# Patient Record
Sex: FEMALE | Race: WHITE | NOT HISPANIC OR LATINO | Employment: OTHER | URBAN - METROPOLITAN AREA
[De-identification: names, ages, dates, MRNs, and addresses within clinical notes are randomized per-mention and may not be internally consistent; named-entity substitution may affect disease eponyms.]

---

## 2023-03-02 PROBLEM — G62.9 NEUROPATHY: Status: ACTIVE | Noted: 2023-03-02

## 2023-03-02 PROBLEM — Z78.0 ASYMPTOMATIC POSTMENOPAUSAL STATUS: Status: ACTIVE | Noted: 2023-03-02

## 2023-03-02 PROBLEM — I70.229 REST PAIN OF LOWER EXTREMITY DUE TO ATHEROSCLEROSIS (MULTI): Status: ACTIVE | Noted: 2023-03-02

## 2023-03-02 PROBLEM — I10 ESSENTIAL HYPERTENSION: Status: ACTIVE | Noted: 2023-03-02

## 2023-03-02 PROBLEM — L02.419 CUTANEOUS ABSCESS OF LIMB, UNSPECIFIED: Status: ACTIVE | Noted: 2023-03-02

## 2023-03-02 PROBLEM — R20.0 LEG NUMBNESS: Status: ACTIVE | Noted: 2023-03-02

## 2023-03-02 PROBLEM — R94.31 ABNORMAL EKG: Status: ACTIVE | Noted: 2023-03-02

## 2023-03-02 PROBLEM — I73.9 PERIPHERAL ARTERY DISEASE (CMS-HCC): Status: ACTIVE | Noted: 2023-03-02

## 2023-03-02 PROBLEM — R20.2 LEG PARESTHESIA: Status: ACTIVE | Noted: 2023-03-02

## 2023-03-02 PROBLEM — R60.0 LEG EDEMA, LEFT: Status: ACTIVE | Noted: 2023-03-02

## 2023-03-02 PROBLEM — L03.119 CELLULITIS, LEG: Status: ACTIVE | Noted: 2023-03-02

## 2023-03-02 PROBLEM — M79.89 SWELLING OF LOWER EXTREMITY: Status: ACTIVE | Noted: 2023-03-02

## 2023-03-02 PROBLEM — E78.5 HYPERLIPEMIA: Status: ACTIVE | Noted: 2023-03-02

## 2023-03-02 RX ORDER — ASPIRIN 81 MG/1
81 TABLET ORAL DAILY
Status: ON HOLD | COMMUNITY
End: 2024-03-20 | Stop reason: SINTOL

## 2023-03-02 RX ORDER — GABAPENTIN 100 MG/1
100 CAPSULE ORAL 3 TIMES DAILY
COMMUNITY
Start: 2022-11-16 | End: 2023-09-27 | Stop reason: ALTCHOICE

## 2023-03-02 RX ORDER — ROSUVASTATIN CALCIUM 5 MG/1
5 TABLET, COATED ORAL DAILY
COMMUNITY
End: 2023-04-26 | Stop reason: SDUPTHER

## 2023-03-02 RX ORDER — CLOPIDOGREL BISULFATE 75 MG/1
75 TABLET ORAL DAILY
COMMUNITY
Start: 2022-05-12 | End: 2023-04-26 | Stop reason: SDUPTHER

## 2023-03-29 ENCOUNTER — OFFICE VISIT (OUTPATIENT)
Dept: PRIMARY CARE | Facility: CLINIC | Age: 78
End: 2023-03-29
Payer: MEDICARE

## 2023-03-29 VITALS
HEIGHT: 65 IN | TEMPERATURE: 98 F | BODY MASS INDEX: 21.49 KG/M2 | WEIGHT: 129 LBS | SYSTOLIC BLOOD PRESSURE: 138 MMHG | OXYGEN SATURATION: 96 % | RESPIRATION RATE: 14 BRPM | DIASTOLIC BLOOD PRESSURE: 60 MMHG | HEART RATE: 70 BPM

## 2023-03-29 DIAGNOSIS — I10 ESSENTIAL HYPERTENSION: ICD-10-CM

## 2023-03-29 DIAGNOSIS — Z72.0 TOBACCO USE: ICD-10-CM

## 2023-03-29 DIAGNOSIS — G62.9 NEUROPATHY: ICD-10-CM

## 2023-03-29 DIAGNOSIS — I83.028: ICD-10-CM

## 2023-03-29 DIAGNOSIS — I73.9 PAD (PERIPHERAL ARTERY DISEASE) (CMS-HCC): ICD-10-CM

## 2023-03-29 DIAGNOSIS — E46 PROTEIN-CALORIE MALNUTRITION, UNSPECIFIED SEVERITY (MULTI): Primary | ICD-10-CM

## 2023-03-29 DIAGNOSIS — Z00.00 PERIODIC HEALTH ASSESSMENT, GENERAL SCREENING, ADULT: ICD-10-CM

## 2023-03-29 DIAGNOSIS — I70.229 REST PAIN OF LOWER EXTREMITY DUE TO ATHEROSCLEROSIS (MULTI): ICD-10-CM

## 2023-03-29 PROBLEM — L03.119 CELLULITIS, LEG: Status: RESOLVED | Noted: 2023-03-02 | Resolved: 2023-03-29

## 2023-03-29 PROBLEM — L02.419 CUTANEOUS ABSCESS OF LIMB, UNSPECIFIED: Status: RESOLVED | Noted: 2023-03-02 | Resolved: 2023-03-29

## 2023-03-29 PROCEDURE — 1160F RVW MEDS BY RX/DR IN RCRD: CPT | Performed by: INTERNAL MEDICINE

## 2023-03-29 PROCEDURE — 99213 OFFICE O/P EST LOW 20 MIN: CPT | Performed by: INTERNAL MEDICINE

## 2023-03-29 PROCEDURE — 1170F FXNL STATUS ASSESSED: CPT | Performed by: INTERNAL MEDICINE

## 2023-03-29 PROCEDURE — 3075F SYST BP GE 130 - 139MM HG: CPT | Performed by: INTERNAL MEDICINE

## 2023-03-29 PROCEDURE — 1157F ADVNC CARE PLAN IN RCRD: CPT | Performed by: INTERNAL MEDICINE

## 2023-03-29 PROCEDURE — 1159F MED LIST DOCD IN RCRD: CPT | Performed by: INTERNAL MEDICINE

## 2023-03-29 PROCEDURE — G0439 PPPS, SUBSEQ VISIT: HCPCS | Performed by: INTERNAL MEDICINE

## 2023-03-29 PROCEDURE — 3078F DIAST BP <80 MM HG: CPT | Performed by: INTERNAL MEDICINE

## 2023-03-29 PROCEDURE — 4004F PT TOBACCO SCREEN RCVD TLK: CPT | Performed by: INTERNAL MEDICINE

## 2023-03-29 ASSESSMENT — ACTIVITIES OF DAILY LIVING (ADL)
DRESSING: INDEPENDENT
BATHING: INDEPENDENT

## 2023-03-29 ASSESSMENT — ENCOUNTER SYMPTOMS
RHINORRHEA: 1
LOSS OF SENSATION IN FEET: 0
DEPRESSION: 0
COUGH: 0
PALPITATIONS: 0
SHORTNESS OF BREATH: 0
CONSTIPATION: 0
OCCASIONAL FEELINGS OF UNSTEADINESS: 0

## 2023-03-29 ASSESSMENT — PATIENT HEALTH QUESTIONNAIRE - PHQ9
SUM OF ALL RESPONSES TO PHQ9 QUESTIONS 1 AND 2: 0
1. LITTLE INTEREST OR PLEASURE IN DOING THINGS: NOT AT ALL
2. FEELING DOWN, DEPRESSED OR HOPELESS: NOT AT ALL

## 2023-03-29 NOTE — PROGRESS NOTES
"Subjective   Patient ID: Mechelle Alcantara is a 77 y.o. female who presents for Medicare Annual Wellness Visit Subsequent (Medicare wellness .) and follow up for PAD.    Overall she has been feeling well.  She has chronic neuropathic like pain in her left LE - primarily her foot.  No claudication symptoms and her pain is getting better.  She also denies any issues with CP, SOB or dizzy spells.      Review of Systems   HENT:  Positive for rhinorrhea.    Respiratory:  Negative for cough and shortness of breath.    Cardiovascular:  Negative for chest pain, palpitations and leg swelling.   Gastrointestinal:  Negative for constipation.     Objective   /60 (BP Location: Left arm, Patient Position: Sitting, BP Cuff Size: Adult)   Pulse 70   Temp 36.7 °C (98 °F) (Tympanic)   Resp 14   Ht 1.651 m (5' 5\")   Wt 58.5 kg (129 lb)   SpO2 96%   BMI 21.47 kg/m²     Physical Exam  Constitutional:       General: She is not in acute distress.     Appearance: Normal appearance. She is not ill-appearing.   HENT:      Head: Normocephalic and atraumatic.      Nose: Nose normal.   Eyes:      Extraocular Movements: Extraocular movements intact.      Conjunctiva/sclera: Conjunctivae normal.      Pupils: Pupils are equal, round, and reactive to light.   Cardiovascular:      Rate and Rhythm: Normal rate.   Pulmonary:      Effort: Pulmonary effort is normal.   Abdominal:      General: There is no distension.   Musculoskeletal:         General: Normal range of motion.      Cervical back: Neck supple.   Skin:     General: Skin is warm and dry.   Neurological:      General: No focal deficit present.      Mental Status: She is alert.      Gait: Gait normal.   Psychiatric:         Mood and Affect: Mood normal.         Behavior: Behavior normal.         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  She denies any current claudication symptoms.  Pain is improving even without gabapentin.  Will continue risk factor treatment.  She is still smoking and we discussed the need to continue to wokr on decreasing amount and hopefully quitting.    Annual Wellness Visit questions and answers were reviewed and discussed including the importance of discussing end of life wishes as well as having a living will and health care power of .     Follow up in 6 months - sooner if any issues.

## 2023-04-26 ENCOUNTER — TELEPHONE (OUTPATIENT)
Dept: PRIMARY CARE | Facility: CLINIC | Age: 78
End: 2023-04-26
Payer: MEDICARE

## 2023-04-26 DIAGNOSIS — I73.9 PERIPHERAL ARTERY DISEASE (CMS-HCC): ICD-10-CM

## 2023-04-26 DIAGNOSIS — E78.5 HYPERLIPIDEMIA, UNSPECIFIED HYPERLIPIDEMIA TYPE: Primary | ICD-10-CM

## 2023-04-26 RX ORDER — CLOPIDOGREL BISULFATE 75 MG/1
75 TABLET ORAL DAILY
Qty: 90 TABLET | Refills: 3 | Status: SHIPPED | OUTPATIENT
Start: 2023-04-26 | End: 2024-04-25

## 2023-04-26 RX ORDER — ROSUVASTATIN CALCIUM 5 MG/1
5 TABLET, COATED ORAL DAILY
Qty: 90 TABLET | Refills: 3 | Status: SHIPPED | OUTPATIENT
Start: 2023-04-26 | End: 2023-04-30 | Stop reason: SDUPTHER

## 2023-04-30 DIAGNOSIS — E78.5 HYPERLIPIDEMIA, UNSPECIFIED HYPERLIPIDEMIA TYPE: ICD-10-CM

## 2023-04-30 RX ORDER — ROSUVASTATIN CALCIUM 5 MG/1
5 TABLET, COATED ORAL DAILY
Qty: 90 TABLET | Refills: 3 | Status: SHIPPED | OUTPATIENT
Start: 2023-04-30 | End: 2024-05-16

## 2023-05-18 ENCOUNTER — LAB (OUTPATIENT)
Dept: LAB | Facility: LAB | Age: 78
End: 2023-05-18
Payer: MEDICARE

## 2023-05-18 DIAGNOSIS — I73.9 PAD (PERIPHERAL ARTERY DISEASE) (CMS-HCC): ICD-10-CM

## 2023-05-18 DIAGNOSIS — I10 ESSENTIAL HYPERTENSION: ICD-10-CM

## 2023-05-18 LAB
ALANINE AMINOTRANSFERASE (SGPT) (U/L) IN SER/PLAS: 10 U/L (ref 7–45)
ALBUMIN (G/DL) IN SER/PLAS: 3.9 G/DL (ref 3.4–5)
ALKALINE PHOSPHATASE (U/L) IN SER/PLAS: 129 U/L (ref 33–136)
ANION GAP IN SER/PLAS: 10 MMOL/L (ref 10–20)
ASPARTATE AMINOTRANSFERASE (SGOT) (U/L) IN SER/PLAS: 15 U/L (ref 9–39)
BILIRUBIN TOTAL (MG/DL) IN SER/PLAS: 0.4 MG/DL (ref 0–1.2)
CALCIUM (MG/DL) IN SER/PLAS: 9.1 MG/DL (ref 8.6–10.3)
CARBON DIOXIDE, TOTAL (MMOL/L) IN SER/PLAS: 27 MMOL/L (ref 21–32)
CHLORIDE (MMOL/L) IN SER/PLAS: 103 MMOL/L (ref 98–107)
CHOLESTEROL (MG/DL) IN SER/PLAS: 137 MG/DL (ref 0–199)
CHOLESTEROL IN HDL (MG/DL) IN SER/PLAS: 80.9 MG/DL
CHOLESTEROL/HDL RATIO: 1.7
CREATININE (MG/DL) IN SER/PLAS: 0.76 MG/DL (ref 0.5–1.05)
ERYTHROCYTE DISTRIBUTION WIDTH (RATIO) BY AUTOMATED COUNT: 13.2 % (ref 11.5–14.5)
ERYTHROCYTE MEAN CORPUSCULAR HEMOGLOBIN CONCENTRATION (G/DL) BY AUTOMATED: 32.2 G/DL (ref 32–36)
ERYTHROCYTE MEAN CORPUSCULAR VOLUME (FL) BY AUTOMATED COUNT: 95 FL (ref 80–100)
ERYTHROCYTES (10*6/UL) IN BLOOD BY AUTOMATED COUNT: 4.16 X10E12/L (ref 4–5.2)
GFR FEMALE: 80 ML/MIN/1.73M2
GLUCOSE (MG/DL) IN SER/PLAS: 92 MG/DL (ref 74–99)
HEMATOCRIT (%) IN BLOOD BY AUTOMATED COUNT: 39.5 % (ref 36–46)
HEMOGLOBIN (G/DL) IN BLOOD: 12.7 G/DL (ref 12–16)
LDL: 42 MG/DL (ref 0–99)
LEUKOCYTES (10*3/UL) IN BLOOD BY AUTOMATED COUNT: 6.2 X10E9/L (ref 4.4–11.3)
NRBC (PER 100 WBCS) BY AUTOMATED COUNT: 0 /100 WBC (ref 0–0)
PLATELETS (10*3/UL) IN BLOOD AUTOMATED COUNT: 381 X10E9/L (ref 150–450)
POTASSIUM (MMOL/L) IN SER/PLAS: 4.4 MMOL/L (ref 3.5–5.3)
PROTEIN TOTAL: 6.5 G/DL (ref 6.4–8.2)
SODIUM (MMOL/L) IN SER/PLAS: 136 MMOL/L (ref 136–145)
TRIGLYCERIDE (MG/DL) IN SER/PLAS: 69 MG/DL (ref 0–149)
UREA NITROGEN (MG/DL) IN SER/PLAS: 17 MG/DL (ref 6–23)
VLDL: 14 MG/DL (ref 0–40)

## 2023-05-18 PROCEDURE — 80061 LIPID PANEL: CPT

## 2023-05-18 PROCEDURE — 85027 COMPLETE CBC AUTOMATED: CPT

## 2023-05-18 PROCEDURE — 36415 COLL VENOUS BLD VENIPUNCTURE: CPT

## 2023-05-18 PROCEDURE — 80053 COMPREHEN METABOLIC PANEL: CPT

## 2023-05-19 ENCOUNTER — TELEPHONE (OUTPATIENT)
Dept: PRIMARY CARE | Facility: CLINIC | Age: 78
End: 2023-05-19
Payer: MEDICARE

## 2023-09-27 ENCOUNTER — OFFICE VISIT (OUTPATIENT)
Dept: PRIMARY CARE | Facility: CLINIC | Age: 78
End: 2023-09-27
Payer: MEDICARE

## 2023-09-27 VITALS
SYSTOLIC BLOOD PRESSURE: 134 MMHG | HEART RATE: 66 BPM | WEIGHT: 126 LBS | DIASTOLIC BLOOD PRESSURE: 80 MMHG | BODY MASS INDEX: 19.78 KG/M2 | RESPIRATION RATE: 14 BRPM | TEMPERATURE: 98 F | OXYGEN SATURATION: 97 % | HEIGHT: 67 IN

## 2023-09-27 DIAGNOSIS — G62.9 NEUROPATHY: ICD-10-CM

## 2023-09-27 DIAGNOSIS — I73.9 PERIPHERAL ARTERY DISEASE (CMS-HCC): Primary | ICD-10-CM

## 2023-09-27 DIAGNOSIS — R73.9 ELEVATED BLOOD SUGAR: ICD-10-CM

## 2023-09-27 DIAGNOSIS — I10 ESSENTIAL HYPERTENSION: ICD-10-CM

## 2023-09-27 DIAGNOSIS — Z00.00 PERIODIC HEALTH ASSESSMENT, GENERAL SCREENING, ADULT: ICD-10-CM

## 2023-09-27 PROCEDURE — 3075F SYST BP GE 130 - 139MM HG: CPT | Performed by: INTERNAL MEDICINE

## 2023-09-27 PROCEDURE — 99213 OFFICE O/P EST LOW 20 MIN: CPT | Performed by: INTERNAL MEDICINE

## 2023-09-27 PROCEDURE — 4004F PT TOBACCO SCREEN RCVD TLK: CPT | Performed by: INTERNAL MEDICINE

## 2023-09-27 PROCEDURE — 1160F RVW MEDS BY RX/DR IN RCRD: CPT | Performed by: INTERNAL MEDICINE

## 2023-09-27 PROCEDURE — 1159F MED LIST DOCD IN RCRD: CPT | Performed by: INTERNAL MEDICINE

## 2023-09-27 PROCEDURE — 3079F DIAST BP 80-89 MM HG: CPT | Performed by: INTERNAL MEDICINE

## 2023-09-27 ASSESSMENT — ENCOUNTER SYMPTOMS
SHORTNESS OF BREATH: 0
EYE PAIN: 1
ABDOMINAL PAIN: 0
PALPITATIONS: 0
COUGH: 0
WHEEZING: 0
EYE REDNESS: 1
CONSTIPATION: 0
VOMITING: 0
DIARRHEA: 0

## 2023-09-27 NOTE — PROGRESS NOTES
"Subjective   Patient ID: Mechelle Alcantara is a 77 y.o. female who presents for Hyperlipidemia.    Hyperlipidemia  Pertinent negatives include no chest pain or shortness of breath.   Has had to cut down to taking medication every other day due to leg pains.  Decreasing dose has in fact helped.    She denies any issues with CP, SOB or dizzy spells.    Unfortunately she continues to smoke and has no desire to quit,.      Review of Systems   Eyes:  Positive for pain and redness.   Respiratory:  Negative for cough, shortness of breath and wheezing.    Cardiovascular:  Negative for chest pain and palpitations.   Gastrointestinal:  Negative for abdominal pain, constipation, diarrhea and vomiting.       Objective   /80 (BP Location: Left arm, Patient Position: Sitting, BP Cuff Size: Adult)   Pulse 66   Temp 36.7 °C (98 °F) (Tympanic)   Resp 14   Ht 1.689 m (5' 6.5\")   Wt 57.2 kg (126 lb)   SpO2 97%   BMI 20.03 kg/m²     Physical Exam  Vitals reviewed.   Constitutional:       Appearance: Normal appearance.   HENT:      Head: Normocephalic.   Cardiovascular:      Rate and Rhythm: Normal rate.   Pulmonary:      Effort: Pulmonary effort is normal.   Musculoskeletal:         General: Normal range of motion.   Neurological:      General: No focal deficit present.      Mental Status: She is alert.   Psychiatric:         Mood and Affect: Mood normal.         Assessment/Plan   Problem List Items Addressed This Visit             ICD-10-CM    Essential hypertension I10    Relevant Orders    Hemoglobin A1C    CBC    Comprehensive Metabolic Panel    Lipid Panel    Thyroid Stimulating Hormone    Neuropathy G62.9    Peripheral artery disease (CMS/HCC) - Primary I73.9    Relevant Orders    Hemoglobin A1C    CBC    Comprehensive Metabolic Panel    Lipid Panel    Thyroid Stimulating Hormone     Other Visit Diagnoses         Codes    Periodic health assessment, general screening, adult     Z00.00    Relevant Orders    Hemoglobin " A1C    CBC    Comprehensive Metabolic Panel    Lipid Panel    Thyroid Stimulating Hormone    Elevated blood sugar     R73.9    Relevant Orders    Hemoglobin A1C        Discussed all of the above.    No claudication or symptoms of PAD.  She does follow with Dr Chamberlain now.    We discussed need to control risks - blood sugars, HTN, HLD and smoking.    We will follow up in 6 months - with labs for reassessment.

## 2023-10-03 ENCOUNTER — TRANSCRIBE ORDERS (OUTPATIENT)
Dept: VASCULAR SURGERY | Facility: CLINIC | Age: 78
End: 2023-10-03
Payer: MEDICARE

## 2023-10-03 DIAGNOSIS — I73.9 PAD (PERIPHERAL ARTERY DISEASE) (CMS-HCC): ICD-10-CM

## 2023-10-03 DIAGNOSIS — R60.9 EDEMA, UNSPECIFIED TYPE: ICD-10-CM

## 2023-10-03 DIAGNOSIS — M79.89 SWELLING OF LEFT LOWER EXTREMITY: ICD-10-CM

## 2023-11-10 ENCOUNTER — OFFICE VISIT (OUTPATIENT)
Dept: PRIMARY CARE | Facility: CLINIC | Age: 78
End: 2023-11-10
Payer: MEDICARE

## 2023-11-10 ENCOUNTER — HOSPITAL ENCOUNTER (EMERGENCY)
Facility: HOSPITAL | Age: 78
Discharge: HOME | End: 2023-11-11
Attending: STUDENT IN AN ORGANIZED HEALTH CARE EDUCATION/TRAINING PROGRAM
Payer: MEDICARE

## 2023-11-10 ENCOUNTER — APPOINTMENT (OUTPATIENT)
Dept: RADIOLOGY | Facility: HOSPITAL | Age: 78
End: 2023-11-10
Payer: MEDICARE

## 2023-11-10 VITALS
OXYGEN SATURATION: 97 % | RESPIRATION RATE: 20 BRPM | BODY MASS INDEX: 20.03 KG/M2 | DIASTOLIC BLOOD PRESSURE: 82 MMHG | SYSTOLIC BLOOD PRESSURE: 128 MMHG | WEIGHT: 126 LBS | TEMPERATURE: 97.8 F | HEART RATE: 80 BPM

## 2023-11-10 DIAGNOSIS — H60.502 ACUTE OTITIS EXTERNA OF LEFT EAR, UNSPECIFIED TYPE: Primary | ICD-10-CM

## 2023-11-10 DIAGNOSIS — H60.22 ACUTE MALIGNANT OTITIS EXTERNA OF LEFT EAR: Primary | ICD-10-CM

## 2023-11-10 LAB
ALBUMIN SERPL BCP-MCNC: 4.1 G/DL (ref 3.4–5)
ALP SERPL-CCNC: 123 U/L (ref 33–136)
ALT SERPL W P-5'-P-CCNC: 8 U/L (ref 7–45)
ANION GAP SERPL CALC-SCNC: 12 MMOL/L (ref 10–20)
AST SERPL W P-5'-P-CCNC: 14 U/L (ref 9–39)
BILIRUB SERPL-MCNC: 0.3 MG/DL (ref 0–1.2)
BUN SERPL-MCNC: 16 MG/DL (ref 6–23)
CALCIUM SERPL-MCNC: 9.1 MG/DL (ref 8.6–10.3)
CHLORIDE SERPL-SCNC: 101 MMOL/L (ref 98–107)
CO2 SERPL-SCNC: 26 MMOL/L (ref 21–32)
CREAT SERPL-MCNC: 0.72 MG/DL (ref 0.5–1.05)
ERYTHROCYTE [DISTWIDTH] IN BLOOD BY AUTOMATED COUNT: 13.6 % (ref 11.5–14.5)
GFR SERPL CREATININE-BSD FRML MDRD: 86 ML/MIN/1.73M*2
GLUCOSE SERPL-MCNC: 118 MG/DL (ref 74–99)
HCT VFR BLD AUTO: 42.3 % (ref 36–46)
HGB BLD-MCNC: 13.8 G/DL (ref 12–16)
MCH RBC QN AUTO: 31 PG (ref 26–34)
MCHC RBC AUTO-ENTMCNC: 32.6 G/DL (ref 32–36)
MCV RBC AUTO: 95 FL (ref 80–100)
NRBC BLD-RTO: 0 /100 WBCS (ref 0–0)
PLATELET # BLD AUTO: 364 X10*3/UL (ref 150–450)
POTASSIUM SERPL-SCNC: 3.9 MMOL/L (ref 3.5–5.3)
PROT SERPL-MCNC: 7.5 G/DL (ref 6.4–8.2)
RBC # BLD AUTO: 4.45 X10*6/UL (ref 4–5.2)
SODIUM SERPL-SCNC: 135 MMOL/L (ref 136–145)
WBC # BLD AUTO: 7.8 X10*3/UL (ref 4.4–11.3)

## 2023-11-10 PROCEDURE — 99214 OFFICE O/P EST MOD 30 MIN: CPT | Performed by: NURSE PRACTITIONER

## 2023-11-10 PROCEDURE — 84520 ASSAY OF UREA NITROGEN: CPT | Performed by: STUDENT IN AN ORGANIZED HEALTH CARE EDUCATION/TRAINING PROGRAM

## 2023-11-10 PROCEDURE — 4004F PT TOBACCO SCREEN RCVD TLK: CPT | Performed by: NURSE PRACTITIONER

## 2023-11-10 PROCEDURE — 3079F DIAST BP 80-89 MM HG: CPT | Performed by: NURSE PRACTITIONER

## 2023-11-10 PROCEDURE — 36415 COLL VENOUS BLD VENIPUNCTURE: CPT | Performed by: STUDENT IN AN ORGANIZED HEALTH CARE EDUCATION/TRAINING PROGRAM

## 2023-11-10 PROCEDURE — 99285 EMERGENCY DEPT VISIT HI MDM: CPT | Performed by: STUDENT IN AN ORGANIZED HEALTH CARE EDUCATION/TRAINING PROGRAM

## 2023-11-10 PROCEDURE — 84075 ASSAY ALKALINE PHOSPHATASE: CPT | Performed by: STUDENT IN AN ORGANIZED HEALTH CARE EDUCATION/TRAINING PROGRAM

## 2023-11-10 PROCEDURE — 99284 EMERGENCY DEPT VISIT MOD MDM: CPT | Mod: 25 | Performed by: STUDENT IN AN ORGANIZED HEALTH CARE EDUCATION/TRAINING PROGRAM

## 2023-11-10 PROCEDURE — 85027 COMPLETE CBC AUTOMATED: CPT | Performed by: STUDENT IN AN ORGANIZED HEALTH CARE EDUCATION/TRAINING PROGRAM

## 2023-11-10 PROCEDURE — 2500000004 HC RX 250 GENERAL PHARMACY W/ HCPCS (ALT 636 FOR OP/ED): Performed by: STUDENT IN AN ORGANIZED HEALTH CARE EDUCATION/TRAINING PROGRAM

## 2023-11-10 PROCEDURE — 70481 CT ORBIT/EAR/FOSSA W/DYE: CPT | Performed by: STUDENT IN AN ORGANIZED HEALTH CARE EDUCATION/TRAINING PROGRAM

## 2023-11-10 PROCEDURE — 99285 EMERGENCY DEPT VISIT HI MDM: CPT | Mod: 25 | Performed by: STUDENT IN AN ORGANIZED HEALTH CARE EDUCATION/TRAINING PROGRAM

## 2023-11-10 PROCEDURE — 3074F SYST BP LT 130 MM HG: CPT | Performed by: NURSE PRACTITIONER

## 2023-11-10 PROCEDURE — 96365 THER/PROPH/DIAG IV INF INIT: CPT | Mod: 59 | Performed by: STUDENT IN AN ORGANIZED HEALTH CARE EDUCATION/TRAINING PROGRAM

## 2023-11-10 PROCEDURE — 70481 CT ORBIT/EAR/FOSSA W/DYE: CPT

## 2023-11-10 PROCEDURE — 1159F MED LIST DOCD IN RCRD: CPT | Performed by: NURSE PRACTITIONER

## 2023-11-10 PROCEDURE — 2550000001 HC RX 255 CONTRASTS: Performed by: STUDENT IN AN ORGANIZED HEALTH CARE EDUCATION/TRAINING PROGRAM

## 2023-11-10 PROCEDURE — 1160F RVW MEDS BY RX/DR IN RCRD: CPT | Performed by: NURSE PRACTITIONER

## 2023-11-10 RX ORDER — CIPROFLOXACIN AND DEXAMETHASONE 3; 1 MG/ML; MG/ML
4 SUSPENSION/ DROPS AURICULAR (OTIC) 2 TIMES DAILY
Qty: 7.5 ML | Refills: 0 | Status: SHIPPED | OUTPATIENT
Start: 2023-11-10 | End: 2024-03-06 | Stop reason: ALTCHOICE

## 2023-11-10 RX ORDER — AMOXICILLIN AND CLAVULANATE POTASSIUM 875; 125 MG/1; MG/1
1 TABLET, FILM COATED ORAL EVERY 12 HOURS
Qty: 14 TABLET | Refills: 0 | Status: SHIPPED | OUTPATIENT
Start: 2023-11-10 | End: 2023-11-17

## 2023-11-10 RX ADMIN — PIPERACILLIN SODIUM AND TAZOBACTAM SODIUM 3.38 G: 3; .375 INJECTION, SOLUTION INTRAVENOUS at 20:54

## 2023-11-10 RX ADMIN — IOHEXOL 60 ML: 350 INJECTION, SOLUTION INTRAVENOUS at 22:13

## 2023-11-10 ASSESSMENT — LIFESTYLE VARIABLES
EVER FELT BAD OR GUILTY ABOUT YOUR DRINKING: NO
EVER HAD A DRINK FIRST THING IN THE MORNING TO STEADY YOUR NERVES TO GET RID OF A HANGOVER: NO
HAVE PEOPLE ANNOYED YOU BY CRITICIZING YOUR DRINKING: NO
HAVE YOU EVER FELT YOU SHOULD CUT DOWN ON YOUR DRINKING: NO
REASON UNABLE TO ASSESS: NO

## 2023-11-10 ASSESSMENT — COLUMBIA-SUICIDE SEVERITY RATING SCALE - C-SSRS
6. HAVE YOU EVER DONE ANYTHING, STARTED TO DO ANYTHING, OR PREPARED TO DO ANYTHING TO END YOUR LIFE?: NO
2. HAVE YOU ACTUALLY HAD ANY THOUGHTS OF KILLING YOURSELF?: NO
1. IN THE PAST MONTH, HAVE YOU WISHED YOU WERE DEAD OR WISHED YOU COULD GO TO SLEEP AND NOT WAKE UP?: NO

## 2023-11-10 ASSESSMENT — PAIN - FUNCTIONAL ASSESSMENT: PAIN_FUNCTIONAL_ASSESSMENT: 0-10

## 2023-11-10 ASSESSMENT — PAIN DESCRIPTION - ORIENTATION: ORIENTATION: LEFT

## 2023-11-10 ASSESSMENT — PAIN SCALES - GENERAL
PAINLEVEL_OUTOF10: 6
PAINLEVEL_OUTOF10: 5 - MODERATE PAIN

## 2023-11-10 ASSESSMENT — PAIN DESCRIPTION - LOCATION: LOCATION: EAR

## 2023-11-10 NOTE — PROGRESS NOTES
Subjective   Patient ID: Mechelle Alcantara is a 78 y.o. female who presents for Earache.  Earache   There is pain in the left ear. This is a new problem. The current episode started in the past 7 days. The problem occurs constantly. The problem has been unchanged. There has been no fever. The pain is severe. Associated symptoms include ear discharge. Treatments tried: peroxide, alcohol and vinegar. The treatment provided no relief.    Review of Systems   HENT:  Positive for ear discharge and ear pain.    Objective   /82   Pulse 80   Temp 36.6 °C (97.8 °F) (Temporal)   Resp 20   Wt 57.2 kg (126 lb)   SpO2 97%   BMI 20.03 kg/m²   Physical Exam  Constitutional:       Appearance: Normal appearance.   HENT:      Head: Normocephalic.      Comments: Tenderness on palpation of left temporal area, but no erythema, warmth in that area.     Left Ear: Drainage (purulent), swelling and tenderness present. No mastoid tenderness.      Ears:        Comments: Edema, erythema, warmth of tragus, lower auricle. Also pre-auricular cellulitis, tenderness of skull above auricle.     Nose: Nose normal. No congestion or rhinorrhea.      Mouth/Throat:      Mouth: Mucous membranes are moist.   Cardiovascular:      Rate and Rhythm: Normal rate and regular rhythm.      Pulses: Normal pulses.      Heart sounds: No murmur heard.  Pulmonary:      Effort: Pulmonary effort is normal.      Breath sounds: Normal breath sounds.   Skin:     General: Skin is warm and dry.      Findings: Erythema (left pre-auricular erythema, warmth, inflammation) present.   Neurological:      General: No focal deficit present.      Mental Status: She is alert and oriented to person, place, and time.   Psychiatric:         Mood and Affect: Mood normal.         Behavior: Behavior normal.     Assessment/Plan   1. 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 will need left temporal CT to eval for possible malignant otitis externa. Likely needs IV  antibiotics.      Pt. Is hemodynamically stable and is not diabetic.  Discussed w/ Dr. Shubham Morris (ENT). The patient was sent to [facility] ER for evaluation of severe otitis externa with concern for malignant OE.

## 2023-11-11 VITALS
RESPIRATION RATE: 18 BRPM | HEART RATE: 78 BPM | DIASTOLIC BLOOD PRESSURE: 77 MMHG | BODY MASS INDEX: 20.99 KG/M2 | HEIGHT: 65 IN | OXYGEN SATURATION: 98 % | SYSTOLIC BLOOD PRESSURE: 178 MMHG | WEIGHT: 126 LBS | TEMPERATURE: 97 F

## 2023-11-11 NOTE — ED PROVIDER NOTES
HPI   Chief Complaint   Patient presents with    Earache     L ear pain since last weekend but worsened yesterday; sent by urgent care NP for possible CT d/t ear infection.       HPI    78-year-old female presents emergency room with chief complaint of earache.  Patient indicates that since Sunday she developed some ear pain on her left ear she attempted home remedies which include vinegar and alcohol and Tuesday she switched to hydroperoxide and water.  She indicates that last night she had terrible pain and subjective fever with purulent discharge she also claims that she has difficulty with chewing.  PMHx: Elevated cholesterol  PSHx: Appendectomy tonsillectomy  FHx: No relevant family history  SocHX:     EtOH:  none      Tobacco: 10 cigarettes a day      Other illicits:  none  Allergies: Fish containing products shellfish containing products.      ROS  Constitutional:      Denies: Fever, fatigue     Reports: None  Neuro:      Denies: Headache, numbness, tingling     Reports: None  Psych:      Denies: anorexia, SI, HI     Reports: None  HEENT:      Denies: vision change, congestion, sore throat     Reports: None  Cardiovascular:      Denies: Chest pain, palpitations, orthopnea     Reports: None  Pulmonary:      Denies: shortness of breath, cough, hemoptysis     Reports: None  Gastrointestinal:      Denies: Abdominal pain, nausea, vomiting, constipation, diarrhea, bright red stools, black stools     Reports: None  Genitourinary:      Denies: Flank pain, painful urination, frequent urination, discharge     Reports: None  Musculoskeletal:     Denies: loss of ROM, extremity pain, back pain     Reports: None  Integumentary:     Denies: Rash, itching     Reports: None    PHYSICAL EXAM  Constitutional: Well appearing, no acute distress, oriented to person/place/time  Psych: Age appropriate insight, judgement, no psychomotor agitation  Neuro: GCS 15, spontaneously moves all extremities  Head: Atraumatic, no salas sign,  no raccoon eyes  Eyes: Spontaneously open, PERRL, EOMI, no scleral icterus or conjunctival injection  ENT: Purulent discharge from the left ear.  As well as an erythematous streak moving proximal from the ear towards the face.  Patient has severe tenderness to palpitation of the mastoid process and has extreme pain with any manipulation of the ear.  Tympanic membrane was not able to be visualized due to swelling and purulent material.    Neck: Supple, nontender, no ROM restriction, no JVD  Respiratory:  respirations nonlabored, equal chest rise, no wheezes rales or rhonchi  Cardiovascular: Regular rate and rhythm,   distal pulses 2+ symmetric  Gastrointestinal: No gross deformation, normal bowel sounds, soft, nontender, nondistended  Musculoskeletal: Normal Appearance, Full ROM of extremities, no tenderness, no edema  Integumentary: Warm, dry, no rashes    MDM/ED Course  78-year-old female presents emergency room chief complaint of an earache.  Medical management and treatment in the emergency room will consist of CT scan of the head with contrast in the ear canal.  Most likely be placed on antibiotics here in the ED. patient was given a dose of antibiotics here, Zosyn.  Patient will be discharged with instructions to follow-up with the ENT specialist as well as antibiotics including Augmentin and Ciprodex.  An ear wick was placed for proper drainage and antibiotic placement.      I reviewed the case with the attending ED physician. The attending ED physician agrees with the plan. Patient and/or patient´s representative was counseled regarding labs, imaging, likely diagnosis, and plan. All questions were answered.  Eliot Friend MD  PGY-1  Emergency Medicine      Please excuse any misspellings or unintended errors related to the Dragon speech recognition software used to dictate this note.                   Kuna Coma Scale Score: 15                  Patient History   No past medical history on file.  No past  surgical history on file.  Family History   Problem Relation Name Age of Onset    Heart failure Father       Social History     Tobacco Use    Smoking status: Every Day     Packs/day: .25     Types: Cigarettes    Smokeless tobacco: Never   Vaping Use    Vaping Use: Never used   Substance Use Topics    Alcohol use: Never    Drug use: Never       Physical Exam   ED Triage Vitals [11/10/23 1807]   Temp Heart Rate Resp BP   35.6 °C (96.1 °F) 88 18 (!) 186/81      SpO2 Temp Source Heart Rate Source Patient Position   97 % Temporal -- --      BP Location FiO2 (%)     -- --       Physical Exam    ED Course & MDM   Diagnoses as of 11/10/23 2349   Acute otitis externa of left ear, unspecified type       Medical Decision Making      Procedure  Procedures     Eliot Friend MD  Resident  11/10/23 3219

## 2023-11-11 NOTE — DISCHARGE INSTRUCTIONS
You are seen in the emergency room today for otitis externa.  We have sent prescription antibiotics to [facility] in [place] include an oral tablet Augmentin as well as drops for your ear that are antibiotics Ciprodex.  Please take these to completion.  Please follow-up with your primary care and Dr. Morris.  If your ear does not progressively get better or you develop any new fevers pain anterior jaw, nausea, vomiting or any other concerning symptoms please come back to emergency room soon as possible.

## 2023-11-28 ENCOUNTER — OFFICE VISIT (OUTPATIENT)
Dept: OTOLARYNGOLOGY | Facility: CLINIC | Age: 78
End: 2023-11-28
Payer: MEDICARE

## 2023-11-28 VITALS
BODY MASS INDEX: 21.76 KG/M2 | HEIGHT: 65 IN | WEIGHT: 130.6 LBS | DIASTOLIC BLOOD PRESSURE: 79 MMHG | TEMPERATURE: 97.3 F | SYSTOLIC BLOOD PRESSURE: 184 MMHG

## 2023-11-28 DIAGNOSIS — H92.02 LEFT EAR PAIN: Primary | ICD-10-CM

## 2023-11-28 DIAGNOSIS — H60.392 INFECTIVE OTITIS EXTERNA OF LEFT EAR: ICD-10-CM

## 2023-11-28 PROCEDURE — 1160F RVW MEDS BY RX/DR IN RCRD: CPT | Performed by: OTOLARYNGOLOGY

## 2023-11-28 PROCEDURE — 3078F DIAST BP <80 MM HG: CPT | Performed by: OTOLARYNGOLOGY

## 2023-11-28 PROCEDURE — 1159F MED LIST DOCD IN RCRD: CPT | Performed by: OTOLARYNGOLOGY

## 2023-11-28 PROCEDURE — 4004F PT TOBACCO SCREEN RCVD TLK: CPT | Performed by: OTOLARYNGOLOGY

## 2023-11-28 PROCEDURE — 99203 OFFICE O/P NEW LOW 30 MIN: CPT | Performed by: OTOLARYNGOLOGY

## 2023-11-28 PROCEDURE — 3077F SYST BP >= 140 MM HG: CPT | Performed by: OTOLARYNGOLOGY

## 2023-11-28 PROCEDURE — 1125F AMNT PAIN NOTED PAIN PRSNT: CPT | Performed by: OTOLARYNGOLOGY

## 2023-11-28 NOTE — PROGRESS NOTES
Impression:  1. Left ear pain        2. Infective otitis externa of left ear              RECOMMENDATIONS/PLAN :  I reassured the patient that her ears appear normal today.  She can stop her antibiotic eardrops for now.  I want her to avoid sticking her finger in her ears or using Q-tips.  She must keep all water out of her ears as well.  If she starts developing pressure within the ears, I want her to use Flonase nasal spray-2 puffs each nostril daily.      **This electronic medical record note was created with the use of voice recognition software.  Despite proofreading, typographical or grammatical errors may be present that could affect meaning of content **    Subjective   Patient ID:     Mechelle Alcantara is a 78 y.o. female who presents to the office today after she recently had an outer ear infection on that left side.  She was seen through the emergency department and placed on antibiotic eardrops.  She is doing much better now.  She denies any fever chills or any upper respiratory complaints nasal congestion or significant postnasal drip.  Sometimes her left ear feels slightly muffled.  No vertigo or neurologic complaints.    ROS:  A detailed 12 system review of systems is noted on the intake form has been reviewed with the patient with details noted in the HPI and scanned into the patient's medical record.    Objective     No past medical history on file.     No past surgical history on file.     Allergies   Allergen Reactions    Fish Containing Products Anaphylaxis    Other Hives     IV Contrast Dye    Shellfish Containing Products Anaphylaxis          Current Outpatient Medications:     ciprofloxacin-dexamethasone (Ciprodex) otic suspension, Administer 4 drops into the left ear 2 times a day., Disp: 7.5 mL, Rfl: 0    rosuvastatin (Crestor) 5 mg tablet, Take 1 tablet (5 mg) by mouth once daily., Disp: 90 tablet, Rfl: 3    aspirin 81 mg EC tablet, Take 1 tablet (81 mg) by mouth once daily., Disp: , Rfl:  "    clopidogrel (Plavix) 75 mg tablet, Take 1 tablet (75 mg) by mouth once daily. (Patient not taking: Reported on 11/28/2023), Disp: 90 tablet, Rfl: 3     Tobacco Use: High Risk (11/28/2023)    Patient History     Smoking Tobacco Use: Every Day     Smokeless Tobacco Use: Never     Passive Exposure: Not on file        Alcohol Use: Not on file        Social History     Substance and Sexual Activity   Drug Use Never        Physical Exam:  Visit Vitals  BP (!) 184/79   Temp 36.3 °C (97.3 °F) (Temporal)   Ht 1.651 m (5' 5\")   Wt 59.2 kg (130 lb 9.6 oz)   BMI 21.73 kg/m²   OB Status Postmenopausal   Smoking Status Every Day   BSA 1.65 m²      General: Patient is alert, oriented, cooperative in no apparent distress.  Head: Normocephalic, atraumatic.  Eyes: PERRL, EOMI, Conjunctiva is clear. No nystagmus.  Ears: Right Ear-- Pinna is normal.  External auditory canal is patent. Tympanic membrane is [intact, translucent and has good mobility with my pneumatic otoscope. No effusion].  Mastoid is nontender.  Left ear-- Pinna is normal.  External auditory canal is patent no evidence of any residual inflammation or drainage.. Tympanic membrane is [intact, translucent and has good mobility with my pneumatic otoscope.  No effusion].  Mastoid is nontender.  Nose: Septum is deviated to the right.  No septal perforation or lesions. No septal hematoma/ seroma.  No signs of bleeding.  Inferior turbinates are mildly swollen.   No evidence of intranasal polyps.  No infectious drainage.  Throat:  Floor of mouth is clear, no masses.  Tongue appears normal, no lesions or masses. Gums, gingiva, buccal mucosa appear pink and moist, no lesions. Teeth are in fair repair.  No obvious dental infections.  Peritonsillar regions appear symmetric without swelling.  Hard and soft palate appear normal, no obvious cleft. Uvula is midline.  Oropharynx: No lesions. Retropharyngeal wall is flat.  No active postnasal drip.  Neck: Supple,  no lymphadenopathy. "  No masses.  Salivary Glands: Symmetric bilaterally.  No palpable masses.  No evidence of acute infection or salivary stones  Neurologic: Cranial Nerves 2-12 are grossly intact without focal deficits. Cerebellar function testing is normal.     Results:   []    Procedure:   []    Phani Russell, DO

## 2024-03-06 ENCOUNTER — OFFICE VISIT (OUTPATIENT)
Dept: PRIMARY CARE | Facility: CLINIC | Age: 79
End: 2024-03-06
Payer: MEDICARE

## 2024-03-06 ENCOUNTER — TELEPHONE (OUTPATIENT)
Dept: PRIMARY CARE | Facility: CLINIC | Age: 79
End: 2024-03-06

## 2024-03-06 VITALS
BODY MASS INDEX: 22.33 KG/M2 | HEIGHT: 65 IN | TEMPERATURE: 97.5 F | HEART RATE: 80 BPM | WEIGHT: 134 LBS | DIASTOLIC BLOOD PRESSURE: 80 MMHG | OXYGEN SATURATION: 98 % | RESPIRATION RATE: 14 BRPM | SYSTOLIC BLOOD PRESSURE: 140 MMHG

## 2024-03-06 DIAGNOSIS — E46 PROTEIN-CALORIE MALNUTRITION, UNSPECIFIED SEVERITY (MULTI): ICD-10-CM

## 2024-03-06 DIAGNOSIS — L03.116 CELLULITIS OF LEFT LOWER EXTREMITY: ICD-10-CM

## 2024-03-06 DIAGNOSIS — I83.028: ICD-10-CM

## 2024-03-06 DIAGNOSIS — L98.492 SKIN ULCER WITH FAT LAYER EXPOSED (MULTI): Primary | ICD-10-CM

## 2024-03-06 DIAGNOSIS — I70.229 REST PAIN OF LOWER EXTREMITY DUE TO ATHEROSCLEROSIS (MULTI): ICD-10-CM

## 2024-03-06 DIAGNOSIS — I73.9 PAD (PERIPHERAL ARTERY DISEASE) (CMS-HCC): ICD-10-CM

## 2024-03-06 PROCEDURE — 1158F ADVNC CARE PLAN TLK DOCD: CPT | Performed by: INTERNAL MEDICINE

## 2024-03-06 PROCEDURE — G0439 PPPS, SUBSEQ VISIT: HCPCS | Performed by: INTERNAL MEDICINE

## 2024-03-06 PROCEDURE — 1159F MED LIST DOCD IN RCRD: CPT | Performed by: INTERNAL MEDICINE

## 2024-03-06 PROCEDURE — 99214 OFFICE O/P EST MOD 30 MIN: CPT | Performed by: INTERNAL MEDICINE

## 2024-03-06 PROCEDURE — 1123F ACP DISCUSS/DSCN MKR DOCD: CPT | Performed by: INTERNAL MEDICINE

## 2024-03-06 PROCEDURE — 3079F DIAST BP 80-89 MM HG: CPT | Performed by: INTERNAL MEDICINE

## 2024-03-06 PROCEDURE — 1157F ADVNC CARE PLAN IN RCRD: CPT | Performed by: INTERNAL MEDICINE

## 2024-03-06 PROCEDURE — 1160F RVW MEDS BY RX/DR IN RCRD: CPT | Performed by: INTERNAL MEDICINE

## 2024-03-06 PROCEDURE — 1170F FXNL STATUS ASSESSED: CPT | Performed by: INTERNAL MEDICINE

## 2024-03-06 PROCEDURE — 3077F SYST BP >= 140 MM HG: CPT | Performed by: INTERNAL MEDICINE

## 2024-03-06 PROCEDURE — 1125F AMNT PAIN NOTED PAIN PRSNT: CPT | Performed by: INTERNAL MEDICINE

## 2024-03-06 RX ORDER — DOXYCYCLINE 100 MG/1
100 CAPSULE ORAL 2 TIMES DAILY
Qty: 28 CAPSULE | Refills: 0 | Status: SHIPPED
Start: 2024-03-06 | End: 2024-03-23 | Stop reason: HOSPADM

## 2024-03-06 ASSESSMENT — ENCOUNTER SYMPTOMS
WEAKNESS: 1
WOUND: 1
SHORTNESS OF BREATH: 0
DIARRHEA: 0
CONSTIPATION: 0
NAUSEA: 0
WHEEZING: 0
PALPITATIONS: 0
COUGH: 0
NUMBNESS: 1
MYALGIAS: 1

## 2024-03-06 ASSESSMENT — ACTIVITIES OF DAILY LIVING (ADL)
DRESSING: INDEPENDENT
GROCERY_SHOPPING: TOTAL CARE
DOING_HOUSEWORK: TOTAL CARE
TAKING_MEDICATION: TOTAL CARE
BATHING: INDEPENDENT
MANAGING_FINANCES: TOTAL CARE

## 2024-03-06 NOTE — PROGRESS NOTES
"Subjective   Patient ID: Mechelle Alcantara is a 78 y.o. female who presents for Medicare Annual Wellness Visit Subsequent ( Possible  infection.).    Present with red leg and ulcer lateral aspect of her left lower leg.  No F/C/S.  Some pain in the leg (most of which is chronic neuropathy).  Feels well otherwise.    History of PAD.  She did stop her aspirin some time ago.      Review of Systems   Respiratory:  Negative for cough, shortness of breath and wheezing.    Cardiovascular:  Negative for chest pain and palpitations.   Gastrointestinal:  Negative for constipation, diarrhea and nausea.   Musculoskeletal:  Positive for myalgias.   Skin:  Positive for wound.        Bi-lat lower   Neurological:  Positive for weakness and numbness.     Objective   /80 (BP Location: Left arm, Patient Position: Sitting, BP Cuff Size: Adult)   Pulse 80   Temp 36.4 °C (97.5 °F) (Tympanic)   Resp 14   Ht 1.651 m (5' 5\")   Wt 60.8 kg (134 lb)   SpO2 98%   BMI 22.30 kg/m²     Physical Exam  Vitals reviewed.   Constitutional:       Appearance: Normal appearance.   HENT:      Head: Normocephalic.   Cardiovascular:      Rate and Rhythm: Normal rate.   Pulmonary:      Effort: Pulmonary effort is normal.   Musculoskeletal:         General: Normal range of motion.   Skin:     General: Skin is warm and dry.      Comments: Dry flaking skin.  Good capillary refill.  No pain to touch.  Ulcer on lateral leg.  Same place where her previous arterial insufficiency ulcer was.     Neurological:      General: No focal deficit present.      Mental Status: She is alert.   Psychiatric:         Mood and Affect: Mood normal.         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    Ulcer with cellulitis.  No systemic symptoms.  We will start her on oral antibiotic.  We will arrange her to see the wound center to assist with the ulcer care.  We will have her restart her baby aspirin and crestor.  We will have her see Dr Cordero again as well.    If symptoms worsen she should go to the ER.   Annual Wellness Visit questions and answers were reviewed and discussed including the importance of discussing end of life wishes as well as having a living will and health care power of .     We will see her back in 1 months - sooner if any issue.

## 2024-03-12 ENCOUNTER — OFFICE VISIT (OUTPATIENT)
Dept: WOUND CARE | Facility: CLINIC | Age: 79
End: 2024-03-12
Payer: MEDICARE

## 2024-03-12 DIAGNOSIS — L98.492 SKIN ULCER WITH FAT LAYER EXPOSED (MULTI): ICD-10-CM

## 2024-03-12 PROCEDURE — 11042 DBRDMT SUBQ TIS 1ST 20SQCM/<: CPT

## 2024-03-12 PROCEDURE — 99213 OFFICE O/P EST LOW 20 MIN: CPT

## 2024-03-14 ENCOUNTER — TELEPHONE (OUTPATIENT)
Dept: VASCULAR SURGERY | Facility: HOSPITAL | Age: 79
End: 2024-03-14
Payer: MEDICARE

## 2024-03-14 NOTE — TELEPHONE ENCOUNTER
I have attempted to contact   Mrs. Alcantara     . There is no answer at the following phone number   [phone] or [phone]       . I have left a voice mail message for the patient to contact Dr. Chamberlain * office nurse at [phone]   Mechelle Childers RN BSN

## 2024-03-15 ENCOUNTER — TELEPHONE (OUTPATIENT)
Dept: VASCULAR SURGERY | Facility: CLINIC | Age: 79
End: 2024-03-15

## 2024-03-15 ENCOUNTER — PREP FOR PROCEDURE (OUTPATIENT)
Dept: VASCULAR SURGERY | Facility: CLINIC | Age: 79
End: 2024-03-15

## 2024-03-15 ENCOUNTER — HOSPITAL ENCOUNTER (OUTPATIENT)
Facility: HOSPITAL | Age: 79
Setting detail: OUTPATIENT SURGERY
End: 2024-03-15
Attending: SURGERY | Admitting: SURGERY
Payer: MEDICARE

## 2024-03-15 ENCOUNTER — TELEMEDICINE (OUTPATIENT)
Dept: VASCULAR SURGERY | Facility: CLINIC | Age: 79
End: 2024-03-15
Payer: MEDICARE

## 2024-03-15 DIAGNOSIS — I73.9 PAD (PERIPHERAL ARTERY DISEASE) (CMS-HCC): Primary | ICD-10-CM

## 2024-03-15 DIAGNOSIS — I73.9 PAD (PERIPHERAL ARTERY DISEASE) (CMS-HCC): ICD-10-CM

## 2024-03-15 DIAGNOSIS — I70.229: ICD-10-CM

## 2024-03-15 DIAGNOSIS — L98.492 SKIN ULCER WITH FAT LAYER EXPOSED (MULTI): ICD-10-CM

## 2024-03-15 PROCEDURE — 1159F MED LIST DOCD IN RCRD: CPT | Performed by: SURGERY

## 2024-03-15 PROCEDURE — 99214 OFFICE O/P EST MOD 30 MIN: CPT | Performed by: SURGERY

## 2024-03-15 PROCEDURE — 1123F ACP DISCUSS/DSCN MKR DOCD: CPT | Performed by: SURGERY

## 2024-03-15 PROCEDURE — 1160F RVW MEDS BY RX/DR IN RCRD: CPT | Performed by: SURGERY

## 2024-03-15 PROCEDURE — 1157F ADVNC CARE PLAN IN RCRD: CPT | Performed by: SURGERY

## 2024-03-15 RX ORDER — SODIUM CHLORIDE 9 MG/ML
100 INJECTION, SOLUTION INTRAVENOUS CONTINUOUS
Status: CANCELLED | OUTPATIENT
Start: 2024-03-15

## 2024-03-15 RX ORDER — PREDNISONE 50 MG/1
TABLET ORAL
Qty: 3 TABLET | Refills: 0 | Status: SHIPPED
Start: 2024-03-15 | End: 2024-03-23 | Stop reason: HOSPADM

## 2024-03-15 RX ORDER — DIPHENHYDRAMINE HCL 50 MG
CAPSULE ORAL
Qty: 1 CAPSULE | Refills: 0 | Status: SHIPPED
Start: 2024-03-15 | End: 2024-03-23 | Stop reason: HOSPADM

## 2024-03-15 NOTE — PROGRESS NOTES
Vascular Surgery Clinic Note    CC: wound left leg    HPI:  Mechelle Alcantara is 78 y.o. female with history of PAD and left leg wounds. KATHLEEN at wound center were 0.3 and wound on the left shin has not healed. Her  says she has had discoloration of left toes and pain in her foot lately.      Medical History:   has no past medical history on file.    Meds:   Current Outpatient Medications on File Prior to Visit   Medication Sig Dispense Refill    aspirin 81 mg EC tablet Take 1 tablet (81 mg) by mouth once daily.      clopidogrel (Plavix) 75 mg tablet Take 1 tablet (75 mg) by mouth once daily. 90 tablet 3    doxycycline (Vibramycin) 100 mg capsule Take 1 capsule (100 mg) by mouth 2 times a day for 14 days. Take with at least 8 ounces (large glass) of water, do not lie down for 30 minutes after 28 capsule 0    rosuvastatin (Crestor) 5 mg tablet Take 1 tablet (5 mg) by mouth once daily. 90 tablet 3     No current facility-administered medications on file prior to visit.        Allergies:   Allergies   Allergen Reactions    Fish Containing Products Anaphylaxis    Other Hives     IV Contrast Dye    Shellfish Containing Products Anaphylaxis       SH:    Social Determinants of Health     Tobacco Use: High Risk (3/6/2024)    Patient History     Smoking Tobacco Use: Every Day     Smokeless Tobacco Use: Never     Passive Exposure: Not on file   Alcohol Use: Not on file   Financial Resource Strain: Not on file   Food Insecurity: Not on file   Transportation Needs: Not on file   Physical Activity: Not on file   Stress: Not on file   Social Connections: Not on file   Intimate Partner Violence: Not on file   Depression: Not at risk (3/6/2024)    PHQ-2     PHQ-2 Score: 0   Housing Stability: Not on file   Utilities: Not on file   [employer]: Not on file        FH:  Family History   Problem Relation Name Age of Onset    Heart failure Father          ROS:  All systems were reviewed and are negative except as per  HPI.    Objective:  Vitals:  There were no vitals filed for this visit.     Assessment & Plan:  Mechelle Alcantara is 78 y.o. female with severe left leg PAD. Proceed with left leg angiogram next week.      I spent a total of 30 minutes on the day of the visit.         Negrito Chamberlain M.D.

## 2024-03-15 NOTE — TELEPHONE ENCOUNTER
Dear Mr. Mechelle Alcantara       Following review with Dr. Negrito Chamberlain, you have been scheduled for a vascular procedure on 3/19/2024 at  [facility]      Prior to your surgery please obtain the following blood work (orders entered into the electronic medical record) at least  one week before the planned operation..Blood work must be completed in the hospital lab, located on the first floor of [facility] near registration.       I have reviewed with the patient's   Mrs. Alcantara's  allergy to IVP dye    The following medications have been ordered and reviewed with the patient'e    Prednisone 50 mg one tablet PO orally 13 hours prior to procedure    Prednisone 50 mg one tablet PO orally 7 hours prior to procedure   Prednisone 50 mg one tablet PO orally 1 hour prior to procedure      Benadryl 50 mg one tablet PO one hour prior to procedure      Please remember nothing to eat nor drink after 12 midnight.   Please wear comfortable clothes and remember to bring with you your insurance cards, a form of identification and your COVID vaccination card with you.   Do not bring valuables such as credit cards, checkbook money or jewelry with you.     PLEASE BRING ALL MEDICATIONS OR AN UPDATED LIST OF CURRENT MEDICATIONS WITH YOU    You must have a  on the day of the procedure.   Directions to the medical center have been provided     The patient's    has verbalized an understanding of the instructions.   If you have any questions or concerns, please feel free to contact our office at [phone].    Mechelle Childers RN BSN  Vascular and Endovascular Surgery

## 2024-03-15 NOTE — TELEPHONE ENCOUNTER
The patient has a known IVP allergy     I have reviewed with the patient his/her allergy to IVP dye    The following medications have been ordered and reviewed with the patient  Prednisone 50 mg one tablet PO orally 13 hours prior to procedure    Prednisone 50 mg one tablet PO orally 7 hours prior to procedure   Prednisone 50 mg one tablet PO orally 1 hour prior to procedure      Benadryl 50 mg one tablet PO one hour prior to procedure      The medications have been called into the [facility] pharmacy in [place] .     MARY Childers RN BSN

## 2024-03-18 ENCOUNTER — TELEPHONE (OUTPATIENT)
Dept: VASCULAR SURGERY | Facility: HOSPITAL | Age: 79
End: 2024-03-18
Payer: MEDICARE

## 2024-03-18 NOTE — TELEPHONE ENCOUNTER
"I have had the pleasure of speaking with Mrs. Alcantara  Per the patient \"  I do not want to have a procedure done tomorrow, I thought  that this was just a test to check my circulation. I will not  come in tomorrow  for  the angiogram, Yes   I know that Dr. Chamberlain  reviewed the procedure  but I am not coming in .\"  I have notified the cath lab and Dr. Chamberlain that the patient has cancelled her procedure.   MARY Sterling  " No

## 2024-03-18 NOTE — TELEPHONE ENCOUNTER
I have attempted to contact     Mrs. Alcantara     . There is no answer at the following phone number  [phone]   . I have left a voice mail message for the patient to contact Dr. Albarran  office nurse* office nurse at [phone] .  Mechelle Childers RN BSN

## 2024-03-19 ENCOUNTER — APPOINTMENT (OUTPATIENT)
Dept: CARDIOLOGY | Facility: HOSPITAL | Age: 79
DRG: 603 | End: 2024-03-19
Payer: MEDICARE

## 2024-03-19 ENCOUNTER — OFFICE VISIT (OUTPATIENT)
Dept: WOUND CARE | Facility: CLINIC | Age: 79
DRG: 603 | End: 2024-03-19
Payer: MEDICARE

## 2024-03-19 ENCOUNTER — HOSPITAL ENCOUNTER (INPATIENT)
Facility: HOSPITAL | Age: 79
LOS: 3 days | Discharge: HOME | DRG: 603 | End: 2024-03-23
Attending: STUDENT IN AN ORGANIZED HEALTH CARE EDUCATION/TRAINING PROGRAM | Admitting: INTERNAL MEDICINE
Payer: MEDICARE

## 2024-03-19 DIAGNOSIS — I73.9 PAD (PERIPHERAL ARTERY DISEASE) (CMS-HCC): ICD-10-CM

## 2024-03-19 DIAGNOSIS — I10 HYPERTENSION, UNSPECIFIED TYPE: ICD-10-CM

## 2024-03-19 DIAGNOSIS — G62.9 NEUROPATHY: ICD-10-CM

## 2024-03-19 DIAGNOSIS — L03.116 LEFT LEG CELLULITIS: Primary | ICD-10-CM

## 2024-03-19 DIAGNOSIS — M79.605 PAIN IN LEFT LEG: ICD-10-CM

## 2024-03-19 DIAGNOSIS — L97.922: ICD-10-CM

## 2024-03-19 LAB
ALBUMIN SERPL BCP-MCNC: 3.5 G/DL (ref 3.4–5)
ALP SERPL-CCNC: 122 U/L (ref 33–136)
ALT SERPL W P-5'-P-CCNC: 11 U/L (ref 7–45)
ANION GAP SERPL CALC-SCNC: 11 MMOL/L (ref 10–20)
AST SERPL W P-5'-P-CCNC: 16 U/L (ref 9–39)
BASOPHILS # BLD AUTO: 0.05 X10*3/UL (ref 0–0.1)
BASOPHILS NFR BLD AUTO: 0.7 %
BILIRUB SERPL-MCNC: 0.3 MG/DL (ref 0–1.2)
BUN SERPL-MCNC: 23 MG/DL (ref 6–23)
CALCIUM SERPL-MCNC: 8.5 MG/DL (ref 8.6–10.3)
CHLORIDE SERPL-SCNC: 105 MMOL/L (ref 98–107)
CO2 SERPL-SCNC: 24 MMOL/L (ref 21–32)
CREAT SERPL-MCNC: 0.59 MG/DL (ref 0.5–1.05)
CRP SERPL-MCNC: 0.99 MG/DL
EGFRCR SERPLBLD CKD-EPI 2021: >90 ML/MIN/1.73M*2
EOSINOPHIL # BLD AUTO: 0.33 X10*3/UL (ref 0–0.4)
EOSINOPHIL NFR BLD AUTO: 4.4 %
ERYTHROCYTE [DISTWIDTH] IN BLOOD BY AUTOMATED COUNT: 13.8 % (ref 11.5–14.5)
ERYTHROCYTE [SEDIMENTATION RATE] IN BLOOD BY WESTERGREN METHOD: 43 MM/H (ref 0–30)
GLUCOSE SERPL-MCNC: 124 MG/DL (ref 74–99)
HCT VFR BLD AUTO: 37.1 % (ref 36–46)
HGB BLD-MCNC: 12.1 G/DL (ref 12–16)
IMM GRANULOCYTES # BLD AUTO: 0.01 X10*3/UL (ref 0–0.5)
IMM GRANULOCYTES NFR BLD AUTO: 0.1 % (ref 0–0.9)
LYMPHOCYTES # BLD AUTO: 1.32 X10*3/UL (ref 0.8–3)
LYMPHOCYTES NFR BLD AUTO: 17.7 %
MCH RBC QN AUTO: 30.3 PG (ref 26–34)
MCHC RBC AUTO-ENTMCNC: 32.6 G/DL (ref 32–36)
MCV RBC AUTO: 93 FL (ref 80–100)
MONOCYTES # BLD AUTO: 0.83 X10*3/UL (ref 0.05–0.8)
MONOCYTES NFR BLD AUTO: 11.1 %
NEUTROPHILS # BLD AUTO: 4.91 X10*3/UL (ref 1.6–5.5)
NEUTROPHILS NFR BLD AUTO: 66 %
NRBC BLD-RTO: 0 /100 WBCS (ref 0–0)
PLATELET # BLD AUTO: 400 X10*3/UL (ref 150–450)
POTASSIUM SERPL-SCNC: 4.3 MMOL/L (ref 3.5–5.3)
PROT SERPL-MCNC: 6.6 G/DL (ref 6.4–8.2)
RBC # BLD AUTO: 3.99 X10*6/UL (ref 4–5.2)
SODIUM SERPL-SCNC: 136 MMOL/L (ref 136–145)
WBC # BLD AUTO: 7.5 X10*3/UL (ref 4.4–11.3)

## 2024-03-19 PROCEDURE — 80053 COMPREHEN METABOLIC PANEL: CPT

## 2024-03-19 PROCEDURE — 93971 EXTREMITY STUDY: CPT

## 2024-03-19 PROCEDURE — 36415 COLL VENOUS BLD VENIPUNCTURE: CPT

## 2024-03-19 PROCEDURE — 96372 THER/PROPH/DIAG INJ SC/IM: CPT

## 2024-03-19 PROCEDURE — 2500000004 HC RX 250 GENERAL PHARMACY W/ HCPCS (ALT 636 FOR OP/ED)

## 2024-03-19 PROCEDURE — 96372 THER/PROPH/DIAG INJ SC/IM: CPT | Mod: GC

## 2024-03-19 PROCEDURE — 86140 C-REACTIVE PROTEIN: CPT

## 2024-03-19 PROCEDURE — 2500000001 HC RX 250 WO HCPCS SELF ADMINISTERED DRUGS (ALT 637 FOR MEDICARE OP)

## 2024-03-19 PROCEDURE — 96375 TX/PRO/DX INJ NEW DRUG ADDON: CPT

## 2024-03-19 PROCEDURE — 99285 EMERGENCY DEPT VISIT HI MDM: CPT | Mod: 25

## 2024-03-19 PROCEDURE — 85025 COMPLETE CBC W/AUTO DIFF WBC: CPT

## 2024-03-19 PROCEDURE — 11042 DBRDMT SUBQ TIS 1ST 20SQCM/<: CPT

## 2024-03-19 PROCEDURE — 96365 THER/PROPH/DIAG IV INF INIT: CPT | Mod: 59

## 2024-03-19 PROCEDURE — 99285 EMERGENCY DEPT VISIT HI MDM: CPT | Performed by: STUDENT IN AN ORGANIZED HEALTH CARE EDUCATION/TRAINING PROGRAM

## 2024-03-19 PROCEDURE — 85652 RBC SED RATE AUTOMATED: CPT

## 2024-03-19 PROCEDURE — 2500000004 HC RX 250 GENERAL PHARMACY W/ HCPCS (ALT 636 FOR OP/ED): Mod: JZ

## 2024-03-19 RX ORDER — CLOPIDOGREL BISULFATE 75 MG/1
75 TABLET ORAL ONCE
Status: COMPLETED | OUTPATIENT
Start: 2024-03-19 | End: 2024-03-19

## 2024-03-19 RX ORDER — CLOPIDOGREL BISULFATE 75 MG/1
75 TABLET ORAL DAILY
Status: DISCONTINUED | OUTPATIENT
Start: 2024-03-20 | End: 2024-03-23 | Stop reason: HOSPADM

## 2024-03-19 RX ORDER — CLOPIDOGREL BISULFATE 75 MG/1
75 TABLET ORAL DAILY
Status: DISCONTINUED | OUTPATIENT
Start: 2024-03-20 | End: 2024-03-19

## 2024-03-19 RX ORDER — ENOXAPARIN SODIUM 100 MG/ML
40 INJECTION SUBCUTANEOUS EVERY 24 HOURS
Status: DISCONTINUED | OUTPATIENT
Start: 2024-03-19 | End: 2024-03-23 | Stop reason: HOSPADM

## 2024-03-19 RX ORDER — MORPHINE SULFATE 4 MG/ML
4 INJECTION, SOLUTION INTRAMUSCULAR; INTRAVENOUS ONCE
Status: COMPLETED | OUTPATIENT
Start: 2024-03-19 | End: 2024-03-19

## 2024-03-19 RX ORDER — NAPROXEN SODIUM 220 MG/1
81 TABLET, FILM COATED ORAL DAILY
Status: DISCONTINUED | OUTPATIENT
Start: 2024-03-20 | End: 2024-03-23 | Stop reason: HOSPADM

## 2024-03-19 RX ORDER — POLYETHYLENE GLYCOL 3350 17 G/17G
17 POWDER, FOR SOLUTION ORAL DAILY
Status: DISCONTINUED | OUTPATIENT
Start: 2024-03-20 | End: 2024-03-23 | Stop reason: HOSPADM

## 2024-03-19 RX ADMIN — PIPERACILLIN SODIUM AND TAZOBACTAM SODIUM 3.38 G: 3; .375 INJECTION, SOLUTION INTRAVENOUS at 21:32

## 2024-03-19 RX ADMIN — ENOXAPARIN SODIUM 40 MG: 40 INJECTION SUBCUTANEOUS at 23:22

## 2024-03-19 RX ADMIN — VANCOMYCIN HYDROCHLORIDE 1250 MG: 1.25 INJECTION, POWDER, LYOPHILIZED, FOR SOLUTION INTRAVENOUS at 22:08

## 2024-03-19 RX ADMIN — MORPHINE SULFATE 4 MG: 4 INJECTION, SOLUTION INTRAMUSCULAR; INTRAVENOUS at 18:06

## 2024-03-19 RX ADMIN — CLOPIDOGREL BISULFATE 75 MG: 75 TABLET ORAL at 21:31

## 2024-03-19 ASSESSMENT — PAIN - FUNCTIONAL ASSESSMENT
PAIN_FUNCTIONAL_ASSESSMENT: 0-10
PAIN_FUNCTIONAL_ASSESSMENT: 0-10

## 2024-03-19 ASSESSMENT — PAIN SCALES - GENERAL
PAINLEVEL_OUTOF10: 0 - NO PAIN
PAINLEVEL_OUTOF10: 0 - NO PAIN
PAINLEVEL_OUTOF10: 4
PAINLEVEL_OUTOF10: 7
PAINLEVEL_OUTOF10: 0 - NO PAIN
PAINLEVEL_OUTOF10: 0 - NO PAIN
PAINLEVEL_OUTOF10: 9

## 2024-03-19 ASSESSMENT — COLUMBIA-SUICIDE SEVERITY RATING SCALE - C-SSRS
1. IN THE PAST MONTH, HAVE YOU WISHED YOU WERE DEAD OR WISHED YOU COULD GO TO SLEEP AND NOT WAKE UP?: NO
6. HAVE YOU EVER DONE ANYTHING, STARTED TO DO ANYTHING, OR PREPARED TO DO ANYTHING TO END YOUR LIFE?: NO
2. HAVE YOU ACTUALLY HAD ANY THOUGHTS OF KILLING YOURSELF?: NO

## 2024-03-19 ASSESSMENT — PAIN DESCRIPTION - PAIN TYPE: TYPE: ACUTE PAIN

## 2024-03-19 ASSESSMENT — LIFESTYLE VARIABLES
EVER FELT BAD OR GUILTY ABOUT YOUR DRINKING: NO
HAVE YOU EVER FELT YOU SHOULD CUT DOWN ON YOUR DRINKING: NO
EVER HAD A DRINK FIRST THING IN THE MORNING TO STEADY YOUR NERVES TO GET RID OF A HANGOVER: NO
HAVE PEOPLE ANNOYED YOU BY CRITICIZING YOUR DRINKING: NO

## 2024-03-19 ASSESSMENT — PAIN DESCRIPTION - LOCATION: LOCATION: ANKLE

## 2024-03-19 ASSESSMENT — PAIN DESCRIPTION - ORIENTATION: ORIENTATION: LEFT

## 2024-03-19 NOTE — Clinical Note
Vessel(s): left PTA and left dorsalis pedis artery. Injected with hand injections. Single view taken.

## 2024-03-19 NOTE — Clinical Note
Patient states that she does not want family to know about her procedure start time change. Patient is alert and oriented.

## 2024-03-19 NOTE — ED PROVIDER NOTES
EMERGENCY DEPARTMENT ENCOUNTER      Pt Name: Mechelle Alcantara  MRN: [id number]  Birthdate 1945  Date of evaluation: 3/19/2024  Provider: Pato Sharpe MD    CHIEF COMPLAINT       Chief Complaint   Patient presents with    Wound Check     Left leg. Sent from wound care needs IV ATB         HISTORY OF PRESENT ILLNESS    HPI  Patient is 78-year-old male with a history of peripheral arterial diseas and chronic left lower extremity wound, noncompliant with medications presenting with concerns for worsening right leg infection.  She notes that the area of erythema of her left leg has increased up to her knee and spreading somewhat of the medial thigh over the past week.  It is not warm but is highly tender to touch.  She denies claudication, fever, chills, chest pain, shortness of breath, nausea, vomiting.  She had the wound debrided in clinic today who sent her to the ER for further evaluation.  She reportedly stopped taking her doxycycline several days ago after it gave her diarrhea.  Notably, she stopped taking her dual antiplatelet therapy 2 months ago.  She was scheduled for an angiogram with her vascular surgeon at [facility] earlier this week, but there were communication issues and she did not take her prep for contrast allergy and had to reschedule for April.    Nursing Notes were reviewed.    PAST MEDICAL HISTORY   History reviewed. No pertinent past medical history.      SURGICAL HISTORY     History reviewed. No pertinent surgical history.      CURRENT MEDICATIONS       Current Discharge Medication List        CONTINUE these medications which have NOT CHANGED    Details   aspirin 81 mg EC tablet Take 1 tablet (81 mg) by mouth once daily.      clopidogrel (Plavix) 75 mg tablet Take 1 tablet (75 mg) by mouth once daily.  Qty: 90 tablet, Refills: 3    Associated Diagnoses: Peripheral artery disease (CMS/[facility])      diphenhydrAMINE (BENADryl) 50 mg capsule Take one capsule (50 mg) by mouth one hour prior to  procedure.  Qty: 1 capsule, Refills: 0    Associated Diagnoses: PAD (peripheral artery disease) (CMS/HCC); Extremity atherosclerosis with resting pain (CMS/HCC)      predniSONE (Deltasone) 50 mg tablet Take one tablet (50 mg) by mouth 13 hours, 7 hours and 1 hour prior to procedure.  Qty: 3 tablet, Refills: 0    Associated Diagnoses: PAD (peripheral artery disease) (CMS/HCC); Extremity atherosclerosis with resting pain (CMS/HCC)      rosuvastatin (Crestor) 5 mg tablet Take 1 tablet (5 mg) by mouth once daily.  Qty: 90 tablet, Refills: 3    Associated Diagnoses: Hyperlipidemia, unspecified hyperlipidemia type             ALLERGIES     Fish containing products, Shellfish containing products, Gadolinium-containing contrast media, and Iodinated contrast media    FAMILY HISTORY       Family History   Problem Relation Name Age of Onset    Heart failure Father            SOCIAL HISTORY       Social History     Socioeconomic History    Marital status:      Spouse name: None    Number of children: None    Years of education: None    Highest education level: None   Occupational History    None   Tobacco Use    Smoking status: Every Day     Packs/day: .25     Types: Cigarettes    Smokeless tobacco: Never   Vaping Use    Vaping Use: Never used   Substance and Sexual Activity    Alcohol use: Never    Drug use: Never    Sexual activity: Defer   Other Topics Concern    None   Social History Narrative    None     Social Determinants of Health     Financial Resource Strain: Low Risk  (3/20/2024)    Overall Financial Resource Strain (CARDIA)     Difficulty of Paying Living Expenses: Not very hard   Food Insecurity: Not on file   Transportation Needs: No Transportation Needs (3/20/2024)    PRAPARE - Transportation     Lack of Transportation (Medical): No     Lack of Transportation (Non-Medical): No   Physical Activity: Not on file   Stress: Not on file   Social Connections: Not on file   Intimate Partner Violence: Not on file    Housing Stability: Low Risk  (3/20/2024)    Housing Stability Vital Sign     Unable to Pay for Housing in the Last Year: No     Number of Places Lived in the Last Year: 1     Unstable Housing in the Last Year: No       SCREENINGS                        PHYSICAL EXAM    (up to 7 for level 4, 8 or more for level 5)     ED Triage Vitals [03/19/24 1421]   Temperature Heart Rate Respirations BP   36 °C (96.8 °F) 77 18 (!) 190/92      Pulse Ox Temp Source Heart Rate Source Patient Position   96 % Temporal Monitor Sitting      BP Location FiO2 (%)     Right arm --       Physical Exam  Vitals and nursing note reviewed.   Constitutional:       General: She is not in acute distress.     Appearance: She is not toxic-appearing.   HENT:      Head: Normocephalic and atraumatic.      Nose: Nose normal.      Mouth/Throat:      Mouth: Mucous membranes are moist.      Pharynx: Oropharynx is clear.   Eyes:      Extraocular Movements: Extraocular movements intact.      Conjunctiva/sclera: Conjunctivae normal.   Cardiovascular:      Rate and Rhythm: Normal rate and regular rhythm.      Comments: Non-palpable DP/PT pulses on the left leg  Pulmonary:      Effort: Pulmonary effort is normal. No respiratory distress.      Breath sounds: Normal breath sounds.   Abdominal:      General: There is no distension.      Palpations: Abdomen is soft.   Musculoskeletal:         General: No deformity or signs of injury.      Cervical back: Normal range of motion and neck supple.      Left lower leg: No edema.   Skin:     Comments: Circumferential zone of erythema from the foot up to the knee on the left with streaks going up the left medial thigh.  Slightly warm to palpation, no induration, crepitus, fluctuance.  Central ulceration of the lateral left leg   Neurological:      General: No focal deficit present.      Mental Status: Mental status is at baseline.          DIAGNOSTIC RESULTS     LABS:  Labs Reviewed   CBC WITH AUTO DIFFERENTIAL -  Abnormal       Result Value    WBC 7.5      nRBC 0.0      RBC 3.99 (*)     Hemoglobin 12.1      Hematocrit 37.1      MCV 93      MCH 30.3      MCHC 32.6      RDW 13.8      Platelets 400      Neutrophils % 66.0      Immature Granulocytes %, Automated 0.1      Lymphocytes % 17.7      Monocytes % 11.1      Eosinophils % 4.4      Basophils % 0.7      Neutrophils Absolute 4.91      Immature Granulocytes Absolute, Automated 0.01      Lymphocytes Absolute 1.32      Monocytes Absolute 0.83 (*)     Eosinophils Absolute 0.33      Basophils Absolute 0.05     COMPREHENSIVE METABOLIC PANEL - Abnormal    Glucose 124 (*)     Sodium 136      Potassium 4.3      Chloride 105      Bicarbonate 24      Anion Gap 11      Urea Nitrogen 23      Creatinine 0.59      eGFR >90      Calcium 8.5 (*)     Albumin 3.5      Alkaline Phosphatase 122      Total Protein 6.6      AST 16      Bilirubin, Total 0.3      ALT 11     SEDIMENTATION RATE, AUTOMATED - Abnormal    Sedimentation Rate 43 (*)    CBC - Abnormal    WBC 5.9      nRBC 0.0      RBC 4.15      Hemoglobin 12.7      Hematocrit 40.6      MCV 98      MCH 30.6      MCHC 31.3 (*)     RDW 13.9      Platelets 416     BASIC METABOLIC PANEL - Abnormal    Glucose 84      Sodium 136      Potassium 3.8      Chloride 105      Bicarbonate 23      Anion Gap 12      Urea Nitrogen 19      Creatinine 0.72      eGFR 86      Calcium 8.3 (*)    CBC WITH AUTO DIFFERENTIAL - Abnormal    WBC 7.7      nRBC 0.0      RBC 3.74 (*)     Hemoglobin 11.4 (*)     Hematocrit 35.4 (*)     MCV 95      MCH 30.5      MCHC 32.2      RDW 13.9      Platelets 401      Neutrophils % 69.9      Immature Granulocytes %, Automated 0.3      Lymphocytes % 15.6      Monocytes % 10.1      Eosinophils % 3.5      Basophils % 0.6      Neutrophils Absolute 5.39      Immature Granulocytes Absolute, Automated 0.02      Lymphocytes Absolute 1.20      Monocytes Absolute 0.78      Eosinophils Absolute 0.27      Basophils Absolute 0.05     RENAL  FUNCTION PANEL - Abnormal    Glucose 94      Sodium 138      Potassium 3.8      Chloride 108 (*)     Bicarbonate 22      Anion Gap 12      Urea Nitrogen 23      Creatinine 0.72      eGFR 86      Calcium 7.7 (*)     Phosphorus 2.8      Albumin 3.1 (*)    C-REACTIVE PROTEIN - Normal    C-Reactive Protein 0.99     PROTIME-INR - Normal    Protime 11.6      INR 1.0     MAGNESIUM - Normal    Magnesium 2.16         All other labs were within normal range or not returned as of this dictation.    Imaging  Vascular US lower extremity venous duplex left   Final Result           Procedures  Procedures     EMERGENCY DEPARTMENT COURSE/MDM:     Diagnoses as of 03/21/24 1955   Left leg cellulitis        Medical Decision Making  History obtained from the patient and her .  Records including labs, imaging, notes reviewed.  Primary diagnostic considerations for possible cellulitis, DVT, acute arterial occlusion.  Patient was not septic at presentation.  Given the concern for possible acute arterial occlusion, lactate was sent which was within normal limits.  Basic labs were also sent.  Chemistry notable for hyperglycemia of 124.  Hematology unremarkable.  With consideration for possible underlying osteomyelitis, inflammatory markers were sent.  ESR elevated at 43.  CRP normal at 0.99.  Venous duplex was ordered which demonstrated no acute DVTs.  We are informed by vascular ultrasound that they do not perform arterial duplex for acute arterial occlusion.  They do perform KATHLEEN, but that it just been performed in clinic prior to presentation in the ER.  This is depressed at 0.3 without evidence of acute arterial occlusion.  On the venous duplex, there were multilevel chronic arterial occlusions seen incidentally but no acute findings.  Patient's pain was acutely treated with success with morphine.  Patient was discussed with Dr. Singh of vascular surgery who recommended admission with broad-spectrum antibiotics for cellulitis as  well as resumption of her dual antiplatelet therapy that she had voluntarily discontinued 2 months prior.  Patient was subsequently admitted to the medicine service for further evaluation and treatment.  Patient and her  were amenable to this plan.    Patient and or family in agreement and understanding of treatment plan.  All questions answered.      I reviewed the case with the attending ED physician. The attending ED physician agrees with the plan. Patient and/or patient´s representative was counseled regarding labs, imaging, likely diagnosis, and plan. All questions were answered.    ED Medications administered this visit:    Medications   aspirin chewable tablet 81 mg (81 mg oral Given 3/21/24 0802)   enoxaparin (Lovenox) syringe 40 mg (40 mg subcutaneous Given 3/20/24 2237)   polyethylene glycol (Glycolax, Miralax) packet 17 g (17 g oral Not Given 3/21/24 0900)   clopidogrel (Plavix) tablet 75 mg (75 mg oral Given 3/21/24 0802)   rosuvastatin (Crestor) tablet 5 mg (5 mg oral Given 3/20/24 2237)   ceFAZolin in dextrose (iso-os) (Ancef) IVPB 1 g (0 g intravenous Stopped 3/21/24 1953)   predniSONE (Deltasone) tablet 50 mg (50 mg oral Given 3/21/24 1844)   diphenhydrAMINE (BENADryl) injection 50 mg (has no administration in time range)   amLODIPine (Norvasc) tablet 5 mg (5 mg oral Given 3/21/24 1457)   morphine injection 4 mg (4 mg intravenous Given 3/19/24 1806)   vancomycin (Vancocin) in  mL IV 1,250 mg (1,250 mg intravenous Given 3/19/24 2208)   piperacillin-tazobactam-dextrose (Zosyn) IV 3.375 g (0 g intravenous Stopped 3/19/24 2202)   clopidogrel (Plavix) tablet 75 mg (75 mg oral Given 3/19/24 2131)       New Prescriptions from this visit:    Current Discharge Medication List          Follow-up:  No follow-up provider specified.      Final Impression:   1. Left leg cellulitis    2. Pain in left leg    3. PAD (peripheral artery disease) (CMS/HCC)    4. Leg ulcer, left, with fat layer exposed  (CMS/[facility])          (Please note that portions of this note were completed with a voice recognition program.  Efforts were made to edit the dictations but occasionally words are mis-transcribed.)     Pato Sharpe MD  Resident  03/21/24 1958

## 2024-03-19 NOTE — Clinical Note
Vessel(s): left CFA, left SFA and left popliteal artery. Injected with hand injections. Single view taken.

## 2024-03-19 NOTE — Clinical Note
Patient Clipped and Prepped: left pedal. Prepped with ChloraPrep, a minimum of 3 minute dry time, longer if needed, no pooling noted, patient draped in sterile fashion.

## 2024-03-19 NOTE — Clinical Note
Sheath was exchanged in the right femoral artery with SHEATH, [place], W/.038 GUIDEWIRE, 10 CM,  6FR INTRODUCER, 6FR NAYELI, 2.5 CM DIALATOR.

## 2024-03-19 NOTE — Clinical Note
Vessel(s): left PTA, left peroneal artery and left DARREN. Injected with hand injections. Single view taken.

## 2024-03-19 NOTE — Clinical Note
Vessel(s): left iliac artery and right iliac artery. Injected with hand injections. Single view taken.

## 2024-03-19 NOTE — Clinical Note
Vessel(s): left SFA. Guidewire inserted and used as the primary guidewire crosses lesion. Attempting to cross lesion

## 2024-03-19 NOTE — ED TRIAGE NOTES
Pt states she was sent over from wound care. Not taking any medications as prescribed. She was placed on doxy, and pt stated not taking

## 2024-03-20 LAB
ANION GAP SERPL CALC-SCNC: 12 MMOL/L (ref 10–20)
BUN SERPL-MCNC: 19 MG/DL (ref 6–23)
CALCIUM SERPL-MCNC: 8.3 MG/DL (ref 8.6–10.3)
CHLORIDE SERPL-SCNC: 105 MMOL/L (ref 98–107)
CO2 SERPL-SCNC: 23 MMOL/L (ref 21–32)
CREAT SERPL-MCNC: 0.72 MG/DL (ref 0.5–1.05)
EGFRCR SERPLBLD CKD-EPI 2021: 86 ML/MIN/1.73M*2
ERYTHROCYTE [DISTWIDTH] IN BLOOD BY AUTOMATED COUNT: 13.9 % (ref 11.5–14.5)
GLUCOSE SERPL-MCNC: 84 MG/DL (ref 74–99)
HCT VFR BLD AUTO: 40.6 % (ref 36–46)
HGB BLD-MCNC: 12.7 G/DL (ref 12–16)
INR PPP: 1 (ref 0.9–1.1)
MCH RBC QN AUTO: 30.6 PG (ref 26–34)
MCHC RBC AUTO-ENTMCNC: 31.3 G/DL (ref 32–36)
MCV RBC AUTO: 98 FL (ref 80–100)
NRBC BLD-RTO: 0 /100 WBCS (ref 0–0)
PLATELET # BLD AUTO: 416 X10*3/UL (ref 150–450)
POTASSIUM SERPL-SCNC: 3.8 MMOL/L (ref 3.5–5.3)
PROTHROMBIN TIME: 11.6 SECONDS (ref 9.8–12.8)
RBC # BLD AUTO: 4.15 X10*6/UL (ref 4–5.2)
SODIUM SERPL-SCNC: 136 MMOL/L (ref 136–145)
WBC # BLD AUTO: 5.9 X10*3/UL (ref 4.4–11.3)

## 2024-03-20 PROCEDURE — 36415 COLL VENOUS BLD VENIPUNCTURE: CPT

## 2024-03-20 PROCEDURE — 2500000001 HC RX 250 WO HCPCS SELF ADMINISTERED DRUGS (ALT 637 FOR MEDICARE OP)

## 2024-03-20 PROCEDURE — 99222 1ST HOSP IP/OBS MODERATE 55: CPT | Performed by: SURGERY

## 2024-03-20 PROCEDURE — 2500000004 HC RX 250 GENERAL PHARMACY W/ HCPCS (ALT 636 FOR OP/ED)

## 2024-03-20 PROCEDURE — 99222 1ST HOSP IP/OBS MODERATE 55: CPT

## 2024-03-20 PROCEDURE — 80048 BASIC METABOLIC PNL TOTAL CA: CPT

## 2024-03-20 PROCEDURE — 2500000002 HC RX 250 W HCPCS SELF ADMINISTERED DRUGS (ALT 637 FOR MEDICARE OP, ALT 636 FOR OP/ED): Mod: MUE

## 2024-03-20 PROCEDURE — 85610 PROTHROMBIN TIME: CPT

## 2024-03-20 PROCEDURE — 85027 COMPLETE CBC AUTOMATED: CPT

## 2024-03-20 PROCEDURE — 1100000001 HC PRIVATE ROOM DAILY

## 2024-03-20 RX ORDER — ASPIRIN 81 MG/1
81 TABLET ORAL DAILY
COMMUNITY

## 2024-03-20 RX ORDER — CEFAZOLIN SODIUM 1 G/50ML
1 SOLUTION INTRAVENOUS EVERY 8 HOURS
Status: DISCONTINUED | OUTPATIENT
Start: 2024-03-20 | End: 2024-03-23 | Stop reason: HOSPADM

## 2024-03-20 RX ORDER — ROSUVASTATIN CALCIUM 5 MG/1
5 TABLET, COATED ORAL DAILY
Status: DISCONTINUED | OUTPATIENT
Start: 2024-03-20 | End: 2024-03-23 | Stop reason: HOSPADM

## 2024-03-20 RX ADMIN — CEFAZOLIN SODIUM 1 G: 1 INJECTION, SOLUTION INTRAVENOUS at 12:06

## 2024-03-20 RX ADMIN — ENOXAPARIN SODIUM 40 MG: 40 INJECTION SUBCUTANEOUS at 22:37

## 2024-03-20 RX ADMIN — CLOPIDOGREL BISULFATE 75 MG: 75 TABLET ORAL at 08:47

## 2024-03-20 RX ADMIN — ROSUVASTATIN CALCIUM 5 MG: 5 TABLET, FILM COATED ORAL at 22:37

## 2024-03-20 RX ADMIN — CEFAZOLIN SODIUM 1 G: 1 INJECTION, SOLUTION INTRAVENOUS at 18:54

## 2024-03-20 RX ADMIN — ASPIRIN 81 MG 81 MG: 81 TABLET ORAL at 08:47

## 2024-03-20 RX ADMIN — PIPERACILLIN SODIUM AND TAZOBACTAM SODIUM 3.38 G: 3; .375 INJECTION, SOLUTION INTRAVENOUS at 04:49

## 2024-03-20 SDOH — SOCIAL STABILITY: SOCIAL INSECURITY: DO YOU FEEL ANYONE HAS EXPLOITED OR TAKEN ADVANTAGE OF YOU FINANCIALLY OR OF YOUR PERSONAL PROPERTY?: NO

## 2024-03-20 SDOH — SOCIAL STABILITY: SOCIAL INSECURITY: ARE YOU OR HAVE YOU BEEN THREATENED OR ABUSED PHYSICALLY, EMOTIONALLY, OR SEXUALLY BY ANYONE?: NO

## 2024-03-20 SDOH — SOCIAL STABILITY: SOCIAL INSECURITY: DOES ANYONE TRY TO KEEP YOU FROM HAVING/CONTACTING OTHER FRIENDS OR DOING THINGS OUTSIDE YOUR HOME?: NO

## 2024-03-20 SDOH — SOCIAL STABILITY: SOCIAL INSECURITY: WERE YOU ABLE TO COMPLETE ALL THE BEHAVIORAL HEALTH SCREENINGS?: YES

## 2024-03-20 SDOH — SOCIAL STABILITY: SOCIAL INSECURITY: DO YOU FEEL UNSAFE GOING BACK TO THE PLACE WHERE YOU ARE LIVING?: NO

## 2024-03-20 SDOH — SOCIAL STABILITY: SOCIAL INSECURITY: HAVE YOU HAD THOUGHTS OF HARMING ANYONE ELSE?: NO

## 2024-03-20 SDOH — SOCIAL STABILITY: SOCIAL INSECURITY: ARE THERE ANY APPARENT SIGNS OF INJURIES/BEHAVIORS THAT COULD BE RELATED TO ABUSE/NEGLECT?: NO

## 2024-03-20 SDOH — SOCIAL STABILITY: SOCIAL INSECURITY: HAS ANYONE EVER THREATENED TO HURT YOUR FAMILY OR YOUR PETS?: NO

## 2024-03-20 SDOH — SOCIAL STABILITY: SOCIAL INSECURITY: ABUSE: ADULT

## 2024-03-20 ASSESSMENT — COGNITIVE AND FUNCTIONAL STATUS - GENERAL
CLIMB 3 TO 5 STEPS WITH RAILING: A LOT
MOBILITY SCORE: 21
MOBILITY SCORE: 22
PATIENT BASELINE BEDBOUND: NO
CLIMB 3 TO 5 STEPS WITH RAILING: TOTAL
DAILY ACTIVITIY SCORE: 24
DAILY ACTIVITIY SCORE: 24

## 2024-03-20 ASSESSMENT — ACTIVITIES OF DAILY LIVING (ADL)
PATIENT'S MEMORY ADEQUATE TO SAFELY COMPLETE DAILY ACTIVITIES?: YES
ADEQUATE_TO_COMPLETE_ADL: YES
DRESSING YOURSELF: INDEPENDENT
LACK_OF_TRANSPORTATION: NO
FEEDING YOURSELF: INDEPENDENT
HEARING - LEFT EAR: FUNCTIONAL
JUDGMENT_ADEQUATE_SAFELY_COMPLETE_DAILY_ACTIVITIES: YES
TOILETING: INDEPENDENT
BATHING: INDEPENDENT
GROOMING: INDEPENDENT
HEARING - RIGHT EAR: FUNCTIONAL
WALKS IN HOME: INDEPENDENT

## 2024-03-20 ASSESSMENT — LIFESTYLE VARIABLES
AUDIT-C TOTAL SCORE: 0
SKIP TO QUESTIONS 9-10: 1
HOW OFTEN DO YOU HAVE A DRINK CONTAINING ALCOHOL: NEVER
HOW MANY STANDARD DRINKS CONTAINING ALCOHOL DO YOU HAVE ON A TYPICAL DAY: PATIENT DOES NOT DRINK
HOW OFTEN DO YOU HAVE 6 OR MORE DRINKS ON ONE OCCASION: NEVER
AUDIT-C TOTAL SCORE: 0
PRESCIPTION_ABUSE_PAST_12_MONTHS: NO
SUBSTANCE_ABUSE_PAST_12_MONTHS: NO

## 2024-03-20 ASSESSMENT — PAIN SCALES - GENERAL
PAINLEVEL_OUTOF10: 2
PAINLEVEL_OUTOF10: 1

## 2024-03-20 ASSESSMENT — PATIENT HEALTH QUESTIONNAIRE - PHQ9
1. LITTLE INTEREST OR PLEASURE IN DOING THINGS: NOT AT ALL
SUM OF ALL RESPONSES TO PHQ9 QUESTIONS 1 & 2: 0
2. FEELING DOWN, DEPRESSED OR HOPELESS: NOT AT ALL

## 2024-03-20 ASSESSMENT — PAIN - FUNCTIONAL ASSESSMENT
PAIN_FUNCTIONAL_ASSESSMENT: 0-10
PAIN_FUNCTIONAL_ASSESSMENT: 0-10

## 2024-03-20 NOTE — PROGRESS NOTES
03/20/24 1417   Discharge Planning   Living Arrangements Spouse/significant other   Support Systems Spouse/significant other   Type of Residence Private residence   Home or Post Acute Services In home services   Type of Home Care Services Home nursing visits   Patient expects to be discharged to: Home possible Infusion     Met with son at bedside. Pt is sleeping. Pt admitted for cellulitis left leg. Pt lives with spouse and was independent with no HHC or DME. Family provides transport. Able to obtain medications. PCP is Dr Latif. Awaiting ID recs. Pt may need home IV antibiotic infusion. FOC is [facility]. TCC team will follow for potential discharge needs.

## 2024-03-20 NOTE — H&P (VIEW-ONLY)
Inpatient consult to Vascular Surgery  Consult performed by: Africa Hoover MD  Consult ordered by: Juan Dahl DO  Reason for consult: left leg PAD, infection          Reason For Consult  left leg PAD, infection     History Of Present Illness  Mechelle Alcantara is a 78 y.o. female presenting with complaint of worsening left lower extremity wound.  He has known history of PAD and venous insufficiency.  She had a similar wound 2 years ago that was treated in the wound center.  At that time she did have a left femoral endarterectomy with angioplasty of her left SFA performed by me.  The wound subsequently healed.  Patient has not followed up with vascular surgery since her procedure.  However she did call the office last week regarding the left leg ulcer, as she was also found to have significantly decreased ABIs.  She was scheduled for an angiogram last week with Dr. Chamberlain, but patient refused to proceed on the day of the procedure.  She states that this current wound of the left lower leg initially started about a month ago.  She noticed increased swelling in her leg at the time.  She admits that she waited a while before going to the doctor.  She was recently seen in the wound center and recommended to come to the emergency room.  Venous duplex in the ED reveals no evidence of DVT.  However it did reveal occlusion of the left SFA.     Past Medical History  She has no past medical history on file.    Surgical History  She has no past surgical history on file.     Social History  She reports that she has been smoking cigarettes. She has been smoking an average of .25 packs per day. She has never used smokeless tobacco. She reports that she does not drink alcohol and does not use drugs.    Family History  Family History   Problem Relation Name Age of Onset    Heart failure Father          Allergies  Fish containing products, Shellfish containing products, Gadolinium-containing contrast media, and Iodinated  contrast media    Review of Systems   Constitutional: Negative.    HENT: Negative.     Eyes: Negative.    Respiratory:  Negative for cough and shortness of breath.    Cardiovascular:  Positive for leg swelling. Negative for chest pain.   Gastrointestinal: Negative.    Endocrine: Negative.    Genitourinary: Negative.    Musculoskeletal:  Negative for back pain and gait problem.   Skin:  Positive for color change and wound. Negative for pallor.   Neurological:  Negative for dizziness, syncope, speech difficulty, weakness, numbness and headaches.   Hematological:  Does not bruise/bleed easily.   Psychiatric/Behavioral: Negative.          Physical Exam  Vitals reviewed.   Constitutional:       General: She is not in acute distress.     Appearance: Normal appearance. She is normal weight.   HENT:      Head: Normocephalic and atraumatic.   Eyes:      Extraocular Movements: Extraocular movements intact.      Conjunctiva/sclera: Conjunctivae normal.      Pupils: Pupils are equal, round, and reactive to light.   Neck:      Vascular: No carotid bruit.   Cardiovascular:      Rate and Rhythm: Normal rate and regular rhythm.      Pulses:           Femoral pulses are 2+ on the right side and 2+ on the left side.       Dorsalis pedis pulses are detected w/ Doppler on the right side and detected w/ Doppler on the left side.        Posterior tibial pulses are detected w/ Doppler on the right side and detected w/ Doppler on the left side.      Heart sounds: Normal heart sounds.      Comments: Trace edema of LLE  Pulmonary:      Effort: Pulmonary effort is normal.      Breath sounds: Normal breath sounds.   Abdominal:      General: Abdomen is flat.      Palpations: Abdomen is soft.   Musculoskeletal:         General: No swelling. Normal range of motion.      Cervical back: Normal range of motion. No tenderness.   Skin:     General: Skin is warm and dry.      Comments: Dry stable ulcer of left lateral mid calf and left medial ankle; no  "active drainage or odor; slight erythema   Neurological:      General: No focal deficit present.      Mental Status: She is alert and oriented to person, place, and time.      Cranial Nerves: No cranial nerve deficit.      Sensory: No sensory deficit.      Motor: No weakness.   Psychiatric:         Mood and Affect: Mood normal.         Behavior: Behavior normal.          Last Recorded Vitals  Blood pressure 150/67, pulse 66, temperature 36.2 °C (97.2 °F), resp. rate 18, height 1.676 m (5' 6\"), weight 61.2 kg (135 lb), SpO2 94 %.    Relevant Results    Current Facility-Administered Medications:     aspirin chewable tablet 81 mg, 81 mg, oral, Daily, Pato Sharpe MD, 81 mg at 03/20/24 0847    ceFAZolin in dextrose (iso-os) (Ancef) IVPB 1 g, 1 g, intravenous, q8h, Fior Hawkins DO, Stopped at 03/20/24 1236    clopidogrel (Plavix) tablet 75 mg, 75 mg, oral, Daily, Robe Marti DO, 75 mg at 03/20/24 0847    enoxaparin (Lovenox) syringe 40 mg, 40 mg, subcutaneous, q24h, Robe Marti DO, 40 mg at 03/19/24 2322    polyethylene glycol (Glycolax, Miralax) packet 17 g, 17 g, oral, Daily, Robe Marti DO    rosuvastatin (Crestor) tablet 5 mg, 5 mg, oral, Daily, Fior Hawkins DO     Results for orders placed or performed during the hospital encounter of 03/19/24 (from the past 24 hour(s))   CBC and Auto Differential   Result Value Ref Range    WBC 7.5 4.4 - 11.3 x10*3/uL    nRBC 0.0 0.0 - 0.0 /100 WBCs    RBC 3.99 (L) 4.00 - 5.20 x10*6/uL    Hemoglobin 12.1 12.0 - 16.0 g/dL    Hematocrit 37.1 36.0 - 46.0 %    MCV 93 80 - 100 fL    MCH 30.3 26.0 - 34.0 pg    MCHC 32.6 32.0 - 36.0 g/dL    RDW 13.8 11.5 - 14.5 %    Platelets 400 150 - 450 x10*3/uL    Neutrophils % 66.0 40.0 - 80.0 %    Immature Granulocytes %, Automated 0.1 0.0 - 0.9 %    Lymphocytes % 17.7 13.0 - 44.0 %    Monocytes % 11.1 2.0 - 10.0 %    Eosinophils % 4.4 0.0 - 6.0 %    Basophils % 0.7 0.0 - 2.0 %    Neutrophils Absolute 4.91 1.60 - " 5.50 x10*3/uL    Immature Granulocytes Absolute, Automated 0.01 0.00 - 0.50 x10*3/uL    Lymphocytes Absolute 1.32 0.80 - 3.00 x10*3/uL    Monocytes Absolute 0.83 (H) 0.05 - 0.80 x10*3/uL    Eosinophils Absolute 0.33 0.00 - 0.40 x10*3/uL    Basophils Absolute 0.05 0.00 - 0.10 x10*3/uL   Comprehensive metabolic panel   Result Value Ref Range    Glucose 124 (H) 74 - 99 mg/dL    Sodium 136 136 - 145 mmol/L    Potassium 4.3 3.5 - 5.3 mmol/L    Chloride 105 98 - 107 mmol/L    Bicarbonate 24 21 - 32 mmol/L    Anion Gap 11 10 - 20 mmol/L    Urea Nitrogen 23 6 - 23 mg/dL    Creatinine 0.59 0.50 - 1.05 mg/dL    eGFR >90 >60 mL/min/1.73m*2    Calcium 8.5 (L) 8.6 - 10.3 mg/dL    Albumin 3.5 3.4 - 5.0 g/dL    Alkaline Phosphatase 122 33 - 136 U/L    Total Protein 6.6 6.4 - 8.2 g/dL    AST 16 9 - 39 U/L    Bilirubin, Total 0.3 0.0 - 1.2 mg/dL    ALT 11 7 - 45 U/L   C-Reactive Protein   Result Value Ref Range    C-Reactive Protein 0.99 <1.00 mg/dL   Sedimentation Rate   Result Value Ref Range    Sedimentation Rate 43 (H) 0 - 30 mm/h   Protime-INR   Result Value Ref Range    Protime 11.6 9.8 - 12.8 seconds    INR 1.0 0.9 - 1.1   CBC   Result Value Ref Range    WBC 5.9 4.4 - 11.3 x10*3/uL    nRBC 0.0 0.0 - 0.0 /100 WBCs    RBC 4.15 4.00 - 5.20 x10*6/uL    Hemoglobin 12.7 12.0 - 16.0 g/dL    Hematocrit 40.6 36.0 - 46.0 %    MCV 98 80 - 100 fL    MCH 30.6 26.0 - 34.0 pg    MCHC 31.3 (L) 32.0 - 36.0 g/dL    RDW 13.9 11.5 - 14.5 %    Platelets 416 150 - 450 x10*3/uL   Basic metabolic panel   Result Value Ref Range    Glucose 84 74 - 99 mg/dL    Sodium 136 136 - 145 mmol/L    Potassium 3.8 3.5 - 5.3 mmol/L    Chloride 105 98 - 107 mmol/L    Bicarbonate 23 21 - 32 mmol/L    Anion Gap 12 10 - 20 mmol/L    Urea Nitrogen 19 6 - 23 mg/dL    Creatinine 0.72 0.50 - 1.05 mg/dL    eGFR 86 >60 mL/min/1.73m*2    Calcium 8.3 (L) 8.6 - 10.3 mg/dL        Vascular US lower extremity venous duplex left    Result Date: 3/20/2024            Darshana  [facility] [address] [place] [address]     Tel [phone] Fax [phone]  Vascular Lab Report  [facility] US LOWER EXTREMITY VENOUS DUPLEX LEFT Patient Name:     SUSIE RIDDLE    Reading           [id number] ER[address]                                        Physician:        MD Study Date:       3/19/2024            Ordering          [id number] DORIE STEIN                                        Provider: MRN/PID:          [id number]             Fellow: Accession#:       [id number]         Technologist:     Carla Manrique RVT Date of           1945 / 78      Technologist 2: Birth/Age:        years Gender:           F                    Encounter#:       [id number] Admission Status: Emergency            Location          [facility]                                        Performed:  Diagnosis/ICD: Pain in left leg-M79.605 Indication:    Limb pain. CPT Codes:     15074 Peripheral venous duplex scan for DVT Limited  Pertinent History: Known severe PVD (KATHLEEN at [facility] 0.3 and patient                    canceled angiogram scheduled this week).  **CRITICAL RESULT** Critical Result: Occluded left SFA and popliteal artery Notification called to Dr. Sharpe on 3/19/2024 at 5:18:38 PM by Carla Manrique, RVT.  CONCLUSIONS: Left Lower Arterial: Incidental finding of left SFA and popliteal artery occlusion. PTA, peroneal artery, and DARREN appear patent with monophasic flow distally. Right Lower Venous: Right common femoral vein is negative for deep vein thrombus. Left Lower Venous: No evidence of acute deep vein thrombus visualized in the left lower extremity. Additional Findings; Lymph nodes left groin. Additional Findings: Patient unable to keep leg still/ flat throughout exam due to pain. Technically difficult exam.  Imaging & Doppler Findings:  Right        Flow CFV   Spontaneous/Phasic  Left                  Compress Thrombus        Flow Distal External Iliac   Yes      None    Spontaneous/Phasic CFV                     Yes      None   Spontaneous/Phasic PFV                     Yes      None FV Proximal             Yes      None   Spontaneous/Phasic FV Mid                  Yes      None FV Distal               Yes      None Popliteal               Yes      None   Spontaneous/Phasic Peroneal                Yes      None PTV                     Yes      None  [id number] ERIN Church MD Electronically signed by [id number] ERIN Church MD on 3/20/2024 at 8:37:32 AM  ** Final **        Assessment/Plan     79yo female with venous stasis ulcer of the left lateral calf.  She also has known peripheral arterial disease.  She has had previous vascular intervention.  However recent KATHLEEN performed in the wound center was noted to be severely decreased at 0.3.  Venous duplex done on this admission reveals incidental finding of occluded left SFA.  I do believe this warrants proceeding with an angiogram.  I have discussed this with both the patient and her son and they are both agreeable to proceed.  Her wound is stable at this time, and therefore angiogram is not necessarily urgent.  Will try to schedule procedure for this coming Friday.  However if this is not possible we will schedule for next week.  If this is the case, patient does not have to remain in the hospital, but may be discharged and present for the procedure on an outpatient basis.  Will update with further information once angiogram procedure has been scheduled.    I spent 65 minutes in the professional and overall care of this patient.

## 2024-03-20 NOTE — H&P (VIEW-ONLY)
Inpatient consult to Vascular Surgery  Consult performed by: Africa Hoover MD  Consult ordered by: Juan Dahl DO  Reason for consult: left leg PAD, infection          Reason For Consult  left leg PAD, infection     History Of Present Illness  Mechelle Alcantara is a 78 y.o. female presenting with complaint of worsening left lower extremity wound.  He has known history of PAD and venous insufficiency.  She had a similar wound 2 years ago that was treated in the wound center.  At that time she did have a left femoral endarterectomy with angioplasty of her left SFA performed by me.  The wound subsequently healed.  Patient has not followed up with vascular surgery since her procedure.  However she did call the office last week regarding the left leg ulcer, as she was also found to have significantly decreased ABIs.  She was scheduled for an angiogram last week with Dr. Chamberlain, but patient refused to proceed on the day of the procedure.  She states that this current wound of the left lower leg initially started about a month ago.  She noticed increased swelling in her leg at the time.  She admits that she waited a while before going to the doctor.  She was recently seen in the wound center and recommended to come to the emergency room.  Venous duplex in the ED reveals no evidence of DVT.  However it did reveal occlusion of the left SFA.     Past Medical History  She has no past medical history on file.    Surgical History  She has no past surgical history on file.     Social History  She reports that she has been smoking cigarettes. She has been smoking an average of .25 packs per day. She has never used smokeless tobacco. She reports that she does not drink alcohol and does not use drugs.    Family History  Family History   Problem Relation Name Age of Onset    Heart failure Father          Allergies  Fish containing products, Shellfish containing products, Gadolinium-containing contrast media, and Iodinated  contrast media    Review of Systems   Constitutional: Negative.    HENT: Negative.     Eyes: Negative.    Respiratory:  Negative for cough and shortness of breath.    Cardiovascular:  Positive for leg swelling. Negative for chest pain.   Gastrointestinal: Negative.    Endocrine: Negative.    Genitourinary: Negative.    Musculoskeletal:  Negative for back pain and gait problem.   Skin:  Positive for color change and wound. Negative for pallor.   Neurological:  Negative for dizziness, syncope, speech difficulty, weakness, numbness and headaches.   Hematological:  Does not bruise/bleed easily.   Psychiatric/Behavioral: Negative.          Physical Exam  Vitals reviewed.   Constitutional:       General: She is not in acute distress.     Appearance: Normal appearance. She is normal weight.   HENT:      Head: Normocephalic and atraumatic.   Eyes:      Extraocular Movements: Extraocular movements intact.      Conjunctiva/sclera: Conjunctivae normal.      Pupils: Pupils are equal, round, and reactive to light.   Neck:      Vascular: No carotid bruit.   Cardiovascular:      Rate and Rhythm: Normal rate and regular rhythm.      Pulses:           Femoral pulses are 2+ on the right side and 2+ on the left side.       Dorsalis pedis pulses are detected w/ Doppler on the right side and detected w/ Doppler on the left side.        Posterior tibial pulses are detected w/ Doppler on the right side and detected w/ Doppler on the left side.      Heart sounds: Normal heart sounds.      Comments: Trace edema of LLE  Pulmonary:      Effort: Pulmonary effort is normal.      Breath sounds: Normal breath sounds.   Abdominal:      General: Abdomen is flat.      Palpations: Abdomen is soft.   Musculoskeletal:         General: No swelling. Normal range of motion.      Cervical back: Normal range of motion. No tenderness.   Skin:     General: Skin is warm and dry.      Comments: Dry stable ulcer of left lateral mid calf and left medial ankle; no  "active drainage or odor; slight erythema   Neurological:      General: No focal deficit present.      Mental Status: She is alert and oriented to person, place, and time.      Cranial Nerves: No cranial nerve deficit.      Sensory: No sensory deficit.      Motor: No weakness.   Psychiatric:         Mood and Affect: Mood normal.         Behavior: Behavior normal.          Last Recorded Vitals  Blood pressure 150/67, pulse 66, temperature 36.2 °C (97.2 °F), resp. rate 18, height 1.676 m (5' 6\"), weight 61.2 kg (135 lb), SpO2 94 %.    Relevant Results    Current Facility-Administered Medications:     aspirin chewable tablet 81 mg, 81 mg, oral, Daily, Pato Sharpe MD, 81 mg at 03/20/24 0847    ceFAZolin in dextrose (iso-os) (Ancef) IVPB 1 g, 1 g, intravenous, q8h, Fior Hawkins DO, Stopped at 03/20/24 1236    clopidogrel (Plavix) tablet 75 mg, 75 mg, oral, Daily, Robe Marti DO, 75 mg at 03/20/24 0847    enoxaparin (Lovenox) syringe 40 mg, 40 mg, subcutaneous, q24h, Robe Marti DO, 40 mg at 03/19/24 2322    polyethylene glycol (Glycolax, Miralax) packet 17 g, 17 g, oral, Daily, Robe Marti DO    rosuvastatin (Crestor) tablet 5 mg, 5 mg, oral, Daily, Fior Hawkins DO     Results for orders placed or performed during the hospital encounter of 03/19/24 (from the past 24 hour(s))   CBC and Auto Differential   Result Value Ref Range    WBC 7.5 4.4 - 11.3 x10*3/uL    nRBC 0.0 0.0 - 0.0 /100 WBCs    RBC 3.99 (L) 4.00 - 5.20 x10*6/uL    Hemoglobin 12.1 12.0 - 16.0 g/dL    Hematocrit 37.1 36.0 - 46.0 %    MCV 93 80 - 100 fL    MCH 30.3 26.0 - 34.0 pg    MCHC 32.6 32.0 - 36.0 g/dL    RDW 13.8 11.5 - 14.5 %    Platelets 400 150 - 450 x10*3/uL    Neutrophils % 66.0 40.0 - 80.0 %    Immature Granulocytes %, Automated 0.1 0.0 - 0.9 %    Lymphocytes % 17.7 13.0 - 44.0 %    Monocytes % 11.1 2.0 - 10.0 %    Eosinophils % 4.4 0.0 - 6.0 %    Basophils % 0.7 0.0 - 2.0 %    Neutrophils Absolute 4.91 1.60 - " 5.50 x10*3/uL    Immature Granulocytes Absolute, Automated 0.01 0.00 - 0.50 x10*3/uL    Lymphocytes Absolute 1.32 0.80 - 3.00 x10*3/uL    Monocytes Absolute 0.83 (H) 0.05 - 0.80 x10*3/uL    Eosinophils Absolute 0.33 0.00 - 0.40 x10*3/uL    Basophils Absolute 0.05 0.00 - 0.10 x10*3/uL   Comprehensive metabolic panel   Result Value Ref Range    Glucose 124 (H) 74 - 99 mg/dL    Sodium 136 136 - 145 mmol/L    Potassium 4.3 3.5 - 5.3 mmol/L    Chloride 105 98 - 107 mmol/L    Bicarbonate 24 21 - 32 mmol/L    Anion Gap 11 10 - 20 mmol/L    Urea Nitrogen 23 6 - 23 mg/dL    Creatinine 0.59 0.50 - 1.05 mg/dL    eGFR >90 >60 mL/min/1.73m*2    Calcium 8.5 (L) 8.6 - 10.3 mg/dL    Albumin 3.5 3.4 - 5.0 g/dL    Alkaline Phosphatase 122 33 - 136 U/L    Total Protein 6.6 6.4 - 8.2 g/dL    AST 16 9 - 39 U/L    Bilirubin, Total 0.3 0.0 - 1.2 mg/dL    ALT 11 7 - 45 U/L   C-Reactive Protein   Result Value Ref Range    C-Reactive Protein 0.99 <1.00 mg/dL   Sedimentation Rate   Result Value Ref Range    Sedimentation Rate 43 (H) 0 - 30 mm/h   Protime-INR   Result Value Ref Range    Protime 11.6 9.8 - 12.8 seconds    INR 1.0 0.9 - 1.1   CBC   Result Value Ref Range    WBC 5.9 4.4 - 11.3 x10*3/uL    nRBC 0.0 0.0 - 0.0 /100 WBCs    RBC 4.15 4.00 - 5.20 x10*6/uL    Hemoglobin 12.7 12.0 - 16.0 g/dL    Hematocrit 40.6 36.0 - 46.0 %    MCV 98 80 - 100 fL    MCH 30.6 26.0 - 34.0 pg    MCHC 31.3 (L) 32.0 - 36.0 g/dL    RDW 13.9 11.5 - 14.5 %    Platelets 416 150 - 450 x10*3/uL   Basic metabolic panel   Result Value Ref Range    Glucose 84 74 - 99 mg/dL    Sodium 136 136 - 145 mmol/L    Potassium 3.8 3.5 - 5.3 mmol/L    Chloride 105 98 - 107 mmol/L    Bicarbonate 23 21 - 32 mmol/L    Anion Gap 12 10 - 20 mmol/L    Urea Nitrogen 19 6 - 23 mg/dL    Creatinine 0.72 0.50 - 1.05 mg/dL    eGFR 86 >60 mL/min/1.73m*2    Calcium 8.3 (L) 8.6 - 10.3 mg/dL        Vascular US lower extremity venous duplex left    Result Date: 3/20/2024            Darshana  [facility] [address] [place] [address]     Tel [phone] Fax [phone]  Vascular Lab Report  [facility] US LOWER EXTREMITY VENOUS DUPLEX LEFT Patient Name:     SUSIE RIDDLE    Reading           [id number] ER[address]                                        Physician:        MD Study Date:       3/19/2024            Ordering          [id number] DORIE STEIN                                        Provider: MRN/PID:          [id number]             Fellow: Accession#:       [id number]         Technologist:     Carla Manrique RVT Date of           1945 / 78      Technologist 2: Birth/Age:        years Gender:           F                    Encounter#:       [id number] Admission Status: Emergency            Location          [facility]                                        Performed:  Diagnosis/ICD: Pain in left leg-M79.605 Indication:    Limb pain. CPT Codes:     61144 Peripheral venous duplex scan for DVT Limited  Pertinent History: Known severe PVD (KATHLEEN at [facility] 0.3 and patient                    canceled angiogram scheduled this week).  **CRITICAL RESULT** Critical Result: Occluded left SFA and popliteal artery Notification called to Dr. Sharpe on 3/19/2024 at 5:18:38 PM by Carla Manrique, RVT.  CONCLUSIONS: Left Lower Arterial: Incidental finding of left SFA and popliteal artery occlusion. PTA, peroneal artery, and DARREN appear patent with monophasic flow distally. Right Lower Venous: Right common femoral vein is negative for deep vein thrombus. Left Lower Venous: No evidence of acute deep vein thrombus visualized in the left lower extremity. Additional Findings; Lymph nodes left groin. Additional Findings: Patient unable to keep leg still/ flat throughout exam due to pain. Technically difficult exam.  Imaging & Doppler Findings:  Right        Flow CFV   Spontaneous/Phasic  Left                  Compress Thrombus        Flow Distal External Iliac   Yes      None    Spontaneous/Phasic CFV                     Yes      None   Spontaneous/Phasic PFV                     Yes      None FV Proximal             Yes      None   Spontaneous/Phasic FV Mid                  Yes      None FV Distal               Yes      None Popliteal               Yes      None   Spontaneous/Phasic Peroneal                Yes      None PTV                     Yes      None  [id number] ERIN Church MD Electronically signed by [id number] ERIN Church MD on 3/20/2024 at 8:37:32 AM  ** Final **        Assessment/Plan     77yo female with venous stasis ulcer of the left lateral calf.  She also has known peripheral arterial disease.  She has had previous vascular intervention.  However recent KATHLEEN performed in the wound center was noted to be severely decreased at 0.3.  Venous duplex done on this admission reveals incidental finding of occluded left SFA.  I do believe this warrants proceeding with an angiogram.  I have discussed this with both the patient and her son and they are both agreeable to proceed.  Her wound is stable at this time, and therefore angiogram is not necessarily urgent.  Will try to schedule procedure for this coming Friday.  However if this is not possible we will schedule for next week.  If this is the case, patient does not have to remain in the hospital, but may be discharged and present for the procedure on an outpatient basis.  Will update with further information once angiogram procedure has been scheduled.    I spent 65 minutes in the professional and overall care of this patient.

## 2024-03-20 NOTE — CONSULTS
Inpatient consult to Vascular Surgery  Consult performed by: Africa Hoover MD  Consult ordered by: Juan Dahl DO  Reason for consult: left leg PAD, infection          Reason For Consult  left leg PAD, infection     History Of Present Illness  Mechelle Alcantara is a 78 y.o. female presenting with complaint of worsening left lower extremity wound.  He has known history of PAD and venous insufficiency.  She had a similar wound 2 years ago that was treated in the wound center.  At that time she did have a left femoral endarterectomy with angioplasty of her left SFA performed by me.  The wound subsequently healed.  Patient has not followed up with vascular surgery since her procedure.  However she did call the office last week regarding the left leg ulcer, as she was also found to have significantly decreased ABIs.  She was scheduled for an angiogram last week with Dr. Chamberlain, but patient refused to proceed on the day of the procedure.  She states that this current wound of the left lower leg initially started about a month ago.  She noticed increased swelling in her leg at the time.  She admits that she waited a while before going to the doctor.  She was recently seen in the wound center and recommended to come to the emergency room.  Venous duplex in the ED reveals no evidence of DVT.  However it did reveal occlusion of the left SFA.     Past Medical History  She has no past medical history on file.    Surgical History  She has no past surgical history on file.     Social History  She reports that she has been smoking cigarettes. She has been smoking an average of .25 packs per day. She has never used smokeless tobacco. She reports that she does not drink alcohol and does not use drugs.    Family History  Family History   Problem Relation Name Age of Onset    Heart failure Father          Allergies  Fish containing products, Shellfish containing products, Gadolinium-containing contrast media, and Iodinated  contrast media    Review of Systems   Constitutional: Negative.    HENT: Negative.     Eyes: Negative.    Respiratory:  Negative for cough and shortness of breath.    Cardiovascular:  Positive for leg swelling. Negative for chest pain.   Gastrointestinal: Negative.    Endocrine: Negative.    Genitourinary: Negative.    Musculoskeletal:  Negative for back pain and gait problem.   Skin:  Positive for color change and wound. Negative for pallor.   Neurological:  Negative for dizziness, syncope, speech difficulty, weakness, numbness and headaches.   Hematological:  Does not bruise/bleed easily.   Psychiatric/Behavioral: Negative.          Physical Exam  Vitals reviewed.   Constitutional:       General: She is not in acute distress.     Appearance: Normal appearance. She is normal weight.   HENT:      Head: Normocephalic and atraumatic.   Eyes:      Extraocular Movements: Extraocular movements intact.      Conjunctiva/sclera: Conjunctivae normal.      Pupils: Pupils are equal, round, and reactive to light.   Neck:      Vascular: No carotid bruit.   Cardiovascular:      Rate and Rhythm: Normal rate and regular rhythm.      Pulses:           Femoral pulses are 2+ on the right side and 2+ on the left side.       Dorsalis pedis pulses are detected w/ Doppler on the right side and detected w/ Doppler on the left side.        Posterior tibial pulses are detected w/ Doppler on the right side and detected w/ Doppler on the left side.      Heart sounds: Normal heart sounds.      Comments: Trace edema of LLE  Pulmonary:      Effort: Pulmonary effort is normal.      Breath sounds: Normal breath sounds.   Abdominal:      General: Abdomen is flat.      Palpations: Abdomen is soft.   Musculoskeletal:         General: No swelling. Normal range of motion.      Cervical back: Normal range of motion. No tenderness.   Skin:     General: Skin is warm and dry.      Comments: Dry stable ulcer of left lateral mid calf and left medial ankle; no  "active drainage or odor; slight erythema   Neurological:      General: No focal deficit present.      Mental Status: She is alert and oriented to person, place, and time.      Cranial Nerves: No cranial nerve deficit.      Sensory: No sensory deficit.      Motor: No weakness.   Psychiatric:         Mood and Affect: Mood normal.         Behavior: Behavior normal.          Last Recorded Vitals  Blood pressure 150/67, pulse 66, temperature 36.2 °C (97.2 °F), resp. rate 18, height 1.676 m (5' 6\"), weight 61.2 kg (135 lb), SpO2 94 %.    Relevant Results    Current Facility-Administered Medications:     aspirin chewable tablet 81 mg, 81 mg, oral, Daily, Pato Sharpe MD, 81 mg at 03/20/24 0847    ceFAZolin in dextrose (iso-os) (Ancef) IVPB 1 g, 1 g, intravenous, q8h, Fior Hawkins DO, Stopped at 03/20/24 1236    clopidogrel (Plavix) tablet 75 mg, 75 mg, oral, Daily, Robe Marti DO, 75 mg at 03/20/24 0847    enoxaparin (Lovenox) syringe 40 mg, 40 mg, subcutaneous, q24h, Robe Marti DO, 40 mg at 03/19/24 2322    polyethylene glycol (Glycolax, Miralax) packet 17 g, 17 g, oral, Daily, Robe Marti DO    rosuvastatin (Crestor) tablet 5 mg, 5 mg, oral, Daily, Fior Hawkins DO     Results for orders placed or performed during the hospital encounter of 03/19/24 (from the past 24 hour(s))   CBC and Auto Differential   Result Value Ref Range    WBC 7.5 4.4 - 11.3 x10*3/uL    nRBC 0.0 0.0 - 0.0 /100 WBCs    RBC 3.99 (L) 4.00 - 5.20 x10*6/uL    Hemoglobin 12.1 12.0 - 16.0 g/dL    Hematocrit 37.1 36.0 - 46.0 %    MCV 93 80 - 100 fL    MCH 30.3 26.0 - 34.0 pg    MCHC 32.6 32.0 - 36.0 g/dL    RDW 13.8 11.5 - 14.5 %    Platelets 400 150 - 450 x10*3/uL    Neutrophils % 66.0 40.0 - 80.0 %    Immature Granulocytes %, Automated 0.1 0.0 - 0.9 %    Lymphocytes % 17.7 13.0 - 44.0 %    Monocytes % 11.1 2.0 - 10.0 %    Eosinophils % 4.4 0.0 - 6.0 %    Basophils % 0.7 0.0 - 2.0 %    Neutrophils Absolute 4.91 1.60 - " 5.50 x10*3/uL    Immature Granulocytes Absolute, Automated 0.01 0.00 - 0.50 x10*3/uL    Lymphocytes Absolute 1.32 0.80 - 3.00 x10*3/uL    Monocytes Absolute 0.83 (H) 0.05 - 0.80 x10*3/uL    Eosinophils Absolute 0.33 0.00 - 0.40 x10*3/uL    Basophils Absolute 0.05 0.00 - 0.10 x10*3/uL   Comprehensive metabolic panel   Result Value Ref Range    Glucose 124 (H) 74 - 99 mg/dL    Sodium 136 136 - 145 mmol/L    Potassium 4.3 3.5 - 5.3 mmol/L    Chloride 105 98 - 107 mmol/L    Bicarbonate 24 21 - 32 mmol/L    Anion Gap 11 10 - 20 mmol/L    Urea Nitrogen 23 6 - 23 mg/dL    Creatinine 0.59 0.50 - 1.05 mg/dL    eGFR >90 >60 mL/min/1.73m*2    Calcium 8.5 (L) 8.6 - 10.3 mg/dL    Albumin 3.5 3.4 - 5.0 g/dL    Alkaline Phosphatase 122 33 - 136 U/L    Total Protein 6.6 6.4 - 8.2 g/dL    AST 16 9 - 39 U/L    Bilirubin, Total 0.3 0.0 - 1.2 mg/dL    ALT 11 7 - 45 U/L   C-Reactive Protein   Result Value Ref Range    C-Reactive Protein 0.99 <1.00 mg/dL   Sedimentation Rate   Result Value Ref Range    Sedimentation Rate 43 (H) 0 - 30 mm/h   Protime-INR   Result Value Ref Range    Protime 11.6 9.8 - 12.8 seconds    INR 1.0 0.9 - 1.1   CBC   Result Value Ref Range    WBC 5.9 4.4 - 11.3 x10*3/uL    nRBC 0.0 0.0 - 0.0 /100 WBCs    RBC 4.15 4.00 - 5.20 x10*6/uL    Hemoglobin 12.7 12.0 - 16.0 g/dL    Hematocrit 40.6 36.0 - 46.0 %    MCV 98 80 - 100 fL    MCH 30.6 26.0 - 34.0 pg    MCHC 31.3 (L) 32.0 - 36.0 g/dL    RDW 13.9 11.5 - 14.5 %    Platelets 416 150 - 450 x10*3/uL   Basic metabolic panel   Result Value Ref Range    Glucose 84 74 - 99 mg/dL    Sodium 136 136 - 145 mmol/L    Potassium 3.8 3.5 - 5.3 mmol/L    Chloride 105 98 - 107 mmol/L    Bicarbonate 23 21 - 32 mmol/L    Anion Gap 12 10 - 20 mmol/L    Urea Nitrogen 19 6 - 23 mg/dL    Creatinine 0.72 0.50 - 1.05 mg/dL    eGFR 86 >60 mL/min/1.73m*2    Calcium 8.3 (L) 8.6 - 10.3 mg/dL        Vascular US lower extremity venous duplex left    Result Date: 3/20/2024            Darshana  [facility] [address] [place] [address]     Tel [phone] Fax [phone]  Vascular Lab Report  [facility] US LOWER EXTREMITY VENOUS DUPLEX LEFT Patient Name:     SUSIE RIDDLE    Reading           [id number] [address]                                        Physician:        MD Study Date:       3/19/2024            Ordering          [id number] DORIE STEIN                                        Provider: MRN/PID:          [id number]             Fellow: Accession#:       [id number]         Technologist:     Carla Manrique RVT Date of           1945 / 78      Technologist 2: Birth/Age:        years Gender:           F                    Encounter#:       [id number] Admission Status: Emergency            Location          [facility]                                        Performed:  Diagnosis/ICD: Pain in left leg-M79.605 Indication:    Limb pain. CPT Codes:     86818 Peripheral venous duplex scan for DVT Limited  Pertinent History: Known severe PVD (KATHLEEN at [facility] 0.3 and patient                    canceled angiogram scheduled this week).  **CRITICAL RESULT** Critical Result: Occluded left SFA and popliteal artery Notification called to Dr. Sharpe on 3/19/2024 at 5:18:38 PM by Carla Manrique, RVT.  CONCLUSIONS: Left Lower Arterial: Incidental finding of left SFA and popliteal artery occlusion. PTA, peroneal artery, and DARREN appear patent with monophasic flow distally. Right Lower Venous: Right common femoral vein is negative for deep vein thrombus. Left Lower Venous: No evidence of acute deep vein thrombus visualized in the left lower extremity. Additional Findings; Lymph nodes left groin. Additional Findings: Patient unable to keep leg still/ flat throughout exam due to pain. Technically difficult exam.  Imaging & Doppler Findings:  Right        Flow CFV   Spontaneous/Phasic  Left                  Compress Thrombus        Flow Distal External Iliac   Yes      None    Spontaneous/Phasic CFV                     Yes      None   Spontaneous/Phasic PFV                     Yes      None FV Proximal             Yes      None   Spontaneous/Phasic FV Mid                  Yes      None FV Distal               Yes      None Popliteal               Yes      None   Spontaneous/Phasic Peroneal                Yes      None PTV                     Yes      None  [id number] ERIN Church MD Electronically signed by [id number] ERIN Church MD on 3/20/2024 at 8:37:32 AM  ** Final **        Assessment/Plan     79yo female with venous stasis ulcer of the left lateral calf.  She also has known peripheral arterial disease.  She has had previous vascular intervention.  However recent KATHLEEN performed in the wound center was noted to be severely decreased at 0.3.  Venous duplex done on this admission reveals incidental finding of occluded left SFA.  I do believe this warrants proceeding with an angiogram.  I have discussed this with both the patient and her son and they are both agreeable to proceed.  Her wound is stable at this time, and therefore angiogram is not necessarily urgent.  Will try to schedule procedure for this coming Friday.  However if this is not possible we will schedule for next week.  If this is the case, patient does not have to remain in the hospital, but may be discharged and present for the procedure on an outpatient basis.  Will update with further information once angiogram procedure has been scheduled.    I spent 65 minutes in the professional and overall care of this patient.

## 2024-03-20 NOTE — H&P
History Of Present Illness  Mechelle Alcantara is a 78 y.o. female w/ pmhx of PAD (ASA/Plavix) s/p L femoral endarterectomy w/ left SFA atherectomy and angioplasty, HTN, and chronic venous insufficiency presents from wound clinic for worsening cellulitis of left leg. Patient has had chronic left lateral leg venous insufficiency ulcer for which she completed healing through the wound clinic, but however the wound opened up about 3 weeks ago. Patient had worsening redness around the ulcer about 2 weeks ago for which her PCP prescribed doxycycline for. She did not take medication as prescribed due to complications with diarrhea and only took medication once daily but not consistently. She was seen at wound clinic this morning and sent over to emergency department for redness extending up to knee. She did endorse pain at the ulcer, but states that ulcer was debrided at wound clinic causing pain. She denies any fevers, nausea, vomiting, headache, chest pain, or SOB.     Patient had procedure for PAD in 5/2022 with Dr. Hoover where she had L femoral endarterectomy w/ left SFA atherectomy and angioplasty. She was started on asa/plavix for 1 year and instructed to stop plavix by Dr. Schwab in 5/2023. PCP requested patient to continue plavix once he noted worsening of wound. She now follows with Dr. Chamberlain for vascualr surgery.    In the ED: Patient was hemodynamically stable on room air.  WBC 7.5, ESR 43, CRP 0.99.  Vascular ultrasound of lower extremities showed an incidental finding of chronic left occlusion of SFA/popliteal arteries.  But no DVT was seen.  Vascular surgery was called who was not concerned for any immediate procedure, but stated she would see patient in a.m..  Patient was started on Zosyn and vancomycin, and given aspirin and Plavix.     Past Medical History  As above    Surgical History  She has no past surgical history on file.     Social History  She reports that she has been smoking cigarettes. She  "has been smoking an average of .25 packs per day. She has never used smokeless tobacco. She reports that she does not drink alcohol and does not use drugs.    Family History  Family History   Problem Relation Name Age of Onset    Heart failure Father          Allergies  Fish containing products, Other, and Shellfish containing products    Review of Systems   ROS: 12 systems reviewed and negative except per HPI above     Physical Exam by System:    Constitutional: A&Ox4, NAD, resting comfortable   Head and Face: Atraumatic, normocephalic   Eyes: Normal external exam, EOMI  ENT: Normal external inspection of ears and nose. Oropharynx normal.  Cardiovascular: RRR, S1/S2, no murmurs, rubs, or gallops, radial pulses +2  Pulmonary: CTAB, no respiratory distress, no wheezing, rales or rhonchi, on RA  Abdomen: +BS, soft, non-tender, nondistended, no guarding rigidity or rebound tenderness, no masses noted  MSK: Negative for edema, No joint swelling, normal movements of all extremities.   Neuro: No focal deficits, normal motor function, normal sensation, follows all commands  Skin-2 to 3 cm open ulcer on left lateral leg, 1 cm open ulcer on left medial ankle.  Erythema extending circumferentially around leg from dorsal foot to knee with streaking under the patella.  Leg is not more warm than right.  There is no pus or drainage from ulcer.  Psychiatric: Judgment intact. Appropriate mood, affect and behavior      Last Recorded Vitals  Blood pressure 131/54, pulse 73, temperature 37 °C (98.6 °F), resp. rate 16, height 1.676 m (5' 6\"), weight 61.2 kg (135 lb), SpO2 94 %.    Relevant Results  Results for orders placed or performed during the hospital encounter of 03/19/24 (from the past 24 hour(s))   CBC and Auto Differential   Result Value Ref Range    WBC 7.5 4.4 - 11.3 x10*3/uL    nRBC 0.0 0.0 - 0.0 /100 WBCs    RBC 3.99 (L) 4.00 - 5.20 x10*6/uL    Hemoglobin 12.1 12.0 - 16.0 g/dL    Hematocrit 37.1 36.0 - 46.0 %    MCV 93 80 " - 100 fL    MCH 30.3 26.0 - 34.0 pg    MCHC 32.6 32.0 - 36.0 g/dL    RDW 13.8 11.5 - 14.5 %    Platelets 400 150 - 450 x10*3/uL    Neutrophils % 66.0 40.0 - 80.0 %    Immature Granulocytes %, Automated 0.1 0.0 - 0.9 %    Lymphocytes % 17.7 13.0 - 44.0 %    Monocytes % 11.1 2.0 - 10.0 %    Eosinophils % 4.4 0.0 - 6.0 %    Basophils % 0.7 0.0 - 2.0 %    Neutrophils Absolute 4.91 1.60 - 5.50 x10*3/uL    Immature Granulocytes Absolute, Automated 0.01 0.00 - 0.50 x10*3/uL    Lymphocytes Absolute 1.32 0.80 - 3.00 x10*3/uL    Monocytes Absolute 0.83 (H) 0.05 - 0.80 x10*3/uL    Eosinophils Absolute 0.33 0.00 - 0.40 x10*3/uL    Basophils Absolute 0.05 0.00 - 0.10 x10*3/uL   Comprehensive metabolic panel   Result Value Ref Range    Glucose 124 (H) 74 - 99 mg/dL    Sodium 136 136 - 145 mmol/L    Potassium 4.3 3.5 - 5.3 mmol/L    Chloride 105 98 - 107 mmol/L    Bicarbonate 24 21 - 32 mmol/L    Anion Gap 11 10 - 20 mmol/L    Urea Nitrogen 23 6 - 23 mg/dL    Creatinine 0.59 0.50 - 1.05 mg/dL    eGFR >90 >60 mL/min/1.73m*2    Calcium 8.5 (L) 8.6 - 10.3 mg/dL    Albumin 3.5 3.4 - 5.0 g/dL    Alkaline Phosphatase 122 33 - 136 U/L    Total Protein 6.6 6.4 - 8.2 g/dL    AST 16 9 - 39 U/L    Bilirubin, Total 0.3 0.0 - 1.2 mg/dL    ALT 11 7 - 45 U/L   C-Reactive Protein   Result Value Ref Range    C-Reactive Protein 0.99 <1.00 mg/dL   Sedimentation Rate   Result Value Ref Range    Sedimentation Rate 43 (H) 0 - 30 mm/h      Scheduled medications  [START ON 3/20/2024] aspirin, 81 mg, oral, Daily  vancomycin, 1,250 mg, intravenous, Once      Continuous medications     PRN medications       Vascular US lower extremity venous duplex left    Result Date: 3/19/2024  Preliminary Cardiology Report            [facility] [place] [address] [place] [address]     Tel [phone] Fax [phone]       [facility] Lab Report  [facility]  Patient Name:     SUSIE Clemens Physician:   [id number] ERIN Church MD Study Date:       3/19/2024         Ordering Provider:  [id number] DORIE STEIN MRN/PID:          [id number]          Fellow: Accession#:       [id number]      Technologist:       Carla Manrique RVT YOB: 1945        Technologist 2: Gender:           F                 Encounter#:         [id number] Admission Status: Emergency         Location Performed: [facility]  Diagnosis/ICD: Pain in left leg-M79.605 Indication:    Limb pain. CPT Codes:     22627 Peripheral venous duplex scan for DVT Limited  Pertinent History: Known severe PVD (KATHLEEN at [facility] center 0.3 and patient                    canceled angiogram scheduled this week).  **CRITICAL RESULT** Critical Result: Occluded left SFA and popliteal artery Notification called to Dr. Sharpe on 3/19/2024 at 5:18:38 PM by Carla Manrique RVT.  PRELIMINARY CONCLUSIONS:  Left Lower Arterial: Incidental finding of left SFA and popliteal artery occlusion. PTA, peroneal artery, and DARREN appear patent with monophasic flow distally. Right Lower Venous: Right common femoral vein is negative for deep vein thrombus. Left Lower Venous: No evidence of acute deep vein thrombus visualized in the left lower extremity. Additional Findings; Lymph nodes left groin. Additional Findings: Patient unable to keep leg still/ flat throughout exam due to pain. Technically difficult exam.  Imaging & Doppler Findings:  Right        Flow CFV   Spontaneous/Phasic  Left                  Compress Thrombus        Flow Distal External Iliac   Yes      None   Spontaneous/Phasic CFV                     Yes      None   Spontaneous/Phasic PFV                     Yes      None FV Proximal             Yes      None   Spontaneous/Phasic FV Mid                  Yes      None FV Distal               Yes      None Popliteal               Yes      None   Spontaneous/Phasic Peroneal                Yes      None PTV                     Yes      None VASCULAR  PRELIMINARY REPORT completed by Carla Manrqiue RVT on 3/19/2024 at 5:19:33 PM  ** Final **         Assessment/Plan   Principal Problem:    Left leg cellulitis    Patient is a 78 y.o. female w/ pmhx of PAD (ASA/Plavix) s/p L femoral endarterectomy w/ left SFA atherectomy and angioplasty, HTN, and chronic venous insufficiency presents from wound clinic for worsening cellulitis of left leg. Technically patient did not fail outpatient antibiotics as she did not taken medication as prescribed due to side effect of diarrhea. Nonpurulent cellulitis and non-diabetic; last culture of this wound in 2022 showed pan-sensitive Ec faecalis. Vascular surgery to see patient in AM to evaluate for potential procedure, but unlikely given chronic nature of disease.     1.) Non-purulent cellulitis of left leg  2.) Left lower extremity wounds 2/2 chronic venous insufficiency/PAD  -Non-compliant with doxycyline due to side effects  -Discontinue Vanc, likely discontinue zosyn  -Start Rocephin in AM  -Wound cultures  -Wound care consulted    3.) PAD  -Continue Plavix/ASA  -Vascular Consulted  -NPO @ midnight    #HTN  #HLD  -continue Crestor 5mg  -NEEDS MED REC    Diet: Cardiac, NPO @ midnight  DVT: lovenox  Consults: Vascular, wound  CODE: FULL    Disposition: Patient admitted for non-purulent cellulitis and PAD. To be seen by vascular. Anticipate 1-2 night stay    To be staffed with attending physician,   Robe Marti D.O.  PGY-1  -----------  Senior resident addendum:    Patient is a 78-year-old female who presents with worsening left lower extremity wounds to the left lateral lower extremity and left medial ankle.  States that over the past week, erythema of the left lower extremity has progressed from just circumferential erythema around the left lateral lower extremity wound to both distally to the foot and toes as well as approximately coursing towards the knee end to the medial thigh.  She has no lower extremity wounds on  the right.  Endorsing significant tenderness to palpation as well as mildly increased edema. She denies claudication, constitutional symptoms, shortness of breath, nausea or vomiting, angina, change in bowel or bladder habits or new or worsening edema.  She was reportedly at the wound clinic today where her wound was debrided, for which wound clinic instructed her to go to the ER for further evaluation of left lower extremity wound.  She was reportedly taking outpatient doxycycline for several days, however did not complete the course secondary to GI upset with diarrhea. Additionally, 2 months ago, she discontinued taking her dual antiplatelet therapy.    She has a past medical history significant for PAD and chronic left lower extremity wounds with concern for noncompliant with medication.  Recently had outpatient telehealth visit with vascular, Dr. Chamberlain, 3/15/2024 noting an KATHLEEN of the left lower extremity of 0.3 with wounds not healed to the left shin.   at that time was also complaining of discoloration of the left toes and pain in her foot.  At the conclusion of this visit, it was agreed upon that she would entertain left leg angiogram.  Per documentation, patient refused to have her procedure done stating that she thought it was a test just to test her circulation and that she stated she would not come in for the angiogram.  It should be noted that she continues to be an everyday tobacco smoker.    Arrived to the ED afebrile temperature 96.8, pulse 77, RR 18, hypertensive urgency 190/92 saturating 96% on room air.  CBC without leukocytosis, neutrophil predominance and left shift.  No anemia with hemoglobin 12.1 and platelets stable at 400.  CMP grossly unremarkable with no electrolyte, hepatic, renal derangement.  CRP normal at 0.99 with slightly elevated sed rate of 43.  Left lower arterial ultrasound: Incidental finding of left SFA and popliteal artery occlusion.  PTA, peroneal artery and DARREN appear  patent with monophasic flow distally.  Left lower venous ultrasound with no evidence of acute DVT.  Right lower venous ultrasound with no DVT.  Vascular surgery contacted in the ED who recommends restarting dual antiplatelet therapy with Plavix 75 mg and aspirin 81 mg.  She was also given broad-spectrum antibiotics with vancomycin and Zosyn due to concern for rapidly expanding cellulitis of the left lower extremity.    # Non-purulent left lower extremity cellulitis  # PAD  # Left SFA and popliteal artery occlusion of unknown chronicity  # Transient hypertensive urgency not requiring intervention  -Afebrile and no leukocytosis. CBC stable. Slightly elevated ESR 43 and CRP WNL at 0.99  -Did not complete outpatient doxycycline given GI upset with diarrhea  -Discontinued DAPT 2 months ago - continue ASA and plavix  -Left lower arterial ultrasound and bilateral duplex ultrasound results as above  -Vascular surgery contacted and consulted in the ED with recommendations to resume DAPT and broad-spectrum antibiotics for cellulitis. Will be made NPO at [facility].  -Hold SCDs. Wound care consulted  -Given vancomycin and Zosyn in the ED.  Continue Zosyn overnight with anticipation to down titrate antibiotics to Rocephin and likely Augmentin on discharge  -Prior culture data from left lower extremity wound 1/14/2022: E faecalis, pan-sensitive    Other comorbid conditions manage as stated above    Julian Pepper, DO  PGY-2 Internal Medicine      ___________________________________________________________  Day team addendum:   No acute events overnight.  The patient was seen and examined this morning at bedside.  Resting comfortably in bed in no apparent distress.  Patient reports feeling anxious improvement in her left lower extremity cellulitis with improved erythema and pain.  For the left lower extremity cellulitis low suspicion for MRSA and pseudomonal infection.  We will de-escalate IV antibiotics to Ancef at this time with  possible transition to p.o. antibiotics in the next 24 hours.  (Left SFA and popliteal artery occlusion vascular surgery is on consultation, appreciate recommendations.  Continue dual antiplatelet therapy at this time with Plavix and aspirin.      03/20/24 at 11:42 AM   - Fior Hawkins, DO  Internal Medicine, PGY 2

## 2024-03-20 NOTE — CARE PLAN
The patient's goals for the shift include      The clinical goals for the shift include remain clinically stable and safe

## 2024-03-20 NOTE — PROGRESS NOTES
"Vancomycin Dosing by Pharmacy - Emergency Department    Mechelle Alcantara is a 78 y.o. female who pharmacy has been consulted for vancomycin one-time dosing for cellulitis, skin and soft tissue by an Emergency Department (ED) provider.    Estimated Creatinine Clearance: 73.6 mL/min (by C-G formula based on SCr of 0.59 mg/dL).    HD/PD/DIANA: No    Blood pressure 157/69, pulse 76, temperature 37 °C (98.6 °F), resp. rate 16, height 1.676 m (5' 6\"), weight 61.2 kg (135 lb), SpO2 96 %.    Concern for Severe Sepsis and/or Septic Shock: No    Assessment/Plan:  Patient will not be given a loading dose.   Will initiate vancomycin ED dose, 1250 mg (20 mg/kg) x 1 dose.  Pharmacy will now discontinue the one time dose consult. Maintenance dose and continuation of vancomycin to be determined by the admitting team. If vancomycin appropriate for continuation, another pharmacy to dose vancomycin consult will be placed at that time.      Thank you for allowing me to take part in the care of this patient. Please contact pharmacy with any questions.    Mali Juan, PharmD   3/19/2024 8:50 PM       "

## 2024-03-20 NOTE — CONSULTS
Wound Care Consult     Visit Date: 3/20/2024      Patient Name: Mechelle Alcantara         MRN: [id number]           YOB: 1945     Reason for Consult: LLE Wounds        Wound History: Vascular      Pertinent Labs:   Albumin   Date Value Ref Range Status   03/19/2024 3.5 3.4 - 5.0 g/dL Final   01/25/2023 3.8 3.4 - 5.0 g/dL Final     Albumin/Protein Total, Ur   Date Value Ref Range Status   01/25/2023 50.9 Not Established % Final       Wound Assessment:  Wound 03/19/24 Venous Ulcer Pretibial Left (Active)       Wound 03/19/24 Venous Ulcer Pretibial Distal;Left (Active)       Wound Team Summary Assessment: Attempted to see patient. Patient and son state that Dr. Hoover was just in and dressed LLE. Requesting I do not removed dressing.      Wound Team Plan: Defer to Vascular MD for wound care.      Maliha Henson RN  3/20/2024  3:05 PM

## 2024-03-21 ENCOUNTER — PREP FOR PROCEDURE (OUTPATIENT)
Dept: VASCULAR SURGERY | Facility: CLINIC | Age: 79
End: 2024-03-21
Payer: MEDICARE

## 2024-03-21 ENCOUNTER — APPOINTMENT (OUTPATIENT)
Dept: CARDIOLOGY | Facility: HOSPITAL | Age: 79
End: 2024-03-21
Payer: MEDICARE

## 2024-03-21 ENCOUNTER — TELEPHONE (OUTPATIENT)
Dept: CARDIOLOGY | Facility: HOSPITAL | Age: 79
End: 2024-03-21

## 2024-03-21 DIAGNOSIS — S81.802A NON-HEALING WOUND OF LEFT LOWER EXTREMITY: ICD-10-CM

## 2024-03-21 DIAGNOSIS — I73.9 PAD (PERIPHERAL ARTERY DISEASE) (CMS-HCC): ICD-10-CM

## 2024-03-21 LAB
ALBUMIN SERPL BCP-MCNC: 3.1 G/DL (ref 3.4–5)
ANION GAP SERPL CALC-SCNC: 12 MMOL/L (ref 10–20)
BASOPHILS # BLD AUTO: 0.05 X10*3/UL (ref 0–0.1)
BASOPHILS NFR BLD AUTO: 0.6 %
BUN SERPL-MCNC: 23 MG/DL (ref 6–23)
CALCIUM SERPL-MCNC: 7.7 MG/DL (ref 8.6–10.3)
CHLORIDE SERPL-SCNC: 108 MMOL/L (ref 98–107)
CO2 SERPL-SCNC: 22 MMOL/L (ref 21–32)
CREAT SERPL-MCNC: 0.72 MG/DL (ref 0.5–1.05)
EGFRCR SERPLBLD CKD-EPI 2021: 86 ML/MIN/1.73M*2
EOSINOPHIL # BLD AUTO: 0.27 X10*3/UL (ref 0–0.4)
EOSINOPHIL NFR BLD AUTO: 3.5 %
ERYTHROCYTE [DISTWIDTH] IN BLOOD BY AUTOMATED COUNT: 13.9 % (ref 11.5–14.5)
GLUCOSE SERPL-MCNC: 94 MG/DL (ref 74–99)
HCT VFR BLD AUTO: 35.4 % (ref 36–46)
HGB BLD-MCNC: 11.4 G/DL (ref 12–16)
IMM GRANULOCYTES # BLD AUTO: 0.02 X10*3/UL (ref 0–0.5)
IMM GRANULOCYTES NFR BLD AUTO: 0.3 % (ref 0–0.9)
LYMPHOCYTES # BLD AUTO: 1.2 X10*3/UL (ref 0.8–3)
LYMPHOCYTES NFR BLD AUTO: 15.6 %
MAGNESIUM SERPL-MCNC: 2.16 MG/DL (ref 1.6–2.4)
MCH RBC QN AUTO: 30.5 PG (ref 26–34)
MCHC RBC AUTO-ENTMCNC: 32.2 G/DL (ref 32–36)
MCV RBC AUTO: 95 FL (ref 80–100)
MONOCYTES # BLD AUTO: 0.78 X10*3/UL (ref 0.05–0.8)
MONOCYTES NFR BLD AUTO: 10.1 %
NEUTROPHILS # BLD AUTO: 5.39 X10*3/UL (ref 1.6–5.5)
NEUTROPHILS NFR BLD AUTO: 69.9 %
NRBC BLD-RTO: 0 /100 WBCS (ref 0–0)
PHOSPHATE SERPL-MCNC: 2.8 MG/DL (ref 2.5–4.9)
PLATELET # BLD AUTO: 401 X10*3/UL (ref 150–450)
POTASSIUM SERPL-SCNC: 3.8 MMOL/L (ref 3.5–5.3)
RBC # BLD AUTO: 3.74 X10*6/UL (ref 4–5.2)
SODIUM SERPL-SCNC: 138 MMOL/L (ref 136–145)
WBC # BLD AUTO: 7.7 X10*3/UL (ref 4.4–11.3)

## 2024-03-21 PROCEDURE — 83735 ASSAY OF MAGNESIUM: CPT

## 2024-03-21 PROCEDURE — 2500000002 HC RX 250 W HCPCS SELF ADMINISTERED DRUGS (ALT 637 FOR MEDICARE OP, ALT 636 FOR OP/ED): Mod: MUE

## 2024-03-21 PROCEDURE — 1100000001 HC PRIVATE ROOM DAILY

## 2024-03-21 PROCEDURE — 80069 RENAL FUNCTION PANEL: CPT

## 2024-03-21 PROCEDURE — 2500000001 HC RX 250 WO HCPCS SELF ADMINISTERED DRUGS (ALT 637 FOR MEDICARE OP)

## 2024-03-21 PROCEDURE — 2500000004 HC RX 250 GENERAL PHARMACY W/ HCPCS (ALT 636 FOR OP/ED): Performed by: SURGERY

## 2024-03-21 PROCEDURE — 2500000004 HC RX 250 GENERAL PHARMACY W/ HCPCS (ALT 636 FOR OP/ED)

## 2024-03-21 PROCEDURE — 36415 COLL VENOUS BLD VENIPUNCTURE: CPT

## 2024-03-21 PROCEDURE — 85025 COMPLETE CBC W/AUTO DIFF WBC: CPT

## 2024-03-21 PROCEDURE — 99232 SBSQ HOSP IP/OBS MODERATE 35: CPT

## 2024-03-21 RX ORDER — AMLODIPINE BESYLATE 5 MG/1
5 TABLET ORAL DAILY
Status: DISCONTINUED | OUTPATIENT
Start: 2024-03-21 | End: 2024-03-23 | Stop reason: HOSPADM

## 2024-03-21 RX ORDER — DIPHENHYDRAMINE HYDROCHLORIDE 50 MG/ML
50 INJECTION INTRAMUSCULAR; INTRAVENOUS ONCE
Status: COMPLETED | OUTPATIENT
Start: 2024-03-22 | End: 2024-03-22

## 2024-03-21 RX ADMIN — CEFAZOLIN SODIUM 1 G: 1 INJECTION, SOLUTION INTRAVENOUS at 18:45

## 2024-03-21 RX ADMIN — AMLODIPINE BESYLATE 5 MG: 5 TABLET ORAL at 14:57

## 2024-03-21 RX ADMIN — ENOXAPARIN SODIUM 40 MG: 40 INJECTION SUBCUTANEOUS at 20:36

## 2024-03-21 RX ADMIN — CLOPIDOGREL BISULFATE 75 MG: 75 TABLET ORAL at 08:02

## 2024-03-21 RX ADMIN — PREDNISONE 50 MG: 20 TABLET ORAL at 13:45

## 2024-03-21 RX ADMIN — CEFAZOLIN SODIUM 1 G: 1 INJECTION, SOLUTION INTRAVENOUS at 11:08

## 2024-03-21 RX ADMIN — ASPIRIN 81 MG 81 MG: 81 TABLET ORAL at 08:02

## 2024-03-21 RX ADMIN — ROSUVASTATIN CALCIUM 5 MG: 5 TABLET, FILM COATED ORAL at 20:36

## 2024-03-21 RX ADMIN — CEFAZOLIN SODIUM 1 G: 1 INJECTION, SOLUTION INTRAVENOUS at 03:39

## 2024-03-21 RX ADMIN — PREDNISONE 50 MG: 20 TABLET ORAL at 18:44

## 2024-03-21 ASSESSMENT — ENCOUNTER SYMPTOMS
PSYCHIATRIC NEGATIVE: 1
EYES NEGATIVE: 1
ENDOCRINE NEGATIVE: 1
HEADACHES: 0
DIZZINESS: 0
SPEECH DIFFICULTY: 0
CONSTITUTIONAL NEGATIVE: 1
BACK PAIN: 0
WEAKNESS: 0
BRUISES/BLEEDS EASILY: 0
SHORTNESS OF BREATH: 0
COUGH: 0
NUMBNESS: 0
COLOR CHANGE: 1
GASTROINTESTINAL NEGATIVE: 1
WOUND: 1

## 2024-03-21 ASSESSMENT — PAIN SCALES - GENERAL
PAINLEVEL_OUTOF10: 0 - NO PAIN
PAINLEVEL_OUTOF10: 0 - NO PAIN
PAINLEVEL_OUTOF10: 1

## 2024-03-21 ASSESSMENT — PAIN - FUNCTIONAL ASSESSMENT
PAIN_FUNCTIONAL_ASSESSMENT: 0-10

## 2024-03-21 ASSESSMENT — COGNITIVE AND FUNCTIONAL STATUS - GENERAL
HELP NEEDED FOR BATHING: A LITTLE
TOILETING: A LITTLE
CLIMB 3 TO 5 STEPS WITH RAILING: A LITTLE
CLIMB 3 TO 5 STEPS WITH RAILING: A LOT
MOBILITY SCORE: 23
MOBILITY SCORE: 22
DAILY ACTIVITIY SCORE: 22
DAILY ACTIVITIY SCORE: 24

## 2024-03-21 NOTE — CARE PLAN
The patient's goals for the shift include comfort    The clinical goals for the shift include pain anagement    Over the shift, the patient slept most of the shift. Denies any pain.

## 2024-03-21 NOTE — PROGRESS NOTES
Mechelle Alcantara is a 78 y.o. female on day 1 of admission presenting with Left leg cellulitis.      Subjective   No acute events overnight.  The patient was seen and examined this morning at bedside.  Resting comfortably in bed in no apparent distress.  Patient reports much improvement in the pain and erythema to her left lower extremity.  She denies any complaints at this time.       Objective     Last Recorded Vitals  /72 (BP Location: Right arm, Patient Position: Lying)   Pulse 75   Temp 37.4 °C (99.3 °F) (Temporal)   Resp 18   Wt 61.2 kg (135 lb)   SpO2 94%   Intake/Output last 3 Shifts:    Intake/Output Summary (Last 24 hours) at 3/21/2024 1349  Last data filed at 3/21/2024 0409  Gross per 24 hour   Intake 100 ml   Output --   Net 100 ml       Admission Weight  Weight: 61.2 kg (135 lb) (03/19/24 1421)    Daily Weight  03/19/24 : 61.2 kg (135 lb)    Image Results  Vascular US lower extremity venous duplex left              [facility] [place]  [address] [id number]      Tel [phone] Fax [phone]       Vascular Lab Report     VASC US LOWER EXTREMITY VENOUS DUPLEX LEFT    Patient Name:     MECHELLE ALCANTARA    Reading           [id number] ERIN Church                                         Physician:        MD  Study Date:       3/19/2024            Ordering          [id number] DORIE STEIN                                         Provider:  MRN/PID:          [id number]             Fellow:  Accession#:       [id number]         Technologist:     Carla Manrique RVT  Date of           1945 / 78      Technologist 2:  Birth/Age:        years  Gender:           F                    Encounter#:       [id number]  Admission Status: Emergency            Location          [facility]                                         Performed:       Diagnosis/ICD: Pain in left leg-M79.605  Indication:    Limb pain.  CPT Codes:     49254 Peripheral venous duplex scan for DVT  Limited       Pertinent History: Known severe PVD (KATHLEEN at [facility] center 0.3 and patient                     canceled angiogram scheduled this week).       **CRITICAL RESULT**  Critical Result: Occluded left SFA and popliteal artery  Notification called to Dr. Sharpe on 3/19/2024 at 5:18:38 PM by Carla Manrique RVT.     CONCLUSIONS:  Left Lower Arterial: Incidental finding of left SFA and popliteal artery occlusion. PTA, peroneal artery, and DARREN appear patent with monophasic flow distally.  Right Lower Venous: Right common femoral vein is negative for deep vein thrombus.  Left Lower Venous: No evidence of acute deep vein thrombus visualized in the left lower extremity. Additional Findings; Lymph nodes left groin.  Additional Findings:  Patient unable to keep leg still/ flat throughout exam due to pain. Technically  difficult exam.       Imaging & Doppler Findings:     Right        Flow  CFV   Spontaneous/Phasic       Left                  Compress Thrombus        Flow  Distal External Iliac   Yes      None   Spontaneous/Phasic  CFV                     Yes      None   Spontaneous/Phasic  PFV                     Yes      None  FV Proximal             Yes      None   Spontaneous/Phasic  FV Mid                  Yes      None  FV Distal               Yes      None  Popliteal               Yes      None   Spontaneous/Phasic  Peroneal                Yes      None  PTV                     Yes      None       [id number] ERIN Church MD  Electronically signed by [id number] ERIN Church MD on 3/20/2024 at 8:37:32 AM       ** Final **      Physical Exam  General: Awake, alert/oriented x4, well developed, no acute distress  Head: Atraumatic/Normocephalic  Eyes: Normal external exam, EOMI, PERRLA  ENT: Oropharynx normal. Uvula midline, no tonsillar edema, moist mucous membranes  Cardiovascular: RRR, S1/S2, no murmurs, rubs, or gallops, radial pulses +2, no edema of extremities  Pulmonary: CTAB, no respiratory distress. No wheezes,  rales, or ronchi  Abdomen: +BS, soft, non-tender, nondistended, no guarding or rebound, no masses noted  MSK: No joint swelling, normal movements of all extremities. Range of motion- normal.  Extremities: FROM, no edema, wounds, or contusions  Skin-there is a 2 to 3 cm venous ulcer to the lateral aspect of the left lower extremity.  Erythema improved from previous evaluation, now extends from the dorsal foot up to the mid calf.  Neuro: Alert/oriented x4, no focal motor or sensory deficits  Psychiatric: Judgment intact. Appropriate mood and behavior    Relevant Results               Assessment/Plan                  Principal Problem:    Left leg cellulitis    Patient is a 78 y.o. female w/ pmhx of PAD (ASA/Plavix) s/p L femoral endarterectomy w/ left SFA atherectomy and angioplasty, HTN, and chronic venous insufficiency presents from wound clinic for worsening cellulitis of left leg.  Patient was recently treated outpatient with doxycycline however was nonadherent due to side effects of diarrhea. Nonpurulent cellulitis and non-diabetic; last culture of this wound in 2022 showed pan-sensitive Ec faecalis.      1.) Non-purulent cellulitis of left lower extremity  2.) Left lower extremity wounds 2/2 chronic venous insufficiency/PAD  -Non-compliant with outpatient doxycyline due to side effects  -Received vancomycin and Zosyn in the emergency department  -Continue Ancef (3/20-) for a total course of 5 days of treatment.  Anticipate discharge with Keflex  -Wound care on board     3.) PAD  4.)  Left SFA and popliteal artery occlusion of unknown chronicity  -Left lower extremity arterial ultrasound remarkable for incidental finding of left SFA and popliteal artery occlusion  -Bilateral lower extremity venous duplex negative for DVT  -Continue Plavix/ASA  -Vascular on consult, plan for angiogram in the morning  -NPO @ midnight      #HTN  #HLD  -continue Crestor 5mg  -Patient has been persistently hypertensive for the course  of her admission with blood pressures ranging between 140 and 170 systolic.    -Start amlodipine 5 mg    Diet: Cardiac, NPO @ midnight  DVT: lovenox  Consults: Vascular, wound  CODE: FULL          Fior Hawkins, DO  Internal Medicine, PGY 2

## 2024-03-22 PROBLEM — L97.922: Status: ACTIVE | Noted: 2024-03-19

## 2024-03-22 PROCEDURE — 2500000002 HC RX 250 W HCPCS SELF ADMINISTERED DRUGS (ALT 637 FOR MEDICARE OP, ALT 636 FOR OP/ED): Performed by: SURGERY

## 2024-03-22 PROCEDURE — 2550000001 HC RX 255 CONTRASTS: Performed by: SURGERY

## 2024-03-22 PROCEDURE — B4101ZZ FLUOROSCOPY OF ABDOMINAL AORTA USING LOW OSMOLAR CONTRAST: ICD-10-PCS | Performed by: SURGERY

## 2024-03-22 PROCEDURE — 2500000001 HC RX 250 WO HCPCS SELF ADMINISTERED DRUGS (ALT 637 FOR MEDICARE OP): Performed by: SURGERY

## 2024-03-22 PROCEDURE — G0269 OCCLUSIVE DEVICE IN VEIN ART: HCPCS | Mod: TC | Performed by: SURGERY

## 2024-03-22 PROCEDURE — 1100000001 HC PRIVATE ROOM DAILY

## 2024-03-22 PROCEDURE — 99153 MOD SED SAME PHYS/QHP EA: CPT | Performed by: SURGERY

## 2024-03-22 PROCEDURE — C1894 INTRO/SHEATH, NON-LASER: HCPCS | Performed by: SURGERY

## 2024-03-22 PROCEDURE — C1757 CATH, THROMBECTOMY/EMBOLECT: HCPCS | Performed by: SURGERY

## 2024-03-22 PROCEDURE — 2500000004 HC RX 250 GENERAL PHARMACY W/ HCPCS (ALT 636 FOR OP/ED): Performed by: SURGERY

## 2024-03-22 PROCEDURE — 36200 PLACE CATHETER IN AORTA: CPT | Performed by: SURGERY

## 2024-03-22 PROCEDURE — 2500000005 HC RX 250 GENERAL PHARMACY W/O HCPCS: Performed by: SURGERY

## 2024-03-22 PROCEDURE — C1769 GUIDE WIRE: HCPCS | Performed by: SURGERY

## 2024-03-22 PROCEDURE — 37224 PR REVSC OPN/PRG FEM/POP W/ANGIOPLASTY UNI: CPT | Performed by: SURGERY

## 2024-03-22 PROCEDURE — 2780000003 HC OR 278 NO HCPCS: Performed by: SURGERY

## 2024-03-22 PROCEDURE — 75625 CONTRAST EXAM ABDOMINL AORTA: CPT | Performed by: SURGERY

## 2024-03-22 PROCEDURE — 7100000010 HC PHASE TWO TIME - EACH INCREMENTAL 1 MINUTE: Performed by: SURGERY

## 2024-03-22 PROCEDURE — B41G1ZZ FLUOROSCOPY OF LEFT LOWER EXTREMITY ARTERIES USING LOW OSMOLAR CONTRAST: ICD-10-PCS | Performed by: SURGERY

## 2024-03-22 PROCEDURE — 75630 X-RAY AORTA LEG ARTERIES: CPT | Performed by: SURGERY

## 2024-03-22 PROCEDURE — C1887 CATHETER, GUIDING: HCPCS | Performed by: SURGERY

## 2024-03-22 PROCEDURE — 99232 SBSQ HOSP IP/OBS MODERATE 35: CPT

## 2024-03-22 PROCEDURE — C1760 CLOSURE DEV, VASC: HCPCS | Performed by: SURGERY

## 2024-03-22 PROCEDURE — 7100000009 HC PHASE TWO TIME - INITIAL BASE CHARGE: Performed by: SURGERY

## 2024-03-22 PROCEDURE — 36140 INTRO NDL ICATH UPR/LXTR ART: CPT | Performed by: SURGERY

## 2024-03-22 PROCEDURE — 37224 HC REVASCULARIZE FEM/POP ARTERY,ANGIOPLASTY: CPT | Mod: 53 | Performed by: SURGERY

## 2024-03-22 PROCEDURE — 99152 MOD SED SAME PHYS/QHP 5/>YRS: CPT | Performed by: SURGERY

## 2024-03-22 PROCEDURE — 75710 ARTERY X-RAYS ARM/LEG: CPT | Performed by: SURGERY

## 2024-03-22 PROCEDURE — 2500000004 HC RX 250 GENERAL PHARMACY W/ HCPCS (ALT 636 FOR OP/ED)

## 2024-03-22 PROCEDURE — 2720000007 HC OR 272 NO HCPCS: Performed by: SURGERY

## 2024-03-22 PROCEDURE — 36247 INS CATH ABD/L-EXT ART 3RD: CPT | Performed by: SURGERY

## 2024-03-22 PROCEDURE — 75774 ARTERY X-RAY EACH VESSEL: CPT | Performed by: SURGERY

## 2024-03-22 RX ORDER — ACETAMINOPHEN 325 MG/1
650 TABLET ORAL EVERY 6 HOURS PRN
Status: DISCONTINUED | OUTPATIENT
Start: 2024-03-22 | End: 2024-03-23 | Stop reason: HOSPADM

## 2024-03-22 RX ORDER — LIDOCAINE HYDROCHLORIDE 20 MG/ML
INJECTION, SOLUTION INFILTRATION; PERINEURAL AS NEEDED
Status: DISCONTINUED | OUTPATIENT
Start: 2024-03-22 | End: 2024-03-22 | Stop reason: HOSPADM

## 2024-03-22 RX ORDER — IODIXANOL 320 MG/ML
INJECTION, SOLUTION INTRAVASCULAR AS NEEDED
Status: DISCONTINUED | OUTPATIENT
Start: 2024-03-22 | End: 2024-03-22 | Stop reason: HOSPADM

## 2024-03-22 RX ORDER — FENTANYL CITRATE 50 UG/ML
INJECTION, SOLUTION INTRAMUSCULAR; INTRAVENOUS AS NEEDED
Status: DISCONTINUED | OUTPATIENT
Start: 2024-03-22 | End: 2024-03-22 | Stop reason: HOSPADM

## 2024-03-22 RX ORDER — MIDAZOLAM HYDROCHLORIDE 1 MG/ML
INJECTION, SOLUTION INTRAMUSCULAR; INTRAVENOUS AS NEEDED
Status: DISCONTINUED | OUTPATIENT
Start: 2024-03-22 | End: 2024-03-22 | Stop reason: HOSPADM

## 2024-03-22 RX ORDER — HEPARIN SODIUM 1000 [USP'U]/ML
INJECTION, SOLUTION INTRAVENOUS; SUBCUTANEOUS AS NEEDED
Status: DISCONTINUED | OUTPATIENT
Start: 2024-03-22 | End: 2024-03-22 | Stop reason: HOSPADM

## 2024-03-22 RX ADMIN — ASPIRIN 81 MG 81 MG: 81 TABLET ORAL at 12:59

## 2024-03-22 RX ADMIN — CEFAZOLIN SODIUM 1 G: 1 INJECTION, SOLUTION INTRAVENOUS at 03:26

## 2024-03-22 RX ADMIN — AMLODIPINE BESYLATE 5 MG: 5 TABLET ORAL at 12:59

## 2024-03-22 RX ADMIN — DIPHENHYDRAMINE HYDROCHLORIDE 50 MG: 50 INJECTION, SOLUTION INTRAMUSCULAR; INTRAVENOUS at 00:54

## 2024-03-22 RX ADMIN — PREDNISONE 50 MG: 20 TABLET ORAL at 00:53

## 2024-03-22 RX ADMIN — CEFAZOLIN SODIUM 1 G: 1 INJECTION, SOLUTION INTRAVENOUS at 18:42

## 2024-03-22 RX ADMIN — CEFAZOLIN SODIUM 1 G: 1 INJECTION, SOLUTION INTRAVENOUS at 12:59

## 2024-03-22 RX ADMIN — CLOPIDOGREL BISULFATE 75 MG: 75 TABLET ORAL at 12:59

## 2024-03-22 RX ADMIN — ROSUVASTATIN CALCIUM 5 MG: 5 TABLET, FILM COATED ORAL at 22:06

## 2024-03-22 RX ADMIN — ENOXAPARIN SODIUM 40 MG: 40 INJECTION SUBCUTANEOUS at 22:06

## 2024-03-22 ASSESSMENT — COGNITIVE AND FUNCTIONAL STATUS - GENERAL
MOBILITY SCORE: 22
TOILETING: A LITTLE
DRESSING REGULAR LOWER BODY CLOTHING: A LITTLE
WALKING IN HOSPITAL ROOM: A LITTLE
CLIMB 3 TO 5 STEPS WITH RAILING: A LITTLE
CLIMB 3 TO 5 STEPS WITH RAILING: A LITTLE
TOILETING: A LITTLE
HELP NEEDED FOR BATHING: A LITTLE
DAILY ACTIVITIY SCORE: 22
MOBILITY SCORE: 23
DAILY ACTIVITIY SCORE: 22

## 2024-03-22 ASSESSMENT — PAIN SCALES - GENERAL
PAINLEVEL_OUTOF10: 0 - NO PAIN

## 2024-03-22 ASSESSMENT — PAIN - FUNCTIONAL ASSESSMENT
PAIN_FUNCTIONAL_ASSESSMENT: 0-10
PAIN_FUNCTIONAL_ASSESSMENT: 0-10

## 2024-03-22 NOTE — NURSING NOTE
Taking po liquids well, no complaints. Site soft and no hematoma noted. Report called to Vel MCNEIL, transported via bed with ccl staff

## 2024-03-22 NOTE — DISCHARGE INSTRUCTIONS
Patient to follow-up at [facility] after discharge. Please call [phone] to make appointment. Please bring with you Insurance card, Photo ID, and list of medications.     Please call your primary care provider Dr. Latif and schedule a follow up appointment in 2-3 days.     Please follow-up with Dr. Hoover, your vascular surgeon, for repeat angiogram within the next week.    Please take all of your medications as directed.     New medications:    Keflex 500mg: Please take one tablet by mouth every 6 hours for the next 7 days  Amlodipine 5mg: Please take one tablet once a day for the management of your hypertension  Plavix 75mg: Please restart this medication and take once a day    If you have any concerning symptoms, or worsening symptoms please call or return, such as chest pain, shortness of breath, new onset confusion, loss of sensation, or fever >103F.    Thank you for allowing us to participate in your health care.    -[facility] [place] [facility].

## 2024-03-22 NOTE — CARE PLAN
The patient's goals for the shift include comfort    The clinical goals for the shift include pain management, stable vitals    Over the shift, the patient slept all night comfortably. Pt denied any pain. Able to walk to bathroom with one person assist.

## 2024-03-22 NOTE — PROGRESS NOTES
Mechelle Alcantara is a 78 y.o. female on day 2 of admission presenting with Left leg cellulitis.      Subjective   No acute events overnight.  The patient was seen and examined this morning at bedside.  Resting comfortably in bed in no apparent distress.  States erythema swelling and pain have significantly improved since arrival from the emergency department.       Objective     Last Recorded Vitals  BP (!) 183/91 (BP Location: Right arm, Patient Position: Lying)   Pulse 56   Temp 36.1 °C (97 °F) (Temporal)   Resp 16   Wt 61.2 kg (135 lb)   SpO2 93%   Intake/Output last 3 Shifts:    Intake/Output Summary (Last 24 hours) at 3/22/2024 1300  Last data filed at 3/22/2024 1028  Gross per 24 hour   Intake 150 ml   Output 5 ml   Net 145 ml       Admission Weight  Weight: 61.2 kg (135 lb) (03/19/24 1421)    Daily Weight  03/19/24 : 61.2 kg (135 lb)    Image Results  Invasive vascular procedure  This study is a surgery completed in an invasive cardiovascular space.   Please see OpNote on Notes tab for findings.      Physical Exam    General: Awake, alert/oriented x4, well developed, no acute distress  Head: Atraumatic/Normocephalic  Eyes: Normal external exam, EOMI, PERRLA  ENT: Oropharynx normal. Uvula midline, no tonsillar edema, moist mucous membranes  Cardiovascular: RRR, S1/S2, no murmurs, rubs, or gallops, radial pulses +2, no edema of extremities  Pulmonary: CTAB, no respiratory distress. No wheezes, rales, or ronchi  Abdomen: +BS, soft, non-tender, nondistended, no guarding or rebound, no masses noted  MSK: No joint swelling, normal movements of all extremities. Range of motion- normal.  Extremities: FROM, no edema, wounds, or contusions  Skin-there is a 2 to 3 cm venous ulcer to the lateral aspect of the left lower extremity.  Erythema improved from previous evaluation, now extends from the dorsal foot up to the distal calf.  Neuro: Alert/oriented x4, no focal motor or sensory deficits  Psychiatric: Judgment  intact. Appropriate mood and behavior       Relevant Results               Assessment/Plan                  Principal Problem:    Left leg cellulitis  Active Problems:    PAD (peripheral artery disease) (CMS/HCC)    Leg ulcer, left, with fat layer exposed (CMS/HCC)    Patient is a 78 y.o. female w/ pmhx of PAD (ASA/Plavix) s/p L femoral endarterectomy w/ left SFA atherectomy and angioplasty, HTN, and chronic venous insufficiency presents from wound clinic for worsening cellulitis of left leg.  Patient was recently treated outpatient with doxycycline however was nonadherent due to side effects of diarrhea. Nonpurulent cellulitis and non-diabetic; last culture of this wound in 2022 showed pan-sensitive Ec faecalis.      1.) Non-purulent cellulitis of left lower extremity  2.) Left lower extremity wounds 2/2 chronic venous insufficiency/PAD  -Non-compliant with outpatient doxycyline due to side effects  -Received vancomycin and Zosyn in the emergency department  -Continue Ancef (3/20-3/24) for a total course of 5 days of treatment.  Anticipate discharge with Keflex  -Wound care on board     3.) PAD  4.)  Left SFA and popliteal artery occlusion of unknown chronicity  -Left lower extremity arterial ultrasound remarkable for incidental finding of left SFA and popliteal artery occlusion  -Bilateral lower extremity venous duplex negative for DVT  -Continue Plavix/ASA  -Vascular on consult, patient underwent angiogram of the left lower extremity however unable to recanalize       #HTN  #HLD  -continue Crestor 5mg  -Continue amlodipine 5 mg     Diet: Cardiac  DVT: lovenox  Consults: Vascular, wound  CODE: FULL     Dispo: Anticipate dispo in the next 24 hours after monitoring status post angiogram this a.m.     Assessment and plan discussed with attending  Fior Hawkins, DO  Internal Medicine, PGY 2

## 2024-03-22 NOTE — OP NOTE
Lower Extremity Angiogram (L), Lower Extremity Intervention Operative Note     Date: 3/22/2024  OR Location: [facility] Cardiac Cath Lab    Name: Mechelle Alcantara, : 1945, Age: 78 y.o., MRN: [id number], Sex: female    Diagnosis  Pre-op Diagnosis     * PAD (peripheral artery disease) (CMS/HCC) [I73.9]     * Leg ulcer, left, with fat layer exposed (CMS/HCC) [L97.922] Post-op Diagnosis     * PAD (peripheral artery disease) (CMS/HCC) [I73.9]     * Leg ulcer, left, with fat layer exposed (CMS/HCC) [L97.922]     Procedures  Aortogram via US guided right femoral access  Selective left lower extremity angiogram  Attempted recanalization of left SFA occlusion  Attempted left pedal access  Mynx closure of right femoral access site    Surgeons      * Africa Hoover - Primary    Resident/Fellow/Other Assistant:  Surgeon(s) and Role:    Procedure Summary  Anesthesia: Moderate Sedation  ASA: ASA status not filed in the log.  Anesthesia Staff: No anesthesia staff entered.  Estimated Blood Loss: 5mL  Intra-op Medications:   Administrations occurring from 0839 to 1029 on 24:   Medication Name Total Dose   methylPREDNISolone sod succinate (SOLU-Medrol) injection 125 mg   fentaNYL PF (Sublimaze) injection 150 mcg   midazolam (Versed) injection 3.5 mg   lidocaine (Xylocaine) 20 mg/mL (2 %) injection 10 mL   heparin 1,000 unit/mL injection 5,000 Units   iodixanol (VISIPaque) 320 mg iodine/mL injection 50 mL         Intraprocedure I/O Totals       None           Specimen: No specimens collected     Staff:   Circulator: Carol Burton RN  Scrub Person: RT Theresa         Drains and/or Catheters: * None in log *    Tourniquet Times:         Implants:     Findings: occlusion of left SFA, popliteal artery, and TP trunk     Indications: Mechelle Alcantara is an 78 y.o. female who is having surgery for PAD (peripheral artery disease) (CMS/HCC) [I73.9]  Leg ulcer, left, with fat layer exposed (CMS/HCC) [L97.922].     The patient  was seen in the preoperative area. The risks, benefits, complications, treatment options, non-operative alternatives, expected recovery and outcomes were discussed with the patient. The possibilities of reaction to medication, pulmonary aspiration, injury to surrounding structures, bleeding, recurrent infection, the need for additional procedures, failure to diagnose a condition, and creating a complication requiring transfusion or operation were discussed with the patient. The patient concurred with the proposed plan, giving informed consent.  The site of surgery was properly noted/marked if necessary per policy. The patient has been actively warmed in preoperative area. Preoperative antibiotics are not indicated. Venous thrombosis prophylaxis are not indicated.    Procedure Details:   After the proper pre-operative work up and informed consent was obtained the patient was taken to the procedure room and laid in the supine position.  She was placed on hemodynamic monitoring and given moderate sedation in the form of fentanyl and Versed.  Bilateral groins were prepped and draped in sterile fashion.  The right femoral artery was visualized under duplex ultrasound and found to be widely patent with no evidence of any significant stenosis.  It was therefore accessed under direct ultrasound visualization using a micropuncture needle.  Micropuncture sheath was placed via Seldinger technique.  This was immediately upsized to a 5 Maltese sheath.  Then an Omni Flush catheter was passed through the sheath and guided up into the abdominal aorta.  The tip of the catheter was positioned at the level of L1.  Then a flush aortogram was performed in the AP projection.  This did reveal that the aorta and bilateral renal arteries were patent.  The tip of the catheter was then moved to the distal aorta and a pelvic oblique angiogram was performed in the 25 degree RAMOS projection.  This did reveal that bilateral iliac systems were  patent with no significant stenosis.  Therefore the catheter was then passed up and over the aortic bifurcation and advanced down to the level of the left femoral head.  Then a selective left lower extremity angiogram was performed in the sequential fashion from the groin to the foot.  This did reveal that the left common femoral and profunda femoris arteries are patent.  However there was an occlusion of the left superficial femoral artery starting just distal to its origin.  The popliteal artery was also found to be completely occluded.  There was reconstitution in the calf of the anterior tibial and posterior tibial arteries, which supplied flow into the foot.    Decision was made to attempt recanalization.  Therefore the 5 Greek sheath was upsized to a 6 Greek 45 cm Terumo destination sheath, the tip of which was positioned in the left common femoral artery.  Attempts were made to cross the occluded SFA using an angled glide catheter and a glide advantage wire.  This was unsuccessful.  We therefore attempted to cross the lesion with the Dizkon Crosser atherectomy device.  The catheter and atherectomy device were loaded through the sheath.  Attempts to cross the proximal cap with the device were unsuccessful.  Therefore an 018 command wire was selected and used to attempt to cross the lesion.  This was also unsuccessful.  Therefore decision was made to attempt pedal access.  The foot was prepped out and the posterior tibial artery was visualized under duplex ultrasound.  Attempts were made to access the artery with ultrasound guidance, but this was unsuccessful as well.  Attempts to access the left dorsalis pedis artery with ultrasound guidance was also unsuccessful.  At this point decision was made to abandon any further attempts at intervention.  The 6 Greek Terumo destination sheath was exchanged out for a short 6 Greek sheath and then the right femoral access site was closed with a Mynx closure device.   Good hemostasis was achieved.  The procedure was then completed.  The patient tolerated the procedure well.  She was transferred to the recovery area in stable condition.    Complications:  None; patient tolerated the procedure well.    Disposition: PACU - hemodynamically stable.  Condition: stable         Additional Details: none    Attending Attestation: I performed the procedure.    Africa Hoover  Phone Number: [phone]

## 2024-03-23 VITALS
OXYGEN SATURATION: 92 % | HEIGHT: 66 IN | DIASTOLIC BLOOD PRESSURE: 79 MMHG | BODY MASS INDEX: 21.69 KG/M2 | SYSTOLIC BLOOD PRESSURE: 162 MMHG | RESPIRATION RATE: 18 BRPM | WEIGHT: 135 LBS | HEART RATE: 67 BPM | TEMPERATURE: 97.3 F

## 2024-03-23 DIAGNOSIS — I10 HYPERTENSION, UNSPECIFIED TYPE: ICD-10-CM

## 2024-03-23 LAB
ALBUMIN SERPL BCP-MCNC: 3.1 G/DL (ref 3.4–5)
ANION GAP SERPL CALC-SCNC: 12 MMOL/L (ref 10–20)
BASOPHILS # BLD AUTO: 0.01 X10*3/UL (ref 0–0.1)
BASOPHILS NFR BLD AUTO: 0.1 %
BUN SERPL-MCNC: 42 MG/DL (ref 6–23)
CALCIUM SERPL-MCNC: 8.2 MG/DL (ref 8.6–10.3)
CHLORIDE SERPL-SCNC: 109 MMOL/L (ref 98–107)
CO2 SERPL-SCNC: 23 MMOL/L (ref 21–32)
CREAT SERPL-MCNC: 0.67 MG/DL (ref 0.5–1.05)
EGFRCR SERPLBLD CKD-EPI 2021: 90 ML/MIN/1.73M*2
EOSINOPHIL # BLD AUTO: 0 X10*3/UL (ref 0–0.4)
EOSINOPHIL NFR BLD AUTO: 0 %
ERYTHROCYTE [DISTWIDTH] IN BLOOD BY AUTOMATED COUNT: 13.9 % (ref 11.5–14.5)
GLUCOSE SERPL-MCNC: 114 MG/DL (ref 74–99)
HCT VFR BLD AUTO: 34.8 % (ref 36–46)
HGB BLD-MCNC: 11 G/DL (ref 12–16)
IMM GRANULOCYTES # BLD AUTO: 0.06 X10*3/UL (ref 0–0.5)
IMM GRANULOCYTES NFR BLD AUTO: 0.5 % (ref 0–0.9)
LYMPHOCYTES # BLD AUTO: 1.44 X10*3/UL (ref 0.8–3)
LYMPHOCYTES NFR BLD AUTO: 11.6 %
MAGNESIUM SERPL-MCNC: 2.32 MG/DL (ref 1.6–2.4)
MCH RBC QN AUTO: 30.2 PG (ref 26–34)
MCHC RBC AUTO-ENTMCNC: 31.6 G/DL (ref 32–36)
MCV RBC AUTO: 96 FL (ref 80–100)
MONOCYTES # BLD AUTO: 0.96 X10*3/UL (ref 0.05–0.8)
MONOCYTES NFR BLD AUTO: 7.7 %
NEUTROPHILS # BLD AUTO: 9.98 X10*3/UL (ref 1.6–5.5)
NEUTROPHILS NFR BLD AUTO: 80.1 %
NRBC BLD-RTO: 0 /100 WBCS (ref 0–0)
PHOSPHATE SERPL-MCNC: 2.8 MG/DL (ref 2.5–4.9)
PLATELET # BLD AUTO: 385 X10*3/UL (ref 150–450)
POTASSIUM SERPL-SCNC: 3.8 MMOL/L (ref 3.5–5.3)
RBC # BLD AUTO: 3.64 X10*6/UL (ref 4–5.2)
SODIUM SERPL-SCNC: 140 MMOL/L (ref 136–145)
WBC # BLD AUTO: 12.5 X10*3/UL (ref 4.4–11.3)

## 2024-03-23 PROCEDURE — 85025 COMPLETE CBC W/AUTO DIFF WBC: CPT

## 2024-03-23 PROCEDURE — 36415 COLL VENOUS BLD VENIPUNCTURE: CPT

## 2024-03-23 PROCEDURE — 2500000001 HC RX 250 WO HCPCS SELF ADMINISTERED DRUGS (ALT 637 FOR MEDICARE OP): Performed by: SURGERY

## 2024-03-23 PROCEDURE — 99239 HOSP IP/OBS DSCHRG MGMT >30: CPT

## 2024-03-23 PROCEDURE — 2500000001 HC RX 250 WO HCPCS SELF ADMINISTERED DRUGS (ALT 637 FOR MEDICARE OP): Performed by: INTERNAL MEDICINE

## 2024-03-23 PROCEDURE — 83735 ASSAY OF MAGNESIUM: CPT

## 2024-03-23 PROCEDURE — 80069 RENAL FUNCTION PANEL: CPT

## 2024-03-23 PROCEDURE — 2500000004 HC RX 250 GENERAL PHARMACY W/ HCPCS (ALT 636 FOR OP/ED): Performed by: SURGERY

## 2024-03-23 RX ORDER — AMLODIPINE BESYLATE 5 MG/1
5 TABLET ORAL DAILY
Qty: 30 TABLET | Refills: 0 | Status: SHIPPED | OUTPATIENT
Start: 2024-03-23 | End: 2024-03-23

## 2024-03-23 RX ORDER — CAPSAICIN 0.03 G/100G
CREAM TOPICAL 2 TIMES DAILY PRN
Qty: 50 G | Refills: 0 | Status: SHIPPED | OUTPATIENT
Start: 2024-03-23

## 2024-03-23 RX ORDER — AMLODIPINE BESYLATE 5 MG/1
5 TABLET ORAL DAILY
Qty: 90 TABLET | Refills: 0 | Status: SHIPPED | OUTPATIENT
Start: 2024-03-23

## 2024-03-23 RX ORDER — CAPSAICIN 0.75 MG/G
CREAM TOPICAL 2 TIMES DAILY
Status: DISCONTINUED | OUTPATIENT
Start: 2024-03-23 | End: 2024-03-23 | Stop reason: HOSPADM

## 2024-03-23 RX ORDER — CEPHALEXIN 500 MG/1
500 CAPSULE ORAL 4 TIMES DAILY
Qty: 28 CAPSULE | Refills: 0 | Status: SHIPPED | OUTPATIENT
Start: 2024-03-23 | End: 2024-04-05 | Stop reason: ALTCHOICE

## 2024-03-23 RX ADMIN — AMLODIPINE BESYLATE 5 MG: 5 TABLET ORAL at 09:30

## 2024-03-23 RX ADMIN — CEFAZOLIN SODIUM 1 G: 1 INJECTION, SOLUTION INTRAVENOUS at 03:32

## 2024-03-23 RX ADMIN — ASPIRIN 81 MG 81 MG: 81 TABLET ORAL at 09:30

## 2024-03-23 RX ADMIN — CLOPIDOGREL BISULFATE 75 MG: 75 TABLET ORAL at 09:30

## 2024-03-23 RX ADMIN — CAPSAICIN: 0.75 CREAM TOPICAL at 11:56

## 2024-03-23 RX ADMIN — CEFAZOLIN SODIUM 1 G: 1 INJECTION, SOLUTION INTRAVENOUS at 11:56

## 2024-03-23 ASSESSMENT — PAIN SCALES - GENERAL: PAINLEVEL_OUTOF10: 0 - NO PAIN

## 2024-03-23 NOTE — DISCHARGE SUMMARY
Discharge Diagnosis  Left leg cellulitis    Issues Requiring Follow-Up  Cellulitis, peripheral arterial disease, hypertension    Discharge Meds     Your medication list        START taking these medications        Instructions Last Dose Given Next Dose Due   amLODIPine 5 mg tablet  Commonly known as: Norvasc      Take 1 tablet (5 mg) by mouth once daily.       cephalexin 500 mg capsule  Commonly known as: Keflex      Take 1 capsule (500 mg) by mouth 4 times a day for 7 days.       clopidogrel 75 mg tablet  Commonly known as: Plavix      Take 1 tablet (75 mg) by mouth once daily.       rosuvastatin 5 mg tablet  Commonly known as: Crestor      Take 1 tablet (5 mg) by mouth once daily.              CONTINUE taking these medications        Instructions Last Dose Given Next Dose Due   aspirin 81 mg EC tablet                     Where to Get Your Medications        These medications were sent to [id number] - [place], [place] - [address] AT [facility] [address]  [address], [facility] [address]      Phone: [phone]   amLODIPine 5 mg tablet  cephalexin 500 mg capsule         Test Results Pending At Discharge  Pending Labs       No current pending labs.            Hospital Course   Mechelle Alcantara is a 78-year-old female with a past medical history of peripheral arterial disease status post angioplasty in 2022 presenting to the [facility] ED with a complaint of left lower extremity erythema pain and swelling.  Patient was started on IV antibiotics and admitted to the teaching service for management of cellulitis.  In the emergency department left lower extremity ultrasound was obtained which revealed occlusion at the left SFA and popliteal region, vascular was consulted.  Patient was started on aspirin and Plavix.  Patient continued on Ancef over the course of her admission.  She underwent angiogram on 3/22 however vascular was unable to recanalize therefore procedure was aborted.  Per  vascular surgery, they will plan for repeat angio attempt via pedal approach within the next week as an outpatient. patient's cellulitis has significantly improved over the course of her admission.  She will be discharged with a course of Keflex to complete an additional 7 days of treatment.  Encouraged continuing Plavix and aspirin on disposition with close primary care follow-up.  She was also started on amlodipine 5mg during her admission due to persistent hypertension.      Pertinent Physical Exam At Time of Discharge  Physical Exam    General: Awake, alert/oriented x4, well developed, no acute distress  Head: Atraumatic/Normocephalic  Eyes: Normal external exam, EOMI, PERRLA  ENT: Oropharynx normal. Uvula midline, no tonsillar edema, moist mucous membranes  Cardiovascular: RRR, S1/S2, no murmurs, rubs, or gallops, radial pulses +2, no edema of extremities  Pulmonary: CTAB, no respiratory distress. No wheezes, rales, or ronchi  Abdomen: +BS, soft, non-tender, nondistended, no guarding or rebound, no masses noted  MSK: No joint swelling, normal movements of all extremities. Range of motion- normal.  Extremities: FROM, no edema, wounds, or contusions  Skin-there is a 2 to 3 cm venous ulcer to the lateral aspect of the left lower extremity.  Erythema improved from previous evaluation, now extends from the dorsal foot up to the mid/distal calf just above the ankle.  Neuro: Alert/oriented x4, no focal motor or sensory deficits  Psychiatric: Judgment intact. Appropriate mood and behavior    Outpatient Follow-Up  Future Appointments   Date Time Provider Department Center   3/26/2024  1:30 PM Woundcare Provider [id number] [place]   4/5/2024  1:20 PM Mj Latif DO [id number] [place]   4/8/2024  1:00 PM ST VASC LAB 3 [id number] [facility] [place]   4/8/2024  2:00 PM STJ VASC LAB 3 [id number] [facility] [place]   4/18/2024  1:15 PM Negrito Chamberlain MD [id number] [place]         Fior Hawkins DO  Internal Medicine,  PGY 2

## 2024-03-23 NOTE — CARE PLAN
Problem: Skin  Goal: Decreased wound size/increased tissue granulation at next dressing change  Outcome: Progressing  Goal: Promote/optimize nutrition  Outcome: Progressing  Goal: Promote skin healing  Outcome: Progressing     Problem: Pain - Adult  Goal: Verbalizes/displays adequate comfort level or baseline comfort level  Outcome: Progressing     Problem: Safety - Adult  Goal: Free from fall injury  Outcome: Progressing     Problem: Discharge Planning  Goal: Discharge to home or other facility with appropriate resources  Outcome: Progressing     Problem: Chronic Conditions and Co-morbidities  Goal: Patient's chronic conditions and co-morbidity symptoms are monitored and maintained or improved  Outcome: Progressing     Problem: Skin  Goal: Decreased wound size/increased tissue granulation at next dressing change  Outcome: Progressing  Goal: Promote/optimize nutrition  Outcome: Progressing  Goal: Promote skin healing  Outcome: Progressing     Problem: Pain - Adult  Goal: Verbalizes/displays adequate comfort level or baseline comfort level  Outcome: Progressing     Problem: Safety - Adult  Goal: Free from fall injury  Outcome: Progressing     Problem: Discharge Planning  Goal: Discharge to home or other facility with appropriate resources  Outcome: Progressing     Problem: Chronic Conditions and Co-morbidities  Goal: Patient's chronic conditions and co-morbidity symptoms are monitored and maintained or improved  Outcome: Progressing   The patient's goals for the shift include comfort    The clinical goals for the shift include Pt will receive adequate rest.    Over the shift, the patient did

## 2024-03-23 NOTE — CARE PLAN
The patient's goals for the shift include comfort    The clinical goals for the shift include pt will have signs of increased healing to leg    Over the shift, the patient did make progress.     No

## 2024-03-23 NOTE — NURSING NOTE
Pt discharged at this time. H/l removed. Reviewed with pt and  medication to be taken today.  To follow up at wound clinic on Tuesday for wound culture.

## 2024-03-23 NOTE — PROGRESS NOTES
03/23/24 1033   Discharge Planning   Home or Post Acute Services None   Patient expects to be discharged to: Home     Pt to d/c home with oral antibiotics per AVS- no need for home infusion.

## 2024-03-23 NOTE — PROGRESS NOTES
Left leg angiogram performed yesterday. Patient has extensive occlusion of left SFA, popliteal artery, and tibioperoneal trunk. Attempts to recanalize were unsuccessful. Case discussed with Dr. Singh and will plan for repeat angio attempt via pedal approach likely sometime next week, as patient's only other option would be fem-tibial bypass. Angiogram can be done on an outpatient basis. Therefore ok to discharge patient home from vascular perspective if patient is otherwise stable from a medical standpoint.

## 2024-03-23 NOTE — NURSING NOTE
Pt ok to discharge home. Per dr. Moreland can follow up as outpt for wound culture. Area to left leg is dry and albert. Pt denies pain to area. C/o neuropathy pain to feet. Up ambulating in room with walker.

## 2024-03-25 ENCOUNTER — TELEPHONE (OUTPATIENT)
Dept: VASCULAR SURGERY | Facility: HOSPITAL | Age: 79
End: 2024-03-25
Payer: MEDICARE

## 2024-03-25 ENCOUNTER — PATIENT OUTREACH (OUTPATIENT)
Dept: PRIMARY CARE | Facility: CLINIC | Age: 79
End: 2024-03-25
Payer: MEDICARE

## 2024-03-25 ENCOUNTER — PREP FOR PROCEDURE (OUTPATIENT)
Dept: CARDIOLOGY | Facility: CLINIC | Age: 79
End: 2024-03-25
Payer: MEDICARE

## 2024-03-25 DIAGNOSIS — S81.802A NON-HEALING WOUND OF LEFT LOWER EXTREMITY: Primary | ICD-10-CM

## 2024-03-25 DIAGNOSIS — I73.9 PAD (PERIPHERAL ARTERY DISEASE) (CMS-HCC): ICD-10-CM

## 2024-03-25 NOTE — PROGRESS NOTES
Discharge Facility: [facility]  Discharge Diagnosis: Left leg cellulitis   Admission Date: 3/20/24  Discharge Date:  3/23/24    PCP Appointment Date: 4/5/24  Specialist Appointment Date:   - Wound Center: 3/26/24  - Africa Hoover MD (Vascular Surgery) :   Hospital Encounter and Summary: Linked  See discharge assessment below for further details    Medications  Medications reviewed with patient/caregiver?: Yes (see med list) (3/25/2024 10:53 AM)  Is the patient having any side effects they believe may be caused by any medication additions or changes?: No (3/25/2024 10:53 AM)  Does the patient have all medications ordered at discharge?: Yes (3/25/2024 10:53 AM)  Care Management Interventions: No intervention needed (3/25/2024 10:53 AM)  Prescription Comments: START: amlodipine 5mg daily, cephalexin 500mg QID x7 days. Clopidogrel 75mg daily, rosuvastatin 5mg daily (3/25/2024 10:53 AM)  Is the patient taking all medications as directed (includes completed medication regime)?: Yes (3/25/2024 10:53 AM)  Care Management Interventions: Provided patient education (3/25/2024 10:53 AM)    Appointments  Does the patient have a primary care provider?: Yes (3/25/2024 10:53 AM)  Care Management Interventions: Verified appointment date/time/provider (3/25/2024 10:53 AM)  Has the patient kept scheduled appointments due by today?: Yes (3/25/2024 10:53 AM)  Care Management Interventions: Advised patient to keep appointment (3/25/2024 10:53 AM)    Self Management  Has home health visited the patient within 72 hours of discharge?: Not applicable (3/25/2024 10:53 AM)    Patient Teaching  Does the patient have access to their discharge instructions?: Yes (3/25/2024 10:53 AM)  What is the patient's perception of their health status since discharge?: Same (3/25/2024 10:53 AM)  Is the patient/caregiver able to teach back the hierarchy of who to call/visit for symptoms/problems? PCP, Specialist, Home Health nurse, Urgent Care, ED, 911: Yes  (3/25/2024 10:53 AM)  Patient/Caregiver Education Comments: Spoke with patient  briefly. He reports patient is sleeping. Reports she is scared and not doing too well. States she is following up in the wound center tomorrow. Denies questions/concerns at this time. (3/25/2024 10:53 AM)

## 2024-03-25 NOTE — TELEPHONE ENCOUNTER
I have spoken to   And Quinton Cook .  The patient is  scheduled for his vascular procedure  with Dr. Singh on 3/28/2024 @ 11AM  .     The patient has been informed to arrive 1.5  hours prior to the  procedure and to remain NPO    8 hours         hours before the  procedure.        I have reviewed with the patient h er allergy to IVP dye    The following medications have been ordered and reviewed with the patient  Prednisone 50 mg one tablet PO orally 13 hours prior to procedure    Prednisone 50 mg one tablet PO orally 7 hours prior to procedure   Prednisone 50 mg one tablet PO orally 1 hour prior to procedure      Benadryl 50 mg one tablet PO one hour prior to procedure    Called into the [facility] pharmacy [place]    -The day before your procedure or surgery    - Nothing to eat nor drink after midnight               - The day of your procedure or surgery:  -Please park in parking lot E (nearest to the Emergency Department) enter through the outpatient   entrance.    Please wear comfortable clothing  Remember to bring your insurance cards, a form of identification and your COVID vaccination card with you.   Do not bring valuables such as credit cards, checkbook, money or jewelry with you.  - PLEASE BRING ALL MEDICATIONS OR AN UPDATED LIST OF CURRENT MEDICATONS WITH YOU      The patient has  been advised to have transportation to and from the hospital on the day of the procedure. The patient has verbalized an understanding of these instructions.     Directions to [facility] have been provided. The patient has been instructed to call Dr. Singh office nurse if there are  any questions or  concerns. The phone number  [phone]  has been provided   Mechelle Childers RN BSN

## 2024-03-25 NOTE — TELEPHONE ENCOUNTER
I have attempted to contact    Mrs Alcantara     . There is no answer at the following phone number [phone] or [phone]   . I have left a voice mail message for the patient to contact Dr. Hoover and [place] office nurse at [phone]     would like to complete a LLE angio on Thursday 3/28/24    Mechelle Childers RN BSN

## 2024-03-25 NOTE — TELEPHONE ENCOUNTER
I have attempted to contact   Mrs. Alcantara     . There is no answer at the following phone number     [phone]   . I have left a voice mail message for the patient to contact Dr. Singh and  [place]  office nurse at [phone]     The patient is to be  scheduled for a  LLE angio  on Thusday 3/28/24  pedal approach  I am calling to confirm the date and time with the patient   to schedule a follow up appointment.  Mechelle Childers RN BSN    
BIBEMS from street c/o toothache for a week

## 2024-03-26 ENCOUNTER — OFFICE VISIT (OUTPATIENT)
Dept: WOUND CARE | Facility: CLINIC | Age: 79
End: 2024-03-26
Payer: MEDICARE

## 2024-03-26 PROBLEM — S81.802A NON-HEALING WOUND OF LEFT LOWER EXTREMITY: Status: ACTIVE | Noted: 2024-03-25

## 2024-03-26 PROCEDURE — 11042 DBRDMT SUBQ TIS 1ST 20SQCM/<: CPT

## 2024-03-26 RX ORDER — DIPHENHYDRAMINE HCL 50 MG
CAPSULE ORAL
Qty: 1 CAPSULE | Refills: 0 | Status: SHIPPED
Start: 2024-03-26 | End: 2024-04-05 | Stop reason: ALTCHOICE

## 2024-03-26 RX ORDER — ASPIRIN 325 MG
325 TABLET ORAL ONCE
Status: CANCELLED | OUTPATIENT
Start: 2024-03-26 | End: 2024-03-26

## 2024-03-26 RX ORDER — PREDNISONE 50 MG/1
TABLET ORAL
Qty: 3 TABLET | Refills: 0 | Status: SHIPPED
Start: 2024-03-26 | End: 2024-04-05 | Stop reason: ALTCHOICE

## 2024-03-26 RX ORDER — SODIUM CHLORIDE 9 MG/ML
100 INJECTION, SOLUTION INTRAVENOUS CONTINUOUS
Status: CANCELLED | OUTPATIENT
Start: 2024-03-26

## 2024-03-26 RX ORDER — CLOPIDOGREL BISULFATE 75 MG/1
300 TABLET ORAL ONCE
Status: CANCELLED | OUTPATIENT
Start: 2024-03-26 | End: 2024-03-26

## 2024-03-28 ENCOUNTER — HOSPITAL ENCOUNTER (OUTPATIENT)
Facility: HOSPITAL | Age: 79
Setting detail: OUTPATIENT SURGERY
Discharge: HOME | End: 2024-03-28
Attending: STUDENT IN AN ORGANIZED HEALTH CARE EDUCATION/TRAINING PROGRAM | Admitting: STUDENT IN AN ORGANIZED HEALTH CARE EDUCATION/TRAINING PROGRAM
Payer: MEDICARE

## 2024-03-28 VITALS
WEIGHT: 135 LBS | HEIGHT: 60 IN | RESPIRATION RATE: 16 BRPM | OXYGEN SATURATION: 98 % | SYSTOLIC BLOOD PRESSURE: 162 MMHG | DIASTOLIC BLOOD PRESSURE: 67 MMHG | TEMPERATURE: 97.6 F | HEART RATE: 54 BPM | BODY MASS INDEX: 26.5 KG/M2

## 2024-03-28 DIAGNOSIS — I70.242 ATHEROSCLEROSIS OF NATIVE ARTERIES OF LEFT LEG WITH ULCERATION OF CALF (MULTI): ICD-10-CM

## 2024-03-28 DIAGNOSIS — I73.9 PAD (PERIPHERAL ARTERY DISEASE) (CMS-HCC): ICD-10-CM

## 2024-03-28 DIAGNOSIS — S81.802A NON-HEALING WOUND OF LEFT LOWER EXTREMITY: Primary | ICD-10-CM

## 2024-03-28 PROCEDURE — C1894 INTRO/SHEATH, NON-LASER: HCPCS | Performed by: STUDENT IN AN ORGANIZED HEALTH CARE EDUCATION/TRAINING PROGRAM

## 2024-03-28 PROCEDURE — 2500000004 HC RX 250 GENERAL PHARMACY W/ HCPCS (ALT 636 FOR OP/ED): Performed by: STUDENT IN AN ORGANIZED HEALTH CARE EDUCATION/TRAINING PROGRAM

## 2024-03-28 PROCEDURE — 2550000001 HC RX 255 CONTRASTS: Performed by: STUDENT IN AN ORGANIZED HEALTH CARE EDUCATION/TRAINING PROGRAM

## 2024-03-28 PROCEDURE — 99152 MOD SED SAME PHYS/QHP 5/>YRS: CPT | Performed by: STUDENT IN AN ORGANIZED HEALTH CARE EDUCATION/TRAINING PROGRAM

## 2024-03-28 PROCEDURE — 76937 US GUIDE VASCULAR ACCESS: CPT | Performed by: STUDENT IN AN ORGANIZED HEALTH CARE EDUCATION/TRAINING PROGRAM

## 2024-03-28 PROCEDURE — C1725 CATH, TRANSLUMIN NON-LASER: HCPCS | Performed by: STUDENT IN AN ORGANIZED HEALTH CARE EDUCATION/TRAINING PROGRAM

## 2024-03-28 PROCEDURE — 99153 MOD SED SAME PHYS/QHP EA: CPT | Performed by: STUDENT IN AN ORGANIZED HEALTH CARE EDUCATION/TRAINING PROGRAM

## 2024-03-28 PROCEDURE — 2500000005 HC RX 250 GENERAL PHARMACY W/O HCPCS: Performed by: STUDENT IN AN ORGANIZED HEALTH CARE EDUCATION/TRAINING PROGRAM

## 2024-03-28 PROCEDURE — 2780000003 HC OR 278 NO HCPCS: Performed by: STUDENT IN AN ORGANIZED HEALTH CARE EDUCATION/TRAINING PROGRAM

## 2024-03-28 PROCEDURE — C2623 CATH, TRANSLUMIN, DRUG-COAT: HCPCS | Performed by: STUDENT IN AN ORGANIZED HEALTH CARE EDUCATION/TRAINING PROGRAM

## 2024-03-28 PROCEDURE — 36247 INS CATH ABD/L-EXT ART 3RD: CPT | Performed by: STUDENT IN AN ORGANIZED HEALTH CARE EDUCATION/TRAINING PROGRAM

## 2024-03-28 PROCEDURE — C1769 GUIDE WIRE: HCPCS | Performed by: STUDENT IN AN ORGANIZED HEALTH CARE EDUCATION/TRAINING PROGRAM

## 2024-03-28 PROCEDURE — 75710 ARTERY X-RAYS ARM/LEG: CPT | Performed by: STUDENT IN AN ORGANIZED HEALTH CARE EDUCATION/TRAINING PROGRAM

## 2024-03-28 PROCEDURE — C1729 CATH, DRAINAGE: HCPCS | Performed by: STUDENT IN AN ORGANIZED HEALTH CARE EDUCATION/TRAINING PROGRAM

## 2024-03-28 PROCEDURE — 37224 HC REVASCULARIZE FEM/POP ARTERY,ANGIOPLASTY: CPT | Performed by: STUDENT IN AN ORGANIZED HEALTH CARE EDUCATION/TRAINING PROGRAM

## 2024-03-28 PROCEDURE — 37224 PR REVSC OPN/PRG FEM/POP W/ANGIOPLASTY UNI: CPT | Performed by: STUDENT IN AN ORGANIZED HEALTH CARE EDUCATION/TRAINING PROGRAM

## 2024-03-28 PROCEDURE — C1760 CLOSURE DEV, VASC: HCPCS | Performed by: STUDENT IN AN ORGANIZED HEALTH CARE EDUCATION/TRAINING PROGRAM

## 2024-03-28 PROCEDURE — 37252 INTRVASC US NONCORONARY 1ST: CPT | Performed by: STUDENT IN AN ORGANIZED HEALTH CARE EDUCATION/TRAINING PROGRAM

## 2024-03-28 PROCEDURE — 85347 COAGULATION TIME ACTIVATED: CPT | Mod: 91

## 2024-03-28 PROCEDURE — C1773 RET DEV, INSERTABLE: HCPCS | Performed by: STUDENT IN AN ORGANIZED HEALTH CARE EDUCATION/TRAINING PROGRAM

## 2024-03-28 PROCEDURE — C1887 CATHETER, GUIDING: HCPCS | Performed by: STUDENT IN AN ORGANIZED HEALTH CARE EDUCATION/TRAINING PROGRAM

## 2024-03-28 PROCEDURE — 2500000001 HC RX 250 WO HCPCS SELF ADMINISTERED DRUGS (ALT 637 FOR MEDICARE OP): Performed by: STUDENT IN AN ORGANIZED HEALTH CARE EDUCATION/TRAINING PROGRAM

## 2024-03-28 PROCEDURE — C1753 CATH, INTRAVAS ULTRASOUND: HCPCS | Performed by: STUDENT IN AN ORGANIZED HEALTH CARE EDUCATION/TRAINING PROGRAM

## 2024-03-28 PROCEDURE — 7100000009 HC PHASE TWO TIME - INITIAL BASE CHARGE: Performed by: STUDENT IN AN ORGANIZED HEALTH CARE EDUCATION/TRAINING PROGRAM

## 2024-03-28 PROCEDURE — 7100000010 HC PHASE TWO TIME - EACH INCREMENTAL 1 MINUTE: Performed by: STUDENT IN AN ORGANIZED HEALTH CARE EDUCATION/TRAINING PROGRAM

## 2024-03-28 PROCEDURE — 85347 COAGULATION TIME ACTIVATED: CPT | Performed by: STUDENT IN AN ORGANIZED HEALTH CARE EDUCATION/TRAINING PROGRAM

## 2024-03-28 PROCEDURE — 36140 INTRO NDL ICATH UPR/LXTR ART: CPT | Performed by: STUDENT IN AN ORGANIZED HEALTH CARE EDUCATION/TRAINING PROGRAM

## 2024-03-28 PROCEDURE — 2720000007 HC OR 272 NO HCPCS: Performed by: STUDENT IN AN ORGANIZED HEALTH CARE EDUCATION/TRAINING PROGRAM

## 2024-03-28 PROCEDURE — 36200 PLACE CATHETER IN AORTA: CPT | Performed by: STUDENT IN AN ORGANIZED HEALTH CARE EDUCATION/TRAINING PROGRAM

## 2024-03-28 RX ORDER — DEXMEDETOMIDINE HYDROCHLORIDE 4 UG/ML
0.2 INJECTION, SOLUTION INTRAVENOUS CONTINUOUS
Status: DISCONTINUED | OUTPATIENT
Start: 2024-03-28 | End: 2024-03-28

## 2024-03-28 RX ORDER — IODIXANOL 320 MG/ML
INJECTION, SOLUTION INTRAVASCULAR AS NEEDED
Status: DISCONTINUED | OUTPATIENT
Start: 2024-03-28 | End: 2024-03-28 | Stop reason: HOSPADM

## 2024-03-28 RX ORDER — DEXMEDETOMIDINE HYDROCHLORIDE 4 UG/ML
INJECTION, SOLUTION INTRAVENOUS CONTINUOUS PRN
Status: DISCONTINUED | OUTPATIENT
Start: 2024-03-28 | End: 2024-03-28 | Stop reason: HOSPADM

## 2024-03-28 RX ORDER — HEPARIN SODIUM 1000 [USP'U]/ML
INJECTION, SOLUTION INTRAVENOUS; SUBCUTANEOUS AS NEEDED
Status: DISCONTINUED | OUTPATIENT
Start: 2024-03-28 | End: 2024-03-28 | Stop reason: HOSPADM

## 2024-03-28 RX ORDER — FENTANYL CITRATE 50 UG/ML
INJECTION, SOLUTION INTRAMUSCULAR; INTRAVENOUS AS NEEDED
Status: DISCONTINUED | OUTPATIENT
Start: 2024-03-28 | End: 2024-03-28 | Stop reason: HOSPADM

## 2024-03-28 RX ORDER — LIDOCAINE HYDROCHLORIDE 20 MG/ML
INJECTION, SOLUTION INFILTRATION; PERINEURAL AS NEEDED
Status: DISCONTINUED | OUTPATIENT
Start: 2024-03-28 | End: 2024-03-28 | Stop reason: HOSPADM

## 2024-03-28 RX ORDER — MIDAZOLAM HYDROCHLORIDE 1 MG/ML
INJECTION, SOLUTION INTRAMUSCULAR; INTRAVENOUS AS NEEDED
Status: DISCONTINUED | OUTPATIENT
Start: 2024-03-28 | End: 2024-03-28 | Stop reason: HOSPADM

## 2024-03-28 RX ORDER — SODIUM CHLORIDE 9 MG/ML
100 INJECTION, SOLUTION INTRAVENOUS CONTINUOUS
Status: DISCONTINUED | OUTPATIENT
Start: 2024-03-28 | End: 2024-03-28 | Stop reason: HOSPADM

## 2024-03-28 RX ORDER — CLOPIDOGREL BISULFATE 300 MG/1
300 TABLET, FILM COATED ORAL ONCE
Status: COMPLETED | OUTPATIENT
Start: 2024-03-28 | End: 2024-03-28

## 2024-03-28 RX ORDER — SODIUM CHLORIDE 9 MG/ML
1.5 INJECTION, SOLUTION INTRAVENOUS CONTINUOUS
Status: DISCONTINUED | OUTPATIENT
Start: 2024-03-28 | End: 2024-03-28 | Stop reason: HOSPADM

## 2024-03-28 RX ORDER — ASPIRIN 325 MG
325 TABLET ORAL ONCE
Status: COMPLETED | OUTPATIENT
Start: 2024-03-28 | End: 2024-03-28

## 2024-03-28 RX ADMIN — SODIUM CHLORIDE 1.5 ML/KG/HR: 9 INJECTION, SOLUTION INTRAVENOUS at 16:45

## 2024-03-28 RX ADMIN — CLOPIDOGREL BISULFATE 300 MG: 300 TABLET, FILM COATED ORAL at 10:21

## 2024-03-28 RX ADMIN — SODIUM CHLORIDE 100 ML/HR: 9 INJECTION, SOLUTION INTRAVENOUS at 10:22

## 2024-03-28 RX ADMIN — ASPIRIN 325 MG ORAL TABLET 325 MG: 325 PILL ORAL at 10:22

## 2024-03-28 ASSESSMENT — PAIN - FUNCTIONAL ASSESSMENT
PAIN_FUNCTIONAL_ASSESSMENT: 0-10

## 2024-03-28 ASSESSMENT — PAIN SCALES - GENERAL
PAINLEVEL_OUTOF10: 0 - NO PAIN

## 2024-03-28 ASSESSMENT — COLUMBIA-SUICIDE SEVERITY RATING SCALE - C-SSRS
2. HAVE YOU ACTUALLY HAD ANY THOUGHTS OF KILLING YOURSELF?: NO
1. IN THE PAST MONTH, HAVE YOU WISHED YOU WERE DEAD OR WISHED YOU COULD GO TO SLEEP AND NOT WAKE UP?: NO
6. HAVE YOU EVER DONE ANYTHING, STARTED TO DO ANYTHING, OR PREPARED TO DO ANYTHING TO END YOUR LIFE?: NO

## 2024-03-28 NOTE — Clinical Note
Patient Clipped and Prepped: groin, left pedal and left popliteal. Prepped with ChloraPrep, a minimum of 3 minute dry time, longer if needed, no pooling noted, patient draped in sterile fashion. Mid-thigh to entire foot on Left.

## 2024-03-28 NOTE — Clinical Note
Balloon inflated. Balloon inflated using single inflation technique. Inflation 1: Pressure = 8 yarelis; Duration = 180 sec. ARTERIAL

## 2024-03-28 NOTE — Clinical Note
Balloon inflated. Balloon inflated using multiple inflation technique. Inflation 1: Pressure = 8 yarelis; Duration = 180 sec. Inflation 2: Pressure = 10 yarelis; Duration = 10 sec. COMMON FEMORAL/PROFUNDA

## 2024-03-28 NOTE — Clinical Note
The left DP pulse is detected w/ doppler. The right DP pulse is 2+. The left PT pulse is detected w/ doppler. The right PT pulse is 2+.

## 2024-03-28 NOTE — Clinical Note
Sheath was exchanged in the left femoral artery with ACCESS KIT, S-PAOLA MINI, 4FR 10CM 0.018IN 40CM, NT/PT, ECHO ENHANCE NEEDLE.

## 2024-03-28 NOTE — NURSING NOTE
Out of bed with assist. Gait is unsteady which is the current norm for patient. Right groin procedural access site remains soft with drsg dry/intact and no bleeding or hematoma noted after ambulation. Left post-tib procedural access site also remains soft with drsg dry/intact and no bleeding or hematoma noted after ambulation.

## 2024-03-28 NOTE — POST-PROCEDURE NOTE
Physician Transition of Care Summary  Invasive Cardiovascular Lab    Procedure Date: 3/28/2024  Attending:    * Dany Drake - Primary  Resident/Fellow/Other Assistant: Surgeon(s) and Role:    Indications:   Pre-op Diagnosis     * Non-healing wound of left lower extremity [S81.802A]     * PAD (peripheral artery disease) (CMS/[facility]) [I73.9]    Post-procedure diagnosis:   Post-op Diagnosis     * Non-healing wound of left lower extremity [S81.802A]     * PAD (peripheral artery disease) (CMS/[facility]) [I73.9]    Procedure(s):   Lower Extremity Angiogram    Lower Extremity Intervention    IVUS - Coronary  76012 - VT ENDOLUMINAL CORONARY IVUS OCT I&R INITIAL VESSEL        Procedure Findings:   Left SFA/pop long segment , reconstitution in TPT    Description of the Procedure:   Left SFA  recanalization using retrograde pedal access approach  DCB angioplasty to left CFA due to dissection    Stents/Implants:   None    Anticoagulation/Antiplatelet Plan:   Continue aspirin and plavix    Estimated Blood Loss:   15 mL    Anesthesia: Moderate Sedation Anesthesia Staff: No anesthesia staff entered.    Any Specimen(s) Removed:   No specimens collected during this procedure.    Disposition:   Home    Electronically signed by: Dany Drake MD, 3/28/2024 4:44 PM

## 2024-03-29 NOTE — NURSING NOTE
Discharge instructions given and reviewed with patient and patient's  Rommel. Angioseal pamphlet given and reviewed. Patient and her  verbalize understanding of discharge instructions. Discharged to private vehicle via wheelchair, escorted by cath lab RN.

## 2024-04-02 ENCOUNTER — OFFICE VISIT (OUTPATIENT)
Dept: WOUND CARE | Facility: CLINIC | Age: 79
End: 2024-04-02
Payer: MEDICARE

## 2024-04-02 PROCEDURE — 11042 DBRDMT SUBQ TIS 1ST 20SQCM/<: CPT

## 2024-04-04 ENCOUNTER — TELEPHONE (OUTPATIENT)
Dept: VASCULAR SURGERY | Facility: HOSPITAL | Age: 79
End: 2024-04-04
Payer: MEDICARE

## 2024-04-04 ENCOUNTER — APPOINTMENT (OUTPATIENT)
Dept: VASCULAR SURGERY | Facility: CLINIC | Age: 79
End: 2024-04-04
Payer: MEDICARE

## 2024-04-05 ENCOUNTER — OFFICE VISIT (OUTPATIENT)
Dept: PRIMARY CARE | Facility: CLINIC | Age: 79
End: 2024-04-05
Payer: MEDICARE

## 2024-04-05 VITALS
TEMPERATURE: 97 F | HEART RATE: 68 BPM | HEIGHT: 66 IN | RESPIRATION RATE: 14 BRPM | DIASTOLIC BLOOD PRESSURE: 60 MMHG | BODY MASS INDEX: 20.41 KG/M2 | SYSTOLIC BLOOD PRESSURE: 140 MMHG | OXYGEN SATURATION: 97 % | WEIGHT: 127 LBS

## 2024-04-05 DIAGNOSIS — E78.00 PURE HYPERCHOLESTEROLEMIA: ICD-10-CM

## 2024-04-05 DIAGNOSIS — D64.9 ANEMIA, UNSPECIFIED TYPE: ICD-10-CM

## 2024-04-05 DIAGNOSIS — I10 ESSENTIAL HYPERTENSION: Primary | ICD-10-CM

## 2024-04-05 DIAGNOSIS — I73.9 PAD (PERIPHERAL ARTERY DISEASE) (CMS-HCC): ICD-10-CM

## 2024-04-05 PROCEDURE — 1157F ADVNC CARE PLAN IN RCRD: CPT | Performed by: INTERNAL MEDICINE

## 2024-04-05 PROCEDURE — 3077F SYST BP >= 140 MM HG: CPT | Performed by: INTERNAL MEDICINE

## 2024-04-05 PROCEDURE — 1123F ACP DISCUSS/DSCN MKR DOCD: CPT | Performed by: INTERNAL MEDICINE

## 2024-04-05 PROCEDURE — 3078F DIAST BP <80 MM HG: CPT | Performed by: INTERNAL MEDICINE

## 2024-04-05 PROCEDURE — 1111F DSCHRG MED/CURRENT MED MERGE: CPT | Performed by: INTERNAL MEDICINE

## 2024-04-05 PROCEDURE — 1159F MED LIST DOCD IN RCRD: CPT | Performed by: INTERNAL MEDICINE

## 2024-04-05 PROCEDURE — 1160F RVW MEDS BY RX/DR IN RCRD: CPT | Performed by: INTERNAL MEDICINE

## 2024-04-05 PROCEDURE — 1158F ADVNC CARE PLAN TLK DOCD: CPT | Performed by: INTERNAL MEDICINE

## 2024-04-05 PROCEDURE — 99495 TRANSJ CARE MGMT MOD F2F 14D: CPT | Performed by: INTERNAL MEDICINE

## 2024-04-05 ASSESSMENT — ENCOUNTER SYMPTOMS
PALPITATIONS: 0
NAUSEA: 0
DIARRHEA: 0
ABDOMINAL PAIN: 0
CONSTIPATION: 0
WHEEZING: 0
COUGH: 0
SHORTNESS OF BREATH: 0

## 2024-04-05 NOTE — PROGRESS NOTES
"Subjective   Patient ID: Mechelle Alcantara is a 78 y.o. female who presents for Hospital follow up and Foot Pain (left).    Admitted on 3/20 and discharged on 3/23.   Today is 4/5 - within 2 weeks of discharge.    Transition of care phone call was made.    Hospital and interval procedures were reviewed and discussed.    PAD in left leg.  Recent intervention.  Since intervention redness and pain have been improving.    No F/C/S.  No CP or SOB.  She has quit smoking now.    We reviewed and discussed medications as well.      Review of Systems   Respiratory:  Negative for cough, shortness of breath and wheezing.    Cardiovascular:  Negative for chest pain and palpitations.   Gastrointestinal:  Negative for abdominal pain, constipation, diarrhea and nausea.       Objective   /60 (BP Location: Left arm, Patient Position: Sitting, BP Cuff Size: Adult)   Pulse 68   Temp 36.1 °C (97 °F) (Tympanic)   Resp 14   Ht 1.676 m (5' 6\")   Wt 57.6 kg (127 lb)   SpO2 97%   BMI 20.50 kg/m²     Physical Exam  Vitals reviewed.   Constitutional:       Appearance: Normal appearance.   HENT:      Head: Normocephalic.   Cardiovascular:      Rate and Rhythm: Normal rate.   Pulmonary:      Effort: Pulmonary effort is normal.   Musculoskeletal:         General: Normal range of motion.   Neurological:      General: No focal deficit present.      Mental Status: She is alert.   Psychiatric:         Mood and Affect: Mood normal.         Assessment/Plan   Problem List Items Addressed This Visit             ICD-10-CM    Essential hypertension - Primary I10    Hyperlipemia E78.5    PAD (peripheral artery disease) (CMS/[facility]) I73.9     Other Visit Diagnoses         Codes    Anemia, unspecified type     D64.9        Transition of care visit.    We discussed the above and reviewed recent hospital records.    Stressed need to continue aspirin, plavix and statin.  So happy she quit smoking.  Stressed need to avoid tobacco.    BP is up a little, " but with recent intervention and PAD we allow mild HTN.    Follow up in about 2 months - sooner if any issues.

## 2024-04-05 NOTE — TELEPHONE ENCOUNTER
I have attempted to contact   Mrs. Alcantara    . There is no answer at the following phone number   [phone]     . I have left a voice mail message for the patient to contact Dr. Singh office nurse at [phone] to schedule a follow up appointment.  Mechelle Childers RN BSN

## 2024-04-08 ENCOUNTER — APPOINTMENT (OUTPATIENT)
Dept: CARDIOLOGY | Facility: HOSPITAL | Age: 79
End: 2024-04-08
Payer: MEDICARE

## 2024-04-09 ENCOUNTER — PATIENT OUTREACH (OUTPATIENT)
Dept: PRIMARY CARE | Facility: CLINIC | Age: 79
End: 2024-04-09
Payer: MEDICARE

## 2024-04-09 ENCOUNTER — OFFICE VISIT (OUTPATIENT)
Dept: WOUND CARE | Facility: CLINIC | Age: 79
End: 2024-04-09
Payer: MEDICARE

## 2024-04-09 PROCEDURE — 11042 DBRDMT SUBQ TIS 1ST 20SQCM/<: CPT

## 2024-04-16 ENCOUNTER — OFFICE VISIT (OUTPATIENT)
Dept: WOUND CARE | Facility: CLINIC | Age: 79
End: 2024-04-16
Payer: MEDICARE

## 2024-04-16 PROCEDURE — 11042 DBRDMT SUBQ TIS 1ST 20SQCM/<: CPT

## 2024-04-18 ENCOUNTER — APPOINTMENT (OUTPATIENT)
Dept: VASCULAR SURGERY | Facility: CLINIC | Age: 79
End: 2024-04-18
Payer: MEDICARE

## 2024-04-19 ENCOUNTER — HOSPITAL ENCOUNTER (OUTPATIENT)
Dept: CARDIOLOGY | Facility: HOSPITAL | Age: 79
Discharge: HOME | End: 2024-04-19
Payer: MEDICARE

## 2024-04-19 DIAGNOSIS — R60.9 EDEMA, UNSPECIFIED TYPE: ICD-10-CM

## 2024-04-19 DIAGNOSIS — I73.9 PAD (PERIPHERAL ARTERY DISEASE) (CMS-HCC): ICD-10-CM

## 2024-04-19 DIAGNOSIS — M79.89 SWELLING OF LEFT LOWER EXTREMITY: ICD-10-CM

## 2024-04-19 PROCEDURE — 93971 EXTREMITY STUDY: CPT | Performed by: INTERNAL MEDICINE

## 2024-04-19 PROCEDURE — 93922 UPR/L XTREMITY ART 2 LEVELS: CPT | Performed by: INTERNAL MEDICINE

## 2024-04-19 PROCEDURE — 93922 UPR/L XTREMITY ART 2 LEVELS: CPT

## 2024-04-19 PROCEDURE — 93971 EXTREMITY STUDY: CPT

## 2024-04-23 ENCOUNTER — OFFICE VISIT (OUTPATIENT)
Dept: WOUND CARE | Facility: CLINIC | Age: 79
End: 2024-04-23
Payer: MEDICARE

## 2024-04-23 PROCEDURE — 11042 DBRDMT SUBQ TIS 1ST 20SQCM/<: CPT

## 2024-04-30 ENCOUNTER — OFFICE VISIT (OUTPATIENT)
Dept: CARDIOLOGY | Facility: CLINIC | Age: 79
End: 2024-04-30
Payer: MEDICARE

## 2024-04-30 ENCOUNTER — APPOINTMENT (OUTPATIENT)
Dept: WOUND CARE | Facility: CLINIC | Age: 79
End: 2024-04-30
Payer: MEDICARE

## 2024-04-30 ENCOUNTER — LAB (OUTPATIENT)
Dept: LAB | Facility: LAB | Age: 79
End: 2024-04-30
Payer: MEDICARE

## 2024-04-30 VITALS
BODY MASS INDEX: 20.41 KG/M2 | SYSTOLIC BLOOD PRESSURE: 142 MMHG | WEIGHT: 127 LBS | HEIGHT: 66 IN | DIASTOLIC BLOOD PRESSURE: 78 MMHG | HEART RATE: 69 BPM | OXYGEN SATURATION: 98 %

## 2024-04-30 DIAGNOSIS — R60.0 LEG EDEMA, LEFT: ICD-10-CM

## 2024-04-30 DIAGNOSIS — I83.028: ICD-10-CM

## 2024-04-30 DIAGNOSIS — I70.245 ATHSCL NATIVE ARTERIES OF LEFT LEG W ULCERATION OTH PRT FOOT (MULTI): Primary | ICD-10-CM

## 2024-04-30 DIAGNOSIS — I73.9 PERIPHERAL ARTERY DISEASE (CMS-HCC): ICD-10-CM

## 2024-04-30 DIAGNOSIS — I73.9 PAD (PERIPHERAL ARTERY DISEASE) (CMS-HCC): ICD-10-CM

## 2024-04-30 DIAGNOSIS — D64.9 ANEMIA, UNSPECIFIED TYPE: ICD-10-CM

## 2024-04-30 DIAGNOSIS — I70.245 ATHSCL NATIVE ARTERIES OF LEFT LEG W ULCERATION OTH PRT FOOT (MULTI): ICD-10-CM

## 2024-04-30 DIAGNOSIS — R73.9 HYPERGLYCEMIA, UNSPECIFIED: ICD-10-CM

## 2024-04-30 DIAGNOSIS — I10 ESSENTIAL HYPERTENSION: ICD-10-CM

## 2024-04-30 DIAGNOSIS — R73.9 ELEVATED BLOOD SUGAR: ICD-10-CM

## 2024-04-30 DIAGNOSIS — Z00.00 PERIODIC HEALTH ASSESSMENT, GENERAL SCREENING, ADULT: ICD-10-CM

## 2024-04-30 LAB
ALBUMIN SERPL BCP-MCNC: 4.1 G/DL (ref 3.4–5)
ALP SERPL-CCNC: 150 U/L (ref 33–136)
ALT SERPL W P-5'-P-CCNC: 9 U/L (ref 7–45)
ANION GAP SERPL CALC-SCNC: 13 MMOL/L (ref 10–20)
AST SERPL W P-5'-P-CCNC: 14 U/L (ref 9–39)
BASOPHILS # BLD AUTO: 0.04 X10*3/UL (ref 0–0.1)
BASOPHILS NFR BLD AUTO: 0.6 %
BILIRUB SERPL-MCNC: 0.4 MG/DL (ref 0–1.2)
BUN SERPL-MCNC: 20 MG/DL (ref 6–23)
CALCIUM SERPL-MCNC: 9.3 MG/DL (ref 8.6–10.3)
CHLORIDE SERPL-SCNC: 104 MMOL/L (ref 98–107)
CHOLEST SERPL-MCNC: 162 MG/DL (ref 0–199)
CHOLESTEROL/HDL RATIO: 1.8
CO2 SERPL-SCNC: 25 MMOL/L (ref 21–32)
CREAT SERPL-MCNC: 0.77 MG/DL (ref 0.5–1.05)
EGFRCR SERPLBLD CKD-EPI 2021: 79 ML/MIN/1.73M*2
EOSINOPHIL # BLD AUTO: 0.31 X10*3/UL (ref 0–0.4)
EOSINOPHIL NFR BLD AUTO: 4.6 %
ERYTHROCYTE [DISTWIDTH] IN BLOOD BY AUTOMATED COUNT: 13.5 % (ref 11.5–14.5)
EST. AVERAGE GLUCOSE BLD GHB EST-MCNC: 128 MG/DL
FOLATE SERPL-MCNC: 8 NG/ML
GLUCOSE SERPL-MCNC: 86 MG/DL (ref 74–99)
HBA1C MFR BLD: 6.1 %
HCT VFR BLD AUTO: 40.5 % (ref 36–46)
HDLC SERPL-MCNC: 90.9 MG/DL
HGB BLD-MCNC: 12.8 G/DL (ref 12–16)
HGB RETIC QN: 32 PG (ref 28–38)
IMM GRANULOCYTES # BLD AUTO: 0.02 X10*3/UL (ref 0–0.5)
IMM GRANULOCYTES NFR BLD AUTO: 0.3 % (ref 0–0.9)
IMMATURE RETIC FRACTION: 6.9 %
INR PPP: 0.9 (ref 0.9–1.1)
IRON SERPL-MCNC: 69 UG/DL (ref 35–150)
LDLC SERPL CALC-MCNC: 57 MG/DL
LYMPHOCYTES # BLD AUTO: 1.47 X10*3/UL (ref 0.8–3)
LYMPHOCYTES NFR BLD AUTO: 21.8 %
MCH RBC QN AUTO: 30 PG (ref 26–34)
MCHC RBC AUTO-ENTMCNC: 31.6 G/DL (ref 32–36)
MCV RBC AUTO: 95 FL (ref 80–100)
MONOCYTES # BLD AUTO: 0.63 X10*3/UL (ref 0.05–0.8)
MONOCYTES NFR BLD AUTO: 9.3 %
NEUTROPHILS # BLD AUTO: 4.27 X10*3/UL (ref 1.6–5.5)
NEUTROPHILS NFR BLD AUTO: 63.4 %
NON HDL CHOLESTEROL: 71 MG/DL (ref 0–149)
NRBC BLD-RTO: 0 /100 WBCS (ref 0–0)
PLATELET # BLD AUTO: 389 X10*3/UL (ref 150–450)
POTASSIUM SERPL-SCNC: 4.4 MMOL/L (ref 3.5–5.3)
PROT SERPL-MCNC: 6.4 G/DL (ref 6.4–8.2)
PROTHROMBIN TIME: 10.6 SECONDS (ref 9.8–12.8)
RBC # BLD AUTO: 4.26 X10*6/UL (ref 4–5.2)
RETICS #: 0.04 X10*6/UL (ref 0.02–0.11)
RETICS/RBC NFR AUTO: 0.9 % (ref 0.5–2)
SODIUM SERPL-SCNC: 138 MMOL/L (ref 136–145)
TRIGL SERPL-MCNC: 72 MG/DL (ref 0–149)
TSH SERPL-ACNC: 2.73 MIU/L (ref 0.44–3.98)
VIT B12 SERPL-MCNC: 419 PG/ML (ref 211–911)
VLDL: 14 MG/DL (ref 0–40)
WBC # BLD AUTO: 6.7 X10*3/UL (ref 4.4–11.3)

## 2024-04-30 PROCEDURE — 3077F SYST BP >= 140 MM HG: CPT | Performed by: STUDENT IN AN ORGANIZED HEALTH CARE EDUCATION/TRAINING PROGRAM

## 2024-04-30 PROCEDURE — 85045 AUTOMATED RETICULOCYTE COUNT: CPT

## 2024-04-30 PROCEDURE — 82607 VITAMIN B-12: CPT

## 2024-04-30 PROCEDURE — 83540 ASSAY OF IRON: CPT

## 2024-04-30 PROCEDURE — 1160F RVW MEDS BY RX/DR IN RCRD: CPT | Performed by: STUDENT IN AN ORGANIZED HEALTH CARE EDUCATION/TRAINING PROGRAM

## 2024-04-30 PROCEDURE — 99214 OFFICE O/P EST MOD 30 MIN: CPT | Performed by: STUDENT IN AN ORGANIZED HEALTH CARE EDUCATION/TRAINING PROGRAM

## 2024-04-30 PROCEDURE — 80053 COMPREHEN METABOLIC PANEL: CPT

## 2024-04-30 PROCEDURE — 1123F ACP DISCUSS/DSCN MKR DOCD: CPT | Performed by: STUDENT IN AN ORGANIZED HEALTH CARE EDUCATION/TRAINING PROGRAM

## 2024-04-30 PROCEDURE — 3078F DIAST BP <80 MM HG: CPT | Performed by: STUDENT IN AN ORGANIZED HEALTH CARE EDUCATION/TRAINING PROGRAM

## 2024-04-30 PROCEDURE — 85025 COMPLETE CBC W/AUTO DIFF WBC: CPT

## 2024-04-30 PROCEDURE — 80061 LIPID PANEL: CPT

## 2024-04-30 PROCEDURE — 36415 COLL VENOUS BLD VENIPUNCTURE: CPT

## 2024-04-30 PROCEDURE — 83036 HEMOGLOBIN GLYCOSYLATED A1C: CPT

## 2024-04-30 PROCEDURE — 1159F MED LIST DOCD IN RCRD: CPT | Performed by: STUDENT IN AN ORGANIZED HEALTH CARE EDUCATION/TRAINING PROGRAM

## 2024-04-30 PROCEDURE — 82746 ASSAY OF FOLIC ACID SERUM: CPT

## 2024-04-30 PROCEDURE — 84443 ASSAY THYROID STIM HORMONE: CPT

## 2024-04-30 PROCEDURE — 1157F ADVNC CARE PLAN IN RCRD: CPT | Performed by: STUDENT IN AN ORGANIZED HEALTH CARE EDUCATION/TRAINING PROGRAM

## 2024-04-30 PROCEDURE — 85610 PROTHROMBIN TIME: CPT

## 2024-04-30 RX ORDER — CLOPIDOGREL BISULFATE 75 MG/1
600 TABLET ORAL ONCE
Status: CANCELLED | OUTPATIENT
Start: 2024-04-30 | End: 2024-04-30

## 2024-04-30 RX ORDER — SODIUM CHLORIDE 9 MG/ML
100 INJECTION, SOLUTION INTRAVENOUS CONTINUOUS
Status: CANCELLED | OUTPATIENT
Start: 2024-04-30

## 2024-04-30 RX ORDER — ASPIRIN 325 MG
325 TABLET ORAL ONCE
Status: CANCELLED | OUTPATIENT
Start: 2024-04-30 | End: 2024-04-30

## 2024-04-30 NOTE — H&P (VIEW-ONLY)
Chief complaint:   Chief Complaint   Patient presents with    Follow-up      hospital stay s/p angio        History of Present Illness  Mechelle Alcantara is a 78 y.o. year old female patient with history of peripheral artery disease status post left common femoral endarterectomy and SFA angioplasty and venous sufficiency who is presenting for evaluation of nonhealing painful left calf wound.    Patient reports she has intermittent stabbing pain in the leg, and the wound also appears to be improving slightly but .  She is not wearing compression.  She reports is not taking aspirin    Social History     Tobacco Use    Smoking status: Former     Current packs/day: 0.00     Types: Cigarettes     Quit date: 3/18/2024     Years since quittin.1    Smokeless tobacco: Never   Vaping Use    Vaping status: Never Used   Substance Use Topics    Alcohol use: Never    Drug use: Never       Outpatient Medications:  Current Outpatient Medications   Medication Instructions    amLODIPine (NORVASC) 5 mg, oral, Daily    aspirin 81 mg, oral, Daily    capsaicin (Zostrix) 0.025 % cream Topical, 2 times daily PRN    rosuvastatin (CRESTOR) 5 mg, oral, Daily         Vitals:  Vitals:    24 1413   BP: 142/78   Pulse: 69   SpO2: 98%       Physical Exam:  General: NAD, well-appearing  HEENT: moist mucous membranes, no jaundice  Neck: No JVD, no carotid bruit  Lungs: CTA maureen, no wheezing or rales  Cardiac: RRR, no murmurs  Abdomen: soft, non-tender, non-distended  Extremities: 2+ radial pulses, 2+ edema, nonpalpable pulses  Skin: warm, dry, wound and anterolateral calf  Neurologic: AAOx3,  no focal deficits       Reviewed Study(s):    KATHLEEN 2024  CONCLUSIONS:  Right Lower PVR: No evidence of arterial occlusive disease in the right lower extremity at rest. Normal digital perfusion noted. Biphasic flow is noted in the right posterior tibial artery and right dorsalis pedis artery. Triphasic flow is noted in the right  common femoral artery.  Left Lower PVR: Evidence of moderate arterial occlusive disease in the left lower extremity at rest. Decreased digital perfusion noted. Monophasic flow is noted in the left common femoral artery, left posterior tibial artery and left dorsalis pedis artery.     Comparison:  Compared with study from 6/15/2022, no significant change in the right leg. Left leg Doppler waveforms are monophasic with moderate disease.     Imaging & Doppler Findings:     RIGHT Lower PVR                Pressures Ratios  Right Posterior Tibial (Ankle) 170 mmHg  1.09  Right Dorsalis Pedis (Ankle)   175 mmHg  1.12  Right Digit (Great Toe)        109 mmHg  0.70           LEFT Lower PVR                Pressures Ratios  Left Posterior Tibial (Ankle) 105 mmHg  0.67  Left Dorsalis Pedis (Ankle)   101 mmHg  0.65  Left Digit (Great Toe)        70 mmHg   0.45       Venous reflux ultrasound  -SSV reflux    Assessment/Plan     #Peripheral artery disease  # Nonhealing anterolateral calf wound  -Patient with left SFA long segment  with difficulty crossing.  Given nonhealing wounds benefits from repeat left lower extremity angiogram and intervention.  Benefits and risk discussed with patient and family and they agreed to proceed  -Continue wound care.  She needs compression therapy and leg elevation to manage the edema.  We have placed double layer Tubigrip's in the office  -Discussed importance of taking baby aspirin 81 mg daily and statin therapy  -Check lipid panel hemoglobin A1c    Dany Singh MD [facility]  Interventional Cardiology  Endovascular Interventions  [email or web address]    **Disclaimer: This note was dictated by speech recognition, and every effort has been made to prevent any error in transcription, however minor errors may be present**

## 2024-04-30 NOTE — PROGRESS NOTES
Chief complaint:   Chief Complaint   Patient presents with    Follow-up      hospital stay s/p angio        History of Present Illness  Mechelle Alcantara is a 78 y.o. year old female patient with history of peripheral artery disease status post left common femoral endarterectomy and SFA angioplasty and venous sufficiency who is presenting for evaluation of nonhealing painful left calf wound.    Patient reports she has intermittent stabbing pain in the leg, and the wound also appears to be improving slightly but .  She is not wearing compression.  She reports is not taking aspirin    Social History     Tobacco Use    Smoking status: Former     Current packs/day: 0.00     Types: Cigarettes     Quit date: 3/18/2024     Years since quittin.1    Smokeless tobacco: Never   Vaping Use    Vaping status: Never Used   Substance Use Topics    Alcohol use: Never    Drug use: Never       Outpatient Medications:  Current Outpatient Medications   Medication Instructions    amLODIPine (NORVASC) 5 mg, oral, Daily    aspirin 81 mg, oral, Daily    capsaicin (Zostrix) 0.025 % cream Topical, 2 times daily PRN    rosuvastatin (CRESTOR) 5 mg, oral, Daily         Vitals:  Vitals:    24 1413   BP: 142/78   Pulse: 69   SpO2: 98%       Physical Exam:  General: NAD, well-appearing  HEENT: moist mucous membranes, no jaundice  Neck: No JVD, no carotid bruit  Lungs: CTA maureen, no wheezing or rales  Cardiac: RRR, no murmurs  Abdomen: soft, non-tender, non-distended  Extremities: 2+ radial pulses, 2+ edema, nonpalpable pulses  Skin: warm, dry, wound and anterolateral calf  Neurologic: AAOx3,  no focal deficits       Reviewed Study(s):    KATHLEEN 2024  CONCLUSIONS:  Right Lower PVR: No evidence of arterial occlusive disease in the right lower extremity at rest. Normal digital perfusion noted. Biphasic flow is noted in the right posterior tibial artery and right dorsalis pedis artery. Triphasic flow is noted in the right  common femoral artery.  Left Lower PVR: Evidence of moderate arterial occlusive disease in the left lower extremity at rest. Decreased digital perfusion noted. Monophasic flow is noted in the left common femoral artery, left posterior tibial artery and left dorsalis pedis artery.     Comparison:  Compared with study from 6/15/2022, no significant change in the right leg. Left leg Doppler waveforms are monophasic with moderate disease.     Imaging & Doppler Findings:     RIGHT Lower PVR                Pressures Ratios  Right Posterior Tibial (Ankle) 170 mmHg  1.09  Right Dorsalis Pedis (Ankle)   175 mmHg  1.12  Right Digit (Great Toe)        109 mmHg  0.70           LEFT Lower PVR                Pressures Ratios  Left Posterior Tibial (Ankle) 105 mmHg  0.67  Left Dorsalis Pedis (Ankle)   101 mmHg  0.65  Left Digit (Great Toe)        70 mmHg   0.45       Venous reflux ultrasound  -SSV reflux    Assessment/Plan     #Peripheral artery disease  # Nonhealing anterolateral calf wound  -Patient with left SFA long segment  with difficulty crossing.  Given nonhealing wounds benefits from repeat left lower extremity angiogram and intervention.  Benefits and risk discussed with patient and family and they agreed to proceed  -Continue wound care.  She needs compression therapy and leg elevation to manage the edema.  We have placed double layer Tubigrip's in the office  -Discussed importance of taking baby aspirin 81 mg daily and statin therapy  -Check lipid panel hemoglobin A1c    Dany Singh MD [facility]  Interventional Cardiology  Endovascular Interventions  [email or web address]    **Disclaimer: This note was dictated by speech recognition, and every effort has been made to prevent any error in transcription, however minor errors may be present**

## 2024-04-30 NOTE — PATIENT INSTRUCTIONS
Pre-Procedure Patient Information    Procedure: Left lower extremity angiogram and intervention    Date of procedure: 05/16/24    1. Please have transportation to and from the hospital. While you should plan for same-day discharge there is a possibility you will need to stay overnight.    2. You will receive a call from the hospital 24 - 72 hours before your procedure providing you with fasting instructions, procedure location detail, and time of arrival.  If you have not received a call from the hospital by 4 pm the day before your scheduled procedure, please call [phone].    3. Please bring a current list of medications with you to the hospital.      4. Medications to hold:     - If you are on a blood thinner (like Eliquis and Xarelto), please hold for 2 full days before procedure.     - If you are on warfarin, please hold it for 4 days before procedure.     - If you are on aspirin, Plavix (clopidogrel), Effient (prasugrel), or Brilinta (ticagrelor), please continue through procedure.       - If you are diabetic on oral pills: please hold your morning oral diabetes medications (that includes metformin, glipizide).     - If you are diabetic on insulin: take half dose of your long acting insulin the night before and hold your short acting insulin and oral diabetes medications on the morning of.       - Otherwise, you may continue your medications in the morning with sips of water.     5. Nothing to eat after midnight before the procedure.     6. Please bring to our attention if you have any contrast, latex or metal allergies.    7. Please have your blood work as instructed completed at least a day before your procedure.    8. If you have any questions, please contact the office at [phone].

## 2024-05-01 ENCOUNTER — TELEPHONE (OUTPATIENT)
Dept: VASCULAR SURGERY | Facility: HOSPITAL | Age: 79
End: 2024-05-01
Payer: MEDICARE

## 2024-05-01 DIAGNOSIS — I73.9 PAD (PERIPHERAL ARTERY DISEASE) (CMS-HCC): ICD-10-CM

## 2024-05-01 DIAGNOSIS — Z09 POSTOPERATIVE EXAMINATION: ICD-10-CM

## 2024-05-01 NOTE — TELEPHONE ENCOUNTER
I have spoken to  MR. Alcantara .  The patient's wife  Mechelle  has been scheduled for a LLE angiogram on 5/16/2024 @ 12 Noon  with Dr. Singh   .         The patient has been informed to arrive 1.5  hours prior to the  procedure and to remain NPO     8  hours before the  procedure.      I have reviewed with the patient his/her allergy to IVP dye    The following medications have been ordered and reviewed with the patient  Prednisone 50 mg one tablet PO orally 13 hours prior to procedure    Prednisone 50 mg one tablet PO orally 7 hours prior to procedure   Prednisone 50 mg one tablet PO orally 1 hour prior to procedure      Benadryl 50 mg one tablet PO one hour prior to procedure        -The day before your procedure or surgery    - Nothing to eat nor drink after midnight               - The day of your procedure or surgery:  -Please park in parking lot E (nearest to the Emergency Department) enter through the outpatient     entrance.    Please wear comfortable clothing  Remember to bring your insurance cards, a form of identification and your COVID vaccination card with you.   Do not bring valuables such as credit cards, checkbook, money or jewelry with you.  - PLEASE BRING ALL MEDICATIONS OR AN UPDATED LIST OF CURRENT MEDICATONS WITH YOU      The patient has  been advised to have transportation to and from the hospital on the day of the procedure. The patient has verbalized an understanding of these instructions.     Directions to [facility] have been provided. The patient has been instructed to call Dr. Singh  office nurse if there are  any questions or  concerns. The phone number  [phone]  has been provided   Mechelle Childers RN BSN

## 2024-05-07 ENCOUNTER — OFFICE VISIT (OUTPATIENT)
Dept: WOUND CARE | Facility: CLINIC | Age: 79
End: 2024-05-07
Payer: MEDICARE

## 2024-05-07 PROCEDURE — 11042 DBRDMT SUBQ TIS 1ST 20SQCM/<: CPT

## 2024-05-10 LAB
ACT BLD: 208 SEC (ref 89–169)
ACT BLD: 253 SEC (ref 89–169)
ACT BLD: 281 SEC (ref 89–169)

## 2024-05-13 ENCOUNTER — PATIENT OUTREACH (OUTPATIENT)
Dept: PRIMARY CARE | Facility: CLINIC | Age: 79
End: 2024-05-13
Payer: MEDICARE

## 2024-05-13 NOTE — PROGRESS NOTES
Call regarding recent hospitalization. Spoke with patient  [place] briefly as patient is not available at time of call.   At time of outreach call the patient  feels as if their condition has improved since last visit. States she is going to have an upcoming procedure but despite everything is doing pretty well. Denies any questions or concerns at this time.

## 2024-05-14 ENCOUNTER — APPOINTMENT (OUTPATIENT)
Dept: WOUND CARE | Facility: CLINIC | Age: 79
End: 2024-05-14
Payer: MEDICARE

## 2024-05-16 ENCOUNTER — HOSPITAL ENCOUNTER (OUTPATIENT)
Facility: HOSPITAL | Age: 79
Setting detail: OUTPATIENT SURGERY
Discharge: HOME | End: 2024-05-16
Attending: STUDENT IN AN ORGANIZED HEALTH CARE EDUCATION/TRAINING PROGRAM | Admitting: STUDENT IN AN ORGANIZED HEALTH CARE EDUCATION/TRAINING PROGRAM
Payer: MEDICARE

## 2024-05-16 VITALS
DIASTOLIC BLOOD PRESSURE: 61 MMHG | RESPIRATION RATE: 13 BRPM | SYSTOLIC BLOOD PRESSURE: 166 MMHG | WEIGHT: 128 LBS | HEART RATE: 67 BPM | HEIGHT: 66 IN | TEMPERATURE: 97.2 F | OXYGEN SATURATION: 96 % | BODY MASS INDEX: 20.57 KG/M2

## 2024-05-16 DIAGNOSIS — I70.245 ATHSCL NATIVE ARTERIES OF LEFT LEG W ULCERATION OTH PRT FOOT (MULTI): Primary | ICD-10-CM

## 2024-05-16 DIAGNOSIS — I70.242 ATHEROSCLEROSIS OF NATIVE ARTERIES OF LEFT LEG WITH ULCERATION OF CALF (MULTI): ICD-10-CM

## 2024-05-16 PROCEDURE — G0269 OCCLUSIVE DEVICE IN VEIN ART: HCPCS | Mod: TC | Performed by: STUDENT IN AN ORGANIZED HEALTH CARE EDUCATION/TRAINING PROGRAM

## 2024-05-16 PROCEDURE — 7100000010 HC PHASE TWO TIME - EACH INCREMENTAL 1 MINUTE: Performed by: STUDENT IN AN ORGANIZED HEALTH CARE EDUCATION/TRAINING PROGRAM

## 2024-05-16 PROCEDURE — 75710 ARTERY X-RAYS ARM/LEG: CPT | Performed by: STUDENT IN AN ORGANIZED HEALTH CARE EDUCATION/TRAINING PROGRAM

## 2024-05-16 PROCEDURE — C1729 CATH, DRAINAGE: HCPCS | Performed by: STUDENT IN AN ORGANIZED HEALTH CARE EDUCATION/TRAINING PROGRAM

## 2024-05-16 PROCEDURE — 36140 INTRO NDL ICATH UPR/LXTR ART: CPT | Performed by: STUDENT IN AN ORGANIZED HEALTH CARE EDUCATION/TRAINING PROGRAM

## 2024-05-16 PROCEDURE — 2780000003 HC OR 278 NO HCPCS: Performed by: STUDENT IN AN ORGANIZED HEALTH CARE EDUCATION/TRAINING PROGRAM

## 2024-05-16 PROCEDURE — 36200 PLACE CATHETER IN AORTA: CPT | Performed by: STUDENT IN AN ORGANIZED HEALTH CARE EDUCATION/TRAINING PROGRAM

## 2024-05-16 PROCEDURE — 2500000001 HC RX 250 WO HCPCS SELF ADMINISTERED DRUGS (ALT 637 FOR MEDICARE OP): Performed by: STUDENT IN AN ORGANIZED HEALTH CARE EDUCATION/TRAINING PROGRAM

## 2024-05-16 PROCEDURE — 36246 INS CATH ABD/L-EXT ART 2ND: CPT | Performed by: STUDENT IN AN ORGANIZED HEALTH CARE EDUCATION/TRAINING PROGRAM

## 2024-05-16 PROCEDURE — 76937 US GUIDE VASCULAR ACCESS: CPT | Performed by: STUDENT IN AN ORGANIZED HEALTH CARE EDUCATION/TRAINING PROGRAM

## 2024-05-16 PROCEDURE — 2500000004 HC RX 250 GENERAL PHARMACY W/ HCPCS (ALT 636 FOR OP/ED): Performed by: STUDENT IN AN ORGANIZED HEALTH CARE EDUCATION/TRAINING PROGRAM

## 2024-05-16 PROCEDURE — 7100000009 HC PHASE TWO TIME - INITIAL BASE CHARGE: Performed by: STUDENT IN AN ORGANIZED HEALTH CARE EDUCATION/TRAINING PROGRAM

## 2024-05-16 PROCEDURE — 99152 MOD SED SAME PHYS/QHP 5/>YRS: CPT | Performed by: STUDENT IN AN ORGANIZED HEALTH CARE EDUCATION/TRAINING PROGRAM

## 2024-05-16 PROCEDURE — 99153 MOD SED SAME PHYS/QHP EA: CPT | Performed by: STUDENT IN AN ORGANIZED HEALTH CARE EDUCATION/TRAINING PROGRAM

## 2024-05-16 PROCEDURE — 2720000007 HC OR 272 NO HCPCS: Performed by: STUDENT IN AN ORGANIZED HEALTH CARE EDUCATION/TRAINING PROGRAM

## 2024-05-16 PROCEDURE — 2500000005 HC RX 250 GENERAL PHARMACY W/O HCPCS: Performed by: STUDENT IN AN ORGANIZED HEALTH CARE EDUCATION/TRAINING PROGRAM

## 2024-05-16 PROCEDURE — 96373 THER/PROPH/DIAG INJ IA: CPT | Performed by: STUDENT IN AN ORGANIZED HEALTH CARE EDUCATION/TRAINING PROGRAM

## 2024-05-16 PROCEDURE — 2550000001 HC RX 255 CONTRASTS: Performed by: STUDENT IN AN ORGANIZED HEALTH CARE EDUCATION/TRAINING PROGRAM

## 2024-05-16 PROCEDURE — C1894 INTRO/SHEATH, NON-LASER: HCPCS | Performed by: STUDENT IN AN ORGANIZED HEALTH CARE EDUCATION/TRAINING PROGRAM

## 2024-05-16 PROCEDURE — C1769 GUIDE WIRE: HCPCS | Performed by: STUDENT IN AN ORGANIZED HEALTH CARE EDUCATION/TRAINING PROGRAM

## 2024-05-16 PROCEDURE — C1760 CLOSURE DEV, VASC: HCPCS | Performed by: STUDENT IN AN ORGANIZED HEALTH CARE EDUCATION/TRAINING PROGRAM

## 2024-05-16 RX ORDER — DEXMEDETOMIDINE HYDROCHLORIDE 4 UG/ML
INJECTION, SOLUTION INTRAVENOUS CONTINUOUS PRN
Status: DISCONTINUED | OUTPATIENT
Start: 2024-05-16 | End: 2024-05-16 | Stop reason: HOSPADM

## 2024-05-16 RX ORDER — SODIUM CHLORIDE 9 MG/ML
1.5 INJECTION, SOLUTION INTRAVENOUS CONTINUOUS
Status: DISCONTINUED | OUTPATIENT
Start: 2024-05-16 | End: 2024-05-16 | Stop reason: HOSPADM

## 2024-05-16 RX ORDER — LIDOCAINE HYDROCHLORIDE 20 MG/ML
INJECTION, SOLUTION INFILTRATION; PERINEURAL AS NEEDED
Status: DISCONTINUED | OUTPATIENT
Start: 2024-05-16 | End: 2024-05-16 | Stop reason: HOSPADM

## 2024-05-16 RX ORDER — ASPIRIN 325 MG
325 TABLET ORAL ONCE
Status: COMPLETED | OUTPATIENT
Start: 2024-05-16 | End: 2024-05-16

## 2024-05-16 RX ORDER — FENTANYL CITRATE 50 UG/ML
INJECTION, SOLUTION INTRAMUSCULAR; INTRAVENOUS AS NEEDED
Status: DISCONTINUED | OUTPATIENT
Start: 2024-05-16 | End: 2024-05-16 | Stop reason: HOSPADM

## 2024-05-16 RX ORDER — DEXMEDETOMIDINE HYDROCHLORIDE 4 UG/ML
.1-1.5 INJECTION, SOLUTION INTRAVENOUS CONTINUOUS
Status: DISCONTINUED | OUTPATIENT
Start: 2024-05-16 | End: 2024-05-16 | Stop reason: HOSPADM

## 2024-05-16 RX ORDER — CLOPIDOGREL BISULFATE 300 MG/1
600 TABLET, FILM COATED ORAL ONCE
Status: COMPLETED | OUTPATIENT
Start: 2024-05-16 | End: 2024-05-16

## 2024-05-16 RX ORDER — IODIXANOL 320 MG/ML
INJECTION, SOLUTION INTRAVASCULAR AS NEEDED
Status: DISCONTINUED | OUTPATIENT
Start: 2024-05-16 | End: 2024-05-16 | Stop reason: HOSPADM

## 2024-05-16 RX ORDER — MIDAZOLAM HYDROCHLORIDE 1 MG/ML
INJECTION, SOLUTION INTRAMUSCULAR; INTRAVENOUS AS NEEDED
Status: DISCONTINUED | OUTPATIENT
Start: 2024-05-16 | End: 2024-05-16 | Stop reason: HOSPADM

## 2024-05-16 RX ORDER — SODIUM CHLORIDE 9 MG/ML
100 INJECTION, SOLUTION INTRAVENOUS CONTINUOUS
Status: DISCONTINUED | OUTPATIENT
Start: 2024-05-16 | End: 2024-05-16 | Stop reason: HOSPADM

## 2024-05-16 RX ADMIN — CLOPIDOGREL BISULFATE 600 MG: 300 TABLET, FILM COATED ORAL at 11:00

## 2024-05-16 RX ADMIN — ASPIRIN 325 MG ORAL TABLET 325 MG: 325 PILL ORAL at 11:33

## 2024-05-16 RX ADMIN — SODIUM CHLORIDE 1.5 ML/KG/HR: 9 INJECTION, SOLUTION INTRAVENOUS at 14:15

## 2024-05-16 RX ADMIN — SODIUM CHLORIDE 100 ML/HR: 9 INJECTION, SOLUTION INTRAVENOUS at 11:15

## 2024-05-16 ASSESSMENT — PAIN SCALES - GENERAL
PAINLEVEL_OUTOF10: 0 - NO PAIN

## 2024-05-16 ASSESSMENT — PAIN - FUNCTIONAL ASSESSMENT
PAIN_FUNCTIONAL_ASSESSMENT: 0-10

## 2024-05-16 NOTE — POST-PROCEDURE NOTE
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    Any Specimen(s) Removed:   No specimens collected during this procedure.    Disposition:   home      Electronically signed by: Dany Drake MD, 5/16/2024 1:50 PM

## 2024-05-16 NOTE — Clinical Note
Patient Clipped and Prepped: groin and left pedal. Prepped with ChloraPrep, a minimum of 3 minute dry time, longer if needed, no pooling noted, patient draped in sterile fashion.

## 2024-05-16 NOTE — NURSING NOTE
Right groin procedural access site remains soft with drsg dry/intact and no bleeding or hematoma noted. Bilateral DP pulses doppled and present. Patient out of bed independently. Discharge instructions given and reviewed with patient. [place] pamphlet w/ID card given and reviewed with patient.  present for discharge instructions. Discharged to private vehicle via wheelchair, escorted by cath lab RN.

## 2024-05-17 DIAGNOSIS — I70.242 ATHEROSCLEROSIS OF NATIVE ARTERIES OF LEFT LEG WITH ULCERATION OF CALF (MULTI): ICD-10-CM

## 2024-05-17 DIAGNOSIS — I73.9 PERIPHERAL ARTERY DISEASE (CMS-HCC): Primary | ICD-10-CM

## 2024-05-17 DIAGNOSIS — M79.662 PAIN IN LEFT LOWER LEG: ICD-10-CM

## 2024-05-21 ENCOUNTER — OFFICE VISIT (OUTPATIENT)
Dept: WOUND CARE | Facility: CLINIC | Age: 79
End: 2024-05-21
Payer: MEDICARE

## 2024-05-21 PROCEDURE — 11042 DBRDMT SUBQ TIS 1ST 20SQCM/<: CPT

## 2024-05-28 ENCOUNTER — OFFICE VISIT (OUTPATIENT)
Dept: WOUND CARE | Facility: CLINIC | Age: 79
End: 2024-05-28
Payer: MEDICARE

## 2024-05-28 PROCEDURE — 11042 DBRDMT SUBQ TIS 1ST 20SQCM/<: CPT

## 2024-05-31 ENCOUNTER — HOSPITAL ENCOUNTER (OUTPATIENT)
Dept: CARDIOLOGY | Facility: HOSPITAL | Age: 79
Discharge: HOME | End: 2024-05-31
Payer: MEDICARE

## 2024-05-31 DIAGNOSIS — I70.242 ATHEROSCLEROSIS OF NATIVE ARTERIES OF LEFT LEG WITH ULCERATION OF CALF (MULTI): ICD-10-CM

## 2024-05-31 DIAGNOSIS — I73.89 OTHER SPECIFIED PERIPHERAL VASCULAR DISEASES (CMS-HCC): ICD-10-CM

## 2024-05-31 DIAGNOSIS — I73.9 PERIPHERAL ARTERY DISEASE (CMS-HCC): ICD-10-CM

## 2024-05-31 DIAGNOSIS — M79.662 PAIN IN LEFT LOWER LEG: ICD-10-CM

## 2024-05-31 PROCEDURE — 93970 EXTREMITY STUDY: CPT

## 2024-05-31 PROCEDURE — 93970 EXTREMITY STUDY: CPT | Performed by: INTERNAL MEDICINE

## 2024-06-04 ENCOUNTER — OFFICE VISIT (OUTPATIENT)
Dept: WOUND CARE | Facility: CLINIC | Age: 79
End: 2024-06-04
Payer: MEDICARE

## 2024-06-04 PROCEDURE — 11042 DBRDMT SUBQ TIS 1ST 20SQCM/<: CPT

## 2024-06-07 ENCOUNTER — PATIENT OUTREACH (OUTPATIENT)
Dept: PRIMARY CARE | Facility: CLINIC | Age: 79
End: 2024-06-07
Payer: MEDICARE

## 2024-06-11 ENCOUNTER — OFFICE VISIT (OUTPATIENT)
Dept: WOUND CARE | Facility: CLINIC | Age: 79
End: 2024-06-11
Payer: MEDICARE

## 2024-06-11 PROCEDURE — 11042 DBRDMT SUBQ TIS 1ST 20SQCM/<: CPT

## 2024-06-18 ENCOUNTER — APPOINTMENT (OUTPATIENT)
Dept: CARDIOLOGY | Facility: CLINIC | Age: 79
End: 2024-06-18
Payer: MEDICARE

## 2024-06-18 ENCOUNTER — APPOINTMENT (OUTPATIENT)
Dept: WOUND CARE | Facility: CLINIC | Age: 79
End: 2024-06-18
Payer: MEDICARE

## 2024-06-19 ENCOUNTER — APPOINTMENT (OUTPATIENT)
Dept: VASCULAR SURGERY | Facility: CLINIC | Age: 79
End: 2024-06-19
Payer: MEDICARE

## 2024-06-19 VITALS
HEART RATE: 74 BPM | WEIGHT: 128 LBS | HEIGHT: 66 IN | DIASTOLIC BLOOD PRESSURE: 80 MMHG | SYSTOLIC BLOOD PRESSURE: 144 MMHG | BODY MASS INDEX: 20.57 KG/M2

## 2024-06-19 DIAGNOSIS — I99.8 OTHER DISORDER OF CIRCULATORY SYSTEM: ICD-10-CM

## 2024-06-19 DIAGNOSIS — S81.802A NON-HEALING WOUND OF LEFT LOWER EXTREMITY: ICD-10-CM

## 2024-06-19 DIAGNOSIS — I73.9 PAD (PERIPHERAL ARTERY DISEASE) (CMS-HCC): Primary | ICD-10-CM

## 2024-06-19 PROCEDURE — 1160F RVW MEDS BY RX/DR IN RCRD: CPT | Performed by: SURGERY

## 2024-06-19 PROCEDURE — 1123F ACP DISCUSS/DSCN MKR DOCD: CPT | Performed by: SURGERY

## 2024-06-19 PROCEDURE — 1159F MED LIST DOCD IN RCRD: CPT | Performed by: SURGERY

## 2024-06-19 PROCEDURE — 1157F ADVNC CARE PLAN IN RCRD: CPT | Performed by: SURGERY

## 2024-06-19 PROCEDURE — 3077F SYST BP >= 140 MM HG: CPT | Performed by: SURGERY

## 2024-06-19 PROCEDURE — 99215 OFFICE O/P EST HI 40 MIN: CPT | Performed by: SURGERY

## 2024-06-19 PROCEDURE — 3079F DIAST BP 80-89 MM HG: CPT | Performed by: SURGERY

## 2024-06-19 NOTE — PROGRESS NOTES
History Of Present Illness  Mechelle Alcantara is a 78 y.o. female presenting for PAD follow-up.  She has a nonhealing venous stasis ulcer of the left lateral calf.  She also has known peripheral artery disease with an KATHLEEN of 0.3.  Recent angiogram done by Dr. 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  She has been recommended for possible bypass surgery and presents today for discussion.  She is here with her  and her sister.  She continues to follow-up in the wound center for management of her venous stasis ulcer.     Past Medical History  She has a past medical history of HLD (hyperlipidemia), HTN (hypertension), and PVD (peripheral vascular disease) (CMS-[facility]).    Surgical History  She has a past surgical history that includes Invasive Vascular Procedure (Left, 3/22/2024); Invasive Vascular Procedure (N/A, 3/22/2024); Invasive Vascular Procedure (Left, 3/28/2024); Invasive Vascular Procedure (N/A, 3/28/2024); Cardiac catheterization (N/A, 3/28/2024); and Invasive Vascular Procedure (N/A, 5/16/2024).     Social History  She reports that she quit smoking about 3 months ago. Her smoking use included cigarettes. She has never used smokeless tobacco. She reports that she does not drink alcohol and does not use drugs.    Family History  Family History   Problem Relation Name Age of Onset    Heart failure Father          Allergies  Fish containing products, Shellfish containing products, Gadolinium-containing contrast media, and Iodinated contrast media    Review of Systems   Constitutional: Negative.    HENT: Negative.     Eyes: Negative.    Respiratory:  Negative for cough and shortness of breath.    Cardiovascular:  Negative for chest pain and leg swelling.   Gastrointestinal: Negative.    Endocrine: Negative.    Genitourinary: Negative.     Musculoskeletal:  Negative for back pain and gait problem.   Skin:  Positive for wound. Negative for color change and pallor.   Neurological:  Negative for dizziness, syncope, speech difficulty, weakness, numbness and headaches.   Hematological:  Does not bruise/bleed easily.   Psychiatric/Behavioral: Negative.          Physical Exam  Constitutional:       General: She is not in acute distress.     Appearance: Normal appearance. She is normal weight.   HENT:      Head: Normocephalic and atraumatic.   Eyes:      Extraocular Movements: Extraocular movements intact.      Conjunctiva/sclera: Conjunctivae normal.   Cardiovascular:      Rate and Rhythm: Normal rate and regular rhythm.      Pulses:           Femoral pulses are 2+ on the right side and 2+ on the left side.       Dorsalis pedis pulses are detected w/ Doppler on the right side and detected w/ Doppler on the left side.        Posterior tibial pulses are detected w/ Doppler on the right side and detected w/ Doppler on the left side.      Heart sounds: Normal heart sounds.   Pulmonary:      Effort: Pulmonary effort is normal.      Breath sounds: Normal breath sounds.   Abdominal:      General: Abdomen is flat.      Palpations: Abdomen is soft.   Musculoskeletal:         General: No swelling. Normal range of motion.      Cervical back: Normal range of motion.      Right lower leg: No edema.      Left lower leg: No edema.   Skin:     General: Skin is warm and dry.      Comments: Venous stasis ulcer of left mid lateral calf with granulating base and no evidence of infection   Neurological:      General: No focal deficit present.      Mental Status: She is alert and oriented to person, place, and time.      Cranial Nerves: No cranial nerve deficit.      Sensory: No sensory deficit.      Motor: No weakness.   Psychiatric:         Mood and Affect: Mood normal.         Behavior: Behavior normal.          Last Recorded Vitals  Blood pressure 144/80, pulse 74, height  "1.676 m (5' 6\"), weight 58.1 kg (128 lb).    Relevant Results      Current Outpatient Medications:     aspirin 81 mg EC tablet, Take 1 tablet (81 mg) by mouth once daily., Disp: , Rfl:     amLODIPine (Norvasc) 5 mg tablet, TAKE 1 TABLET(5 MG) BY MOUTH DAILY (Patient not taking: Reported on 6/19/2024), Disp: 90 tablet, Rfl: 0    capsaicin (Zostrix) 0.025 % cream, Apply topically 2 times a day as needed (neuropathic pain). (Patient not taking: Reported on 6/19/2024), Disp: 50 g, Rfl: 0    rosuvastatin (Crestor) 5 mg tablet, Take 1 tablet (5 mg) by mouth once daily., Disp: 90 tablet, Rfl: 3       Vascular US lower extremity vein mapping bilateral    Result Date: 6/2/2024   Vascular Lab Report  VASC US LOWER EXTREMITY VEIN MAPPING BILATERAL Patient Name:      SUSIE VENKATESH     Reading Physician:  [id number] Deisi Gregorio MD Study Date:        5/31/2024             Ordering Provider:  [id number] SHAWN DELVALLE MRN/PID:           [id number]              Fellow: Accession#:        [id number]          Technologist:       Yarelis Ley RVT Date of Birth/Age: 1945 / 78 years Technologist 2: Gender:            F                     Encounter#:         [id number] Admission Status:  Outpatient            Location Performed: [facility]  Diagnosis/ICD: Other specified peripheral vascular diseases-I73.89 CPT Codes:     63171 Vein mapping complete  Pertinent History: PVD.  CONCLUSIONS: Right Lower Vein Mapping: The right great saphenous vein appears widely patent with no evidence of thrombosis or fibrosis. The great saphenous vein branches at mid calf. There is chronic post-thrombotic change in the small saphenous vein at proximal. Lymph node measuring 0.7 cm x 1.3 cm " noted in groin. Left Lower Vein Mapping: The left great saphenous vein appears widely patent with no evidence of thrombosis or fibrosis. The great saphenous vein branches at the mid thigh and mid calf. Lymph node measuring 1.0 cm x 1.9 cm noted in groin.  Imaging & Doppler Findings:  Right          Compress     Thrombus       Diam SFJ              Yes          None        7.2 mm Prox Thigh GSV   Yes          None        4.4 mm Mid Thigh GSV    Yes          None        2.7 mm Knee GSV         Yes          None        1.2 mm Prox Calf GSV    Yes          None        1.7 mm Mid Calf GSV     Yes          None        2.1 mm Dist Calf GSV    Yes          None        2.3 mm SSV Prox         Yes    Age Indeterminate 3.8 mm SSV Mid          Yes          None        3.1 mm SSV Distal       Yes          None        3.3 mm  Left           Compress Thrombus  Diam SFJ              Yes      None   7.5 mm Prox Thigh GSV   Yes      None   4.8 mm Mid Thigh GSV    Yes      None   4.6 mm Knee GSV         Yes      None   4.7 mm Prox Calf GSV    Yes      None   4.1 mm Mid Calf GSV     Yes      None   3.7 mm Dist Calf GSV    Yes      None   5.1 mm SSV Prox         Yes      None   4.8 mm SSV Mid          Yes      None   4.5 mm SSV Distal       Yes      None   2.3 mm  Right                 Compressible Thrombus Distal External Iliac     Yes        None CFV                       Yes        None PFV                       Yes        None FV Proximal               Yes        None  Left                  Compress Thrombus Distal External Iliac   Yes      None CFV                     Yes      None PFV                     Yes      None FV Proximal             Yes      None  [id number] Deisi Gregorio MD Electronically signed by [id number] Deisi Gregorio MD on 6/2/2024 at 12:20:58 PM  ** Final **          Assessment/Plan   Diagnoses and all orders for this visit:  PAD (peripheral artery disease) (CMS-[facility])  -     Coagulation Screen; Future  -     ECG 12 lead;  Future  -     Case Request Operating Room: Creation Bypass Graft Tibial Artery; Standing  -     Request for Pre-Admission Testing Visit; Future  -     CBC; Future  -     Comprehensive Metabolic Panel; Future  -     Prepare RBC: 2 Units; Future  Non-healing wound of left lower extremity  -     Coagulation Screen; Future  -     ECG 12 lead; Future  -     Case Request Operating Room: Creation Bypass Graft Tibial Artery; Standing  -     Request for Pre-Admission Testing Visit; Future  -     CBC; Future  -     Comprehensive Metabolic Panel; Future  -     Prepare RBC: 2 Units; Future  Other disorder of circulatory system  -     Coagulation Screen; Future  Other orders  -     Vital Signs; Standing  -     Pulse oximetry, spot; Standing  -     Insert and maintain peripheral IV; Standing  -     Saline lock IV; Standing  -     sodium chloride 0.9% infusion  -     Admit to inpatient; Standing  -     Apply sequential compression device; Standing  -     Type and screen; Standing  -     ceFAZolin in dextrose (iso-os) (Ancef) IVPB 2 g      79yo female with known PAD and a nonhealing venous stasis ulcer of the left lateral calf.  Given the severity of her arterial disease, I do feel that this is the reason why her venous stasis ulcer is not healing despite proper wound care.  I have recommended that we proceed with left femoral-tibial bypass.  She has had vein mapping and does appear to have a good caliber left greater saphenous vein for vein harvest.  I have had a lengthy discussion regarding the procedure, of the specific reasons for it, benefits, risks, and alternatives with the patient, her , and her sister.  I have answered their questions regarding the procedure to their satisfaction.  Patient would like to proceed with bypass surgery.  We will schedule this in [facility]       I spent 75 minutes in the professional and overall care of this patient.      Africa Hoover MD

## 2024-06-21 RX ORDER — SODIUM CHLORIDE 9 MG/ML
75 INJECTION, SOLUTION INTRAVENOUS CONTINUOUS
OUTPATIENT
Start: 2024-07-25

## 2024-06-21 RX ORDER — CEFAZOLIN SODIUM 2 G/100ML
2 INJECTION, SOLUTION INTRAVENOUS ONCE
OUTPATIENT
Start: 2024-07-25

## 2024-06-21 ASSESSMENT — ENCOUNTER SYMPTOMS
COUGH: 0
PSYCHIATRIC NEGATIVE: 1
DIZZINESS: 0
HEADACHES: 0
WEAKNESS: 0
EYES NEGATIVE: 1
SPEECH DIFFICULTY: 0
NUMBNESS: 0
ENDOCRINE NEGATIVE: 1
CONSTITUTIONAL NEGATIVE: 1
BACK PAIN: 0
COLOR CHANGE: 0
SHORTNESS OF BREATH: 0
WOUND: 1
BRUISES/BLEEDS EASILY: 0
GASTROINTESTINAL NEGATIVE: 1

## 2024-06-25 ENCOUNTER — OFFICE VISIT (OUTPATIENT)
Dept: WOUND CARE | Facility: CLINIC | Age: 79
End: 2024-06-25
Payer: MEDICARE

## 2024-06-25 PROCEDURE — 11042 DBRDMT SUBQ TIS 1ST 20SQCM/<: CPT

## 2024-07-09 ENCOUNTER — OFFICE VISIT (OUTPATIENT)
Dept: WOUND CARE | Facility: CLINIC | Age: 79
End: 2024-07-09
Payer: MEDICARE

## 2024-07-09 PROCEDURE — 11042 DBRDMT SUBQ TIS 1ST 20SQCM/<: CPT

## 2024-07-12 ENCOUNTER — PRE-ADMISSION TESTING (OUTPATIENT)
Dept: PREADMISSION TESTING | Facility: HOSPITAL | Age: 79
End: 2024-07-12
Payer: MEDICARE

## 2024-07-12 ENCOUNTER — LAB (OUTPATIENT)
Dept: LAB | Facility: LAB | Age: 79
End: 2024-07-12
Payer: MEDICARE

## 2024-07-12 VITALS
WEIGHT: 128.31 LBS | OXYGEN SATURATION: 97 % | DIASTOLIC BLOOD PRESSURE: 80 MMHG | HEART RATE: 75 BPM | BODY MASS INDEX: 20.62 KG/M2 | RESPIRATION RATE: 15 BRPM | HEIGHT: 66 IN | TEMPERATURE: 97.2 F | SYSTOLIC BLOOD PRESSURE: 169 MMHG

## 2024-07-12 DIAGNOSIS — Z01.818 PRE-OP TESTING: ICD-10-CM

## 2024-07-12 DIAGNOSIS — S81.802A NON-HEALING WOUND OF LEFT LOWER EXTREMITY: ICD-10-CM

## 2024-07-12 DIAGNOSIS — I73.9 PAD (PERIPHERAL ARTERY DISEASE) (CMS-HCC): ICD-10-CM

## 2024-07-12 DIAGNOSIS — I73.9 PAD (PERIPHERAL ARTERY DISEASE) (CMS-HCC): Primary | ICD-10-CM

## 2024-07-12 DIAGNOSIS — I99.8 OTHER DISORDER OF CIRCULATORY SYSTEM: ICD-10-CM

## 2024-07-12 DIAGNOSIS — I70.229: ICD-10-CM

## 2024-07-12 LAB
ALBUMIN SERPL BCP-MCNC: 3.6 G/DL (ref 3.4–5)
ALP SERPL-CCNC: 131 U/L (ref 33–136)
ALT SERPL W P-5'-P-CCNC: 8 U/L (ref 7–45)
ANION GAP SERPL CALC-SCNC: 9 MMOL/L (ref 10–20)
APTT PPP: 28 SECONDS (ref 27–38)
AST SERPL W P-5'-P-CCNC: 13 U/L (ref 9–39)
BILIRUB SERPL-MCNC: 0.3 MG/DL (ref 0–1.2)
BUN SERPL-MCNC: 19 MG/DL (ref 6–23)
CALCIUM SERPL-MCNC: 8.8 MG/DL (ref 8.6–10.3)
CHLORIDE SERPL-SCNC: 106 MMOL/L (ref 98–107)
CO2 SERPL-SCNC: 27 MMOL/L (ref 21–32)
CREAT SERPL-MCNC: 0.87 MG/DL (ref 0.5–1.05)
EGFRCR SERPLBLD CKD-EPI 2021: 68 ML/MIN/1.73M*2
ERYTHROCYTE [DISTWIDTH] IN BLOOD BY AUTOMATED COUNT: 14 % (ref 11.5–14.5)
GLUCOSE SERPL-MCNC: 83 MG/DL (ref 74–99)
HCT VFR BLD AUTO: 37.6 % (ref 36–46)
HGB BLD-MCNC: 11.9 G/DL (ref 12–16)
INR PPP: 1 (ref 0.9–1.1)
MCH RBC QN AUTO: 29.8 PG (ref 26–34)
MCHC RBC AUTO-ENTMCNC: 31.6 G/DL (ref 32–36)
MCV RBC AUTO: 94 FL (ref 80–100)
NRBC BLD-RTO: 0 /100 WBCS (ref 0–0)
PLATELET # BLD AUTO: 355 X10*3/UL (ref 150–450)
POTASSIUM SERPL-SCNC: 4.2 MMOL/L (ref 3.5–5.3)
PROT SERPL-MCNC: 6.2 G/DL (ref 6.4–8.2)
PROTHROMBIN TIME: 10.9 SECONDS (ref 9.8–12.8)
RBC # BLD AUTO: 3.99 X10*6/UL (ref 4–5.2)
SODIUM SERPL-SCNC: 138 MMOL/L (ref 136–145)
WBC # BLD AUTO: 5.7 X10*3/UL (ref 4.4–11.3)

## 2024-07-12 PROCEDURE — 99202 OFFICE O/P NEW SF 15 MIN: CPT | Performed by: NURSE PRACTITIONER

## 2024-07-12 PROCEDURE — 36415 COLL VENOUS BLD VENIPUNCTURE: CPT

## 2024-07-12 PROCEDURE — 85610 PROTHROMBIN TIME: CPT

## 2024-07-12 PROCEDURE — 87081 CULTURE SCREEN ONLY: CPT | Mod: STJLAB | Performed by: SURGERY

## 2024-07-12 PROCEDURE — 86901 BLOOD TYPING SEROLOGIC RH(D): CPT

## 2024-07-12 PROCEDURE — 80053 COMPREHEN METABOLIC PANEL: CPT

## 2024-07-12 PROCEDURE — 85730 THROMBOPLASTIN TIME PARTIAL: CPT

## 2024-07-12 PROCEDURE — 93005 ELECTROCARDIOGRAM TRACING: CPT

## 2024-07-12 PROCEDURE — 86900 BLOOD TYPING SEROLOGIC ABO: CPT

## 2024-07-12 PROCEDURE — 85027 COMPLETE CBC AUTOMATED: CPT

## 2024-07-12 PROCEDURE — 86850 RBC ANTIBODY SCREEN: CPT

## 2024-07-12 RX ORDER — CHLORHEXIDINE GLUCONATE ORAL RINSE 1.2 MG/ML
SOLUTION DENTAL
Qty: 473 ML | Refills: 0 | Status: SHIPPED | OUTPATIENT
Start: 2024-07-12

## 2024-07-12 ASSESSMENT — DUKE ACTIVITY SCORE INDEX (DASI)
CAN YOU TAKE CARE OF YOURSELF (EAT, DRESS, BATHE, OR USE TOILET): YES
CAN YOU HAVE SEXUAL RELATIONS: NO
CAN YOU PARTICIPATE IN MODERATE RECREATIONAL ACTIVITIES LIKE GOLF, BOWLING, DANCING, DOUBLES TENNIS OR THROWING A BASEBALL OR FOOTBALL: NO
CAN YOU WALK A BLOCK OR TWO ON LEVEL GROUND: NO
CAN YOU WALK INDOORS, SUCH AS AROUND YOUR HOUSE: YES
CAN YOU DO HEAVY WORK AROUND THE HOUSE LIKE SCRUBBING FLOORS OR LIFTING AND MOVING HEAVY FURNITURE: NO
CAN YOU RUN A SHORT DISTANCE: NO
DASI METS SCORE: 4.3
CAN YOU DO YARD WORK LIKE RAKING LEAVES, WEEDING OR PUSHING A MOWER: NO
TOTAL_SCORE: 12.7
CAN YOU PARTICIPATE IN STRENOUS SPORTS LIKE SWIMMING, SINGLES TENNIS, FOOTBALL, BASKETBALL, OR SKIING: NO
CAN YOU DO MODERATE WORK AROUND THE HOUSE LIKE VACUUMING, SWEEPING FLOORS OR CARRYING GROCERIES: NO
CAN YOU DO LIGHT WORK AROUND THE HOUSE LIKE DUSTING OR WASHING DISHES: YES
CAN YOU CLIMB A FLIGHT OF STAIRS OR WALK UP A HILL: YES

## 2024-07-12 ASSESSMENT — LIFESTYLE VARIABLES: SMOKING_STATUS: NONSMOKER

## 2024-07-12 ASSESSMENT — ACTIVITIES OF DAILY LIVING (ADL): ADL_SCORE: 0

## 2024-07-12 NOTE — H&P (VIEW-ONLY)
"CPM/PAT Evaluation       Name: Mechelle Alcantara (Mechelle Alcantara)  /Age: 1945/78 y.o.     In-Person       Chief Complaint: lower leg pains    HPI    PS is a 79 yo female who has had known PAD and PVD with a non-healing venous stasis ulcer of the left lateral calf. She has underwent left lower extremity angiograms with surgical interventions with not much improvement. She is now scheduled for a left creation bypass graft tibial artery; left femoral to posterior tibial bypass with saphenous vein harvest; possible completion angiogram. She goes to wound clinic once a week for wound dressing changes. She endorses left lower leg \"stabbing\" pains with a heavy feeling- partially uses the leg for weight bearing. The left lower leg is wrapped in kerlex- no drainage noted. She denies any recent infections from leg or need for antibiotics. Otherwise denies any recent illness, fever/chills, chest pains or shortness of breath.     Past Medical History:   Diagnosis Date    Cellulitis     Left leg    HLD (hyperlipidemia)     HTN (hypertension)     Neuropathy     Non-healing wound of left lower extremity     PAD (peripheral artery disease) (CMS-HCC)     PVD (peripheral vascular disease) (CMS-[facility])     Varicose veins of both lower extremities      PCP: Dr. Drake Blanco/endo vasc: Dr. Francisco Drake  Vas: Dr. Hoover    Echo   LVEF 65%    Stress test :  CONCLUSION Clinically, electrocardiographically, and echocardiographically normal stress echocardiogram, with no evidence of stress-induced ischemia at maximum workload. Patient demonstrated limited functional capacity     Vasc US lower extremities May 2024:  Right Lower Vein Mapping: The right great saphenous vein appears widely patent with no evidence of thrombosis or fibrosis. The great saphenous vein branches at mid calf. There is chronic post-thrombotic change in the small saphenous vein at proximal. Lymph node measuring 0.7 cm x 1.3 cm noted in " groin.  Left Lower Vein Mapping: The left great saphenous vein appears widely patent with no evidence of thrombosis or fibrosis. The great saphenous vein branches at the mid thigh and mid calf. Lymph node measuring 1.0 cm x 1.9 cm noted in groin.    Past Surgical History:   Procedure Laterality Date    CARDIAC CATHETERIZATION N/A 3/28/2024    Procedure: IVUS - Coronary;  Surgeon: Dany Drake MD;  Location: [facility] Cardiac Cath Lab;  Service: Cardiovascular;  Laterality: N/A;  PERIPHERAL/LOWER EXTREMITY    INVASIVE VASCULAR PROCEDURE Left 3/22/2024    Procedure: Lower Extremity Angiogram;  Surgeon: Africa Hoover MD;  Location: [facility] Cardiac Cath Lab;  Service: Vascular Surgery;  Laterality: Left;  Left leg angiogram with intervention    INVASIVE VASCULAR PROCEDURE N/A 3/22/2024    Procedure: Lower Extremity Intervention;  Surgeon: Africa Hoover MD;  Location: [facility] Cardiac Cath Lab;  Service: Vascular Surgery;  Laterality: N/A;    INVASIVE VASCULAR PROCEDURE Left 3/28/2024    Procedure: Lower Extremity Angiogram;  Surgeon: Dany Drake MD;  Location: [facility] Cardiac Cath Lab;  Service: Cardiovascular;  Laterality: Left;  Left LE angiogram ( [id number])   PEDAL APPROACH    INVASIVE VASCULAR PROCEDURE N/A 3/28/2024    Procedure: Lower Extremity Intervention;  Surgeon: Dany Drake MD;  Location: [facility] Cardiac Cath Lab;  Service: Cardiovascular;  Laterality: N/A;    INVASIVE VASCULAR PROCEDURE N/A 5/16/2024    Procedure: Lower Extremity Angiogram;  Surgeon: Dany Drake MD;  Location: [facility] Cardiac Cath Lab;  Service: Cardiovascular;  Laterality: N/A;       Patient  reports that she is not currently sexually active.    Family History   Problem Relation Name Age of Onset    Heart failure Father      Hypertension Father      Heart disease Sister      Hypertension Sister      Heart disease Brother      Hypertension Brother         Allergies   Allergen Reactions    Fish  Containing Products Anaphylaxis    Shellfish Containing Products Anaphylaxis    Gadolinium-Containing Contrast Media Hives    Iodinated Contrast Media Hives              Prior to Admission medications    Medication Sig Start Date End Date Taking? Authorizing Provider   amLODIPine (Norvasc) 5 mg tablet TAKE 1 TABLET(5 MG) BY MOUTH DAILY  Patient not taking: Reported on 6/19/2024 3/23/24   Fior Hawkins DO   aspirin 81 mg EC tablet Take 1 tablet (81 mg) by mouth once daily.    Historical Provider, MD   capsaicin (Zostrix) 0.025 % cream Apply topically 2 times a day as needed (neuropathic pain).  Patient not taking: Reported on 6/19/2024 3/23/24   Fior Hawkins DO   rosuvastatin (Crestor) 5 mg tablet Take 1 tablet (5 mg) by mouth once daily. 4/30/23 5/16/24  Mj Latif,         PAT ROS   Constitutional: Negative for fever, chills, or sweats   ENMT: Negative for nasal discharge, congestion, ear pain, mouth pain, throat pain   Respiratory: Negative for cough, wheezing, shortness of breath   Cardiac: Negative for chest pain, dyspnea on exertion, palpitations   Gastrointestinal: Negative for nausea, vomiting, diarrhea, constipation, abdominal pain  Genitourinary: Negative for dysuria, flank pain, frequency, hematuria   Musculoskeletal: Negative for decreased ROM, pain, swelling, weakness   Neurological: Negative for dizziness, confusion, headache  Psychiatric: Negative for mood changes     Skin: Negative for itching, rash; + left lower leg non healing ulcer- currently wrapped- much pain from source      Hematologic/Lymph: Negative for bruising, easy bleeding  Allergic/Immunologic: Negative itching, sneezing, swelling      Physical Exam  Constitutional:       Appearance: Normal appearance.   HENT:      Head: Normocephalic.      Mouth/Throat:      Mouth: Mucous membranes are moist.   Eyes:      Extraocular Movements: Extraocular movements intact.   Cardiovascular:      Rate and Rhythm: Normal rate and  regular rhythm.   Pulmonary:      Effort: Pulmonary effort is normal.      Breath sounds: Normal breath sounds.   Abdominal:      General: Abdomen is flat.      Palpations: Abdomen is soft.   Musculoskeletal:         General: Normal range of motion.      Cervical back: Normal range of motion.   Skin:     General: Skin is warm and dry.      Comments:  ulcer of left mid lateral calf with granulating tissue; surrounding skin pinkish; no drainage; wrapped in kerlex with purple substance placed in wound clinic noted    Neurological:      General: No focal deficit present.      Mental Status: She is alert.   Psychiatric:         Mood and Affect: Mood normal.        PAT AIRWAY:   Airway:     Neck ROM::  Full   Missing tooth       Visit Vitals  /80   Pulse 75   Temp 36.2 °C (97.2 °F) (Temporal)   Resp 15       DASI Risk Score      Flowsheet Row Most Recent Value   DASI SCORE 12.7   METS Score (Will be calculated only when all the questions are answered) 4.3          Caprini DVT Assessment      Flowsheet Row Most Recent Value   DVT Score 11   Current Status Swollen legs, Major surgery planned, lasting over 3 hours   History Prior major surgery   Age Over 75 years   BMI 30 or less          Modified Frailty Index      Flowsheet Row Most Recent Value   Modified Frailty Index Calculator .1818          CHADS2 Stroke Risk  Current as of 8 minutes ago        N/A 3 to 100%: High Risk   2 to < 3%: Medium Risk   0 to < 2%: Low Risk     Last Change: N/A          This score determines the patient's risk of having a stroke if the patient has atrial fibrillation.        This score is not applicable to this patient. Components are not calculated.          Revised Cardiac Risk Index      Flowsheet Row Most Recent Value   Revised Cardiac Risk Calculator 0          Apfel Simplified Score    No data to display       Risk Analysis Index Results This Encounter         7/12/2024  1345             REYES Cancer History: Patient does not  indicate history of cancer    Total Risk Analysis Index Score Without Cancer: 28    Total Risk Analysis Index Score: 28          Stop Bang Score      Flowsheet Row Most Recent Value   Do you snore loudly? 0   Do you often feel tired or fatigued after your sleep? 1   Has anyone ever observed you stop breathing in your sleep? 0   Do you have or are you being treated for high blood pressure? 1   Recent BMI (Calculated) 20.7   Is BMI greater than 35 kg/m2? 0=No   Age older than 50 years old? 1=Yes   Is your neck circumference greater than 17 inches (Male) or 16 inches (Female)? 0   Gender - Male 0=No   STOP-BANG Total Score 3            Assessment and Plan:     Pre-Op  77 yo female scheduled for Left Creation Bypass Graft Tibial Artery, Left femoral to posterior tibial bypass with saphenous vein harvest; possible completion angiogram on 7/25/2024 with Dr. Hoover. Blood work ordered. EKG shows NSR with LAFB, v rate of 68 bpm- comparable EKG on file. Otherwise no further orders indicated.     Cardiac  -HTN, taking Norvasc and aspirin, compliancy is questionable  -HLD, prescribed a statin but she is non-compliant   LVEF 65%  -encouraged to take medications as prescribed    Vasc  -PAD  -PVD  -Non-healing wound of left lower extremity, follows with wound clinic, due to poor wound healing she is scheduled for left fem-tib bypass  -recent Vasc US lower extremities May 2024  -follows with vascular surgeon and card/endo vasc    Anesthesia:  Patient denies any anesthesia complications.   ASA 3: A to review    See risk scores as previously documented.

## 2024-07-12 NOTE — PREPROCEDURE INSTRUCTIONS
Medication List            Accurate as of July 12, 2024  1:42 PM. Always use your most recent med list.                amLODIPine 5 mg tablet  Commonly known as: Norvasc  TAKE 1 TABLET(5 MG) BY MOUTH DAILY     aspirin 81 mg EC tablet     capsaicin 0.025 % cream  Commonly known as: Zostrix  Apply topically 2 times a day as needed (neuropathic pain).     rosuvastatin 5 mg tablet  Commonly known as: Crestor  Take 1 tablet (5 mg) by mouth once daily.                                              PRE-OPERATIVE INSTRUCTIONS    You will receive notification one business day prior to your procedure to confirm your arrival time. It is important that you answer your phone and/or check your messages during this time. If you do not hear from the surgery center by 5 pm. the day before your procedure, please call [phone].     Please enter the building through the Outpatient entrance and take the elevator off the lobby to the 2nd floor then check in at the Outpatient Surgery desk on the 2nd floor.    INSTRUCTIONS:  Talk to your surgeon for instructions if you should stop your aspirin, blood thinner, or diabetes medicines.  DO NOT take any multivitamins or over the counter supplements for 7-10 days before surgery.  If not being admitted, you must have an adult immediately available to drive you home after surgery. We also highly recommend you have someone stay with you for the entire day and night of your surgery.  For children having surgery, a parent or legal guardian must accompany them to the surgery center. If this is not possible, please call [phone] to make additional arrangements.  For adults who are unable to consent or make medical decisions for themselves, a legal guardian or Power of  must accompany them to the surgery center. If this is not possible, please call [phone] to make additional arrangements.  Wear comfortable, loose fitting clothing.  All jewelry and piercings must be removed. If you  are unable to remove an item or have a dermal piercing, please be sure to tell the nurse when you arrive for surgery.  Nail polish and make-up must be removed.  Avoid smoking or consuming alcohol for 24 hours before surgery.  To help prevent infection, please take a shower/bath and wash your hair the night before and/or morning of surgery (or follow other specific bathing instructions provided).    Preoperative Fasting Guidelines    Why must I stop eating and drinking near surgery time?  With sedation, food or liquid in your stomach can enter your lungs causing serious complications  Increases nausea and vomiting    When do I need to stop eating and drinking before my surgery?  Do not eat any solid food after midnight the night before your surgery/procedure unless otherwise instructed by your surgeon.   You may have up to 13.5 ounces of clear liquid until TWO hours before your instructed arrival time to the hospital.  This includes water, black tea/coffee, (no milk or cream) apple juice, and electrolyte drinks (Gatorade).   You may chew gum until TWO hours before your surgery/procedure      If applicable, notify your surgeons office immediately of any new skin changes that occur to the surgical limb.      If you have any questions or concerns, please call Pre-Admission Testing at [phone].

## 2024-07-12 NOTE — CPM/PAT H&P
"CPM/PAT Evaluation       Name: Mechelle Alcantara (Mechelle Alcantaar)  /Age: 1945/78 y.o.     In-Person       Chief Complaint: lower leg pains    HPI    PS is a 77 yo female who has had known PAD and PVD with a non-healing venous stasis ulcer of the left lateral calf. She has underwent left lower extremity angiograms with surgical interventions with not much improvement. She is now scheduled for a left creation bypass graft tibial artery; left femoral to posterior tibial bypass with saphenous vein harvest; possible completion angiogram. She goes to wound clinic once a week for wound dressing changes. She endorses left lower leg \"stabbing\" pains with a heavy feeling- partially uses the leg for weight bearing. The left lower leg is wrapped in kerlex- no drainage noted. She denies any recent infections from leg or need for antibiotics. Otherwise denies any recent illness, fever/chills, chest pains or shortness of breath.     Past Medical History:   Diagnosis Date    Cellulitis     Left leg    HLD (hyperlipidemia)     HTN (hypertension)     Neuropathy     Non-healing wound of left lower extremity     PAD (peripheral artery disease) (CMS-HCC)     PVD (peripheral vascular disease) (CMS-[facility])     Varicose veins of both lower extremities      PCP: Dr. Drake Blanco/endo vasc: Dr. Francisco Drake  Vas: Dr. Hoover    Echo   LVEF 65%    Stress test :  CONCLUSION Clinically, electrocardiographically, and echocardiographically normal stress echocardiogram, with no evidence of stress-induced ischemia at maximum workload. Patient demonstrated limited functional capacity     Vasc US lower extremities May 2024:  Right Lower Vein Mapping: The right great saphenous vein appears widely patent with no evidence of thrombosis or fibrosis. The great saphenous vein branches at mid calf. There is chronic post-thrombotic change in the small saphenous vein at proximal. Lymph node measuring 0.7 cm x 1.3 cm noted in " groin.  Left Lower Vein Mapping: The left great saphenous vein appears widely patent with no evidence of thrombosis or fibrosis. The great saphenous vein branches at the mid thigh and mid calf. Lymph node measuring 1.0 cm x 1.9 cm noted in groin.    Past Surgical History:   Procedure Laterality Date    CARDIAC CATHETERIZATION N/A 3/28/2024    Procedure: IVUS - Coronary;  Surgeon: Dany Drake MD;  Location: [facility] Cardiac Cath Lab;  Service: Cardiovascular;  Laterality: N/A;  PERIPHERAL/LOWER EXTREMITY    INVASIVE VASCULAR PROCEDURE Left 3/22/2024    Procedure: Lower Extremity Angiogram;  Surgeon: Africa Hoover MD;  Location: [facility] Cardiac Cath Lab;  Service: Vascular Surgery;  Laterality: Left;  Left leg angiogram with intervention    INVASIVE VASCULAR PROCEDURE N/A 3/22/2024    Procedure: Lower Extremity Intervention;  Surgeon: Africa Hoover MD;  Location: [facility] Cardiac Cath Lab;  Service: Vascular Surgery;  Laterality: N/A;    INVASIVE VASCULAR PROCEDURE Left 3/28/2024    Procedure: Lower Extremity Angiogram;  Surgeon: Dany Drake MD;  Location: [facility] Cardiac Cath Lab;  Service: Cardiovascular;  Laterality: Left;  Left LE angiogram ( [id number])   PEDAL APPROACH    INVASIVE VASCULAR PROCEDURE N/A 3/28/2024    Procedure: Lower Extremity Intervention;  Surgeon: Dany Drake MD;  Location: [facility] Cardiac Cath Lab;  Service: Cardiovascular;  Laterality: N/A;    INVASIVE VASCULAR PROCEDURE N/A 5/16/2024    Procedure: Lower Extremity Angiogram;  Surgeon: Dany Drake MD;  Location: [facility] Cardiac Cath Lab;  Service: Cardiovascular;  Laterality: N/A;       Patient  reports that she is not currently sexually active.    Family History   Problem Relation Name Age of Onset    Heart failure Father      Hypertension Father      Heart disease Sister      Hypertension Sister      Heart disease Brother      Hypertension Brother         Allergies   Allergen Reactions    Fish  Containing Products Anaphylaxis    Shellfish Containing Products Anaphylaxis    Gadolinium-Containing Contrast Media Hives    Iodinated Contrast Media Hives              Prior to Admission medications    Medication Sig Start Date End Date Taking? Authorizing Provider   amLODIPine (Norvasc) 5 mg tablet TAKE 1 TABLET(5 MG) BY MOUTH DAILY  Patient not taking: Reported on 6/19/2024 3/23/24   Fior Hawkins DO   aspirin 81 mg EC tablet Take 1 tablet (81 mg) by mouth once daily.    Historical Provider, MD   capsaicin (Zostrix) 0.025 % cream Apply topically 2 times a day as needed (neuropathic pain).  Patient not taking: Reported on 6/19/2024 3/23/24   Fior Hawkins DO   rosuvastatin (Crestor) 5 mg tablet Take 1 tablet (5 mg) by mouth once daily. 4/30/23 5/16/24  Mj Latif,         PAT ROS   Constitutional: Negative for fever, chills, or sweats   ENMT: Negative for nasal discharge, congestion, ear pain, mouth pain, throat pain   Respiratory: Negative for cough, wheezing, shortness of breath   Cardiac: Negative for chest pain, dyspnea on exertion, palpitations   Gastrointestinal: Negative for nausea, vomiting, diarrhea, constipation, abdominal pain  Genitourinary: Negative for dysuria, flank pain, frequency, hematuria   Musculoskeletal: Negative for decreased ROM, pain, swelling, weakness   Neurological: Negative for dizziness, confusion, headache  Psychiatric: Negative for mood changes     Skin: Negative for itching, rash; + left lower leg non healing ulcer- currently wrapped- much pain from source      Hematologic/Lymph: Negative for bruising, easy bleeding  Allergic/Immunologic: Negative itching, sneezing, swelling      Physical Exam  Constitutional:       Appearance: Normal appearance.   HENT:      Head: Normocephalic.      Mouth/Throat:      Mouth: Mucous membranes are moist.   Eyes:      Extraocular Movements: Extraocular movements intact.   Cardiovascular:      Rate and Rhythm: Normal rate and  regular rhythm.   Pulmonary:      Effort: Pulmonary effort is normal.      Breath sounds: Normal breath sounds.   Abdominal:      General: Abdomen is flat.      Palpations: Abdomen is soft.   Musculoskeletal:         General: Normal range of motion.      Cervical back: Normal range of motion.   Skin:     General: Skin is warm and dry.      Comments:  ulcer of left mid lateral calf with granulating tissue; surrounding skin pinkish; no drainage; wrapped in kerlex with purple substance placed in wound clinic noted    Neurological:      General: No focal deficit present.      Mental Status: She is alert.   Psychiatric:         Mood and Affect: Mood normal.        PAT AIRWAY:   Airway:     Neck ROM::  Full   Missing tooth       Visit Vitals  /80   Pulse 75   Temp 36.2 °C (97.2 °F) (Temporal)   Resp 15       DASI Risk Score      Flowsheet Row Most Recent Value   DASI SCORE 12.7   METS Score (Will be calculated only when all the questions are answered) 4.3          Caprini DVT Assessment      Flowsheet Row Most Recent Value   DVT Score 11   Current Status Swollen legs, Major surgery planned, lasting over 3 hours   History Prior major surgery   Age Over 75 years   BMI 30 or less          Modified Frailty Index      Flowsheet Row Most Recent Value   Modified Frailty Index Calculator .1818          CHADS2 Stroke Risk  Current as of 8 minutes ago        N/A 3 to 100%: High Risk   2 to < 3%: Medium Risk   0 to < 2%: Low Risk     Last Change: N/A          This score determines the patient's risk of having a stroke if the patient has atrial fibrillation.        This score is not applicable to this patient. Components are not calculated.          Revised Cardiac Risk Index      Flowsheet Row Most Recent Value   Revised Cardiac Risk Calculator 0          Apfel Simplified Score    No data to display       Risk Analysis Index Results This Encounter         7/12/2024  1345             REYES Cancer History: Patient does not  indicate history of cancer    Total Risk Analysis Index Score Without Cancer: 28    Total Risk Analysis Index Score: 28          Stop Bang Score      Flowsheet Row Most Recent Value   Do you snore loudly? 0   Do you often feel tired or fatigued after your sleep? 1   Has anyone ever observed you stop breathing in your sleep? 0   Do you have or are you being treated for high blood pressure? 1   Recent BMI (Calculated) 20.7   Is BMI greater than 35 kg/m2? 0=No   Age older than 50 years old? 1=Yes   Is your neck circumference greater than 17 inches (Male) or 16 inches (Female)? 0   Gender - Male 0=No   STOP-BANG Total Score 3            Assessment and Plan:     Pre-Op  79 yo female scheduled for Left Creation Bypass Graft Tibial Artery, Left femoral to posterior tibial bypass with saphenous vein harvest; possible completion angiogram on 7/25/2024 with Dr. Hoover. Blood work ordered. EKG shows NSR with LAFB, v rate of 68 bpm- comparable EKG on file. Otherwise no further orders indicated.     Cardiac  -HTN, taking Norvasc and aspirin, compliancy is questionable  -HLD, prescribed a statin but she is non-compliant   LVEF 65%  -encouraged to take medications as prescribed    Vasc  -PAD  -PVD  -Non-healing wound of left lower extremity, follows with wound clinic, due to poor wound healing she is scheduled for left fem-tib bypass  -recent Vasc US lower extremities May 2024  -follows with vascular surgeon and card/endo vasc    Anesthesia:  Patient denies any anesthesia complications.   ASA 3: A to review    See risk scores as previously documented.

## 2024-07-13 LAB
ABO GROUP (TYPE) IN BLOOD: NORMAL
ANTIBODY SCREEN: NORMAL
ATRIAL RATE: 68 BPM
P AXIS: 27 DEGREES
P OFFSET: 199 MS
P ONSET: 147 MS
PR INTERVAL: 148 MS
Q ONSET: 221 MS
QRS COUNT: 11 BEATS
QRS DURATION: 80 MS
QT INTERVAL: 404 MS
QTC CALCULATION(BAZETT): 429 MS
QTC FREDERICIA: 421 MS
R AXIS: -50 DEGREES
RH FACTOR (ANTIGEN D): NORMAL
T AXIS: 55 DEGREES
T OFFSET: 423 MS
VENTRICULAR RATE: 68 BPM

## 2024-07-14 LAB — STAPHYLOCOCCUS SPEC CULT: ABNORMAL

## 2024-07-16 ENCOUNTER — OFFICE VISIT (OUTPATIENT)
Dept: WOUND CARE | Facility: CLINIC | Age: 79
End: 2024-07-16
Payer: MEDICARE

## 2024-07-16 PROCEDURE — 11042 DBRDMT SUBQ TIS 1ST 20SQCM/<: CPT

## 2024-07-23 ENCOUNTER — OFFICE VISIT (OUTPATIENT)
Dept: WOUND CARE | Facility: CLINIC | Age: 79
End: 2024-07-23
Payer: MEDICARE

## 2024-07-23 PROCEDURE — 11042 DBRDMT SUBQ TIS 1ST 20SQCM/<: CPT

## 2024-07-24 ENCOUNTER — ANESTHESIA EVENT (OUTPATIENT)
Dept: OPERATING ROOM | Facility: HOSPITAL | Age: 79
End: 2024-07-24
Payer: MEDICARE

## 2024-07-25 ENCOUNTER — APPOINTMENT (OUTPATIENT)
Dept: RADIOLOGY | Facility: HOSPITAL | Age: 79
DRG: 253 | End: 2024-07-25
Payer: MEDICARE

## 2024-07-25 ENCOUNTER — ANESTHESIA (OUTPATIENT)
Dept: OPERATING ROOM | Facility: HOSPITAL | Age: 79
End: 2024-07-25
Payer: MEDICARE

## 2024-07-25 ENCOUNTER — HOSPITAL ENCOUNTER (INPATIENT)
Facility: HOSPITAL | Age: 79
DRG: 253 | End: 2024-07-25
Attending: SURGERY | Admitting: SURGERY
Payer: MEDICARE

## 2024-07-25 DIAGNOSIS — L03.116 LEFT LEG CELLULITIS: ICD-10-CM

## 2024-07-25 DIAGNOSIS — S81.802A NON-HEALING WOUND OF LEFT LOWER EXTREMITY: ICD-10-CM

## 2024-07-25 DIAGNOSIS — I73.9 PAD (PERIPHERAL ARTERY DISEASE) (CMS-HCC): Primary | ICD-10-CM

## 2024-07-25 DIAGNOSIS — I70.245 ATHSCL NATIVE ARTERIES OF LEFT LEG W ULCERATION OTH PRT FOOT (MULTI): ICD-10-CM

## 2024-07-25 DIAGNOSIS — I73.9 PERIPHERAL ARTERY DISEASE (CMS-HCC): ICD-10-CM

## 2024-07-25 LAB
ABO GROUP (TYPE) IN BLOOD: NORMAL
ANION GAP SERPL CALC-SCNC: 12 MMOL/L (ref 10–20)
BLOOD EXPIRATION DATE: NORMAL
BLOOD EXPIRATION DATE: NORMAL
BUN SERPL-MCNC: 16 MG/DL (ref 6–23)
CALCIUM SERPL-MCNC: 7.1 MG/DL (ref 8.6–10.3)
CHLORIDE SERPL-SCNC: 105 MMOL/L (ref 98–107)
CO2 SERPL-SCNC: 23 MMOL/L (ref 21–32)
CREAT SERPL-MCNC: 0.63 MG/DL (ref 0.5–1.05)
DISPENSE STATUS: NORMAL
DISPENSE STATUS: NORMAL
EGFRCR SERPLBLD CKD-EPI 2021: >90 ML/MIN/1.73M*2
ERYTHROCYTE [DISTWIDTH] IN BLOOD BY AUTOMATED COUNT: 13.8 % (ref 11.5–14.5)
GLUCOSE BLD MANUAL STRIP-MCNC: 151 MG/DL (ref 74–99)
GLUCOSE SERPL-MCNC: 186 MG/DL (ref 74–99)
HCT VFR BLD AUTO: 29.6 % (ref 36–46)
HGB BLD-MCNC: 9.5 G/DL (ref 12–16)
MCH RBC QN AUTO: 30 PG (ref 26–34)
MCHC RBC AUTO-ENTMCNC: 32.1 G/DL (ref 32–36)
MCV RBC AUTO: 93 FL (ref 80–100)
NRBC BLD-RTO: 0 /100 WBCS (ref 0–0)
PLATELET # BLD AUTO: 249 X10*3/UL (ref 150–450)
POTASSIUM SERPL-SCNC: 4 MMOL/L (ref 3.5–5.3)
PRODUCT BLOOD TYPE: 6200
PRODUCT BLOOD TYPE: 6200
PRODUCT CODE: NORMAL
PRODUCT CODE: NORMAL
RBC # BLD AUTO: 3.17 X10*6/UL (ref 4–5.2)
RH FACTOR (ANTIGEN D): NORMAL
SODIUM SERPL-SCNC: 136 MMOL/L (ref 136–145)
UNIT ABO: NORMAL
UNIT ABO: NORMAL
UNIT NUMBER: NORMAL
UNIT NUMBER: NORMAL
UNIT RH: NORMAL
UNIT RH: NORMAL
UNIT VOLUME: 350
UNIT VOLUME: 350
WBC # BLD AUTO: 8.9 X10*3/UL (ref 4.4–11.3)
XM INTEP: NORMAL
XM INTEP: NORMAL

## 2024-07-25 PROCEDURE — 88342 IMHCHEM/IMCYTCHM 1ST ANTB: CPT | Performed by: PATHOLOGY

## 2024-07-25 PROCEDURE — 88341 IMHCHEM/IMCYTCHM EA ADD ANTB: CPT | Mod: TC,STJLAB | Performed by: SURGERY

## 2024-07-25 PROCEDURE — 88365 INSITU HYBRIDIZATION (FISH): CPT | Performed by: PATHOLOGY

## 2024-07-25 PROCEDURE — 06BQ0ZZ EXCISION OF LEFT SAPHENOUS VEIN, OPEN APPROACH: ICD-10-PCS | Performed by: SURGERY

## 2024-07-25 PROCEDURE — 2500000005 HC RX 250 GENERAL PHARMACY W/O HCPCS: Performed by: SURGERY

## 2024-07-25 PROCEDURE — 71045 X-RAY EXAM CHEST 1 VIEW: CPT | Performed by: STUDENT IN AN ORGANIZED HEALTH CARE EDUCATION/TRAINING PROGRAM

## 2024-07-25 PROCEDURE — 2500000005 HC RX 250 GENERAL PHARMACY W/O HCPCS: Performed by: NURSE PRACTITIONER

## 2024-07-25 PROCEDURE — 35355 RECHANNELING OF ARTERY: CPT | Performed by: SURGERY

## 2024-07-25 PROCEDURE — 88305 TISSUE EXAM BY PATHOLOGIST: CPT | Performed by: PATHOLOGY

## 2024-07-25 PROCEDURE — 2720000007 HC OR 272 NO HCPCS: Performed by: SURGERY

## 2024-07-25 PROCEDURE — 36415 COLL VENOUS BLD VENIPUNCTURE: CPT | Performed by: SURGERY

## 2024-07-25 PROCEDURE — P9045 ALBUMIN (HUMAN), 5%, 250 ML: HCPCS | Mod: JZ | Performed by: NURSE ANESTHETIST, CERTIFIED REGISTERED

## 2024-07-25 PROCEDURE — 87086 URINE CULTURE/COLONY COUNT: CPT | Mod: STJLAB | Performed by: SURGERY

## 2024-07-25 PROCEDURE — 2780000003 HC OR 278 NO HCPCS: Performed by: SURGERY

## 2024-07-25 PROCEDURE — 82947 ASSAY GLUCOSE BLOOD QUANT: CPT

## 2024-07-25 PROCEDURE — 3700000001 HC GENERAL ANESTHESIA TIME - INITIAL BASE CHARGE: Performed by: SURGERY

## 2024-07-25 PROCEDURE — 2500000004 HC RX 250 GENERAL PHARMACY W/ HCPCS (ALT 636 FOR OP/ED): Performed by: NURSE PRACTITIONER

## 2024-07-25 PROCEDURE — 2500000005 HC RX 250 GENERAL PHARMACY W/O HCPCS: Performed by: NURSE ANESTHETIST, CERTIFIED REGISTERED

## 2024-07-25 PROCEDURE — 3700000002 HC GENERAL ANESTHESIA TIME - EACH INCREMENTAL 1 MINUTE: Performed by: SURGERY

## 2024-07-25 PROCEDURE — 85027 COMPLETE CBC AUTOMATED: CPT | Performed by: SURGERY

## 2024-07-25 PROCEDURE — 88341 IMHCHEM/IMCYTCHM EA ADD ANTB: CPT | Performed by: PATHOLOGY

## 2024-07-25 PROCEDURE — 2500000005 HC RX 250 GENERAL PHARMACY W/O HCPCS: Performed by: ANESTHESIOLOGY

## 2024-07-25 PROCEDURE — 37799 UNLISTED PX VASCULAR SURGERY: CPT | Performed by: SURGERY

## 2024-07-25 PROCEDURE — 3600000009 HC OR TIME - EACH INCREMENTAL 1 MINUTE - PROCEDURE LEVEL FOUR: Performed by: SURGERY

## 2024-07-25 PROCEDURE — 88304 TISSUE EXAM BY PATHOLOGIST: CPT | Performed by: PATHOLOGY

## 2024-07-25 PROCEDURE — 2500000004 HC RX 250 GENERAL PHARMACY W/ HCPCS (ALT 636 FOR OP/ED): Performed by: NURSE ANESTHETIST, CERTIFIED REGISTERED

## 2024-07-25 PROCEDURE — 04UL0KZ SUPPLEMENT LEFT FEMORAL ARTERY WITH NONAUTOLOGOUS TISSUE SUBSTITUTE, OPEN APPROACH: ICD-10-PCS | Performed by: SURGERY

## 2024-07-25 PROCEDURE — 3600000004 HC OR TIME - INITIAL BASE CHARGE - PROCEDURE LEVEL FOUR: Performed by: SURGERY

## 2024-07-25 PROCEDURE — 80048 BASIC METABOLIC PNL TOTAL CA: CPT | Performed by: NURSE PRACTITIONER

## 2024-07-25 PROCEDURE — 35566 ART BYP FEM-ANT-POST TIB/PRL: CPT | Performed by: SURGERY

## 2024-07-25 PROCEDURE — 2500000004 HC RX 250 GENERAL PHARMACY W/ HCPCS (ALT 636 FOR OP/ED): Performed by: SURGERY

## 2024-07-25 PROCEDURE — 2500000004 HC RX 250 GENERAL PHARMACY W/ HCPCS (ALT 636 FOR OP/ED): Mod: JZ | Performed by: SURGERY

## 2024-07-25 PROCEDURE — 2020000001 HC ICU ROOM DAILY

## 2024-07-25 PROCEDURE — C1762 CONN TISS, HUMAN(INC FASCIA): HCPCS | Performed by: SURGERY

## 2024-07-25 PROCEDURE — 94002 VENT MGMT INPAT INIT DAY: CPT

## 2024-07-25 PROCEDURE — 04CL0ZZ EXTIRPATION OF MATTER FROM LEFT FEMORAL ARTERY, OPEN APPROACH: ICD-10-PCS | Performed by: SURGERY

## 2024-07-25 PROCEDURE — 041L09N BYPASS LEFT FEMORAL ARTERY TO POSTERIOR TIBIAL ARTERY WITH AUTOLOGOUS VENOUS TISSUE, OPEN APPROACH: ICD-10-PCS | Performed by: SURGERY

## 2024-07-25 PROCEDURE — 99291 CRITICAL CARE FIRST HOUR: CPT | Performed by: NURSE PRACTITIONER

## 2024-07-25 PROCEDURE — 71045 X-RAY EXAM CHEST 1 VIEW: CPT

## 2024-07-25 DEVICE — XENOSURE BIOLOGIC PATCH, 0.8CM X 8CM, EIFU
Type: IMPLANTABLE DEVICE | Site: LEG | Status: FUNCTIONAL
Brand: XENOSURE BIOLOGIC PATCH

## 2024-07-25 RX ORDER — AMLODIPINE BESYLATE 5 MG/1
5 TABLET ORAL DAILY
Status: DISCONTINUED | OUTPATIENT
Start: 2024-07-26 | End: 2024-07-30 | Stop reason: HOSPADM

## 2024-07-25 RX ORDER — SODIUM CHLORIDE, SODIUM LACTATE, POTASSIUM CHLORIDE, CALCIUM CHLORIDE 600; 310; 30; 20 MG/100ML; MG/100ML; MG/100ML; MG/100ML
75 INJECTION, SOLUTION INTRAVENOUS CONTINUOUS
Status: DISCONTINUED | OUTPATIENT
Start: 2024-07-25 | End: 2024-07-26

## 2024-07-25 RX ORDER — ALBUMIN HUMAN 50 G/1000ML
SOLUTION INTRAVENOUS AS NEEDED
Status: DISCONTINUED | OUTPATIENT
Start: 2024-07-25 | End: 2024-07-25

## 2024-07-25 RX ORDER — SODIUM CHLORIDE 9 MG/ML
75 INJECTION, SOLUTION INTRAVENOUS CONTINUOUS
Status: DISCONTINUED | OUTPATIENT
Start: 2024-07-25 | End: 2024-07-25

## 2024-07-25 RX ORDER — INSULIN LISPRO 100 [IU]/ML
0-10 INJECTION, SOLUTION INTRAVENOUS; SUBCUTANEOUS EVERY 4 HOURS
Status: DISCONTINUED | OUTPATIENT
Start: 2024-07-25 | End: 2024-07-26

## 2024-07-25 RX ORDER — DIPHENHYDRAMINE HYDROCHLORIDE 50 MG/ML
12.5 INJECTION INTRAMUSCULAR; INTRAVENOUS ONCE AS NEEDED
Status: DISCONTINUED | OUTPATIENT
Start: 2024-07-25 | End: 2024-07-25

## 2024-07-25 RX ORDER — HYDROMORPHONE HYDROCHLORIDE 1 MG/ML
1 INJECTION, SOLUTION INTRAMUSCULAR; INTRAVENOUS; SUBCUTANEOUS EVERY 5 MIN PRN
Status: DISCONTINUED | OUTPATIENT
Start: 2024-07-25 | End: 2024-07-25

## 2024-07-25 RX ORDER — HYDROMORPHONE HYDROCHLORIDE 1 MG/ML
INJECTION, SOLUTION INTRAMUSCULAR; INTRAVENOUS; SUBCUTANEOUS AS NEEDED
Status: DISCONTINUED | OUTPATIENT
Start: 2024-07-25 | End: 2024-07-25

## 2024-07-25 RX ORDER — ONDANSETRON HYDROCHLORIDE 2 MG/ML
INJECTION, SOLUTION INTRAVENOUS AS NEEDED
Status: DISCONTINUED | OUTPATIENT
Start: 2024-07-25 | End: 2024-07-25

## 2024-07-25 RX ORDER — HEPARIN SODIUM 1000 [USP'U]/ML
INJECTION, SOLUTION INTRAVENOUS; SUBCUTANEOUS AS NEEDED
Status: DISCONTINUED | OUTPATIENT
Start: 2024-07-25 | End: 2024-07-25

## 2024-07-25 RX ORDER — ACETAMINOPHEN 325 MG/1
650 TABLET ORAL EVERY 4 HOURS PRN
Status: DISCONTINUED | OUTPATIENT
Start: 2024-07-25 | End: 2024-07-30 | Stop reason: HOSPADM

## 2024-07-25 RX ORDER — NALOXONE HYDROCHLORIDE 0.4 MG/ML
0.2 INJECTION, SOLUTION INTRAMUSCULAR; INTRAVENOUS; SUBCUTANEOUS EVERY 5 MIN PRN
Status: DISCONTINUED | OUTPATIENT
Start: 2024-07-25 | End: 2024-07-30 | Stop reason: HOSPADM

## 2024-07-25 RX ORDER — ENOXAPARIN SODIUM 100 MG/ML
40 INJECTION SUBCUTANEOUS EVERY 24 HOURS
Status: DISCONTINUED | OUTPATIENT
Start: 2024-07-26 | End: 2024-07-27

## 2024-07-25 RX ORDER — PHENYLEPHRINE 10 MG/250 ML(40 MCG/ML)IN 0.9 % SOD.CHLORIDE INTRAVENOUS
CONTINUOUS PRN
Status: DISCONTINUED | OUTPATIENT
Start: 2024-07-25 | End: 2024-07-25

## 2024-07-25 RX ORDER — ALBUTEROL SULFATE 0.83 MG/ML
2.5 SOLUTION RESPIRATORY (INHALATION)
Status: DISCONTINUED | OUTPATIENT
Start: 2024-07-25 | End: 2024-07-25

## 2024-07-25 RX ORDER — DOCUSATE SODIUM 100 MG/1
100 CAPSULE, LIQUID FILLED ORAL 2 TIMES DAILY
Status: DISCONTINUED | OUTPATIENT
Start: 2024-07-26 | End: 2024-07-30 | Stop reason: HOSPADM

## 2024-07-25 RX ORDER — LIDOCAINE HYDROCHLORIDE 20 MG/ML
INJECTION, SOLUTION INFILTRATION; PERINEURAL AS NEEDED
Status: DISCONTINUED | OUTPATIENT
Start: 2024-07-25 | End: 2024-07-25

## 2024-07-25 RX ORDER — PHENYLEPHRINE HYDROCHLORIDE 10 MG/ML
INJECTION INTRAVENOUS AS NEEDED
Status: DISCONTINUED | OUTPATIENT
Start: 2024-07-25 | End: 2024-07-25

## 2024-07-25 RX ORDER — DEXTROSE 50 % IN WATER (D50W) INTRAVENOUS SYRINGE
12.5
Status: DISCONTINUED | OUTPATIENT
Start: 2024-07-25 | End: 2024-07-30 | Stop reason: HOSPADM

## 2024-07-25 RX ORDER — ASPIRIN 81 MG/1
81 TABLET ORAL DAILY
Status: DISCONTINUED | OUTPATIENT
Start: 2024-07-26 | End: 2024-07-30 | Stop reason: HOSPADM

## 2024-07-25 RX ORDER — PANTOPRAZOLE SODIUM 40 MG/10ML
40 INJECTION, POWDER, LYOPHILIZED, FOR SOLUTION INTRAVENOUS
Status: DISCONTINUED | OUTPATIENT
Start: 2024-07-26 | End: 2024-07-25

## 2024-07-25 RX ORDER — SODIUM CHLORIDE, SODIUM LACTATE, POTASSIUM CHLORIDE, CALCIUM CHLORIDE 600; 310; 30; 20 MG/100ML; MG/100ML; MG/100ML; MG/100ML
75 INJECTION, SOLUTION INTRAVENOUS CONTINUOUS
Status: DISCONTINUED | OUTPATIENT
Start: 2024-07-25 | End: 2024-07-25

## 2024-07-25 RX ORDER — FENTANYL CITRATE 50 UG/ML
INJECTION, SOLUTION INTRAMUSCULAR; INTRAVENOUS AS NEEDED
Status: DISCONTINUED | OUTPATIENT
Start: 2024-07-25 | End: 2024-07-25

## 2024-07-25 RX ORDER — PROPOFOL 10 MG/ML
5-50 INJECTION, EMULSION INTRAVENOUS CONTINUOUS
Status: DISCONTINUED | OUTPATIENT
Start: 2024-07-25 | End: 2024-07-25

## 2024-07-25 RX ORDER — HYDRALAZINE HYDROCHLORIDE 20 MG/ML
5 INJECTION INTRAMUSCULAR; INTRAVENOUS EVERY 30 MIN PRN
Status: DISCONTINUED | OUTPATIENT
Start: 2024-07-25 | End: 2024-07-25

## 2024-07-25 RX ORDER — DOCUSATE SODIUM 50 MG/5ML
100 LIQUID ORAL 2 TIMES DAILY
Status: DISCONTINUED | OUTPATIENT
Start: 2024-07-25 | End: 2024-07-25

## 2024-07-25 RX ORDER — METOCLOPRAMIDE HYDROCHLORIDE 5 MG/ML
10 INJECTION INTRAMUSCULAR; INTRAVENOUS ONCE AS NEEDED
Status: DISCONTINUED | OUTPATIENT
Start: 2024-07-25 | End: 2024-07-25

## 2024-07-25 RX ORDER — PROPOFOL 10 MG/ML
INJECTION, EMULSION INTRAVENOUS AS NEEDED
Status: DISCONTINUED | OUTPATIENT
Start: 2024-07-25 | End: 2024-07-25

## 2024-07-25 RX ORDER — MIDAZOLAM HYDROCHLORIDE 1 MG/ML
1 INJECTION, SOLUTION INTRAMUSCULAR; INTRAVENOUS ONCE AS NEEDED
Status: DISCONTINUED | OUTPATIENT
Start: 2024-07-25 | End: 2024-07-25

## 2024-07-25 RX ORDER — HYDRALAZINE HYDROCHLORIDE 20 MG/ML
10 INJECTION INTRAMUSCULAR; INTRAVENOUS EVERY 4 HOURS PRN
Status: DISCONTINUED | OUTPATIENT
Start: 2024-07-25 | End: 2024-07-30 | Stop reason: HOSPADM

## 2024-07-25 RX ORDER — SODIUM CHLORIDE, SODIUM GLUCONATE, SODIUM ACETATE, POTASSIUM CHLORIDE AND MAGNESIUM CHLORIDE 30; 37; 368; 526; 502 MG/100ML; MG/100ML; MG/100ML; MG/100ML; MG/100ML
INJECTION, SOLUTION INTRAVENOUS CONTINUOUS PRN
Status: DISCONTINUED | OUTPATIENT
Start: 2024-07-25 | End: 2024-07-25

## 2024-07-25 RX ORDER — POLYETHYLENE GLYCOL 3350 17 G/17G
17 POWDER, FOR SOLUTION ORAL DAILY
Status: DISCONTINUED | OUTPATIENT
Start: 2024-07-25 | End: 2024-07-25

## 2024-07-25 RX ORDER — HYDROCODONE BITARTRATE AND ACETAMINOPHEN 5; 325 MG/1; MG/1
1 TABLET ORAL EVERY 4 HOURS PRN
Status: DISCONTINUED | OUTPATIENT
Start: 2024-07-25 | End: 2024-07-25

## 2024-07-25 RX ORDER — MAGNESIUM SULFATE HEPTAHYDRATE 500 MG/ML
INJECTION, SOLUTION INTRAMUSCULAR; INTRAVENOUS AS NEEDED
Status: DISCONTINUED | OUTPATIENT
Start: 2024-07-25 | End: 2024-07-25

## 2024-07-25 RX ORDER — ROSUVASTATIN CALCIUM 5 MG/1
5 TABLET, COATED ORAL NIGHTLY
Status: DISCONTINUED | OUTPATIENT
Start: 2024-07-26 | End: 2024-07-28

## 2024-07-25 RX ORDER — SODIUM CHLORIDE, SODIUM LACTATE, POTASSIUM CHLORIDE, CALCIUM CHLORIDE 600; 310; 30; 20 MG/100ML; MG/100ML; MG/100ML; MG/100ML
INJECTION, SOLUTION INTRAVENOUS CONTINUOUS PRN
Status: DISCONTINUED | OUTPATIENT
Start: 2024-07-25 | End: 2024-07-25

## 2024-07-25 RX ORDER — OXYCODONE HYDROCHLORIDE 5 MG/1
5 TABLET ORAL EVERY 6 HOURS PRN
Status: DISCONTINUED | OUTPATIENT
Start: 2024-07-25 | End: 2024-07-28

## 2024-07-25 RX ORDER — ROCURONIUM BROMIDE 10 MG/ML
INJECTION, SOLUTION INTRAVENOUS AS NEEDED
Status: DISCONTINUED | OUTPATIENT
Start: 2024-07-25 | End: 2024-07-25

## 2024-07-25 RX ORDER — LABETALOL HYDROCHLORIDE 5 MG/ML
5 INJECTION, SOLUTION INTRAVENOUS
Status: DISCONTINUED | OUTPATIENT
Start: 2024-07-25 | End: 2024-07-25

## 2024-07-25 RX ORDER — NORETHINDRONE AND ETHINYL ESTRADIOL 0.5-0.035
KIT ORAL AS NEEDED
Status: DISCONTINUED | OUTPATIENT
Start: 2024-07-25 | End: 2024-07-25

## 2024-07-25 RX ORDER — CEFAZOLIN SODIUM 2 G/100ML
2 INJECTION, SOLUTION INTRAVENOUS ONCE
Status: COMPLETED | OUTPATIENT
Start: 2024-07-25 | End: 2024-07-25

## 2024-07-25 RX ADMIN — SODIUM CHLORIDE, POTASSIUM CHLORIDE, SODIUM LACTATE AND CALCIUM CHLORIDE 75 ML/HR: 600; 310; 30; 20 INJECTION, SOLUTION INTRAVENOUS at 20:50

## 2024-07-25 RX ADMIN — Medication 40 PERCENT: at 19:15

## 2024-07-25 RX ADMIN — PROPOFOL 25 MCG/KG/MIN: 10 INJECTION, EMULSION INTRAVENOUS at 20:57

## 2024-07-25 RX ADMIN — Medication 40 PERCENT: at 20:00

## 2024-07-25 RX ADMIN — SODIUM CHLORIDE, POTASSIUM CHLORIDE, SODIUM LACTATE AND CALCIUM CHLORIDE 75 ML/HR: 600; 310; 30; 20 INJECTION, SOLUTION INTRAVENOUS at 21:04

## 2024-07-25 RX ADMIN — Medication 45 PERCENT: at 19:00

## 2024-07-25 RX ADMIN — Medication 2 L/MIN: at 21:39

## 2024-07-25 SDOH — SOCIAL STABILITY: SOCIAL INSECURITY: HAVE YOU HAD THOUGHTS OF HARMING ANYONE ELSE?: UNABLE TO ASSESS

## 2024-07-25 SDOH — SOCIAL STABILITY: SOCIAL INSECURITY: DO YOU FEEL ANYONE HAS EXPLOITED OR TAKEN ADVANTAGE OF YOU FINANCIALLY OR OF YOUR PERSONAL PROPERTY?: UNABLE TO ASSESS

## 2024-07-25 SDOH — SOCIAL STABILITY: SOCIAL INSECURITY: DO YOU FEEL UNSAFE GOING BACK TO THE PLACE WHERE YOU ARE LIVING?: UNABLE TO ASSESS

## 2024-07-25 SDOH — SOCIAL STABILITY: SOCIAL INSECURITY: ABUSE: ADULT

## 2024-07-25 SDOH — SOCIAL STABILITY: SOCIAL INSECURITY: ARE THERE ANY APPARENT SIGNS OF INJURIES/BEHAVIORS THAT COULD BE RELATED TO ABUSE/NEGLECT?: NO

## 2024-07-25 SDOH — SOCIAL STABILITY: SOCIAL INSECURITY: HAS ANYONE EVER THREATENED TO HURT YOUR FAMILY OR YOUR PETS?: UNABLE TO ASSESS

## 2024-07-25 SDOH — SOCIAL STABILITY: SOCIAL INSECURITY: WERE YOU ABLE TO COMPLETE ALL THE BEHAVIORAL HEALTH SCREENINGS?: NO

## 2024-07-25 SDOH — SOCIAL STABILITY: SOCIAL INSECURITY: DOES ANYONE TRY TO KEEP YOU FROM HAVING/CONTACTING OTHER FRIENDS OR DOING THINGS OUTSIDE YOUR HOME?: UNABLE TO ASSESS

## 2024-07-25 SDOH — SOCIAL STABILITY: SOCIAL INSECURITY: HAVE YOU HAD ANY THOUGHTS OF HARMING ANYONE ELSE?: UNABLE TO ASSESS

## 2024-07-25 SDOH — HEALTH STABILITY: MENTAL HEALTH: CURRENT SMOKER: 0

## 2024-07-25 SDOH — SOCIAL STABILITY: SOCIAL INSECURITY: ARE YOU OR HAVE YOU BEEN THREATENED OR ABUSED PHYSICALLY, EMOTIONALLY, OR SEXUALLY BY ANYONE?: UNABLE TO ASSESS

## 2024-07-25 ASSESSMENT — COGNITIVE AND FUNCTIONAL STATUS - GENERAL
EATING MEALS: TOTAL
MOVING FROM LYING ON BACK TO SITTING ON SIDE OF FLAT BED WITH BEDRAILS: TOTAL
CLIMB 3 TO 5 STEPS WITH RAILING: TOTAL
TOILETING: TOTAL
MOBILITY SCORE: 6
PATIENT BASELINE BEDBOUND: NO
PERSONAL GROOMING: TOTAL
MOVING TO AND FROM BED TO CHAIR: TOTAL
WALKING IN HOSPITAL ROOM: TOTAL
DRESSING REGULAR LOWER BODY CLOTHING: TOTAL
TURNING FROM BACK TO SIDE WHILE IN FLAT BAD: TOTAL
DRESSING REGULAR UPPER BODY CLOTHING: TOTAL
DAILY ACTIVITIY SCORE: 6
HELP NEEDED FOR BATHING: TOTAL
STANDING UP FROM CHAIR USING ARMS: TOTAL

## 2024-07-25 ASSESSMENT — COLUMBIA-SUICIDE SEVERITY RATING SCALE - C-SSRS
6. HAVE YOU EVER DONE ANYTHING, STARTED TO DO ANYTHING, OR PREPARED TO DO ANYTHING TO END YOUR LIFE?: NO
1. IN THE PAST MONTH, HAVE YOU WISHED YOU WERE DEAD OR WISHED YOU COULD GO TO SLEEP AND NOT WAKE UP?: NO
2. HAVE YOU ACTUALLY HAD ANY THOUGHTS OF KILLING YOURSELF?: NO

## 2024-07-25 ASSESSMENT — ACTIVITIES OF DAILY LIVING (ADL)
GROOMING: UNABLE TO ASSESS
FEEDING YOURSELF: UNABLE TO ASSESS
JUDGMENT_ADEQUATE_SAFELY_COMPLETE_DAILY_ACTIVITIES: UNABLE TO ASSESS
WALKS IN HOME: UNABLE TO ASSESS
BATHING: UNABLE TO ASSESS
HEARING - RIGHT EAR: UNABLE TO ASSESS
TOILETING: UNABLE TO ASSESS
DRESSING YOURSELF: UNABLE TO ASSESS
LACK_OF_TRANSPORTATION: PATIENT UNABLE TO ANSWER
HEARING - LEFT EAR: UNABLE TO ASSESS
ASSISTIVE_DEVICE: OTHER (COMMENT)
PATIENT'S MEMORY ADEQUATE TO SAFELY COMPLETE DAILY ACTIVITIES?: UNABLE TO ASSESS
ADEQUATE_TO_COMPLETE_ADL: UNABLE TO ASSESS

## 2024-07-25 ASSESSMENT — LIFESTYLE VARIABLES
HOW OFTEN DO YOU HAVE A DRINK CONTAINING ALCOHOL: PATIENT UNABLE TO ANSWER
SKIP TO QUESTIONS 9-10: 0
AUDIT-C TOTAL SCORE: -1
HOW OFTEN DO YOU HAVE 6 OR MORE DRINKS ON ONE OCCASION: PATIENT UNABLE TO ANSWER
HOW MANY STANDARD DRINKS CONTAINING ALCOHOL DO YOU HAVE ON A TYPICAL DAY: PATIENT UNABLE TO ANSWER
AUDIT-C TOTAL SCORE: -1

## 2024-07-25 ASSESSMENT — PAIN SCALES - GENERAL
PAINLEVEL_OUTOF10: 0 - NO PAIN

## 2024-07-25 ASSESSMENT — PAIN - FUNCTIONAL ASSESSMENT
PAIN_FUNCTIONAL_ASSESSMENT: CPOT (CRITICAL CARE PAIN OBSERVATION TOOL)
PAIN_FUNCTIONAL_ASSESSMENT: 0-10

## 2024-07-25 ASSESSMENT — PATIENT HEALTH QUESTIONNAIRE - PHQ9
SUM OF ALL RESPONSES TO PHQ9 QUESTIONS 1 & 2: 0
2. FEELING DOWN, DEPRESSED OR HOPELESS: NOT AT ALL
1. LITTLE INTEREST OR PLEASURE IN DOING THINGS: NOT AT ALL

## 2024-07-25 NOTE — CONSULTS
Critical Care Medicine Consult    Consults    HPI  Patient is a 78 y.o. female with hx hyperlipidemia, HTN, PVD and nonhealing venous stasis ulcer of left lateral calf.  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  Presented today for elective redo left femoral endarterectomy, left femoral to posterior tibial artery bypass and harvest of the left greater saphenous vein.    Unremarkable intraoperative period reported.  Patient admitted to ICU postoperatively for close hemodynamic monitoring and neurovascular checks.  Remains intubated and hemodynamically stable.  Neuromuscular blockade was not reversed in the operating room and airway maintained secondary to hypothermia-33.7        Past Medical History:   Diagnosis Date    Cellulitis     Left leg    HLD (hyperlipidemia)     HTN (hypertension)     Neuropathy     Non-healing wound of left lower extremity     PAD (peripheral artery disease) (CMS-[facility])     PVD (peripheral vascular disease) (CMS-[facility])     Varicose veins of both lower extremities      Past Surgical History:   Procedure Laterality Date    CARDIAC CATHETERIZATION N/A 3/28/2024    Procedure: IVUS - Coronary;  Surgeon: Dany Drake MD;  Location: [facility] Cardiac Cath Lab;  Service: Cardiovascular;  Laterality: N/A;  PERIPHERAL/LOWER EXTREMITY    INVASIVE VASCULAR PROCEDURE Left 3/22/2024    Procedure: Lower Extremity Angiogram;  Surgeon: Africa Hoover MD;  Location: [facility] Cardiac Cath Lab;  Service: Vascular Surgery;  Laterality: Left;  Left leg angiogram with intervention    INVASIVE VASCULAR PROCEDURE N/A 3/22/2024    Procedure: Lower Extremity Intervention;  Surgeon: Africa Hoover MD;  Location: [facility] Cardiac Cath Lab;  Service: Vascular Surgery;  Laterality: N/A;    INVASIVE VASCULAR PROCEDURE Left 3/28/2024    Procedure: Lower Extremity Angiogram;  Surgeon: Dany PEDERSON  Francisco Drake MD;  Location: [facility] Cardiac Cath Lab;  Service: Cardiovascular;  Laterality: Left;  Left LE angiogram ( [id number])   PEDAL APPROACH    INVASIVE VASCULAR PROCEDURE N/A 3/28/2024    Procedure: Lower Extremity Intervention;  Surgeon: Dany Drake MD;  Location: [facility] Cardiac Cath Lab;  Service: Cardiovascular;  Laterality: N/A;    INVASIVE VASCULAR PROCEDURE N/A 2024    Procedure: Lower Extremity Angiogram;  Surgeon: Dany Drake MD;  Location: [facility] Cardiac Cath Lab;  Service: Cardiovascular;  Laterality: N/A;     Medications Prior to Admission   Medication Sig Dispense Refill Last Dose    amLODIPine (Norvasc) 5 mg tablet TAKE 1 TABLET(5 MG) BY MOUTH DAILY 90 tablet 0 Past Month    aspirin 81 mg EC tablet Take 1 tablet (81 mg) by mouth once daily.   Past Month    capsaicin (Zostrix) 0.025 % cream Apply topically 2 times a day as needed (neuropathic pain). 50 g 0 Past Month    chlorhexidine (Peridex) 0.12 % solution Swish and spit 15 ml for 2 doses, 15mL the night before surgery and 15 ml morning of surgery - swish for 30 seconds -DO NOT SWALLOW, SPIT  mL 0 2024    rosuvastatin (Crestor) 5 mg tablet Take 1 tablet (5 mg) by mouth once daily. 90 tablet 3      Fish containing products, Shellfish containing products, Gadolinium-containing contrast media, and Iodinated contrast media  Social History     Tobacco Use    Smoking status: Former     Current packs/day: 0.00     Types: Cigarettes     Quit date: 3/18/2024     Years since quittin.3     Passive exposure: Past    Smokeless tobacco: Never   Vaping Use    Vaping status: Never Used   Substance Use Topics    Alcohol use: Never    Drug use: Never     Family History   Problem Relation Name Age of Onset    Heart failure Father      Hypertension Father      Heart disease Sister      Hypertension Sister      Heart disease Brother      Hypertension Brother         Scheduled Medications:         Continuous Medications:    sodium chloride 0.9%, 75 mL/hr         PRN Medications:   PRN medications: gelatin absorbable, heparin (porcine) 5,000 Units in sodium chloride 0.9% 500 mL irrigation, thrombin    Review of Systems   Reason unable to perform ROS: intubated and sedated.        Physical Exam  Constitutional:       Comments: Sedated and intubated   HENT:      Head: Normocephalic and atraumatic.      Mouth/Throat:      Mouth: Mucous membranes are dry.      Comments: OETT in place  Eyes:      Pupils: Pupils are equal, round, and reactive to light.   Cardiovascular:      Rate and Rhythm: Normal rate and regular rhythm.      Heart sounds: Normal heart sounds.      Comments: L PT doppled. L foot warm  Pulmonary:      Effort: Pulmonary effort is normal. No respiratory distress.      Breath sounds: Normal breath sounds.   Abdominal:      General: Bowel sounds are normal. There is no distension.      Palpations: Abdomen is soft.   Musculoskeletal:         General: No swelling.      Comments: Remains under effect of NMB   Skin:     General: Skin is warm and dry.      Capillary Refill: Capillary refill takes less than 2 seconds.      Comments: L leg incision dressing dry and intact   Neurological:      Comments: Sedated and paralyzed         Objective   Vitals:  Most Recent:  Vitals:    07/25/24 0718   BP: (!) 193/84   Pulse: 64   Resp: 18   Temp: 36.2 °C (97.2 °F)   SpO2: 97%       24hr Min/Max:  Temp  Min: 36.2 °C (97.2 °F)  Max: 36.2 °C (97.2 °F)  Pulse  Min: 64  Max: 64  BP  Min: 193/84  Max: 193/84  Resp  Min: 18  Max: 18  SpO2  Min: 97 %  Max: 97 %    LDA:   Arterial Line 07/25/24 Left Brachial (Active)   Placement Date/Time: 07/25/24 (c) 0810   Size: 20 G  Orientation: Left  Location: Brachial  Securement Method: Taped  Patient Tolerance: Tolerated well   Number of days: 0       ETT  7 mm (Active)   Placement Date/Time: 07/25/24 (c) 0754   Mask Ventilation: Vent by mask  Technique: Direct laryngoscopy  ETT Type: ETT - single  Single  Lumen Tube Size: 7 mm  Cuffed: Yes  Laryngoscope: Carlos  Blade Size: 3  Location: Oral  Grade View: Full view o...   Number of days: 0       Urethral Catheter 20 Fr. (Active)   Placement Date/Time: 07/25/24 0818   Placed by: brown  Hand Hygiene Completed: Yes  Tube Size (Fr.): 20 Fr.  Catheter Balloon Size: 10 mL   Number of days: 0              Intake/Output Summary (Last 24 hours) at 7/25/2024 1745  Last data filed at 7/25/2024 1520  Gross per 24 hour   Intake 2000 ml   Output 900 ml   Net 1100 ml           Lab/Radiology/Diagnostic Review:  Results for orders placed or performed during the hospital encounter of 07/25/24 (from the past 24 hour(s))   Prepare RBC: 2 Units   Result Value Ref Range    PRODUCT CODE C8698I16     Unit Number [id number]     Unit ABO A     Unit RH POS     XM INTEP COMP     Dispense Status XM     Blood Expiration Date 7/30/2024 11:59:00 PM EDT     PRODUCT BLOOD TYPE 6200     UNIT VOLUME 350     PRODUCT CODE I5118H87     Unit Number [id number]     Unit ABO A     Unit RH POS     XM INTEP COMP     Dispense Status XM     Blood Expiration Date 7/31/2024 11:59:00 PM EDT     PRODUCT BLOOD TYPE 6200     UNIT VOLUME 350    VERIFY ABO/Rh Group Test   Result Value Ref Range    ABO TYPE A     Rh TYPE POS      ECG 12 Lead    Result Date: 7/13/2024  Normal sinus rhythm Left anterior fascicular block Cannot rule out Inferior infarct (masked by fascicular block?) , age undetermined Anterior infarct , age undetermined Abnormal ECG No previous ECGs available        Assessment & Recommendation  Active Problems:    PAD (peripheral artery disease) (CMS-HCC)    Non-healing wound of left lower extremity  Hypertension  Hypothermia    Neuro  #Post op pain  #Hypothermic  -Acetaminophen, oxycodone, Dilaudid as needed for pain  -Propofol for sedation while intubated  -Garima hugger to rewarm- goal 36-37.5 degrees    CV  #HTN  #PAD with nonhealing LLE wounds  #HLD  -Maintain arterial line for hemodynamic  monitoring  -Goal systolic blood pressure 110-150 per vascular surgery  -Hold home norvasc- resume tomorrow  -Resume ASA and crestor tomorrow  -Maintain serial vascular checks as ordered  -Hydralazine and labetalol prn for systolic blood pressure greater than 160  -Replete electrolytes per protocol.  Goal potassium greater than 4.0 and magnesium greater than 2.0    Pulm  #Post op respiratory insufficiency  -OETT/vent maintained operatively by anesthesia secondary to hypothermia  -Rewarm, assess neuromuscular blockade effect and anticipate continuous breathing trial with plans for extubation  -Currently on assist-control 16, tidal volume 400, 40% FiO2, PEEP 5  -Obtain stat portable chest x-ray for ET tube placement    GI  -N.p.o. initially then advance diet as tolerated  -Colace and MiraLAX for bowel regimen      -Maintain Estrella catheter per protocol  -Maintain maintenance IV fluid until extubated and taking good oral intake  -Obtain stat BMP    Heme  -Obtain stat CBC  -Monitor for blood loss  -Transfuse for hemoglobin less than 7  -Will hold pharmacologic VTE prophylaxis until seen tomorrow. SCDs right leg only    Endo  -Goal blood glucose less than 180  -Accu-Cheks before meals and at bedtime    ID  -No acute issues    PT/OT    I spent 50 minutes in the professional and overall care of this patient.

## 2024-07-25 NOTE — ANESTHESIA PROCEDURE NOTES
Arterial Line:    Date/Time: 7/25/2024 8:10 AM    Staffing  Performed: attending   Authorized by: Mae Gary MD    Performed by: MICHAEL Tavares-CRNA    An arterial line was placed. Procedure performed using ultrasound guidance.in the OR for the following indication(s): continuous blood pressure monitoring.    A 20 gauge (size) (length), Angiocath (type) catheter was placed into the Left brachial artery, secured by tape,   Seldinger technique used.  Events:  patient tolerated procedure well with no complications.

## 2024-07-25 NOTE — OP NOTE
Left Femoral Endarterectomy, Left Femoral-Tibial Bypass with Vein Operative Note     Date: 2024  OR Location: [facility]    Name: Mechelle Alcantara, : 1945, Age: 78 y.o., MRN: [id number], Sex: female    Diagnosis  Pre-op Diagnosis      * PAD (peripheral artery disease) (CMS-HCC) [I73.9]     * Non-healing wound of left lower extremity [S81.802A] Post-op Diagnosis     * PAD (peripheral artery disease) (CMS-HCC) [I73.9]     * Non-healing wound of left lower extremity [S81.802A]     Procedures  Redo left femoral endarterectomy  Left femoral to posterior tibial artery bypass with reversed  greater saphenous vein  [place] of left greater saphenous vein      Surgeons      * Africa Hoover - Primary    Resident/Fellow/Other Assistant:  Surgeons and Role:  * No surgeons found with a matching role *    Procedure Summary  Anesthesia: General  ASA: III  Anesthesia Staff: Anesthesiologist: Mae Gary MD  CRNA: MICHAEL Tavares-CRNA  Estimated Blood Loss: 400mL  Intra-op Medications:   Administrations occurring from 0730 to 1420 on 24:   Medication Name Total Dose   thrombin solution 10,000 Units   heparin (porcine) 5,000 Units in sodium chloride 0.9% 500 mL irrigation 5,000 Units   gelatin absorbable (Gelfoam) 100 sponge 1 each   ceFAZolin (Ancef) 2 g in dextrose (iso)  mL 4 g              Anesthesia Record               Intraprocedure I/O Totals          Intake    electrolyte-A (Plasmalyte-A) 1000.00 mL    Phenylephrine Drip 0.00 mL    The total shown is the total volume documented since Anesthesia Start was filed.    LR infusion 1000.00 mL    Total Intake 2000 mL       Output    Urine 500 mL    Est. Blood Loss 400 mL    Total Output 900 mL       Net    Net Volume 1100 mL          Specimen:   ID Type Source Tests Collected by Time   1 : left femoral lymph node Tissue LYMPH NODE EXCISION SURGICAL PATHOLOGY EXAM Africa Hoover MD 2024 0852   2 : Left upper thigh lymph node Tissue LYMPH  NODE EXCISION SURGICAL PATHOLOGY EXAM Africa Hoover MD 7/25/2024 1023   3 : plaque from left femoral artery Tissue PLAQUE SURGICAL PATHOLOGY EXAM Africa Hoover MD 7/25/2024 1332   A : urine C+S Fluid URINE CATHETERIZED URINE CULTURE (Canceled) Africa Hoover MD 7/25/2024 0838        Staff:   Circulator: Manjulaor  Circulator: Stephani  Scrub Person: Zach  Circulator: Rashawn Garcia Circulator: Nikki         Drains and/or Catheters:   Urethral Catheter 20 Fr. (Active)       Tourniquet Times:         Implants:  Implants       Type Name Action Serial No.       0.03IN X 16IN BLUE MINI SILICONE VESSEL LOOPS Implanted       2.5MM VESSEL LOOPS, RED MAXI (2/PK, CS=50 PKS) Implanted      Implant GRAFT, XENOSURE, BIOLOGIC PATCH, 0.8CM X 8CM -  - [id number] Implanted 0000              Findings:   Enlarged left femoral lymph nodes  Left femoral plaque  Good caliber left greater saphenous vein    Indications: Mechelle Alcantara is an 78 y.o. female who is having surgery for PAD (peripheral artery disease) (CMS-HCC) [I73.9]  Non-healing wound of left lower extremity [S81.802A].     The patient was seen in the preoperative area. The risks, benefits, complications, treatment options, non-operative alternatives, expected recovery and outcomes were discussed with the patient. The possibilities of reaction to medication, pulmonary aspiration, injury to surrounding structures, bleeding, recurrent infection, the need for additional procedures, failure to diagnose a condition, and creating a complication requiring transfusion or operation were discussed with the patient. The patient concurred with the proposed plan, giving informed consent.  The site of surgery was properly noted/marked if necessary per policy. The patient has been actively warmed in preoperative area. Preoperative antibiotics have been ordered and given within 1 hours of incision. Venous thrombosis prophylaxis are not indicated.    Procedure Details:    After the proper preoperative workup and informed consent was obtained, the patient was taken to the operating room and laid in the supine position.  She was placed on hemodynamic monitoring and then placed under general anesthesia with orotracheal intubation.  Arterial line was placed.  The patient was then prepped and draped in sterile fashion.  A longitudinal skin incision was then made groin 10 blade knife.  Dissection was carried down through the subcutaneous tissues and fascia was the femoral artery.  Of note the patient had a previous femoral endarterectomy and therefore there was significant scar tissue encountered.  Once through the scar tissue, the left common femoral artery was identified.  Dissection was continued proximally to of the inguinal ligament.  At this level a vessel loop was placed for proximal control dissection was then continued distally to expose the remainder of the common femoral artery at the site of the previous endarterectomy with bovine patch.  Dissection continued distally to identify and isolate both the profunda femoris and superficial femoral arteries.  Vesseloops were placed around each of these arteries for distal control.  Once this was completed, attention was turned to the vein harvest.    Another a longitudinal skin incision was made just distal and medial to the groin incision in the upper thigh.  The greater saphenous vein from the groin all the way down to the ankle had been mapped out via ultrasound just prior to sterile prep and drape.  Therefore this first incision was made over those markings and dissection was continued through the subcutaneous tissue and fascia until the left greater saphenous vein was identified.  It was noted to be of good caliber and size.  It was dissected all the way up to the saphenofemoral junction at the fossa ovalis.  Dissection was then continued distally making several skip incisions to expose and harvest the vein from its subcutaneous  tunnel.  Continued down to the distal calf.  Side branches were ligated along the way.  Once the entire length of the vein was adequately freed, it gated proximally at the junction and then the junction was oversewn with a running 5-0 Prolene suture in a double layer.  The saphenous vein is ligated distally in the calf using medium clips.  The vein was then removed in its entirety from its bed and carefully moved to the back table where it was gently distended and checked for any leaks.  These legs were controlled with 6-0 Prolene interrupted suture.  The vein was then placed in a bath of heparin saline.    Attention was then turned to exposure of the posterior tibial artery and the medial calf.  The last most distal skip incision for the vein harvest was used to expose this artery.  Dissection was continued through the subcutaneous tissues and fascia.  The soleus muscle was identified and retracted posteriorly.  Dissection was continued until the neurovascular bundle was identified.  The posterior tibial vein was carefully dissected off of the posterior tibial artery.  The artery was found to be of good caliber and size.  Vesseloops were placed proximally and distally.    Attention was then turned back to the femoral artery exposure.  Of note the patient was known to have restenosis of her left femoral endarterectomy site.  Therefore we proceeded with a redo left femoral endarterectomy.  It was given a bolus of 5000 units of heparin.  A 3 minutes period elapsed to allow time for circulation.  The vessel was clamped both proximally and distally.  Note the patient did receive repeat boluses of 1000 units of heparin every hour during the length of the procedure.  Then a longitudinal arteriotomy was made through the previous bovine patch and 11 blade knife.  It was extended with Brooke scissors.  There was a significant amount of plaque noted within the vessel that appeared to be of a rubbery consistency most likely  consistent with intimal hyperplasia this plaque was carefully removed using a Goodman elevator.  Proximal extent was up to the inguinal ligament.  The distal extent was into the origin of the profunda femoris artery.  The SFA was known to be occluded and therefore endarterectomy was not extended into this vessel.  Once endarterectomy was adequately completed, the vessel was closed using a new bovine patch.  The patch was sewn on using 5-0 Prolene suture in a running circumferential fashion.  Just prior to completion of the patch closure, the SFA and profunda were backbled and the common femoral artery was forward bled.  The site was then flushed with heparin saline.  Patch closure was then completed.  Then clamps were removed proximally and distally to restore flow.  The anastomosis was checked for hemostasis.  This was controlled with additional 6-0 Prolene interrupted suture.    Attention was then turned to performing the bypass graft.  The left greater saphenous vein was again moved onto the operative field.  A hollow tunneler was passed from the femoral exposure to the posterior tibial exposure.  The left greater saphenous vein was then passed through the tunnel in a reverse fashion, and then the hollow tunneler removed.  Orientation was maintained by placing markings on the vein itself.  Once the vein was within the tunnel, it was gently distended with heparin saline to ensure there were no kinks.  Once this was verified, then the proximal anastomosis was performed to the left common femoral artery.  Vesseloops were again placed on this artery proximally and distally.  A longitudinal arteriotomy was then made through the bovine patch.  The proximal end of the vein graft was then cut to fashion.  The proximal anastomosis was then performed using 6-0 Prolene suture in a running fashion.  Upon completion of the anastomosis, a clamp was placed on the vein graft a couple centimeters distal to the anastomosis.  Clamps  were removed from the proximal and distal artery and the anastomosis was checked.  It was found to be hemostatic.    Attention was then turned to the distal anastomosis.  The clamp was temporarily removed from the vein graft to check flow.  Flow was nice and pulsatile.  Therefore the clamp was replaced and the vein graft was flushed.  Then the posterior tibial artery was clamped proximally and distally.  A longitudinal arteriotomy was made.  The distal clamp on the artery was temporarily removed and backflow was checked.  Initially somewhat sluggish, therefore coronary dilators were used to sequentially dilate the the vessel.  This did return improved flow.  Therefore the distal clamp was again replaced.  The distal end of the vein graft was then cut to fashion.  The distal anastomosis was then performed using a 7-0 Prolene suture in a running circumferential fashion.  Just before completion of the anastomosis, the artery was backbled and the vein graft was forward bled.  It was then flushed with heparin saline.  The anastomosis was then completed.  Clamps were then removed from both the artery and the vein, and flow was established through the bypass graft.  The distal anastomosis was checked for hemostasis which appeared to be adequate.  Distal flow was then checked using a Doppler probe.  There was found to be an excellent Doppler signal in the posterior tibial artery just distal to the distal anastomosis as well as at the ankle.  Therefore intervention was thought to be successful.    At this time all wounds were copiously irrigated and checked for hemostasis.  Wounds were then closed using 2-0 Vicryl in the fascial layer of the groin.  All incisions were then closed with layers of 3-0 Vicryl in the subcutaneous tissues as needed.  The skin was closed with 4-0 Monocryl subcuticular suture.  Then Dermabond and dressings were applied.  The procedure was then completed.  The patient tolerated the procedure well and  there were no complications.  Patient was stable at the conclusion of the procedure.  However she was kept intubated and transferred to the ICU postoperatively out of an abundance of caution secondary to the length of the procedure.    Complications:  None; patient tolerated the procedure well.    Disposition: ICU - intubated and hemodynamically stable.  Condition: stable         Additional Details: none    Attending Attestation: I performed the procedure.    Africa Hoover  Phone Number: [phone]

## 2024-07-25 NOTE — ANESTHESIA PROCEDURE NOTES
Airway  Date/Time: 7/25/2024 7:56 AM  Urgency: elective    Airway not difficult    Staffing  Performed: CRNA   Authorized by: Mae Gary MD    Performed by: MICHAEL Tavares-TRISTON  Patient location during procedure: OR    Indications and Patient Condition  Indications for airway management: anesthesia  Spontaneous Ventilation: absent  Sedation level: deep  Preoxygenated: yes  Patient position: sniffing  MILS maintained throughout  Mask difficulty assessment: 1 - vent by mask    Final Airway Details  Final airway type: endotracheal airway      Successful airway: ETT  Cuffed: yes   Successful intubation technique: direct laryngoscopy  Endotracheal tube insertion site: oral  Blade: Carlos  Blade size: #3  ETT size (mm): 7.0  Cormack-Lehane Classification: grade I - full view of glottis  Placement verified by: chest auscultation and capnometry   Cuff volume (mL): 5  Measured from: lips  ETT to lips (cm): 21  Number of attempts at approach: 1  Number of other approaches attempted: 0

## 2024-07-26 LAB
ANION GAP SERPL CALC-SCNC: 9 MMOL/L (ref 10–20)
APTT PPP: 21 SECONDS (ref 27–38)
BUN SERPL-MCNC: 17 MG/DL (ref 6–23)
CALCIUM SERPL-MCNC: 7.4 MG/DL (ref 8.6–10.3)
CHLORIDE SERPL-SCNC: 105 MMOL/L (ref 98–107)
CO2 SERPL-SCNC: 25 MMOL/L (ref 21–32)
CREAT SERPL-MCNC: 0.65 MG/DL (ref 0.5–1.05)
EGFRCR SERPLBLD CKD-EPI 2021: 90 ML/MIN/1.73M*2
ERYTHROCYTE [DISTWIDTH] IN BLOOD BY AUTOMATED COUNT: 14.2 % (ref 11.5–14.5)
GLUCOSE BLD MANUAL STRIP-MCNC: 105 MG/DL (ref 74–99)
GLUCOSE BLD MANUAL STRIP-MCNC: 94 MG/DL (ref 74–99)
GLUCOSE SERPL-MCNC: 105 MG/DL (ref 74–99)
HCT VFR BLD AUTO: 25 % (ref 36–46)
HGB BLD-MCNC: 8.2 G/DL (ref 12–16)
INR PPP: 1.1 (ref 0.9–1.1)
MAGNESIUM SERPL-MCNC: 2.17 MG/DL (ref 1.6–2.4)
MCH RBC QN AUTO: 30.5 PG (ref 26–34)
MCHC RBC AUTO-ENTMCNC: 32.8 G/DL (ref 32–36)
MCV RBC AUTO: 93 FL (ref 80–100)
NRBC BLD-RTO: 0 /100 WBCS (ref 0–0)
PHOSPHATE SERPL-MCNC: 3.2 MG/DL (ref 2.5–4.9)
PLATELET # BLD AUTO: 214 X10*3/UL (ref 150–450)
POTASSIUM SERPL-SCNC: 4.5 MMOL/L (ref 3.5–5.3)
PROTHROMBIN TIME: 11.9 SECONDS (ref 9.8–12.8)
RBC # BLD AUTO: 2.69 X10*6/UL (ref 4–5.2)
SODIUM SERPL-SCNC: 134 MMOL/L (ref 136–145)
WBC # BLD AUTO: 5.4 X10*3/UL (ref 4.4–11.3)

## 2024-07-26 PROCEDURE — 85027 COMPLETE CBC AUTOMATED: CPT | Performed by: SURGERY

## 2024-07-26 PROCEDURE — 2500000001 HC RX 250 WO HCPCS SELF ADMINISTERED DRUGS (ALT 637 FOR MEDICARE OP): Performed by: NURSE PRACTITIONER

## 2024-07-26 PROCEDURE — 80048 BASIC METABOLIC PNL TOTAL CA: CPT | Performed by: NURSE PRACTITIONER

## 2024-07-26 PROCEDURE — 2500000004 HC RX 250 GENERAL PHARMACY W/ HCPCS (ALT 636 FOR OP/ED): Performed by: SURGERY

## 2024-07-26 PROCEDURE — 99232 SBSQ HOSP IP/OBS MODERATE 35: CPT | Performed by: NURSE PRACTITIONER

## 2024-07-26 PROCEDURE — 99291 CRITICAL CARE FIRST HOUR: CPT | Performed by: NURSE PRACTITIONER

## 2024-07-26 PROCEDURE — 1200000002 HC GENERAL ROOM WITH TELEMETRY DAILY

## 2024-07-26 PROCEDURE — 84100 ASSAY OF PHOSPHORUS: CPT | Performed by: NURSE PRACTITIONER

## 2024-07-26 PROCEDURE — 97165 OT EVAL LOW COMPLEX 30 MIN: CPT | Mod: GO

## 2024-07-26 PROCEDURE — 85610 PROTHROMBIN TIME: CPT | Performed by: SURGERY

## 2024-07-26 PROCEDURE — 82947 ASSAY GLUCOSE BLOOD QUANT: CPT

## 2024-07-26 PROCEDURE — 97161 PT EVAL LOW COMPLEX 20 MIN: CPT | Mod: GP

## 2024-07-26 PROCEDURE — 85730 THROMBOPLASTIN TIME PARTIAL: CPT | Performed by: SURGERY

## 2024-07-26 PROCEDURE — 2500000005 HC RX 250 GENERAL PHARMACY W/O HCPCS: Performed by: SURGERY

## 2024-07-26 PROCEDURE — 2500000002 HC RX 250 W HCPCS SELF ADMINISTERED DRUGS (ALT 637 FOR MEDICARE OP, ALT 636 FOR OP/ED): Performed by: NURSE PRACTITIONER

## 2024-07-26 PROCEDURE — 37799 UNLISTED PX VASCULAR SURGERY: CPT | Performed by: NURSE PRACTITIONER

## 2024-07-26 PROCEDURE — 2500000001 HC RX 250 WO HCPCS SELF ADMINISTERED DRUGS (ALT 637 FOR MEDICARE OP)

## 2024-07-26 PROCEDURE — 83735 ASSAY OF MAGNESIUM: CPT | Performed by: NURSE PRACTITIONER

## 2024-07-26 PROCEDURE — 2500000001 HC RX 250 WO HCPCS SELF ADMINISTERED DRUGS (ALT 637 FOR MEDICARE OP): Performed by: SURGERY

## 2024-07-26 PROCEDURE — 97116 GAIT TRAINING THERAPY: CPT | Mod: GP

## 2024-07-26 RX ADMIN — DOCUSATE SODIUM 100 MG: 100 CAPSULE, LIQUID FILLED ORAL at 09:48

## 2024-07-26 RX ADMIN — AMLODIPINE BESYLATE 5 MG: 5 TABLET ORAL at 09:48

## 2024-07-26 RX ADMIN — ACETAMINOPHEN 650 MG: 325 TABLET ORAL at 01:09

## 2024-07-26 RX ADMIN — HYDROMORPHONE HYDROCHLORIDE 0.5 MG: 1 INJECTION, SOLUTION INTRAMUSCULAR; INTRAVENOUS; SUBCUTANEOUS at 03:26

## 2024-07-26 RX ADMIN — ROSUVASTATIN CALCIUM 5 MG: 5 TABLET, FILM COATED ORAL at 20:01

## 2024-07-26 RX ADMIN — Medication 2 L/MIN: at 03:56

## 2024-07-26 RX ADMIN — ENOXAPARIN SODIUM 40 MG: 40 INJECTION SUBCUTANEOUS at 09:48

## 2024-07-26 RX ADMIN — OXYCODONE HYDROCHLORIDE 5 MG: 5 TABLET ORAL at 02:19

## 2024-07-26 RX ADMIN — ASPIRIN 81 MG: 81 TABLET, COATED ORAL at 09:48

## 2024-07-26 SDOH — ECONOMIC STABILITY: FOOD INSECURITY: HOW HARD IS IT FOR YOU TO PAY FOR THE VERY BASICS LIKE FOOD, HOUSING, MEDICAL CARE, AND HEATING?: NOT HARD AT ALL

## 2024-07-26 SDOH — ECONOMIC STABILITY: TRANSPORTATION INSECURITY
IN THE PAST 12 MONTHS, HAS LACK OF TRANSPORTATION KEPT YOU FROM MEETINGS, WORK, OR FROM GETTING THINGS NEEDED FOR DAILY LIVING?: NO

## 2024-07-26 SDOH — ECONOMIC STABILITY: HOUSING INSECURITY: AT ANY TIME IN THE PAST 12 MONTHS, WERE YOU HOMELESS OR LIVING IN A SHELTER (INCLUDING NOW)?: NO

## 2024-07-26 SDOH — ECONOMIC STABILITY: INCOME INSECURITY: IN THE PAST 12 MONTHS HAS THE ELECTRIC, GAS, OIL, OR WATER COMPANY THREATENED TO SHUT OFF SERVICES IN YOUR HOME?: NO

## 2024-07-26 SDOH — ECONOMIC STABILITY: HOUSING INSECURITY: IN THE LAST 12 MONTHS, WAS THERE A TIME WHEN YOU WERE NOT ABLE TO PAY THE MORTGAGE OR RENT ON TIME?: NO

## 2024-07-26 SDOH — HEALTH STABILITY: PHYSICAL HEALTH: ON AVERAGE, HOW MANY DAYS PER WEEK DO YOU ENGAGE IN MODERATE TO STRENUOUS EXERCISE (LIKE A BRISK WALK)?: 0 DAYS

## 2024-07-26 SDOH — SOCIAL STABILITY: SOCIAL NETWORK: HOW OFTEN DO YOU GET TOGETHER WITH FRIENDS OR RELATIVES?: MORE THAN THREE TIMES A WEEK

## 2024-07-26 SDOH — ECONOMIC STABILITY: FOOD INSECURITY: WITHIN THE PAST 12 MONTHS, YOU WORRIED THAT YOUR FOOD WOULD RUN OUT BEFORE YOU GOT MONEY TO BUY MORE.: NEVER TRUE

## 2024-07-26 SDOH — HEALTH STABILITY: PHYSICAL HEALTH
HOW OFTEN DO YOU NEED TO HAVE SOMEONE HELP YOU WHEN YOU READ INSTRUCTIONS, PAMPHLETS, OR OTHER WRITTEN MATERIAL FROM YOUR DOCTOR OR PHARMACY?: NEVER

## 2024-07-26 SDOH — ECONOMIC STABILITY: FOOD INSECURITY: WITHIN THE PAST 12 MONTHS, THE FOOD YOU BOUGHT JUST DIDN'T LAST AND YOU DIDN'T HAVE MONEY TO GET MORE.: NEVER TRUE

## 2024-07-26 SDOH — ECONOMIC STABILITY: FOOD INSECURITY: WITHIN THE PAST 12 MONTHS, YOU WORRIED THAT YOUR FOOD WOULD RUN OUT BEFORE YOU GOT THE MONEY TO BUY MORE.: NEVER TRUE

## 2024-07-26 SDOH — ECONOMIC STABILITY: INCOME INSECURITY: IN THE PAST 12 MONTHS, HAS THE ELECTRIC, GAS, OIL, OR WATER COMPANY THREATENED TO SHUT OFF SERVICE IN YOUR HOME?: NO

## 2024-07-26 SDOH — HEALTH STABILITY: MENTAL HEALTH
DO YOU FEEL STRESS - TENSE, RESTLESS, NERVOUS, OR ANXIOUS, OR UNABLE TO SLEEP AT NIGHT BECAUSE YOUR MIND IS TROUBLED ALL THE TIME - THESE DAYS?: ONLY A LITTLE

## 2024-07-26 SDOH — ECONOMIC STABILITY: INCOME INSECURITY: IN THE LAST 12 MONTHS, WAS THERE A TIME WHEN YOU WERE NOT ABLE TO PAY THE MORTGAGE OR RENT ON TIME?: NO

## 2024-07-26 SDOH — ECONOMIC STABILITY: TRANSPORTATION INSECURITY: IN THE PAST 12 MONTHS, HAS LACK OF TRANSPORTATION KEPT YOU FROM MEDICAL APPOINTMENTS OR FROM GETTING MEDICATIONS?: NO

## 2024-07-26 SDOH — ECONOMIC STABILITY: INCOME INSECURITY: HOW HARD IS IT FOR YOU TO PAY FOR THE VERY BASICS LIKE FOOD, HOUSING, MEDICAL CARE, AND HEATING?: NOT HARD AT ALL

## 2024-07-26 SDOH — SOCIAL STABILITY: SOCIAL NETWORK
IN A TYPICAL WEEK, HOW MANY TIMES DO YOU TALK ON THE PHONE WITH FAMILY, FRIENDS, OR NEIGHBORS?: MORE THAN THREE TIMES A WEEK

## 2024-07-26 SDOH — HEALTH STABILITY: MENTAL HEALTH
STRESS IS WHEN SOMEONE FEELS TENSE, NERVOUS, ANXIOUS, OR CAN'T SLEEP AT NIGHT BECAUSE THEIR MIND IS TROUBLED. HOW STRESSED ARE YOU?: ONLY A LITTLE

## 2024-07-26 SDOH — ECONOMIC STABILITY: TRANSPORTATION INSECURITY
IN THE PAST 12 MONTHS, HAS THE LACK OF TRANSPORTATION KEPT YOU FROM MEDICAL APPOINTMENTS OR FROM GETTING MEDICATIONS?: NO

## 2024-07-26 ASSESSMENT — PAIN DESCRIPTION - LOCATION: LOCATION: ANKLE

## 2024-07-26 ASSESSMENT — COGNITIVE AND FUNCTIONAL STATUS - GENERAL
CLIMB 3 TO 5 STEPS WITH RAILING: A LITTLE
MOVING TO AND FROM BED TO CHAIR: A LOT
DRESSING REGULAR LOWER BODY CLOTHING: A LOT
TOILETING: A LITTLE
DAILY ACTIVITIY SCORE: 17
PERSONAL GROOMING: A LITTLE
MOVING TO AND FROM BED TO CHAIR: A LOT
DAILY ACTIVITIY SCORE: 16
CLIMB 3 TO 5 STEPS WITH RAILING: A LOT
DRESSING REGULAR UPPER BODY CLOTHING: A LITTLE
WALKING IN HOSPITAL ROOM: A LITTLE
DRESSING REGULAR UPPER BODY CLOTHING: A LITTLE
WALKING IN HOSPITAL ROOM: A LOT
CLIMB 3 TO 5 STEPS WITH RAILING: A LOT
HELP NEEDED FOR BATHING: A LOT
TURNING FROM BACK TO SIDE WHILE IN FLAT BAD: A LITTLE
STANDING UP FROM CHAIR USING ARMS: A LOT
TURNING FROM BACK TO SIDE WHILE IN FLAT BAD: A LITTLE
DRESSING REGULAR LOWER BODY CLOTHING: A LOT
PERSONAL GROOMING: A LITTLE
MOVING FROM LYING ON BACK TO SITTING ON SIDE OF FLAT BED WITH BEDRAILS: A LITTLE
MOBILITY SCORE: 14
MOBILITY SCORE: 14
STANDING UP FROM CHAIR USING ARMS: A LITTLE
MOVING TO AND FROM BED TO CHAIR: A LITTLE
TOILETING: A LITTLE
EATING MEALS: A LITTLE
TOILETING: A LITTLE
PERSONAL GROOMING: A LITTLE
DRESSING REGULAR LOWER BODY CLOTHING: A LOT
DRESSING REGULAR UPPER BODY CLOTHING: A LITTLE
WALKING IN HOSPITAL ROOM: A LOT
MOVING FROM LYING ON BACK TO SITTING ON SIDE OF FLAT BED WITH BEDRAILS: A LITTLE
HELP NEEDED FOR BATHING: A LOT
MOBILITY SCORE: 18
TURNING FROM BACK TO SIDE WHILE IN FLAT BAD: A LITTLE
MOVING FROM LYING ON BACK TO SITTING ON SIDE OF FLAT BED WITH BEDRAILS: A LITTLE
DAILY ACTIVITIY SCORE: 16
EATING MEALS: A LITTLE
STANDING UP FROM CHAIR USING ARMS: A LOT
HELP NEEDED FOR BATHING: A LOT

## 2024-07-26 ASSESSMENT — PAIN - FUNCTIONAL ASSESSMENT
PAIN_FUNCTIONAL_ASSESSMENT: 0-10

## 2024-07-26 ASSESSMENT — PAIN SCALES - GENERAL
PAINLEVEL_OUTOF10: 0 - NO PAIN
PAINLEVEL_OUTOF10: 0 - NO PAIN
PAINLEVEL_OUTOF10: 6
PAINLEVEL_OUTOF10: 0 - NO PAIN
PAINLEVEL_OUTOF10: 5 - MODERATE PAIN
PAINLEVEL_OUTOF10: 3
PAINLEVEL_OUTOF10: 7
PAINLEVEL_OUTOF10: 0 - NO PAIN
PAINLEVEL_OUTOF10: 0 - NO PAIN
PAINLEVEL_OUTOF10: 6
PAINLEVEL_OUTOF10: 0 - NO PAIN
PAINLEVEL_OUTOF10: 0 - NO PAIN
PAINLEVEL_OUTOF10: 2

## 2024-07-26 ASSESSMENT — PAIN DESCRIPTION - ORIENTATION: ORIENTATION: LEFT

## 2024-07-26 ASSESSMENT — ACTIVITIES OF DAILY LIVING (ADL)
LACK_OF_TRANSPORTATION: NO
ADL_ASSISTANCE: INDEPENDENT
ADL_ASSISTANCE: INDEPENDENT

## 2024-07-26 NOTE — PROGRESS NOTES
24 1137   Discharge Planning   Support Systems Children;Family members   Assistance Needed driving, due to neuropthy .   Number of Stairs to Enter Residence 4   Number of Stairs Within Residence 12   Who is requesting discharge planning? Provider   Home or Post Acute Services Other (Comment)  ( [facility]  every Tuesday)     Introduced my self and my role.  Confirmed name, , phone, address, PCP and pharmacy.  [facility] for wound care.  She changes her dressing in between appointments. Declines need for a nurse to see her at home. No hme gong needs at this time.

## 2024-07-26 NOTE — PROGRESS NOTES
"Occupational Therapy    Occupational Therapy    Evaluation    Patient Name: Mechelle Alcantara  MRN: [id number]  Today's Date: 7/26/2024  Time Calculation  Start Time: 1123  Stop Time: 1143  Time Calculation (min): 20 min  2129/2129-A    Assessment  IP OT Assessment  OT Assessment:  (Pt. presents with decreased balance and endurance impacting pt. ability to complete ADLs and mobility independently)  Prognosis: Good  Evaluation/Treatment Tolerance: Patient limited by pain  End of Session Communication: Bedside nurse  End of Session Patient Position: Up in chair, Alarm on    Plan:  Treatment Interventions: ADL retraining, Functional transfer training, Endurance training  OT Frequency: 5 times per week  OT Discharge Recommendations: High intensity level of continued care  Equipment Recommended upon Discharge: Wheeled walker  OT Recommended Transfer Status: Moderate assist  OT - OK to Discharge: Yes (Next level of care when cleared by medical team)    Subjective   Per EMR:     S/p redo left femoral endarterectomy with left femoral to PTA bypass with harvested, reversed GSV.     77 yo female who has had known PAD and PVD with a non-healing venous stasis ulcer of the left lateral calf. She has underwent left lower extremity angiograms with surgical interventions with not much improvement. She is now scheduled for a left creation bypass graft tibial artery; left femoral to posterior tibial bypass with saphenous vein harvest; possible completion angiogram. She goes to wound clinic once a week for wound dressing changes. She endorses left lower leg \"stabbing\" pains with a heavy feeling- partially uses the leg for weight bearing. The left lower leg is wrapped in kerlex- no drainage noted. She denies any recent infections from leg or need for antibiotics. Otherwise denies any recent illness, fever/chills, chest pains or shortness of breath.      Medical History[]Expand by Default        Past Medical History:   Diagnosis Date    " Cellulitis       Left leg    HLD (hyperlipidemia)      HTN (hypertension)      Neuropathy      Non-healing wound of left lower extremity      PAD (peripheral artery disease) (CMS-HCC)      PVD (peripheral vascular disease) (CMS-HCC)      Varicose veins of both lower extremities         Current Problem:  1. PAD (peripheral artery disease) (CMS-HCC)  Surgical Pathology Exam    Surgical Pathology Exam    CANCELED: ECG 12 lead    CANCELED: ECG 12 lead    CANCELED: Urine Culture    CANCELED: Urine Culture      2. Non-healing wound of left lower extremity  Surgical Pathology Exam    Surgical Pathology Exam    CANCELED: ECG 12 lead    CANCELED: ECG 12 lead    CANCELED: Urine Culture    CANCELED: Urine Culture          General:  General  Reason for Referral: ADLs, discharge planning  Referred By: Dr. Hoover  Family/Caregiver Present: No  Co-Treatment: PT  Co-Treatment Reason: Safety  Prior to Session Communication: Bedside nurse  Patient Position Received: Bed, 3 rail up, Alarm on    Precautions:  LE Weight Bearing Status: Weight Bearing as Tolerated (left LE)  Medical Precautions: Fall precautions        Pain:  Pain Assessment  Pain Assessment: 0-10  0-10 (Numeric) Pain Score:  (pain to left LE once sitting EOB, does not rate)    Objective     Cognition:  Overall Cognitive Status: Within Functional Limits  Orientation Level: Oriented X4  Insight: Mild             Home Living:  Home Living Comments:  (Pt. resides with spouse in home with 4 entry steps with bedroom and bathroom on second floor with tub shower has grab bars. PLOF independent. Owns ww and cane)     Prior Function:  Level of Deuel: Independent with ADLs and functional transfers  ADL Assistance: Independent  Homemaking Assistance: Independent  Ambulatory Assistance: Independent      ADL:  Grooming Assistance: Stand by  LE Dressing Assistance: Moderate    Activity Tolerance:  Endurance: Tolerates 10 - 20 min exercise with multiple rests    Bed  Mobility/Transfers:   Bed Mobility  Bed Mobility:  (supine to sit  SBA)  Transfers  Transfer:  (sit<>stand mod assist with cues for hand placement; unable to fully place left LE flat to floor)    Ambulation/Gait Training:  Functional Mobility  Functional Mobility Performed:  (Pt. able to take a few steps from bed to chair mod assist with ww and gait belt)    Sitting Balance:  Static Sitting Balance  Static Sitting-Balance Support: No upper extremity supported  Static Sitting-Level of Assistance: Close supervision    Standing Balance:  Static Standing Balance  Static Standing-Balance Support: Bilateral upper extremity supported  Static Standing-Level of Assistance: Moderate assistance      Sensation:  Sensation Comment: Denies numbness        Hand Function:  Hand Function  Gross Grasp: Functional  Coordination: Functional    Extremities: RUE   RUE : Within Functional Limits and LUE   LUE: Within Functional Limits    Outcome Measures: [facility] Daily Activity  Putting on and taking off regular lower body clothing: A lot  Bathing (including washing, rinsing, drying): A lot  Putting on and taking off regular upper body clothing: A little  Toileting, which includes using toilet, bedpan or urinal: A little  Taking care of personal grooming such as brushing teeth: A little  Eating Meals: A little  Daily Activity - Total Score: 16          EDUCATION:  Education  Individual(s) Educated: Patient  Education Provided: Fall precautons, Risk and benefits of OT discussed with patient or other, POC discussed and agreed upon  Patient Response to Education: Patient/Caregiver Verbalized Understanding of Information      Goals:   Encounter Problems       Encounter Problems (Active)       Dressings Lower Extremities       STG - Patient to complete lower body dressing SUP       Start:  07/26/24    Expected End:  08/09/24               Functional Balance       LTG - Patient will maintain standing and sitting balance to allow for completion of  daily activities       Start:  07/26/24    Expected End:  08/09/24               Grooming       STG - Patient completes grooming SUP       Start:  07/26/24    Expected End:  08/09/24               OT Transfers       STG - Patient will perform toilet transfer SUP       Start:  07/26/24    Expected End:  08/09/24

## 2024-07-26 NOTE — PROGRESS NOTES
Physical Therapy    Physical Therapy Evaluation and Treatment    Patient Name: Mechelle Alcantara  MRN: [id number]  Today's Date: 7/26/2024   Time Calculation  Start Time: 1124  Stop Time: 1142  Time Calculation (min): 18 min  2129/2129-A    Assessment/Plan   PT Assessment  PT Assessment Results: Decreased strength, Decreased range of motion, Decreased endurance, Impaired balance, Decreased mobility, Decreased safety awareness, Pain  Rehab Prognosis: Good  Evaluation/Treatment Tolerance: Patient limited by pain  Medical Staff Made Aware: Yes  End of Session Communication: Bedside nurse  Assessment Comment: Pt presents today below baseline level of function and requires continued PT during hospital stay. Pt requires 24 hour physical assist for all mobility to prevent falls. Pt is unsafe to navigate home environment at this time with available support and assist. Pt requires PT at a high intensity level at discharge to maximize functional mobility and safety. Anticipate pt will tolerate 3 hours of therapy daily. Pt is motivated to participate and requires daily, intensive, and short term therapy to return to high prior functional level.       End of Session Patient Position: Up in chair, Alarm on  IP OR SWING BED PT PLAN  Inpatient or Swing Bed: Inpatient  PT Plan  Treatment/Interventions: Bed mobility, Transfer training, Gait training, Balance training, Neuromuscular re-education, Strengthening, Endurance training, Range of motion, Therapeutic exercise, Therapeutic activity, Home exercise program, Stair training  PT Plan: Ongoing PT  PT Frequency: 5 times per week  PT Discharge Recommendations: High intensity level of continued care  Equipment Recommended upon Discharge: Wheeled walker  PT Recommended Transfer Status: Assist x1  PT - OK to Discharge: Yes (To next level of care when cleared by medical team   )    Subjective       General Visit Information:  General  Reason for Referral: recent surgery  Referred By:  "Africa Hoover MD  Past Medical History Relevant to Rehab: Pt underwent redo left femoral endarterectomy with left femoral to PTA bypass with harvested, reversed GSV 7/25/24. PMH: PAD, HTN, neuropathy, left leg cellulitis  Family/Caregiver Present: No  Co-Treatment: OT  Co-Treatment Reason: to maximize pt safety and functional mobility  Prior to Session Communication: Bedside nurse  Patient Position Received: Bed, 3 rail up, Alarm on  Preferred Learning Style: verbal  General Comment: Pt agreeable to PT, nursing cleared for treatment.    Home Living:  Home Living  Type of Home: House  Lives With: Spouse  Home Adaptive Equipment: Walker rolling or standard, Cane  Home Layout: Stairs to alternate level with rails, Bed/bath upstairs  Alternate Level Stairs-Number of Steps: full flight with rail  Home Access: Stairs to enter with rails  Entrance Stairs-Number of Steps: 4  Bathroom Shower/Tub: Tub/shower unit, Walk-in shower  Bathroom Equipment: Grab bars in shower    Prior Level of Function:  Prior Function Per Pt/Caregiver Report  ADL Assistance: Independent  Homemaking Assistance: Independent  Ambulatory Assistance: Independent (pt states she does not leave the house often and \"hobbles\")    Precautions:  Precautions  LE Weight Bearing Status: Weight Bearing as Tolerated (left LE)  Medical Precautions: Fall precautions    Vital Signs:     Objective     Pain:  Pain Assessment  Pain Assessment: 0-10  0-10 (Numeric) Pain Score:  (denies pain at rest, reports 8/10 pain with movement and weight bearing)  Pain Type: Surgical pain  Pain Location: Leg  Pain Orientation: Left  Pain Interventions: Ambulation/increased activity, Repositioned    Cognition:  Cognition  Overall Cognitive Status: Within Functional Limits  Orientation Level: Oriented X4  Insight: Mild  Impulsive: Mildly    General Assessments:      Activity Tolerance  Endurance: Tolerates 10 - 20 min exercise with multiple rests  Sensation  Sensation Comment: " reports numbness and tingling in feet due to neuropathy              Static Sitting Balance  Static Sitting-Comment/Number of Minutes: good  Dynamic Sitting Balance  Dynamic Sitting-Comments: good  Static Standing Balance  Static Standing-Comment/Number of Minutes: fair  Dynamic Standing Balance  Dynamic Standing-Comments: fair    Functional Assessments:     Bed Mobility  Bed Mobility: Yes  Bed Mobility 1  Bed Mobility 1: Supine to sitting  Level of Assistance 1: Close supervision  Transfers  Transfer: Yes  Transfer 1  Transfer From 1: Sit to  Transfer to 1: Stand  Transfer Device 1: Walker  Transfer Level of Assistance 1: Moderate assistance  Transfers 2  Transfer From 2: Stand to  Transfer to 2: Sit  Transfer Device 2: Walker  Transfer Level of Assistance 2: Moderate assistance, +2  Ambulation/Gait Training  Ambulation/Gait Training Performed: Yes  Ambulation/Gait Training 1  Device 1: Rolling walker  Gait Support Devices: Gait belt  Assistance 1: Moderate assistance  Quality of Gait 1: Decreased step length, Forward flexed posture, Antalgic (bears weight through forefoot only due to pain, step to gait, increased difficulty with walker management)  Comments/Distance (ft) 1: 5 feet bed to chair        Treatment    Cues for safe hand placement with use of FWW for sit<>stand. Instruction provided in step to gait pattern with cues given for sequencing with FWW, increased use of UE's, and walker management. Cues given for turning strategy to avoid pivoting on left LE. Instruction provided in safe stair climbing strategy.       Extremity/Trunk Assessments:        RLE   RLE : Within Functional Limits  LLE   LLE :  (did not assess due to recent surgery)    Outcome Measures:     [facility] Basic Mobility  Turning from your back to your side while in a flat bed without using bedrails: A little  Moving from lying on your back to sitting on the side of a flat bed without using bedrails: A little  Moving to and from bed to chair  (including a wheelchair): A lot  Standing up from a chair using your arms (e.g. wheelchair or bedside chair): A lot  To walk in hospital room: A lot  Climbing 3-5 steps with railing: A lot  Basic Mobility - Total Score: 14                Goals:  Encounter Problems       Encounter Problems (Active)       PT Problem       Pt will perform bed mobility with mod I.        Start:  07/26/24    Expected End:  08/09/24            Pt will complete sit <> stand and bed <> chair transfers with SBA.        Start:  07/26/24    Expected End:  08/09/24            Pt will ambulate 150 feet SBA with no significant gait deviations.         Start:  07/26/24    Expected End:  08/09/24            Pt will ascend/descend at least 4 stairs in order to navigate home environment.         Start:  07/26/24    Expected End:  08/09/24            Pt will progress to completing 3 x 20 supine/seated exercises in order to increase strength and improve gait mechanics.         Start:  07/26/24    Expected End:  08/09/24                     Education Documentation  Precautions, taught by Rosina Acuña PT at 7/26/2024  3:28 PM.  Learner: Patient  Readiness: Acceptance  Method: Explanation  Response: Verbalizes Understanding, Needs Reinforcement    Body Mechanics, taught by Rosina Acuña PT at 7/26/2024  3:28 PM.  Learner: Patient  Readiness: Acceptance  Method: Explanation  Response: Verbalizes Understanding, Needs Reinforcement    Mobility Training, taught by Rosina Acuña PT at 7/26/2024  3:28 PM.  Learner: Patient  Readiness: Acceptance  Method: Explanation  Response: Verbalizes Understanding, Needs Reinforcement    Education Comments  No comments found.

## 2024-07-26 NOTE — NURSING NOTE
Pt report called to nurse Geronimo on [address]. Pt going to room 3115. Transport picked pt up via wheelchair. All belongings and chart sent with pt.  [place] to be notified of room change.

## 2024-07-26 NOTE — ANESTHESIA POSTPROCEDURE EVALUATION
Patient: Mechelle Alcantara    Procedure Summary       Date: 07/25/24 Room / Location: [facility] OR 07 / Virtual [facility] OR    Anesthesia Start: 0745 Anesthesia Stop: 1830    Procedure: Creation Bypass Graft Tibial Artery (Left) Diagnosis:       PAD (peripheral artery disease) (CMS-HCC)      Non-healing wound of left lower extremity      (PAD (peripheral artery disease) (CMS-[facility]) [I73.9])      (Non-healing wound of left lower extremity [S81.802A])    Surgeons: Africa Hoover MD Responsible Provider: Mae Gary MD    Anesthesia Type: general ASA Status: 3            Anesthesia Type: general    Vitals Value Taken Time   /73 07/25/24 1830   Temp 34.2 °C (93.56 °F) 07/25/24 2020   Pulse 62 07/25/24 2020   Resp 14 07/25/24 2020   SpO2 100 % 07/25/24 2020   Vitals shown include unfiled device data.    Anesthesia Post Evaluation    Patient location during evaluation: ICU  Patient participation: complete - patient cannot participate  Level of consciousness: sedated  Pain management: satisfactory to patient  Airway patency: patent  Cardiovascular status: hemodynamically stable  Respiratory status: intubated  Hydration status: euvolemic  Postoperative Nausea and Vomiting: none  Comments: Patient has not been extubated in the OR secondary to increasing hypothermia despite continuous rewarming throughout the entire surgical procedure. Decision to leave the patient intubated has been discussed with the surgeon and anesthesia providers. Will require prolonged rewarming to achieve the baseline temperature. I suggested not attempting to wean patient from the ventilator until CBT exceeds 35.5 degrees.        No notable events documented.

## 2024-07-26 NOTE — PROGRESS NOTES
"Nutrition Initial Assessment:   Nutrition Assessment    Reason for Assessment: Admission nursing screening    Nutrition Note:  Mechelle Alcantara is a 78 y.o. female presenting 7/25 s/p redo left femoral endarterectomy, left femoral to posterior tibial artery bypass and harvest of the left greater saphenous vein. Post op to ICU as pt remained intubated as hypothermic (33.7C).     Past Medical History   has a past medical history of Cellulitis, HLD (hyperlipidemia), HTN (hypertension), Neuropathy, Non-healing wound of left lower extremity, PAD (peripheral artery disease) (CMS-[facility]), PVD (peripheral vascular disease) (CMS-HCC), and Varicose veins of both lower extremities.  Surgical History   has a past surgical history that includes Invasive Vascular Procedure (Left, 3/22/2024); Invasive Vascular Procedure (N/A, 3/22/2024); Invasive Vascular Procedure (Left, 3/28/2024); Invasive Vascular Procedure (N/A, 3/28/2024); Cardiac catheterization (N/A, 3/28/2024); and Invasive Vascular Procedure (N/A, 5/16/2024).       Nutrition History:  Vitamin/Herbal Supplement Use: home meds include crestor  Food Allergies/Intolerances:   fish/shellfish  GI Symptoms: None  Oral Problems: None       Anthropometrics:  Height: 167.6 cm (5' 6\")   Weight: 58.1 kg (128 lb)   BMI (Calculated): 20.67  7/25 admit measured weight 58.2kg. Per pt \"that weight is right on the nose; I haven't lost any weight.\"         0-10 (Numeric) Pain Score: 0 - No pain  Critical-Care Pain Observation Score:  [0]      Weight History:   6/2024: 58.1kg  3/2024: 61.2kg  4.9% x 4 months  11/2023: 59.2kg  5/2023: 57.7kg  Weight Change %: no significant changes x 14 months  Significant Weight Loss: No    Nutrition Focused Physical Exam Findings:  defer: limited; pt reports no weight changes and no losses.   Subcutaneous Fat Loss:   Orbital Fat Pads: Mild-Moderate (slight dark circles and slight hollowing)  Buccal Fat Pads: Mild-Moderate (flat cheeks, minimal " "bounce)  Muscle Wasting:  Pectoralis (Clavicular Region): Mild-Moderate (some protrusion of clavicle)  Edema:  Edema: +1 trace  Edema Location: LLE  Physical Findings:  Eyes: Negative  Mouth: Negative  Skin: Positive (LLE incisional wounds + venous stasis ulcers)    Nutrition Significant Labs:  BMP Trend:   Results from last 7 days   Lab Units 07/26/24  0329 07/25/24  1926   GLUCOSE mg/dL 105* 186*   CALCIUM mg/dL 7.4* 7.1*   SODIUM mmol/L 134* 136   POTASSIUM mmol/L 4.5 4.0   CO2 mmol/L 25 23   CHLORIDE mmol/L 105 105   BUN mg/dL 17 16   CREATININE mg/dL 0.65 0.63    , A1C:  Lab Results   Component Value Date    HGBA1C 6.1 (H) 04/30/2024   , BG POCT trend:   Results from last 7 days   Lab Units 07/26/24  1148 07/26/24  0742 07/25/24  2108   POCT GLUCOSE mg/dL 105* 94 151*    , Renal Lab Trend:   Results from last 7 days   Lab Units 07/26/24  0329 07/25/24  1926   POTASSIUM mmol/L 4.5 4.0   PHOSPHORUS mg/dL 3.2  --    SODIUM mmol/L 134* 136   MAGNESIUM mg/dL 2.17  --    EGFR mL/min/1.73m*2 90 >90   BUN mg/dL 17 16   CREATININE mg/dL 0.65 0.63    , Vit D: No results found for: \"VITD25\" , Folate:   Lab Results   Component Value Date    FOLATE 8.0 04/30/2024    , CF Vitamin Labs: No results found for: \"VITD25\", \"VITAMINA\", \"VITEALPHA\"     Nutrition Specific Medications:  Scheduled medications  amLODIPine, 5 mg, oral, Daily  aspirin, 81 mg, oral, Daily  docusate sodium, 100 mg, oral, BID  enoxaparin, 40 mg, subcutaneous, q24h  insulin lispro, 0-10 Units, subcutaneous, q4h  rosuvastatin, 5 mg, oral, Nightly      Continuous medications     PRN medications  PRN medications: acetaminophen, dextrose, glucagon, hydrALAZINE, HYDROmorphone, naloxone, oxyCODONE     I/O:    ; Stool Appearance: Unable to assess (no stool to assess) (07/25/24 2000)    Dietary Orders (From admission, onward)       Start     Ordered    07/26/24 0846  Adult diet Regular  Diet effective now        Question:  Diet type  Answer:  Regular    07/26/24 0846 "                     Estimated Needs:   Total Energy Estimated Needs (kCal):  (1630-1862kcal (28-32kcal/kg of 58.2kg))     Total Protein Estimated Needs (g):  (70-87g (1.2-1.5g/kg of 58.2kg))     Total Fluid Estimated Needs (mL):  (1mL/kcal/d or as per physician)           Nutrition Diagnosis   Malnutrition Diagnosis  Patient has Malnutrition Diagnosis: No    Nutrition Diagnosis  Patient has Nutrition Diagnosis: Yes  Diagnosis Status (1): New  Nutrition Diagnosis 1: Increased nutrient needs  Related to (1): protein demands  As Evidenced by (1): OR 7/25 with nonhealing LLE wounds and now incisional wounds.  Additional Assessment Information (1): 7/26: Case discussed during CCM rounds and with nurse. Pt successfully extubated early AM and diet advanced to regular. Pt initially refused to eat however ready to try small meals.       Nutrition Interventions/Recommendations         Nutrition Prescription:  Individualized Nutrition Prescription Provided for : Regular/liberalized diet        Nutrition Interventions:   Interventions: Meals and snacks, Medical food supplement  Meals and Snacks: General healthful diet  Goal: Continue regular diet as tolerated; pt aware can order small/frequent meals/snacks. Pt aware to focus on protein foods at each meal/snack.  Medical Food Supplement: Commercial beverage, Commercial food  Goal: Pt currently declines    Collaboration and Referral of Nutrition Care: Collaboration by nutrition professional with other providers  Goal: RN Johnny and NP Jessica    Nutrition Education:   7/26: met with pt at bedside; assisted with late breakfast order (1/2 omelet, toast, hot chocolate). AYR reviewed with focus on high protein foods; encouraging protein source at each meal and as snacks in between. Pt currently declines supplement; aware of floor stock pantries and AYR hours. Pt states follows at the wound clinic and is familiar with protein foods; pt denies need for further MNT intervention.         Nutrition Monitoring and Evaluation   Food/Nutrient Related History Monitoring  Monitoring and Evaluation Plan: Energy intake  Energy Intake: Estimated energy intake  Criteria: >75% of estimated nutrient needs    Body Composition/Growth/Weight History  Monitoring and Evaluation Plan: Weight  Weight: Measured weight  Criteria: daily weight; stable weight    Biochemical Data, Medical Tests and Procedures  Monitoring and Evaluation Plan: Electrolyte/renal panel  Electrolyte and Renal Panel: BUN, Creatinine, Magnesium, Phosphorus, Potassium, Sodium  Criteria: labs WNR    Nutrition Focused Physical Findings  Monitoring and Evaluation Plan: Skin  Skin: Impaired wound healing  Criteria: promote skin integrity; wound healing         Time Spent (min): 60 minutes

## 2024-07-26 NOTE — CARE PLAN
The patient's goals for the shift include      The clinical goals for the shift include Pt will continue to have pulse that is able to be doppled of LLE.    Over the shift, the patient did  make progress toward the following goals.

## 2024-07-26 NOTE — SIGNIFICANT EVENT
Pt seen and examined at bedside. This is a 78-year-old female with known PAD and PVD and nonhealing venous stasis ulcers of the left lower extremity who was admitted to vascular surgery for elective redo of the left femoral endarterectomy with left femoral to posterior tibial artery bypass and harvest of left greater saphenous vein.  She was admitted to the ICU postoperatively for close monitoring due to hypothermia and remaining intubated.  She was rewarmed and extubated.  She is currently awake and alert and doing well.  She reports some neuropathic pain but has apprehensive about starting gabapentin or Lyrica. She is stable for downgrade from the the ICU.  Vascular surgery requested hospital medicine be consulted for help with medical management, we will continue to follow the patient upon arrival to the [facility].

## 2024-07-26 NOTE — PROGRESS NOTES
Spiritual Care Visit    Clinical Encounter Type  Visited With: Patient  Routine Visit: Introduction  Continue Visiting: Yes    Voodoo Encounters  Voodoo Needs: Prayer, Voodoo articles         Sacramental Encounters  Communion: Patient wants communion  Communion Given Indicator: No  Sacrament of Sick-Anointing: Anointed, Patient requested anointing                             Taxonomy  Intended Effects: Establish rapport and connectedness, Makayla affirmation, Build relationship of care and support, Demonstrate caring and concern, Promote sense of peace

## 2024-07-26 NOTE — PROGRESS NOTES
Subjective Data:  77yo patient POD#1 redo left femoral endarterectomy with left femoral to PTA bypass with harvested, reversed GSV.     Overnight Events:    Patient was successfully extubated. Estrella catheter has been removed. Continued mau hugger for post-operative hypothermia. Hypoxia overnight, resolved with 2L O2 via NC.     Patient is accompanied by son this morning. Denies abdominal pain and/or back pain. Denies non-joint related pain (I.e. aching, burning, cramping, discomfort) in buttock, thigh, calf, or ankle. Endorses intermittent neuropathy in left foot overnight, alleviated with pain medication prn. Able to tolerate small sips of fluid. Will advance to regular diet today. Estrella was removed, patient has not attempted to urinate yet this morning.      Objective Data:  Last Recorded Vitals:  Vitals:    07/26/24 0356 07/26/24 0400 07/26/24 0500 07/26/24 0600   BP:  (!) 119/46 (!) 112/46 (!) 119/43   Pulse:  74 74 70   Resp:  16 13 15   Temp:  36 °C (96.8 °F)     TempSrc:  Temporal     SpO2: 95% 97% 98% 92%   Weight:       Height:         Last Labs:  CBC - 7/26/2024:  3:29 AM  5.4 8.2 214    25.0      CMP - 7/26/2024:  3:29 AM  7.4 6.2 13 --- 0.3   3.2 3.6 8 131      PTT - 7/26/2024:  3:29 AM  1.1   11.9 21     Last I/O:  I/O last 3 completed shifts:  In: 6702.5 (115.4 mL/kg) [I.V.:3702.5 (63.8 mL/kg); IV Piggyback:3000]  Out: 2250 (38.8 mL/kg) [Urine:1850 (0.9 mL/kg/hr); Blood:400]  Weight: 58.1 kg     EKG:  ECG 12 Lead 07/12/2024 (Preliminary)  Normal sinus rhythm  Left anterior fascicular block  Cannot rule out Inferior infarct (masked by fascicular block?) , age undetermined  Anterior infarct , age undetermined  Abnormal ECG  No previous ECGs available    Echo:  No results found for this or any previous visit from the past 1095 days.    Cath:  No results found for this or any previous visit from the past 1095 days.    Stress Test:  NM Stress 4/13/22  CONCLUSION Clinically, electrocardiographically, and  echocardiographically normal stress echocardiogram, with no evidence of stress-induced ischemia at maximum workload. Patient demonstrated limited functional capacity     Vascular Imaging:  Vasc US LE Vein Mapping Bilateral 5/31/24  CONCLUSIONS:  Right Lower Vein Mapping: The right great saphenous vein appears widely patent with no evidence of thrombosis or fibrosis. The great saphenous vein branches at mid calf. There is chronic post-thrombotic change in the small saphenous vein at proximal. Lymph node measuring 0.7 cm x 1.3 cm noted in groin.  Left Lower Vein Mapping: The left great saphenous vein appears widely patent with no evidence of thrombosis or fibrosis. The great saphenous vein branches at the mid thigh and mid calf. Lymph node measuring 1.0 cm x 1.9 cm noted in groin.  Vascular US KATHLEEN w/o Exercise 4/19/24   CONCLUSIONS:  Right Lower PVR: No evidence of arterial occlusive disease in the right lower extremity at rest. Normal digital perfusion noted. Biphasic flow is noted in the right posterior tibial artery and right dorsalis pedis artery. Triphasic flow is noted in the right common femoral artery.  Left Lower PVR: Evidence of moderate arterial occlusive disease in the left lower extremity at rest. Decreased digital perfusion noted. Monophasic flow is noted in the left common femoral artery, left posterior tibial artery and left dorsalis pedis artery.    Inpatient Medications:  Scheduled medications   Medication Dose Route Frequency    amLODIPine  5 mg oral Daily    aspirin  81 mg oral Daily    docusate sodium  100 mg oral BID    enoxaparin  40 mg subcutaneous q24h    insulin lispro  0-10 Units subcutaneous q4h    rosuvastatin  5 mg oral Nightly     PRN medications   Medication    acetaminophen    dextrose    glucagon    hydrALAZINE    HYDROmorphone    naloxone    oxyCODONE     Continuous Medications   Medication Dose Last Rate     Physical Exam:  Vital Signs Reviewed.  Constitutional:       General:  A&Ox3. No apparent acute distress.   HENT:      Head: Normocephalic and atraumatic.      Mouth: Mucous membranes are moist.  Cardiovascular:      Rate and Rhythm: Normal rate and regular rhythm.      Heart sounds: Normal heart sounds.    +2 right femoral pulse +2 left femoral pulse   +2 palpable right DP & PT. Able to doppler left DP & PT.      Bilateral lower extremities warm to touch. Slight edema and erythema to LLE.      Left groin dressing intact, no drainage noted. Left Lower extremity medial dressing from thigh to calf intact w/scant drainage noted at distal portion.  Pulmonary:      Effort: Pulmonary effort is normal. No respiratory distress.      Breath sounds: Normal breath sounds.   Abdominal:      General: Bowel sounds are normal. There is no distension.      Palpations: Abdomen is soft.   Skin:     General: Skin is warm and dry.    Comments: Right thigh notable for telangiectases.   Left lower extremity wound w/mepilex dressing intact on left medial distal calf.      Assessment/Plan   PAD   POD #1 Redo Left Femoral Endarterectomy with Left Femoral to PTA Bypass with grafted, reversed GSV  Patient endorses intermittent neuropathy pain in left foot, alleviated w/pain medication prn overnight.    +2 palpable right DP & PT. Able to doppler left DP & PT.   Bilateral lower extremities warm to touch. Slight edema and erythema to LLE.   Left groin dressing intact, no drainage noted. Left Lower extremity medial dressing from thigh to calf intact w/scant drainage noted at distal portion.  Left lower extremity wound w/dressing intact on left medial distal calf.   Continue Aspirin 81mg every day and Crestor 5mg every day.     Postoperative Hypoxia/Hypothermia   Hypoxia noted overnight per nursing staff.   Denies SOB. Lungs CTAB. SpO2 99% on 2L O2 via NC.   Medicine following.        I spent 35 minutes in the professional and overall care of this patient.    Plan of care discussed with Dr. Africa Hoover.   Dulce  NASIM Angeles, APRN-CNP  Vascular Surgery  [facility]  [facility]

## 2024-07-26 NOTE — PROGRESS NOTES
"Mechelle Alcantara is a 78 y.o. female on day 1 of admission presenting with PAD (peripheral artery disease) (CMS-HCC).    Subjective   Rewarmed last evening and successfully extubated.  Patient is awake, alert and oriented this morning.  Complains of left lower extremity neuropathy pain but otherwise states she feels well         Objective     Physical Exam  Constitutional:       Appearance: Normal appearance.   HENT:      Head: Normocephalic and atraumatic.      Mouth/Throat:      Mouth: Mucous membranes are moist.   Cardiovascular:      Rate and Rhythm: Normal rate and regular rhythm.      Pulses:           Dorsalis pedis pulses are 2+ on the right side.        Posterior tibial pulses are 2+ on the right side and detected w/ Doppler on the left side.      Heart sounds: Normal heart sounds.   Pulmonary:      Effort: Pulmonary effort is normal.      Breath sounds: Normal breath sounds.   Abdominal:      General: Bowel sounds are normal.      Palpations: Abdomen is soft.   Musculoskeletal:         General: Normal range of motion.      Comments: L leg guarded due to incisions   Skin:     General: Skin is warm and dry.      Capillary Refill: Capillary refill takes less than 2 seconds.      Comments: Left leg incisional dressings dry and intact.  Left foot erythematous and warm.   Neurological:      General: No focal deficit present.      Mental Status: She is alert and oriented to person, place, and time. Mental status is at baseline.         Last Recorded Vitals  Blood pressure (!) 119/43, pulse 70, temperature 36 °C (96.8 °F), temperature source Temporal, resp. rate 15, height 1.676 m (5' 6\"), weight 58.1 kg (128 lb), SpO2 92%.  Intake/Output last 3 Shifts:  I/O last 3 completed shifts:  In: 6702.5 (115.4 mL/kg) [I.V.:3702.5 (63.8 mL/kg); IV Piggyback:3000]  Out: 2250 (38.8 mL/kg) [Urine:1850 (0.9 mL/kg/hr); Blood:400]  Weight: 58.1 kg     Relevant Results           This patient currently has cardiac telemetry " ordered; if you would like to modify or discontinue the telemetry order, click here to go to the orders activity to modify/discontinue the order.                 Assessment/Plan   Principal Problem:    PAD (peripheral artery disease) (CMS-[facility])  Active Problems:    Non-healing wound of left lower extremity     PAD (peripheral artery disease) (CMS-HCC)    Non-healing wound of left lower extremity  S/p redo L femoral endarterectomy, left femoral to posterior tibial artery bypass, harvest of left greater saphenous vein  Hypertension  Hypothermia     Neuro  #Post op pain  #Hypothermic- resolved  -Acetaminophen, oxycodone, Dilaudid as needed for pain       CV  #HTN  #PAD with nonhealing LLE wounds  #s/p redo L femoral endarterectomy, left femoral to posterior tibial artery bypass, harvest of left greater saphenous vein  #HLD  -DC arterial line  -Goal systolic blood pressure 110-150 per vascular surgery  -Resume home norvasc, aspirin and Crestor  -Maintain serial vascular checks as ordered  -Hydralazine and labetalol prn for systolic blood pressure greater than 160  -Replete electrolytes per protocol.  Goal potassium greater than 4.0 and magnesium greater than 2.0     Pulm  #Post op respiratory insufficiency- resolved  -Successfully extubated last evening.  -Weaned to room air     GI  -Diet as tolerated  -Colace and MiraLAX for bowel regimen       -Estrella catheter DC'd  -DC IV fluid     Heme  -H&H stable.  No signs of bleeding  -Start Lovenox for VTE prophylaxis.  SCDs on right leg only       Endo  -Goal blood glucose less than 180  -Accu-Cheks before meals and at bedtime with sliding scale coverage     ID  -No acute issues     PT/OT     I spent 32 minutes in the professional and overall care of this patient.    Dispo  -Will transfer patient to regular nursing floor under surgeon with medicine consult        Jessica Cisse, APRN-CNP

## 2024-07-27 LAB
ANION GAP SERPL CALC-SCNC: 11 MMOL/L (ref 10–20)
ATRIAL RATE: 68 BPM
BACTERIA UR CULT: NO GROWTH
BUN SERPL-MCNC: 14 MG/DL (ref 6–23)
CALCIUM SERPL-MCNC: 7.6 MG/DL (ref 8.6–10.3)
CHLORIDE SERPL-SCNC: 105 MMOL/L (ref 98–107)
CO2 SERPL-SCNC: 23 MMOL/L (ref 21–32)
CREAT SERPL-MCNC: 0.65 MG/DL (ref 0.5–1.05)
EGFRCR SERPLBLD CKD-EPI 2021: 90 ML/MIN/1.73M*2
ERYTHROCYTE [DISTWIDTH] IN BLOOD BY AUTOMATED COUNT: 14.6 % (ref 11.5–14.5)
GLUCOSE SERPL-MCNC: 92 MG/DL (ref 74–99)
HCT VFR BLD AUTO: 30.6 % (ref 36–46)
HGB BLD-MCNC: 9.3 G/DL (ref 12–16)
MAGNESIUM SERPL-MCNC: 2.24 MG/DL (ref 1.6–2.4)
MCH RBC QN AUTO: 29.8 PG (ref 26–34)
MCHC RBC AUTO-ENTMCNC: 30.4 G/DL (ref 32–36)
MCV RBC AUTO: 98 FL (ref 80–100)
NRBC BLD-RTO: 0 /100 WBCS (ref 0–0)
P AXIS: 27 DEGREES
P OFFSET: 199 MS
P ONSET: 147 MS
PHOSPHATE SERPL-MCNC: 2.4 MG/DL (ref 2.5–4.9)
PLATELET # BLD AUTO: 222 X10*3/UL (ref 150–450)
POTASSIUM SERPL-SCNC: 4 MMOL/L (ref 3.5–5.3)
PR INTERVAL: 148 MS
Q ONSET: 221 MS
QRS COUNT: 11 BEATS
QRS DURATION: 80 MS
QT INTERVAL: 404 MS
QTC CALCULATION(BAZETT): 429 MS
QTC FREDERICIA: 421 MS
R AXIS: -50 DEGREES
RBC # BLD AUTO: 3.12 X10*6/UL (ref 4–5.2)
SODIUM SERPL-SCNC: 135 MMOL/L (ref 136–145)
T AXIS: 55 DEGREES
T OFFSET: 423 MS
VENTRICULAR RATE: 68 BPM
WBC # BLD AUTO: 8.9 X10*3/UL (ref 4.4–11.3)

## 2024-07-27 PROCEDURE — 2500000001 HC RX 250 WO HCPCS SELF ADMINISTERED DRUGS (ALT 637 FOR MEDICARE OP): Performed by: SURGERY

## 2024-07-27 PROCEDURE — 2500000005 HC RX 250 GENERAL PHARMACY W/O HCPCS: Performed by: STUDENT IN AN ORGANIZED HEALTH CARE EDUCATION/TRAINING PROGRAM

## 2024-07-27 PROCEDURE — 2500000001 HC RX 250 WO HCPCS SELF ADMINISTERED DRUGS (ALT 637 FOR MEDICARE OP): Performed by: NURSE PRACTITIONER

## 2024-07-27 PROCEDURE — 80048 BASIC METABOLIC PNL TOTAL CA: CPT | Performed by: NURSE PRACTITIONER

## 2024-07-27 PROCEDURE — 99024 POSTOP FOLLOW-UP VISIT: CPT | Performed by: SURGERY

## 2024-07-27 PROCEDURE — 97535 SELF CARE MNGMENT TRAINING: CPT | Mod: GO,CO

## 2024-07-27 PROCEDURE — 36415 COLL VENOUS BLD VENIPUNCTURE: CPT | Performed by: NURSE PRACTITIONER

## 2024-07-27 PROCEDURE — 2500000004 HC RX 250 GENERAL PHARMACY W/ HCPCS (ALT 636 FOR OP/ED): Performed by: STUDENT IN AN ORGANIZED HEALTH CARE EDUCATION/TRAINING PROGRAM

## 2024-07-27 PROCEDURE — 99233 SBSQ HOSP IP/OBS HIGH 50: CPT | Performed by: STUDENT IN AN ORGANIZED HEALTH CARE EDUCATION/TRAINING PROGRAM

## 2024-07-27 PROCEDURE — 85027 COMPLETE CBC AUTOMATED: CPT | Performed by: NURSE PRACTITIONER

## 2024-07-27 PROCEDURE — 97530 THERAPEUTIC ACTIVITIES: CPT | Mod: GO,CO

## 2024-07-27 PROCEDURE — 2500000002 HC RX 250 W HCPCS SELF ADMINISTERED DRUGS (ALT 637 FOR MEDICARE OP, ALT 636 FOR OP/ED): Performed by: NURSE PRACTITIONER

## 2024-07-27 PROCEDURE — 2500000004 HC RX 250 GENERAL PHARMACY W/ HCPCS (ALT 636 FOR OP/ED): Performed by: NURSE PRACTITIONER

## 2024-07-27 PROCEDURE — 83735 ASSAY OF MAGNESIUM: CPT | Performed by: NURSE PRACTITIONER

## 2024-07-27 PROCEDURE — 84100 ASSAY OF PHOSPHORUS: CPT | Performed by: NURSE PRACTITIONER

## 2024-07-27 PROCEDURE — 1200000002 HC GENERAL ROOM WITH TELEMETRY DAILY

## 2024-07-27 RX ADMIN — SODIUM PHOSPHATE, MONOBASIC, MONOHYDRATE AND SODIUM PHOSPHATE, DIBASIC, ANHYDROUS 15 MMOL: 142; 276 INJECTION, SOLUTION INTRAVENOUS at 11:38

## 2024-07-27 RX ADMIN — AMLODIPINE BESYLATE 5 MG: 5 TABLET ORAL at 08:31

## 2024-07-27 RX ADMIN — DOCUSATE SODIUM 100 MG: 100 CAPSULE, LIQUID FILLED ORAL at 08:31

## 2024-07-27 RX ADMIN — ASPIRIN 81 MG: 81 TABLET, COATED ORAL at 08:31

## 2024-07-27 RX ADMIN — ROSUVASTATIN CALCIUM 5 MG: 5 TABLET, FILM COATED ORAL at 20:20

## 2024-07-27 RX ADMIN — DOCUSATE SODIUM 100 MG: 100 CAPSULE, LIQUID FILLED ORAL at 20:20

## 2024-07-27 RX ADMIN — ENOXAPARIN SODIUM 40 MG: 40 INJECTION SUBCUTANEOUS at 08:31

## 2024-07-27 RX ADMIN — RIVAROXABAN 2.5 MG: 2.5 TABLET, FILM COATED ORAL at 20:20

## 2024-07-27 ASSESSMENT — COGNITIVE AND FUNCTIONAL STATUS - GENERAL
DRESSING REGULAR LOWER BODY CLOTHING: A LOT
EATING MEALS: A LITTLE
DAILY ACTIVITIY SCORE: 15
DRESSING REGULAR UPPER BODY CLOTHING: A LITTLE
TOILETING: A LOT
HELP NEEDED FOR BATHING: A LOT
PERSONAL GROOMING: A LITTLE

## 2024-07-27 ASSESSMENT — PAIN - FUNCTIONAL ASSESSMENT: PAIN_FUNCTIONAL_ASSESSMENT: 0-10

## 2024-07-27 ASSESSMENT — ACTIVITIES OF DAILY LIVING (ADL): HOME_MANAGEMENT_TIME_ENTRY: 13

## 2024-07-27 ASSESSMENT — PAIN SCALES - GENERAL
PAINLEVEL_OUTOF10: 5 - MODERATE PAIN
PAINLEVEL_OUTOF10: 0 - NO PAIN

## 2024-07-27 NOTE — PROGRESS NOTES
Occupational Therapy    OT Treatment    Patient Name: Mechelle Alcantara  MRN: [id number]  Today's Date: 7/27/2024  Time Calculation  Start Time: 1049  Stop Time: 1115  Time Calculation (min): 26 min      Assessment:  End of Session Communication: Bedside nurse  End of Session Patient Position: Up in chair, Alarm on     Plan:  Treatment Interventions: ADL retraining, Functional transfer training, Endurance training  OT Frequency: 5 times per week  Treatment Interventions: ADL retraining, Functional transfer training, Endurance training    Subjective   Previous Visit Info:  OT Last Visit  OT Received On: 07/27/24  General:  General  Reason for Referral: Pt underwent redo left femoral endarterectomy with left femoral to PTA bypass with harvested, reversed GSV 7/25/24.  Prior to Session Communication: Bedside nurse  Patient Position Received: Bed, 3 rail up, Alarm on  Precautions:  LE Weight Bearing Status: Weight Bearing as Tolerated  Medical Precautions: Fall precautions     Pain:  Pain Assessment  Pain Assessment: 0-10  0-10 (Numeric) Pain Score: 5 - Moderate pain  Pain Type: Surgical pain  Pain Location: Leg  Pain Orientation: Left    Objective    Cognition:  Cognition  Overall Cognitive Status: Impaired  Orientation Level: Oriented X4  Following Commands: Follows multistep commands with repetition  Safety Judgment: Decreased awareness of need for safety precautions  Problem Solving: Assistance required to implement solutions  Insight: Moderate  Impulsive: Mildly     Activities of Daily Living: LE Dressing  LE Dressing: Yes  Adult Briefs Level of Assistance:  (donned underwear with mod a for LLE and min a for RLE. pants mgmt while in stance with min a.)  LE Dressing Where Assessed: Edge of bed    Toileting  Toileting Level of Assistance:  (SBA)  Where Assessed: Toilet  Functional Standing Tolerance:  Functional Standing Tolerance Comments: patient stood for a total of 5 mins this date with fair/fair- balance with 2-0  ue support as needed during functional ambulation/transfers and ADL tasks  Bed Mobility/Transfers: Bed Mobility  Bed Mobility: Yes  Bed Mobility 1  Bed Mobility 1: Supine to sitting  Level of Assistance 1: Minimum assistance    Transfers  Transfer: Yes  Transfer 1  Technique 1: Sit to stand  Transfer Device 1: Walker, Gait belt  Transfer Level of Assistance 1: Minimum assistance, Moderate verbal cues (for hand placement)  Transfers 2  Transfer Device 2: Walker, Gait belt  Transfer Level of Assistance 2: Contact guard, Moderate verbal cues (for wlker mgmt and safety)  Trials/Comments 2: functional ambulation    Toilet Transfers  Toilet Transfer Type: To and from  Toilet Transfer to: Standard toilet  Toilet Transfer Technique: Ambulating  Toilet Transfers: Minimal assistance  Toilet Transfers Comments: with mod verbal cues for safety and hand placement  Sitting Balance:  Dynamic Sitting Balance  Dynamic Sitting-Balance:  (good-)  Standing Balance:  Dynamic Standing Balance  Dynamic Standing-Balance:  (fair/fair-)    Outcome Measures:[facility] Daily Activity  Putting on and taking off regular lower body clothing: A lot  Bathing (including washing, rinsing, drying): A lot  Putting on and taking off regular upper body clothing: A little  Toileting, which includes using toilet, bedpan or urinal: A lot  Taking care of personal grooming such as brushing teeth: A little  Eating Meals: A little  Daily Activity - Total Score: 15    OP EDUCATION:  Education  Individual(s) Educated: Patient  Education Provided: Diagnosis & Precautions, Symptom management, Ergonomics and postural realignment, Joint protection and energy conservation, Fall precautons, Risk and benefits of OT discussed with patient or other, POC discussed and agreed upon  Diagnosis and Precautions: WBAT LLE  Equipment:  (AE, EC tech, home DME)  Patient/Caregiver Demonstrated Understanding: yes  Plan of Care Discussed and Agreed Upon: yes  Patient Response to Education:  Patient/Caregiver Verbalized Understanding of Information    Goals:  Encounter Problems       Encounter Problems (Active)       Dressings Lower Extremities       STG - Patient to complete lower body dressing SUP (Progressing)       Start:  07/26/24    Expected End:  08/09/24               Functional Balance       LTG - Patient will maintain standing and sitting balance to allow for completion of daily activities (Progressing)       Start:  07/26/24    Expected End:  08/09/24               Grooming       STG - Patient completes grooming SUP (Progressing)       Start:  07/26/24    Expected End:  08/09/24               OT Transfers       STG - Patient will perform toilet transfer SUP (Progressing)       Start:  07/26/24    Expected End:  08/09/24

## 2024-07-27 NOTE — CARE PLAN
The patient's goals for the shift include      The clinical goals for the shift include Pt will continue to have pulse in affected LLE this shift.

## 2024-07-27 NOTE — PROGRESS NOTES
"Mechelle Alcantara is a 78 y.o. female on day 2 of admission presenting with PAD (peripheral artery disease) (CMS-HCC).    Subjective   Patient seen and examined at bedside.  Patient has family members also at bedside.  She reports she is doing very well today and is hopeful to go home. She has no complaints other than numbness/neuropathy. She is still reluctant to start gabapentin/lyrica.        Objective     Physical Exam    Constitutional: Well developed, no distress, alert and cooperative  Skin: Warm and dry  Eyes: EOMI, clear sclera  ENMT: mucous membranes moist  Respiratory: Patent airways, CTAB  Cardiovascular: Regular, rate and rhythm  Abdominal: Nondistended, soft, non-tender, +BS  MSK: ROM intact, LLE swelling with mild warmth and erythema of distal LLE, incision well approximated  Neuro: alert and oriented x3    Last Recorded Vitals  Blood pressure 145/64, pulse 88, temperature 37 °C (98.6 °F), temperature source Temporal, resp. rate 18, height 1.676 m (5' 6\"), weight 58.1 kg (128 lb), SpO2 93%.  Intake/Output last 3 Shifts:  I/O last 3 completed shifts:  In: 910 (15.7 mL/kg) [I.V.:910 (15.7 mL/kg)]  Out: 1050 (18.1 mL/kg) [Urine:1050 (0.5 mL/kg/hr)]  Weight: 58.1 kg     Relevant Results  Scheduled medications  amLODIPine, 5 mg, oral, Daily  aspirin, 81 mg, oral, Daily  docusate sodium, 100 mg, oral, BID  enoxaparin, 40 mg, subcutaneous, q24h  rivaroxaban, 2.5 mg, oral, BID  rosuvastatin, 5 mg, oral, Nightly      Continuous medications     PRN medications  PRN medications: acetaminophen, dextrose, glucagon, hydrALAZINE, HYDROmorphone, naloxone, oxyCODONE  Results from last 7 days   Lab Units 07/27/24  0653 07/26/24  0329 07/25/24  1926   WBC AUTO x10*3/uL 8.9 5.4 8.9   RBC AUTO x10*6/uL 3.12* 2.69* 3.17*   HEMOGLOBIN g/dL 9.3* 8.2* 9.5*     Results from last 7 days   Lab Units 07/27/24  0653 07/26/24  0329 07/25/24  1926   SODIUM mmol/L 135* 134* 136   POTASSIUM mmol/L 4.0 4.5 4.0   CHLORIDE mmol/L 105 105 " 105   CO2 mmol/L 23 25 23   BUN mg/dL 14 17 16   CREATININE mg/dL 0.65 0.65 0.63   CALCIUM mg/dL 7.6* 7.4* 7.1*   PHOSPHORUS mg/dL 2.4* 3.2  --    MAGNESIUM mg/dL 2.24 2.17  --        XR chest 1 view    Result Date: 7/25/2024  Interpreted By:  Mj Manley, STUDY: XR CHEST 1 VIEW;  7/25/2024 7:23 pm   INDICATION: Signs/Symptoms:ETT placement.   COMPARISON: None.   ACCESSION NUMBER(S): [id number]   ORDERING CLINICIAN: ADI RUSSELL   FINDINGS: ET tube terminates 6.6 cm above jb. Soft temperature probe noted.   The cardiomediastinal silhouette and pulmonary vasculature are within normal limits. There are prominent upper lung interstitial opacities likely representing pulmonary pruning with emphysema and mild scarring. No consolidation, pulmonary edema, effusion, or pneumothorax.         ET tube terminates 6.6 cm above the jb.   No acute radiographic abnormality of the chest.   Probable emphysema.   MACRO: None.   Signed by: Mj Manley 7/25/2024 7:33 PM Dictation workstation:   [id number]      Assessment/Plan   Principal Problem:    PAD (peripheral artery disease) (CMS-[facility])  Active Problems:    Non-healing wound of left lower extremity  HTN    -vascular primary   -LLE with swelling and slight distal warmth/erythema today   -Phos 2.4; replaced   -labs/VSS   -goal -150 per vascular; pt has been within this goal   -continue pain regimen of tylenol, oxy and dilaudid PRN   -continue other home meds as indicated   -PT/OT  -started on xarelto in addition to aspirin for PAD per vascular     Thank you for the consult, we will continue to follow. Please call with questions.     Keara Diaz,    Hospitalist

## 2024-07-27 NOTE — PROGRESS NOTES
Mechelle Alcantara is a 78 y.o. female on day 2 of admission presenting with PAD (peripheral artery disease) (CMS-HCC).    Subjective   Patient resting comfortably in recliner chair.  She states she was able to ambulate slightly better with physical therapy today.  She currently denies any incisional pain, but did have some earlier.  She has not yet had a bowel movement but states she has passed flatus.  She is tolerating diet.       Objective     Physical Exam  Constitutional:       General: She is not in acute distress.     Appearance: Normal appearance. She is normal weight.   HENT:      Head: Normocephalic and atraumatic.   Eyes:      Extraocular Movements: Extraocular movements intact.      Conjunctiva/sclera: Conjunctivae normal.   Cardiovascular:      Rate and Rhythm: Normal rate and regular rhythm.      Pulses:           Femoral pulses are 2+ on the right side and 2+ on the left side.       Dorsalis pedis pulses are detected w/ Doppler on the right side and detected w/ Doppler on the left side.        Posterior tibial pulses are detected w/ Doppler on the right side and detected w/ Doppler on the left side.      Heart sounds: Normal heart sounds.      Comments: Strong multiphasic Doppler signal noted over the distal bypass graft and over the left PT just above the ankle    Pulmonary:      Effort: Pulmonary effort is normal.      Breath sounds: Normal breath sounds.   Abdominal:      General: Abdomen is flat.      Palpations: Abdomen is soft.   Musculoskeletal:         General: No swelling. Normal range of motion.      Cervical back: Normal range of motion.      Left lower le+ Edema present.   Skin:     General: Skin is warm and dry.      Comments: Stable superficial venous ulcers of left medial ankle and lateral distal calf  Left lower extremity surgical incisions c/d/i   Neurological:      General: No focal deficit present.      Mental Status: She is alert and oriented to person, place, and time.       "Cranial Nerves: No cranial nerve deficit.      Sensory: No sensory deficit.      Motor: No weakness.   Psychiatric:         Mood and Affect: Mood normal.         Behavior: Behavior normal.         Last Recorded Vitals  Blood pressure 145/64, pulse 88, temperature 37 °C (98.6 °F), temperature source Temporal, resp. rate 18, height 1.676 m (5' 6\"), weight 58.1 kg (128 lb), SpO2 93%.  Intake/Output last 3 Shifts:  I/O last 3 completed shifts:  In: 910 (15.7 mL/kg) [I.V.:910 (15.7 mL/kg)]  Out: 1050 (18.1 mL/kg) [Urine:1050 (0.5 mL/kg/hr)]  Weight: 58.1 kg     Relevant Results      Current Facility-Administered Medications:     acetaminophen (Tylenol) tablet 650 mg, 650 mg, oral, q4h PRN, MICHAEL Burroughs-CNP, 650 mg at 07/26/24 0109    amLODIPine (Norvasc) tablet 5 mg, 5 mg, oral, Daily, MICHAEL Burroughs-CNP, 5 mg at 07/27/24 0831    aspirin EC tablet 81 mg, 81 mg, oral, Daily, MICHAEL Burroughs-CNP, 81 mg at 07/27/24 0831    dextrose 50 % injection 12.5 g, 12.5 g, intravenous, q15 min PRN, MICHAEL Burroughs-CNP    docusate sodium (Colace) capsule 100 mg, 100 mg, oral, BID, MICHAEL Burroughs-CNP, 100 mg at 07/27/24 0831    enoxaparin (Lovenox) syringe 40 mg, 40 mg, subcutaneous, q24h, MICHAEL Burroughs-CNP, 40 mg at 07/27/24 0831    glucagon (Glucagen) injection 1 mg, 1 mg, intramuscular, q15 min PRN, MICHAEL Burroughs-CNP    hydrALAZINE (Apresoline) injection 10 mg, 10 mg, intravenous, q4h PRN, MICHAEL Burroughs-CNP    HYDROmorphone (Dilaudid) injection 0.5 mg, 0.5 mg, intravenous, q4h PRN, QUENTIN Burroughs, 0.5 mg at 07/26/24 0326    naloxone (Narcan) injection 0.2 mg, 0.2 mg, intravenous, q5 min PRN, QUENTIN Burroughs    oxyCODONE (Roxicodone) immediate release tablet 5 mg, 5 mg, oral, q6h PRN, QUENTIN Burroughs, 5 mg at 07/26/24 0219    rosuvastatin (Crestor) tablet 5 mg, 5 mg, oral, Nightly, QUENTIN Burroughs, 5 mg at " 07/26/24 2001    sodium phosphate 15 mmol in sodium chloride 0.9% 250 mL IV, 15 mmol, intravenous, Once, Keara CRISTINA Diaz DO, Last Rate: 62.5 mL/hr at 07/27/24 1138, 15 mmol at 07/27/24 1138       Results for orders placed or performed during the hospital encounter of 07/25/24 (from the past 24 hour(s))   Phosphorus   Result Value Ref Range    Phosphorus 2.4 (L) 2.5 - 4.9 mg/dL   Magnesium   Result Value Ref Range    Magnesium 2.24 1.60 - 2.40 mg/dL   Basic Metabolic Panel   Result Value Ref Range    Glucose 92 74 - 99 mg/dL    Sodium 135 (L) 136 - 145 mmol/L    Potassium 4.0 3.5 - 5.3 mmol/L    Chloride 105 98 - 107 mmol/L    Bicarbonate 23 21 - 32 mmol/L    Anion Gap 11 10 - 20 mmol/L    Urea Nitrogen 14 6 - 23 mg/dL    Creatinine 0.65 0.50 - 1.05 mg/dL    eGFR 90 >60 mL/min/1.73m*2    Calcium 7.6 (L) 8.6 - 10.3 mg/dL   CBC   Result Value Ref Range    WBC 8.9 4.4 - 11.3 x10*3/uL    nRBC 0.0 0.0 - 0.0 /100 WBCs    RBC 3.12 (L) 4.00 - 5.20 x10*6/uL    Hemoglobin 9.3 (L) 12.0 - 16.0 g/dL    Hematocrit 30.6 (L) 36.0 - 46.0 %    MCV 98 80 - 100 fL    MCH 29.8 26.0 - 34.0 pg    MCHC 30.4 (L) 32.0 - 36.0 g/dL    RDW 14.6 (H) 11.5 - 14.5 %    Platelets 222 150 - 450 x10*3/uL              Assessment/Plan   Principal Problem:    PAD (peripheral artery disease) (CMS-HCC)  Active Problems:    Non-healing wound of left lower extremity    79yo female who is now postop day #2 status post redo left femoral endarterectomy and left femoral to posterior tibial artery bypass with reversed greater saphenous vein.  She is clinically and hemodynamically stable.  Vascular exam remains intact with a strong multiphasic left PT Doppler signal.  Unable to palpate PT pulse most likely secondary to postoperative leg edema.  Patient is stable from a vascular standpoint.  She is to continue PT/OT.  She will most likely need acute inpatient rehab after discharge.  Will continue bowel regimen medications to encourage bowel movement.  Continue  daily dressing changes as ordered.  Will also start patient on Xarelto 2.5 mg p.o. twice daily in addition to aspirin for PAD management and bypass graft patency.         I spent 45 minutes in the professional and overall care of this patient.      Africa Hoover MD

## 2024-07-28 VITALS
OXYGEN SATURATION: 95 % | HEIGHT: 66 IN | BODY MASS INDEX: 20.51 KG/M2 | HEART RATE: 80 BPM | WEIGHT: 127.65 LBS | DIASTOLIC BLOOD PRESSURE: 67 MMHG | TEMPERATURE: 97.5 F | RESPIRATION RATE: 17 BRPM | SYSTOLIC BLOOD PRESSURE: 158 MMHG

## 2024-07-28 LAB
ANION GAP SERPL CALC-SCNC: 10 MMOL/L (ref 10–20)
BUN SERPL-MCNC: 10 MG/DL (ref 6–23)
CALCIUM SERPL-MCNC: 7.6 MG/DL (ref 8.6–10.3)
CHLORIDE SERPL-SCNC: 105 MMOL/L (ref 98–107)
CO2 SERPL-SCNC: 25 MMOL/L (ref 21–32)
CREAT SERPL-MCNC: 0.53 MG/DL (ref 0.5–1.05)
EGFRCR SERPLBLD CKD-EPI 2021: >90 ML/MIN/1.73M*2
ERYTHROCYTE [DISTWIDTH] IN BLOOD BY AUTOMATED COUNT: 14.2 % (ref 11.5–14.5)
GLUCOSE BLD MANUAL STRIP-MCNC: 148 MG/DL (ref 74–99)
GLUCOSE BLD MANUAL STRIP-MCNC: 94 MG/DL (ref 74–99)
GLUCOSE SERPL-MCNC: 96 MG/DL (ref 74–99)
HCT VFR BLD AUTO: 27.4 % (ref 36–46)
HGB BLD-MCNC: 8.5 G/DL (ref 12–16)
MAGNESIUM SERPL-MCNC: 1.93 MG/DL (ref 1.6–2.4)
MCH RBC QN AUTO: 29.8 PG (ref 26–34)
MCHC RBC AUTO-ENTMCNC: 31 G/DL (ref 32–36)
MCV RBC AUTO: 96 FL (ref 80–100)
NRBC BLD-RTO: 0 /100 WBCS (ref 0–0)
PHOSPHATE SERPL-MCNC: 2.2 MG/DL (ref 2.5–4.9)
PLATELET # BLD AUTO: 228 X10*3/UL (ref 150–450)
POTASSIUM SERPL-SCNC: 3.6 MMOL/L (ref 3.5–5.3)
RBC # BLD AUTO: 2.85 X10*6/UL (ref 4–5.2)
SODIUM SERPL-SCNC: 136 MMOL/L (ref 136–145)
WBC # BLD AUTO: 8.3 X10*3/UL (ref 4.4–11.3)

## 2024-07-28 PROCEDURE — 99232 SBSQ HOSP IP/OBS MODERATE 35: CPT | Performed by: STUDENT IN AN ORGANIZED HEALTH CARE EDUCATION/TRAINING PROGRAM

## 2024-07-28 PROCEDURE — 2500000001 HC RX 250 WO HCPCS SELF ADMINISTERED DRUGS (ALT 637 FOR MEDICARE OP): Performed by: NURSE PRACTITIONER

## 2024-07-28 PROCEDURE — 97530 THERAPEUTIC ACTIVITIES: CPT | Mod: GO,CO

## 2024-07-28 PROCEDURE — 36415 COLL VENOUS BLD VENIPUNCTURE: CPT | Performed by: STUDENT IN AN ORGANIZED HEALTH CARE EDUCATION/TRAINING PROGRAM

## 2024-07-28 PROCEDURE — 84100 ASSAY OF PHOSPHORUS: CPT | Performed by: STUDENT IN AN ORGANIZED HEALTH CARE EDUCATION/TRAINING PROGRAM

## 2024-07-28 PROCEDURE — 82947 ASSAY GLUCOSE BLOOD QUANT: CPT

## 2024-07-28 PROCEDURE — 2500000005 HC RX 250 GENERAL PHARMACY W/O HCPCS: Performed by: STUDENT IN AN ORGANIZED HEALTH CARE EDUCATION/TRAINING PROGRAM

## 2024-07-28 PROCEDURE — 97535 SELF CARE MNGMENT TRAINING: CPT | Mod: GO,CO

## 2024-07-28 PROCEDURE — 85027 COMPLETE CBC AUTOMATED: CPT | Performed by: STUDENT IN AN ORGANIZED HEALTH CARE EDUCATION/TRAINING PROGRAM

## 2024-07-28 PROCEDURE — 2500000004 HC RX 250 GENERAL PHARMACY W/ HCPCS (ALT 636 FOR OP/ED): Performed by: STUDENT IN AN ORGANIZED HEALTH CARE EDUCATION/TRAINING PROGRAM

## 2024-07-28 PROCEDURE — 99233 SBSQ HOSP IP/OBS HIGH 50: CPT | Performed by: STUDENT IN AN ORGANIZED HEALTH CARE EDUCATION/TRAINING PROGRAM

## 2024-07-28 PROCEDURE — 2500000001 HC RX 250 WO HCPCS SELF ADMINISTERED DRUGS (ALT 637 FOR MEDICARE OP): Performed by: SURGERY

## 2024-07-28 PROCEDURE — 80048 BASIC METABOLIC PNL TOTAL CA: CPT | Performed by: STUDENT IN AN ORGANIZED HEALTH CARE EDUCATION/TRAINING PROGRAM

## 2024-07-28 PROCEDURE — 83735 ASSAY OF MAGNESIUM: CPT | Performed by: STUDENT IN AN ORGANIZED HEALTH CARE EDUCATION/TRAINING PROGRAM

## 2024-07-28 PROCEDURE — 2500000002 HC RX 250 W HCPCS SELF ADMINISTERED DRUGS (ALT 637 FOR MEDICARE OP, ALT 636 FOR OP/ED): Performed by: STUDENT IN AN ORGANIZED HEALTH CARE EDUCATION/TRAINING PROGRAM

## 2024-07-28 PROCEDURE — 1200000002 HC GENERAL ROOM WITH TELEMETRY DAILY

## 2024-07-28 RX ORDER — LANOLIN ALCOHOL/MO/W.PET/CERES
400 CREAM (GRAM) TOPICAL DAILY
Status: DISCONTINUED | OUTPATIENT
Start: 2024-07-28 | End: 2024-07-30 | Stop reason: HOSPADM

## 2024-07-28 RX ORDER — POTASSIUM PHOSPHATE, MONOBASIC AND POTASSIUM PHOSPHATE, DIBASIC 224; 236 MG/ML; MG/ML
15 INJECTION, SOLUTION INTRAVENOUS ONCE
Status: COMPLETED | OUTPATIENT
Start: 2024-07-28 | End: 2024-07-28

## 2024-07-28 RX ORDER — OXYCODONE HYDROCHLORIDE 5 MG/1
5 TABLET ORAL EVERY 6 HOURS PRN
Status: DISCONTINUED | OUTPATIENT
Start: 2024-07-28 | End: 2024-07-30 | Stop reason: HOSPADM

## 2024-07-28 RX ORDER — ROSUVASTATIN CALCIUM 20 MG/1
20 TABLET, COATED ORAL NIGHTLY
Status: DISCONTINUED | OUTPATIENT
Start: 2024-07-28 | End: 2024-07-30 | Stop reason: HOSPADM

## 2024-07-28 RX ADMIN — ROSUVASTATIN CALCIUM 20 MG: 20 TABLET, FILM COATED ORAL at 20:40

## 2024-07-28 RX ADMIN — DOCUSATE SODIUM 100 MG: 100 CAPSULE, LIQUID FILLED ORAL at 08:40

## 2024-07-28 RX ADMIN — RIVAROXABAN 2.5 MG: 2.5 TABLET, FILM COATED ORAL at 16:49

## 2024-07-28 RX ADMIN — POTASSIUM PHOSPHATE, MONOBASIC AND POTASSIUM PHOSPHATE, DIBASIC 15 MMOL: 224; 236 INJECTION, SOLUTION, CONCENTRATE INTRAVENOUS at 10:31

## 2024-07-28 RX ADMIN — RIVAROXABAN 2.5 MG: 2.5 TABLET, FILM COATED ORAL at 08:39

## 2024-07-28 RX ADMIN — ASPIRIN 81 MG: 81 TABLET, COATED ORAL at 08:40

## 2024-07-28 RX ADMIN — AMLODIPINE BESYLATE 5 MG: 5 TABLET ORAL at 08:39

## 2024-07-28 RX ADMIN — Medication 400 MG: at 10:31

## 2024-07-28 ASSESSMENT — PAIN SCALES - GENERAL: PAINLEVEL_OUTOF10: 0 - NO PAIN

## 2024-07-28 ASSESSMENT — COGNITIVE AND FUNCTIONAL STATUS - GENERAL
DRESSING REGULAR LOWER BODY CLOTHING: A LOT
PERSONAL GROOMING: A LITTLE
TOILETING: A LITTLE
HELP NEEDED FOR BATHING: A LITTLE
DAILY ACTIVITIY SCORE: 18
DRESSING REGULAR UPPER BODY CLOTHING: A LITTLE

## 2024-07-28 ASSESSMENT — ACTIVITIES OF DAILY LIVING (ADL): HOME_MANAGEMENT_TIME_ENTRY: 13

## 2024-07-28 NOTE — PROGRESS NOTES
ENDOVASCULAR/LIMB SALVAGE PROGRESS NOTE    Subjective Data:    Covering vascular surgery service today    Patient reports to be doing well.  Reports some mild foot pain, which she attributes to her neuropathy.  But overall feels better.  Had 1 small bowel movement this morning      Objective Data:  Last Recorded Vitals:  Vitals:    07/28/24 0016 07/28/24 0600 07/28/24 0800 07/28/24 1200   BP: 155/78  164/72 107/50   BP Location: Left arm  Left arm Right arm   Patient Position: Lying  Lying Sitting   Pulse: 80  78 73   Resp: 16  18 18   Temp: 37.2 °C (99 °F)  36.2 °C (97.2 °F) 36.5 °C (97.7 °F)   TempSrc: Temporal  Temporal Temporal   SpO2: 94%  96% 94%   Weight:  57.9 kg (127 lb 10.3 oz)     Height:           Last Labs:  CBC - 7/28/2024:  6:15 AM  8.3 8.5 228    27.4      CMP - 7/28/2024:  6:15 AM  7.6 6.2 13 --- 0.3   2.2 3.6 8 131      PTT - 7/26/2024:  3:29 AM  1.1   11.9 21     HGBA1C   Date/Time Value Ref Range Status   04/30/2024 03:09 PM 6.1 see below % Final     LDLCALC   Date/Time Value Ref Range Status   04/30/2024 03:09 PM 57 <=99 mg/dL Final     Comment:                                 Near   Borderline      AGE      Desirable  Optimal    High     High     Very High     0-19 Y     0 - 109     ---    110-129   >/= 130     ----    20-24 Y     0 - 119     ---    120-159   >/= 160     ----      >24 Y     0 -  99   100-129  130-159   160-189     >/=190       VLDL   Date/Time Value Ref Range Status   04/30/2024 03:09 PM 14 0 - 40 mg/dL Final   05/18/2023 01:53 PM 14 0 - 40 mg/dL Final   09/27/2022 02:41 PM 10 0 - 40 mg/dL Final   11/15/2021 02:26 PM 12 0 - 40 mg/dL Final      Last I/O:  I/O last 3 completed shifts:  In: 253.8 (4.4 mL/kg) [I.V.:253.8 (4.4 mL/kg)]  Out: - (0 mL/kg)   Weight: 57.9 kg     Inpatient Medications:  Scheduled medications   Medication Dose Route Frequency    amLODIPine  5 mg oral Daily    aspirin  81 mg oral Daily    docusate sodium  100 mg oral BID    magnesium oxide  400 mg oral  Daily    potassium phosphate  15 mmol intravenous Once    rivaroxaban  2.5 mg oral BID    rosuvastatin  5 mg oral Nightly     PRN medications   Medication    acetaminophen    dextrose    glucagon    hydrALAZINE    HYDROmorphone    naloxone    oxyCODONE     Continuous Medications   Medication Dose Last Rate       Physical Exam:  General: NAD, well-appearing  HEENT: moist mucous membranes, no jaundice  Neck: No JVD, no carotid bruit  Lungs: CTA maureen, no wheezing or rales  Cardiac: RRR, no murmurs  Abdomen: soft, non-tender, non-distended  Extremities: Palpable femoral pulses, left bypass graft incisions look clear dry and intact, dopplerable DP and PT signal, left foot edema  Skin: warm, dry  Neurologic: AAOx3,  no focal deficits       Assessment/Plan   #PAD, CLTI  #Left anterior calf wound  #Status post left femoral endarterectomy and left fem-PT bypass with reversed GSV    -Patient appears to be improving well clinically.  On vascular exam she has PT and DP signals  -Needs edema control.  Continue to reinforce importance of leg elevation with pillows to control leg edema  -Labs appear stable with mild postoperative anemia.  Continue to monitor  -Continue to work with PT OT.  May need acute rehab.  Particularly since patient has stairs at home  -On aspirin and Xarelto 2.5 mg twice daily.  Increase Crestor to high intensity dose  -Appreciate internal medicine team recommendations    Code Status:  Full Code        Dany Drake MD

## 2024-07-28 NOTE — PROGRESS NOTES
Occupational Therapy    OT Treatment    Patient Name: Mechelle Alcantara  MRN: [id number]  Today's Date: 7/28/2024  Time Calculation  Start Time: 0948  Stop Time: 1011  Time Calculation (min): 23 min      Assessment:  End of Session Communication: Bedside nurse  End of Session Patient Position: Up in chair, Alarm on     Plan:  Treatment Interventions: ADL retraining, Functional transfer training, Endurance training  OT Frequency: 5 times per week  Treatment Interventions: ADL retraining, Functional transfer training, Endurance training    Subjective   Previous Visit Info:  OT Last Visit  OT Received On: 07/28/24  General:  General  Reason for Referral: Pt underwent redo left femoral endarterectomy with left femoral to PTA bypass with harvested, reversed GSV 7/25/24.  Prior to Session Communication: Bedside nurse  Patient Position Received: Bed, 3 rail up, Alarm on  Precautions:  LE Weight Bearing Status: Weight Bearing as Tolerated  Medical Precautions: Fall precautions     Pain:  Pain Assessment  Pain Assessment: 0-10  0-10 (Numeric) Pain Score:  (not rated)  Pain Type: Surgical pain  Pain Location: Leg  Pain Orientation: Left    Objective    Cognition:  Cognition  Overall Cognitive Status: Impaired  Orientation Level: Oriented X4  Following Commands: Follows multistep commands with repetition  Safety Judgment: Decreased awareness of need for safety precautions  Problem Solving: Assistance required to implement solutions  Insight: Mild  Impulsive: Mildly     Activities of Daily Living: LE Dressing  LE Dressing: Yes  Adult Briefs Level of Assistance:  (doffed with SBA. donned underwear with min a. pants mgmt while in stance with cga.)  LE Dressing Where Assessed: Recliner    Toileting  Toileting Level of Assistance: Distant supervision  Where Assessed: Toilet  Toileting Comments: seated with weight shifting  Functional Standing Tolerance:  Functional Standing Tolerance Comments: patient stood for a total of 7 mins this  date with fair/fair+ balance with 2-0 ue support as needed during functional ambulation/transfers and ADL tasks  Bed Mobility/Transfers: Bed Mobility  Bed Mobility: Yes  Bed Mobility 1  Bed Mobility 1: Supine to sitting  Level of Assistance 1:  (SBA)    Transfers  Transfer: Yes  Transfer 1  Technique 1: Sit to stand  Transfer Device 1: Walker, Gait belt  Transfer Level of Assistance 1: Minimum assistance, Minimal verbal cues  Trials/Comments 1: for hand placement and safety  Transfers 2  Transfer Device 2: Walker, Gait belt  Transfer Level of Assistance 2: Contact guard, Moderate verbal cues  Trials/Comments 2: functional ambulation    Toilet Transfers  Toilet Transfer Type: To and from  Toilet Transfer to: Standard toilet  Toilet Transfer Technique: Ambulating  Toilet Transfers: Contact guard  Toilet Transfers Comments: with mod verbal cues for safety and hand placement  Sitting Balance:  Dynamic Sitting Balance  Dynamic Sitting-Balance:  (good)  Standing Balance:  Dynamic Standing Balance  Dynamic Standing-Balance:  (fair/fair+)    Outcome Measures:[facility] Daily Activity  Putting on and taking off regular lower body clothing: A lot  Bathing (including washing, rinsing, drying): A little  Putting on and taking off regular upper body clothing: A little  Toileting, which includes using toilet, bedpan or urinal: A little  Taking care of personal grooming such as brushing teeth: A little  Eating Meals: None  Daily Activity - Total Score: 18    OP EDUCATION:  Education  Individual(s) Educated: Patient  Education Provided: Diagnosis & Precautions, Symptom management, Ergonomics and postural realignment, Joint protection and energy conservation, Fall precautons, Risk and benefits of OT discussed with patient or other, POC discussed and agreed upon  Diagnosis and Precautions: WBAT LLE  Equipment:  (AE, EC tech, home DME)  Patient/Caregiver Demonstrated Understanding: yes  Plan of Care Discussed and Agreed Upon: yes  Patient  Response to Education: Patient/Caregiver Verbalized Understanding of Information    Goals:  Encounter Problems       Encounter Problems (Active)       Dressings Lower Extremities       STG - Patient to complete lower body dressing SUP (Progressing)       Start:  07/26/24    Expected End:  08/09/24               Functional Balance       LTG - Patient will maintain standing and sitting balance to allow for completion of daily activities (Progressing)       Start:  07/26/24    Expected End:  08/09/24               Grooming       STG - Patient completes grooming SUP (Progressing)       Start:  07/26/24    Expected End:  08/09/24               OT Transfers       STG - Patient will perform toilet transfer SUP (Progressing)       Start:  07/26/24    Expected End:  08/09/24

## 2024-07-28 NOTE — PROGRESS NOTES
"Mechelle Alcantara is a 78 y.o. female on day 3 of admission presenting with PAD (peripheral artery disease) (CMS-HCC).    Subjective   Patient seen and examined at bedside.  Patient reports she is doing well today. She had a BM and only has occasional neuropathic pain. She denies any other complaints. She is hopeful to go home with family on d/c. They do have a cottage that is all on one floor.        Objective     Physical Exam    Constitutional: Well developed, no distress, alert and cooperative  Skin: Warm and dry  Eyes: EOMI, clear sclera  ENMT: mucous membranes moist  Respiratory: Patent airways, CTAB  Cardiovascular: Regular, rate and rhythm  Abdominal: Nondistended, soft, non-tender, +BS  MSK: ROM intact, LLE swelling with persistent mild warmth and erythema of distal LLE, incision well approximated, dressing in place   Neuro: alert and oriented x3    Last Recorded Vitals  Blood pressure 107/50, pulse 73, temperature 36.5 °C (97.7 °F), temperature source Temporal, resp. rate 18, height 1.676 m (5' 6\"), weight 57.9 kg (127 lb 10.3 oz), SpO2 94%.  Intake/Output last 3 Shifts:  I/O last 3 completed shifts:  In: 253.8 (4.4 mL/kg) [I.V.:253.8 (4.4 mL/kg)]  Out: - (0 mL/kg)   Weight: 57.9 kg     Relevant Results  Scheduled medications  amLODIPine, 5 mg, oral, Daily  aspirin, 81 mg, oral, Daily  docusate sodium, 100 mg, oral, BID  magnesium oxide, 400 mg, oral, Daily  rivaroxaban, 2.5 mg, oral, BID  rosuvastatin, 20 mg, oral, Nightly      Continuous medications     PRN medications  PRN medications: acetaminophen, dextrose, glucagon, hydrALAZINE, HYDROmorphone, naloxone, oxyCODONE  Results from last 7 days   Lab Units 07/28/24  0615 07/27/24  0653 07/26/24  0329   WBC AUTO x10*3/uL 8.3 8.9 5.4   RBC AUTO x10*6/uL 2.85* 3.12* 2.69*   HEMOGLOBIN g/dL 8.5* 9.3* 8.2*     Results from last 7 days   Lab Units 07/28/24  0615 07/27/24  0653 07/26/24  0329   SODIUM mmol/L 136 135* 134*   POTASSIUM mmol/L 3.6 4.0 4.5 "   CHLORIDE mmol/L 105 105 105   CO2 mmol/L 25 23 25   BUN mg/dL 10 14 17   CREATININE mg/dL 0.53 0.65 0.65   CALCIUM mg/dL 7.6* 7.6* 7.4*   PHOSPHORUS mg/dL 2.2* 2.4* 3.2   MAGNESIUM mg/dL 1.93 2.24 2.17       No results found.     Assessment/Plan   Principal Problem:    PAD (peripheral artery disease) (CMS-HCC)  Active Problems:    Non-healing wound of left lower extremity  HTN    -vascular primary   -LLE with swelling and slight distal warmth/erythema similar to yesterday   -encourage LE elevation   -Phos 2.2; replaced   -labs/VSS   -goal -150 per vascular; pt has been within this goal   -continue pain regimen of tylenol and oxy PRN   -continue on xarelto in addition to aspirin for PAD per vascular   -continue other home meds as indicated   -PT/OT - pt hopeful to go home with family on d/c    Thank you for the consult, we will continue to follow. Please call with questions.     Keara Diaz DO   Hospitalist

## 2024-07-28 NOTE — CARE PLAN
The patient's goals for the shift include      The clinical goals for the shift include pulses will remain present

## 2024-07-29 LAB
ANION GAP SERPL CALC-SCNC: 10 MMOL/L (ref 10–20)
BUN SERPL-MCNC: 11 MG/DL (ref 6–23)
CALCIUM SERPL-MCNC: 7.7 MG/DL (ref 8.6–10.3)
CHLORIDE SERPL-SCNC: 106 MMOL/L (ref 98–107)
CO2 SERPL-SCNC: 24 MMOL/L (ref 21–32)
CREAT SERPL-MCNC: 0.54 MG/DL (ref 0.5–1.05)
EGFRCR SERPLBLD CKD-EPI 2021: >90 ML/MIN/1.73M*2
ERYTHROCYTE [DISTWIDTH] IN BLOOD BY AUTOMATED COUNT: 13.8 % (ref 11.5–14.5)
GLUCOSE BLD MANUAL STRIP-MCNC: 89 MG/DL (ref 74–99)
GLUCOSE BLD MANUAL STRIP-MCNC: 91 MG/DL (ref 74–99)
GLUCOSE BLD MANUAL STRIP-MCNC: 93 MG/DL (ref 74–99)
GLUCOSE SERPL-MCNC: 91 MG/DL (ref 74–99)
HCT VFR BLD AUTO: 23.9 % (ref 36–46)
HGB BLD-MCNC: 7.7 G/DL (ref 12–16)
MAGNESIUM SERPL-MCNC: 1.91 MG/DL (ref 1.6–2.4)
MCH RBC QN AUTO: 30.1 PG (ref 26–34)
MCHC RBC AUTO-ENTMCNC: 32.2 G/DL (ref 32–36)
MCV RBC AUTO: 93 FL (ref 80–100)
NRBC BLD-RTO: 0 /100 WBCS (ref 0–0)
PHOSPHATE SERPL-MCNC: 3.1 MG/DL (ref 2.5–4.9)
PLATELET # BLD AUTO: 259 X10*3/UL (ref 150–450)
POTASSIUM SERPL-SCNC: 3.7 MMOL/L (ref 3.5–5.3)
RBC # BLD AUTO: 2.56 X10*6/UL (ref 4–5.2)
SODIUM SERPL-SCNC: 136 MMOL/L (ref 136–145)
WBC # BLD AUTO: 6.7 X10*3/UL (ref 4.4–11.3)

## 2024-07-29 PROCEDURE — 36415 COLL VENOUS BLD VENIPUNCTURE: CPT | Performed by: STUDENT IN AN ORGANIZED HEALTH CARE EDUCATION/TRAINING PROGRAM

## 2024-07-29 PROCEDURE — 97535 SELF CARE MNGMENT TRAINING: CPT | Mod: GO,CO

## 2024-07-29 PROCEDURE — 84100 ASSAY OF PHOSPHORUS: CPT | Performed by: STUDENT IN AN ORGANIZED HEALTH CARE EDUCATION/TRAINING PROGRAM

## 2024-07-29 PROCEDURE — 99233 SBSQ HOSP IP/OBS HIGH 50: CPT | Performed by: STUDENT IN AN ORGANIZED HEALTH CARE EDUCATION/TRAINING PROGRAM

## 2024-07-29 PROCEDURE — 2500000001 HC RX 250 WO HCPCS SELF ADMINISTERED DRUGS (ALT 637 FOR MEDICARE OP): Performed by: NURSE PRACTITIONER

## 2024-07-29 PROCEDURE — 2500000004 HC RX 250 GENERAL PHARMACY W/ HCPCS (ALT 636 FOR OP/ED): Performed by: STUDENT IN AN ORGANIZED HEALTH CARE EDUCATION/TRAINING PROGRAM

## 2024-07-29 PROCEDURE — 80048 BASIC METABOLIC PNL TOTAL CA: CPT | Performed by: STUDENT IN AN ORGANIZED HEALTH CARE EDUCATION/TRAINING PROGRAM

## 2024-07-29 PROCEDURE — 85027 COMPLETE CBC AUTOMATED: CPT | Performed by: STUDENT IN AN ORGANIZED HEALTH CARE EDUCATION/TRAINING PROGRAM

## 2024-07-29 PROCEDURE — 97530 THERAPEUTIC ACTIVITIES: CPT | Mod: GO,CO

## 2024-07-29 PROCEDURE — 97530 THERAPEUTIC ACTIVITIES: CPT | Mod: GP,CQ

## 2024-07-29 PROCEDURE — 2500000001 HC RX 250 WO HCPCS SELF ADMINISTERED DRUGS (ALT 637 FOR MEDICARE OP): Performed by: SURGERY

## 2024-07-29 PROCEDURE — 83735 ASSAY OF MAGNESIUM: CPT | Performed by: STUDENT IN AN ORGANIZED HEALTH CARE EDUCATION/TRAINING PROGRAM

## 2024-07-29 PROCEDURE — 82947 ASSAY GLUCOSE BLOOD QUANT: CPT

## 2024-07-29 PROCEDURE — 97110 THERAPEUTIC EXERCISES: CPT | Mod: GO,CO

## 2024-07-29 PROCEDURE — 2500000002 HC RX 250 W HCPCS SELF ADMINISTERED DRUGS (ALT 637 FOR MEDICARE OP, ALT 636 FOR OP/ED): Performed by: STUDENT IN AN ORGANIZED HEALTH CARE EDUCATION/TRAINING PROGRAM

## 2024-07-29 PROCEDURE — 97116 GAIT TRAINING THERAPY: CPT | Mod: GP,CQ

## 2024-07-29 PROCEDURE — 1100000001 HC PRIVATE ROOM DAILY

## 2024-07-29 PROCEDURE — 99232 SBSQ HOSP IP/OBS MODERATE 35: CPT | Performed by: NURSE PRACTITIONER

## 2024-07-29 RX ORDER — CEPHALEXIN 500 MG/1
500 CAPSULE ORAL EVERY 6 HOURS SCHEDULED
Status: DISCONTINUED | OUTPATIENT
Start: 2024-07-29 | End: 2024-07-30 | Stop reason: HOSPADM

## 2024-07-29 RX ADMIN — Medication 400 MG: at 08:48

## 2024-07-29 RX ADMIN — ROSUVASTATIN CALCIUM 20 MG: 20 TABLET, FILM COATED ORAL at 20:14

## 2024-07-29 RX ADMIN — ASPIRIN 81 MG: 81 TABLET, COATED ORAL at 08:48

## 2024-07-29 RX ADMIN — RIVAROXABAN 2.5 MG: 2.5 TABLET, FILM COATED ORAL at 08:48

## 2024-07-29 RX ADMIN — CEPHALEXIN 500 MG: 500 CAPSULE ORAL at 23:15

## 2024-07-29 RX ADMIN — AMLODIPINE BESYLATE 5 MG: 5 TABLET ORAL at 08:48

## 2024-07-29 RX ADMIN — CEPHALEXIN 500 MG: 500 CAPSULE ORAL at 17:11

## 2024-07-29 RX ADMIN — RIVAROXABAN 2.5 MG: 2.5 TABLET, FILM COATED ORAL at 17:11

## 2024-07-29 ASSESSMENT — PAIN SCALES - GENERAL
PAINLEVEL_OUTOF10: 5 - MODERATE PAIN
PAINLEVEL_OUTOF10: 0 - NO PAIN
PAINLEVEL_OUTOF10: 0 - NO PAIN
PAINLEVEL_OUTOF10: 2

## 2024-07-29 ASSESSMENT — COGNITIVE AND FUNCTIONAL STATUS - GENERAL
MOVING TO AND FROM BED TO CHAIR: A LOT
TOILETING: A LITTLE
DAILY ACTIVITIY SCORE: 20
CLIMB 3 TO 5 STEPS WITH RAILING: A LOT
DAILY ACTIVITIY SCORE: 19
TURNING FROM BACK TO SIDE WHILE IN FLAT BAD: A LITTLE
MOVING FROM LYING ON BACK TO SITTING ON SIDE OF FLAT BED WITH BEDRAILS: A LITTLE
DRESSING REGULAR LOWER BODY CLOTHING: A LITTLE
WALKING IN HOSPITAL ROOM: A LOT
TURNING FROM BACK TO SIDE WHILE IN FLAT BAD: A LITTLE
MOBILITY SCORE: 14
MOBILITY SCORE: 14
TOILETING: A LITTLE
HELP NEEDED FOR BATHING: A LITTLE
MOVING FROM LYING ON BACK TO SITTING ON SIDE OF FLAT BED WITH BEDRAILS: A LITTLE
DRESSING REGULAR UPPER BODY CLOTHING: A LITTLE
DRESSING REGULAR LOWER BODY CLOTHING: A LOT
MOVING TO AND FROM BED TO CHAIR: A LITTLE
PERSONAL GROOMING: A LITTLE
TOILETING: A LITTLE
MOVING TO AND FROM BED TO CHAIR: A LOT
STANDING UP FROM CHAIR USING ARMS: A LOT
HELP NEEDED FOR BATHING: A LITTLE
WALKING IN HOSPITAL ROOM: A LITTLE
MOBILITY SCORE: 17
STANDING UP FROM CHAIR USING ARMS: A LITTLE
HELP NEEDED FOR BATHING: A LITTLE
DRESSING REGULAR UPPER BODY CLOTHING: A LITTLE
MOVING FROM LYING ON BACK TO SITTING ON SIDE OF FLAT BED WITH BEDRAILS: A LITTLE
STANDING UP FROM CHAIR USING ARMS: A LOT
DRESSING REGULAR LOWER BODY CLOTHING: A LOT
WALKING IN HOSPITAL ROOM: A LOT
TURNING FROM BACK TO SIDE WHILE IN FLAT BAD: A LITTLE
CLIMB 3 TO 5 STEPS WITH RAILING: A LOT
DAILY ACTIVITIY SCORE: 19
CLIMB 3 TO 5 STEPS WITH RAILING: A LOT

## 2024-07-29 ASSESSMENT — ACTIVITIES OF DAILY LIVING (ADL)
HOME_MANAGEMENT_TIME_ENTRY: 12
EFFECT OF PAIN ON DAILY ACTIVITIES: .

## 2024-07-29 ASSESSMENT — PAIN - FUNCTIONAL ASSESSMENT
PAIN_FUNCTIONAL_ASSESSMENT: 0-10

## 2024-07-29 NOTE — PROGRESS NOTES
Physical Therapy    Physical Therapy    Physical Therapy Treatment    Patient Name: Mechelle Alcantara  MRN: [id number]  Today's Date: 7/29/2024  Time Calculation  Start Time: 1010  Stop Time: 1036  Time Calculation (min): 26 min     3125/3125-A     07/29/24 1010   PT  Visit   PT Received On 07/29/24   Response to Previous Treatment Patient with no complaints from previous session.   General   Reason for Referral Pt underwent redo left femoral endarterectomy with left femoral to PTA bypass with harvested, reversed GSV 7/25/24.   Referred By Africa Hoover MD   Past Medical History Relevant to Rehab Pt underwent redo left femoral endarterectomy with left femoral to PTA bypass with harvested, reversed GSV 7/25/24. PMH: PAD, HTN, neuropathy, left leg cellulitis   Prior to Session Communication Bedside nurse   Patient Position Received Bed, 3 rail up;Alarm on   General Comment Pt agreeable to terapy and cleared for participation   Precautions   LE Weight Bearing Status Weight Bearing as Tolerated  (LLE)   Medical Precautions Fall precautions   Pain Assessment   Pain Assessment 0-10   0-10 (Numeric) Pain Score 2   Pain Type Surgical pain   Pain Location Leg   Pain Orientation Left   Effect of Pain on Daily Activities .   Cognition   Overall Cognitive Status WFL   Orientation Level Oriented X4   Therapeutic Exercise   Therapeutic Exercise Performed Yes   Therapeutic Exercise Activity 1 AP/LAQ-limited ROM LLE x10   Therapeutic Activity   Therapeutic Activity Performed Yes   Therapeutic Activity 1 standing activities performed- unsupported and unilateral UE support, CGA/SBA for safety   Bed Mobility   Bed Mobility Yes   Bed Mobility 1   Bed Mobility 1 Supine to sitting   Level of Assistance 1 Contact guard;Close supervision   Ambulation/Gait Training   Ambulation/Gait Training Performed Yes   Ambulation/Gait Training 1   Surface 1 Level tile   Device 1 Rolling walker   Gait Support Devices Gait belt   Assistance 1 Contact  "guard;Close supervision   Quality of Gait 1 Diminished heel strike;Decreased step length;Forward flexed posture   Comments/Distance (ft) 1 40'x2, 60'x2 slow step to progressing to minimal step through d/t pt not placing L foot flat on floor, pt is walking on ball of foot only.  Max cues and demo to place L foot flat, pt states she is \"unable to do that right now\"   Transfers   Transfer Yes   Transfer 1   Transfer From 1 Bed to   Transfer to 1 Chair with arms   Technique 1 Stand to sit;Sit to stand   Transfer Device 1 Walker;Gait belt   Transfer Level of Assistance 1 Minimum assistance;Contact guard;Moderate verbal cues   Trials/Comments 1 Mod cues for safe approach to chair and for safe UE placement and sequencing for increased sfatey. poor>fair follow through   Transfers 2   Transfer From 2 Sit to   Transfer to 2 Stand   Technique 2 Sit to stand;Stand to sit   Transfer Device 2 Walker;Gait belt   Transfer Level of Assistance 2 Close supervision   Trials/Comments 2 multiple transfers from chair level SBA/CGA for safety   Stairs   Stairs Yes   Stairs   Rails 1 Right   Device 1 Railing   Support Devices 1 Gait belt   Assistance 1 Minimum assistance   Comment/Number of Steps 1 x4 Lorna x 1-2 with cues for sequencing and safety. Fair follow through with frequent cueing. mildly unsteady.   Activity Tolerance   Endurance Tolerates 10 - 20 min exercise with multiple rests   PT Assessment   PT Assessment Results Decreased strength;Decreased endurance;Impaired balance;Decreased mobility   Rehab Prognosis Good   End of Session Communication Bedside nurse   Assessment Comment Pt put forth good effort. CGA/SBA for bed mobility, transfers and ambulation, minAx1 for stair training, mildly unsteady with frequent cueing required.   End of Session Patient Position Up in chair;Alarm on       Outcome Measures:  [facility] Basic Mobility  Turning from your back to your side while in a flat bed without using bedrails: A little  Moving from " lying on your back to sitting on the side of a flat bed without using bedrails: A little  Moving to and from bed to chair (including a wheelchair): A little  Standing up from a chair using your arms (e.g. wheelchair or bedside chair): A little  To walk in hospital room: A little  Climbing 3-5 steps with railing: A lot  Basic Mobility - Total Score: 17                             EDUCATION:  Outpatient Education  Individual(s) Educated: Patient  Education Provided: Fall Risk  Education Documentation  Body Mechanics, taught by Daphne Thakur PTA at 7/29/2024 11:39 AM.  Learner: Patient  Readiness: Acceptance  Method: Explanation, Demonstration  Response: Verbalizes Understanding, Demonstrated Understanding, Needs Reinforcement    Precautions, taught by Daphne Thakur PTA at 7/29/2024 11:39 AM.  Learner: Patient  Readiness: Acceptance  Method: Explanation, Demonstration  Response: Verbalizes Understanding, Demonstrated Understanding, Needs Reinforcement    ADL Training, taught by Daphne Thakur PTA at 7/29/2024 11:39 AM.  Learner: Patient  Readiness: Acceptance  Method: Explanation, Demonstration  Response: Verbalizes Understanding, Demonstrated Understanding, Needs Reinforcement    Precautions, taught by Daphne Thakur PTA at 7/29/2024 11:39 AM.  Learner: Patient  Readiness: Acceptance  Method: Explanation, Demonstration  Response: Verbalizes Understanding, Demonstrated Understanding, Needs Reinforcement    Body Mechanics, taught by Daphne Thakur PTA at 7/29/2024 11:39 AM.  Learner: Patient  Readiness: Acceptance  Method: Explanation, Demonstration  Response: Verbalizes Understanding, Demonstrated Understanding, Needs Reinforcement    Mobility Training, taught by Daphne Thakur PTA at 7/29/2024 11:39 AM.  Learner: Patient  Readiness: Acceptance  Method: Explanation, Demonstration  Response: Verbalizes Understanding, Demonstrated Understanding, Needs Reinforcement    Education Comments  No comments  found.        GOALS:  Encounter Problems       Encounter Problems (Active)       PT Problem       Pt will perform bed mobility with mod I.  (Progressing)       Start:  07/26/24    Expected End:  08/09/24            Pt will complete sit <> stand and bed <> chair transfers with SBA.  (Progressing)       Start:  07/26/24    Expected End:  08/09/24            Pt will ambulate 150 feet SBA with no significant gait deviations.   (Progressing)       Start:  07/26/24    Expected End:  08/09/24            Pt will ascend/descend at least 4 stairs in order to navigate home environment.   (Progressing)       Start:  07/26/24    Expected End:  08/09/24            Pt will progress to completing 3 x 20 supine/seated exercises in order to increase strength and improve gait mechanics.   (Progressing)       Start:  07/26/24    Expected End:  08/09/24                   Pain - Adult

## 2024-07-29 NOTE — PROGRESS NOTES
Subjective Data:  77yo patient POD#4 redo left femoral endarterectomy with left femoral to PTA bypass with harvested, reversed GSV.     Overnight Events:    Isolated fever 37.8, otherwise stable.      Patient asleep in bed, unaccompanied. Denies abdominal pain and/or back pain. Denies non-joint related pain (I.e. aching, burning, cramping, discomfort) in buttock, thigh, calf, or ankle. Endorses intermittent neuropathy in left foot overnight. Eating drinking well. Denies N/V/D. Small BM 7/28.  Per nursing notes, patient able to ambulate with walker to bathroom overnight.     Objective Data:  Last Recorded Vitals:  Vitals:    07/28/24 1600 07/28/24 2000 07/29/24 0000 07/29/24 0848   BP: 158/67 136/61 141/71 129/60   BP Location:  Left arm Right arm    Patient Position:  Sitting Lying    Pulse: 80 82 72 77   Resp: 17 18 17    Temp: 36.4 °C (97.5 °F) 37.8 °C (100 °F) 36.8 °C (98.2 °F)    TempSrc:  Temporal Temporal    SpO2: 95% 93% 98%    Weight:       Height:         Last Labs:  CBC - 7/29/2024:  6:54 AM  6.7 7.7 259    23.9      CMP - 7/29/2024:  6:54 AM  7.7 6.2 13 --- 0.3   3.1 3.6 8 131      PTT - 7/26/2024:  3:29 AM  1.1   11.9 21      Last I/O:  I/O last 3 completed shifts:  In: 830 (14.3 mL/kg) [P.O.:580; IV Piggyback:250]  Out: - (0 mL/kg)   Weight: 57.9 kg     EKG:  ECG 12 Lead 07/12/2024 (Preliminary)  Normal sinus rhythm  Left anterior fascicular block  Cannot rule out Inferior infarct (masked by fascicular block?) , age undetermined  Anterior infarct , age undetermined  Abnormal ECG  No previous ECGs available     Echo:  No results found for this or any previous visit from the past 1095 days.     Cath:  No results found for this or any previous visit from the past 1095 days.     Stress Test:  NM Stress 4/13/22  CONCLUSION Clinically, electrocardiographically, and echocardiographically normal stress echocardiogram, with no evidence of stress-induced ischemia at maximum workload. Patient demonstrated limited  functional capacity      Vascular Imaging:  Vasc US LE Vein Mapping Bilateral 5/31/24  CONCLUSIONS:  Right Lower Vein Mapping: The right great saphenous vein appears widely patent with no evidence of thrombosis or fibrosis. The great saphenous vein branches at mid calf. There is chronic post-thrombotic change in the small saphenous vein at proximal. Lymph node measuring 0.7 cm x 1.3 cm noted in groin.  Left Lower Vein Mapping: The left great saphenous vein appears widely patent with no evidence of thrombosis or fibrosis. The great saphenous vein branches at the mid thigh and mid calf. Lymph node measuring 1.0 cm x 1.9 cm noted in groin.    Vascular US KATHLEEN w/o Exercise 4/19/24   CONCLUSIONS:  Right Lower PVR: No evidence of arterial occlusive disease in the right lower extremity at rest. Normal digital perfusion noted. Biphasic flow is noted in the right posterior tibial artery and right dorsalis pedis artery. Triphasic flow is noted in the right common femoral artery.  Left Lower PVR: Evidence of moderate arterial occlusive disease in the left lower extremity at rest. Decreased digital perfusion noted. Monophasic flow is noted in the left common femoral artery, left posterior tibial artery and left dorsalis pedis artery.    Inpatient Medications:  Scheduled medications   Medication Dose Route Frequency    amLODIPine  5 mg oral Daily    aspirin  81 mg oral Daily    docusate sodium  100 mg oral BID    magnesium oxide  400 mg oral Daily    rivaroxaban  2.5 mg oral BID    rosuvastatin  20 mg oral Nightly     PRN medications   Medication    acetaminophen    dextrose    glucagon    hydrALAZINE    naloxone    oxyCODONE     Continuous Medications   Medication Dose Last Rate     Physical Exam:  Vital Signs Reviewed.  Constitutional:       General: A&Ox3. No apparent acute distress.   HENT:      Head: Normocephalic and atraumatic.      Mouth: Mucous membranes are moist.  Cardiovascular:      Rate and Rhythm: Normal rate and  regular rhythm.      Heart sounds: Normal heart sounds.    +2 right femoral pulse +2 left femoral pulse   +2 palpable right DP & PT. Able to doppler left DP & PT.      Bilateral lower extremities warm to touch. +1 pitting edema and erythema to LLE.      Left groin dressing intact, no drainage noted. Left Lower extremity medial incision clean/dry/intact thigh to calf.  Pulmonary:      Effort: Pulmonary effort is normal. No respiratory distress.      Breath sounds: Normal breath sounds.   Abdominal:      General: Bowel sounds are normal. There is no distension.      Palpations: Abdomen is soft.   Skin:     General: Skin is warm and dry.    Comments: Right thigh notable for telangiectases. Left lower extremity wound w/dressing intact on left medial distal calf and left lateral calf.      Assessment/Plan   PAD   POD #4 Redo Left Femoral Endarterectomy with Left Femoral to PTA Bypass with grafted, reversed GSV  Patient endorses intermittent neuropathy pain in left foot, alleviated w/pain medication prn overnight.    +2 palpable right DP & PT. Able to doppler left DP & PT signal.   Bilateral lower extremities warm to touch. +1 pitting edema and erythema to LLE.   Left groin dressing intact, no drainage noted. Left Lower extremity medial incision clean/dry/intact thigh to calf.  Continue Aspirin 81mg every day, Xarelto 2.5mg BID and Crestor 5mg every day.   Patient states she would like to be discharged home with HHC & PT/OT, she does not want to go to rehab facility at discharge.   Start Cephalexin 500mg Q6 for 7 days.     Venous Ulcers  Left lower extremity wound w/dressing intact on left medial distal calf and left lateral calf.   Order for calcium alginate and mepilex dressing to be changed daily.     I spent 35 minutes in the professional and overall care of this patient.    Dulce Angeles, APRN-CNP  Vascular Surgery  [facility] [place]  [facility]

## 2024-07-29 NOTE — PROGRESS NOTES
07/29/24 1139   Discharge Planning   Home or Post Acute Services In home services   Type of Home Care Services Home nursing visits;Home OT;Home PT   Expected Discharge Disposition  Services   Does the patient need discharge transport arranged? No     Introduced myself to the patient and my role in her discharge process. I reviewed with her the recommendations by therapy, but she is insistent she wants to return home but is agreeable to home care for therapy and nurse for wound care. Patient's reference is [facility]. Dr. Latif to follow for home care orders. Her  is the teachable care giver for any wound care needs she may have. .

## 2024-07-29 NOTE — NURSING NOTE
2200  Ambulated with walker and sbax1 to br. Gait steady slow toe touch weight bearing. Doppled pulses 2+ to left pedal and dp. 1+ edema. Redness down suture line length. Left leg warm and dry. Denies numbness/tingling. Line well approximated.

## 2024-07-29 NOTE — PROGRESS NOTES
Occupational Therapy    OT Treatment    Patient Name: Mechelle Alcantara  MRN: [id number]  Today's Date: 7/29/2024  Time Calculation  Start Time: 0934  Stop Time: 1012  Time Calculation (min): 38 min       3125/3125-A    Assessment:  OT Assessment: Patient motivated to engage in therapy session  Prognosis: Good  End of Session Communication: Bedside nurse  End of Session Patient Position: Bed, 3 rail up, Alarm on  OT Assessment Results: Decreased ADL status, Decreased endurance, Decreased functional mobility  Prognosis: Good    Plan:  Treatment Interventions: ADL retraining, Functional transfer training  OT Frequency: 5 times per week  Treatment Interventions: ADL retraining, Functional transfer training  Subjective    07/29/24 0934   OT Last Visit   OT Received On 07/29/24   General   Reason for Referral Pt underwent redo left femoral endarterectomy with left femoral to PTA bypass with harvested, reversed GSV 7/25/24.   Prior to Session Communication Bedside nurse   Patient Position Received Bed, 3 rail up;Alarm on   Preferred Learning Style verbal;visual   General Comment Patient is agreeable to therapy, stated she is ready to get back to her baseline at home   Precautions   LE Weight Bearing Status Weight Bearing as Tolerated   Medical Precautions Fall precautions   Pain Assessment   Pain Assessment 0-10   0-10 (Numeric) Pain Score 5 - Moderate pain   Pain Type Surgical pain   Pain Location Leg   Pain Orientation Left   Pain Interventions Medication (See MAR)   Response to Interventions increases with movement   Cognition   Overall Cognitive Status WFL   Orientation Level Oriented X4   RUE    RUE  WFL   LUE    LUE WFL   Grooming   Grooming Level of Assistance Setup   Grooming Where Assessed Edge of bed   Grooming Comments facial hygiene completed   UE Bathing   UE Bathing Level of Assistance Minimum assistance   UE Bathing Where Assessed Edge of bed   UE Bathing Comments with wipes, assist with back   UE Dressing    UE Dressing Level of Assistance Minimum assistance   UE Dressing Where Assessed Edge of bed   UE Dressing Comments to doff/rupa gown, mgmt of lines   LE Dressing   LE Dressing Yes   Sock Level of Assistance Minimum assistance   LE Dressing Where Assessed Edge of bed   LE Dressing Comments to doff/rupa socks   Bed Mobility   Bed Mobility Yes   Bed Mobility 1   Bed Mobility 1 Supine to sitting   Level of Assistance 1 Contact guard   Bed Mobility Comments 1 use of bedrail for upright positioning   Bed Mobility 2   Bed Mobility  2 Sitting to supine   Level of Assistance 2 Contact guard;Minimum assistance   Bed Mobility Comments 2 use of bedrails for repositioning   Transfers   Transfer Yes   Transfer 1   Transfer From 1 Sit to   Transfer to 1 Stand   Technique 1 Sit to stand;Stand to sit   Transfer Device 1 Walker;Gait belt   Transfer Level of Assistance 1 Minimum assistance;Minimal verbal cues   Trials/Comments 1 assist for safe hand placement transfer, ww mgmt   Transfers 2   Transfer From 2 Stand to   Transfer to 2 Sit   Technique 2 Sit to stand;Stand to sit   Transfer Device 2 Walker;Gait belt   Transfer Level of Assistance 2 Contact guard;Minimum assistance   Trials/Comments 2 assist with hand/foot placement, pt unable to place LLE flat on floor, displays TTWB during mobility.   Therapeutic Exercise   Therapeutic Exercise Performed Yes   Therapeutic Exercise Activity 1 BUE AROM in all planes, 3 sets x10 reps, visual demo provided, additional time required to complete tasks d/t rest breaks.   Therapeutic Activity   Therapeutic Activity Performed Yes   Therapeutic Activity 1 ambulation within room, no LOB or dizziness noted, pt displays TTWB on LLE, stated she is having a difficult time placing foot flat on floor   IP OT Assessment   OT Assessment Patient motivated to engage in therapy session   Prognosis Good   End of Session Communication Bedside nurse   End of Session Patient Position Bed, 3 rail up;Alarm on    OT Assessment   OT Assessment Results Decreased ADL status;Decreased endurance;Decreased functional mobility   Education   Individual(s) Educated Patient   Education Provided Diagnosis & Precautions;Symptom management;Ergonomics and postural realignment;Joint protection and energy conservation;Fall precautons;Risk and benefits of OT discussed with patient or other;POC discussed and agreed upon   Diagnosis and Precautions WBAT LLE   Inpatient/Swing Bed or Outpatient   Inpatient/Swing Bed or Outpatient Inpatient   Inpatient Plan   Treatment Interventions ADL retraining;Functional transfer training   OT Frequency 5 times per week         Outcome Measures:[facility] Daily Activity  Putting on and taking off regular lower body clothing: A lot  Bathing (including washing, rinsing, drying): A little  Putting on and taking off regular upper body clothing: A little  Toileting, which includes using toilet, bedpan or urinal: A little  Taking care of personal grooming such as brushing teeth: None  Eating Meals: None  Daily Activity - Total Score: 19  Education Documentation  Body Mechanics, taught by NIXON Kwong at 7/29/2024 10:56 AM.  Learner: Patient  Readiness: Acceptance  Method: Explanation  Response: Verbalizes Understanding    Precautions, taught by NIXON Kwong at 7/29/2024 10:56 AM.  Learner: Patient  Readiness: Acceptance  Method: Explanation  Response: Verbalizes Understanding    ADL Training, taught by NIXON Kwong at 7/29/2024 10:56 AM.  Learner: Patient  Readiness: Acceptance  Method: Explanation  Response: Verbalizes Understanding    Education Comments  No comments found.      Goals:  Encounter Problems       Encounter Problems (Active)       Dressings Lower Extremities       STG - Patient to complete lower body dressing SUP (Progressing)       Start:  07/26/24    Expected End:  07/30/24               Functional Balance       LTG - Patient will maintain standing and sitting balance to allow for  completion of daily activities (Progressing)       Start:  07/26/24    Expected End:  08/09/24               Grooming       STG - Patient completes grooming SUP (Progressing)       Start:  07/26/24    Expected End:  07/30/24               OT Transfers       STG - Patient will perform toilet transfer SUP (Progressing)       Start:  07/26/24    Expected End:  08/09/24

## 2024-07-29 NOTE — PROGRESS NOTES
"Mechelle Alcantara is a 78 y.o. female on day 4 of admission presenting with PAD (peripheral artery disease) (CMS-[facility]).    Subjective   Patient seen and examined at bedside.  Patient reports she is doing well today. She denies pain or complaints. Upon questioning she does report some tightness to her LLE.        Objective     Physical Exam    Constitutional: Well developed, no distress, alert and cooperative  Skin: Warm and dry  Eyes: EOMI, clear sclera  ENMT: mucous membranes moist  Respiratory: Patent airways, CTAB  Cardiovascular: Regular, rate and rhythm  Abdominal: Nondistended, soft, non-tender, +BS  MSK: ROM intact, LLE swelling worse today with more reported tightness as well, continues to be very warm to touch, area of erythema is moving proximal/enlarging as well, incision well approximated, dressing in place   Neuro: alert and oriented x3    Last Recorded Vitals  Blood pressure 137/63, pulse 71, temperature 36.2 °C (97.2 °F), temperature source Temporal, resp. rate 18, height 1.676 m (5' 6\"), weight 57.9 kg (127 lb 10.3 oz), SpO2 97%.  Intake/Output last 3 Shifts:  I/O last 3 completed shifts:  In: 830 (14.3 mL/kg) [P.O.:580; IV Piggyback:250]  Out: - (0 mL/kg)   Weight: 57.9 kg     Relevant Results  Scheduled medications  amLODIPine, 5 mg, oral, Daily  aspirin, 81 mg, oral, Daily  docusate sodium, 100 mg, oral, BID  magnesium oxide, 400 mg, oral, Daily  rivaroxaban, 2.5 mg, oral, BID  rosuvastatin, 20 mg, oral, Nightly      Continuous medications     PRN medications  PRN medications: acetaminophen, dextrose, glucagon, hydrALAZINE, naloxone, oxyCODONE  Results from last 7 days   Lab Units 07/29/24  0654 07/28/24  0615 07/27/24  0653   WBC AUTO x10*3/uL 6.7 8.3 8.9   RBC AUTO x10*6/uL 2.56* 2.85* 3.12*   HEMOGLOBIN g/dL 7.7* 8.5* 9.3*     Results from last 7 days   Lab Units 07/29/24  0654 07/28/24  0615 07/27/24  0653   SODIUM mmol/L 136 136 135*   POTASSIUM mmol/L 3.7 3.6 4.0   CHLORIDE mmol/L 106 105 105 "   CO2 mmol/L 24 25 23   BUN mg/dL 11 10 14   CREATININE mg/dL 0.54 0.53 0.65   CALCIUM mg/dL 7.7* 7.6* 7.6*   PHOSPHORUS mg/dL 3.1 2.2* 2.4*   MAGNESIUM mg/dL 1.91 1.93 2.24       No results found.     Assessment/Plan   Principal Problem:    PAD (peripheral artery disease) (CMS-HCC)  Active Problems:    Non-healing wound of left lower extremity  HTN    -vascular primary   -LLE with persistent warmth, now with worsening swelling and erythema; T max 37.8 (100F) overnight  -nursing to outline area of erythema  -reached out to vascular to discuss findings   -encourage LE elevation   -labs/VSS   -goal -150 per vascular; pt has been mostly within this goal with a few outliers, will continue to monitor   -continue pain regimen of tylenol and oxy PRN   -continue on xarelto in addition to aspirin for PAD per vascular   -continue other home meds as indicated   -PT/OT - pt hopeful to go home with family on d/c    Thank you for the consult, we will continue to follow. Please call with questions.     Keara Diaz,    Hospitalist

## 2024-07-30 ENCOUNTER — DOCUMENTATION (OUTPATIENT)
Dept: HOME HEALTH SERVICES | Facility: HOME HEALTH | Age: 79
End: 2024-07-30
Payer: MEDICARE

## 2024-07-30 ENCOUNTER — HOME HEALTH ADMISSION (OUTPATIENT)
Dept: HOME HEALTH SERVICES | Facility: HOME HEALTH | Age: 79
End: 2024-07-30
Payer: MEDICARE

## 2024-07-30 ENCOUNTER — APPOINTMENT (OUTPATIENT)
Dept: WOUND CARE | Facility: CLINIC | Age: 79
End: 2024-07-30
Payer: MEDICARE

## 2024-07-30 VITALS
HEIGHT: 66 IN | SYSTOLIC BLOOD PRESSURE: 128 MMHG | HEART RATE: 72 BPM | BODY MASS INDEX: 21.75 KG/M2 | TEMPERATURE: 96.8 F | OXYGEN SATURATION: 97 % | RESPIRATION RATE: 16 BRPM | WEIGHT: 135.36 LBS | DIASTOLIC BLOOD PRESSURE: 68 MMHG

## 2024-07-30 LAB
ANION GAP SERPL CALC-SCNC: 11 MMOL/L (ref 10–20)
BLOOD EXPIRATION DATE: NORMAL
BLOOD EXPIRATION DATE: NORMAL
BUN SERPL-MCNC: 18 MG/DL (ref 6–23)
CALCIUM SERPL-MCNC: 7.7 MG/DL (ref 8.6–10.3)
CHLORIDE SERPL-SCNC: 108 MMOL/L (ref 98–107)
CO2 SERPL-SCNC: 23 MMOL/L (ref 21–32)
CREAT SERPL-MCNC: 0.54 MG/DL (ref 0.5–1.05)
DISPENSE STATUS: NORMAL
DISPENSE STATUS: NORMAL
EGFRCR SERPLBLD CKD-EPI 2021: >90 ML/MIN/1.73M*2
ERYTHROCYTE [DISTWIDTH] IN BLOOD BY AUTOMATED COUNT: 14 % (ref 11.5–14.5)
GLUCOSE SERPL-MCNC: 95 MG/DL (ref 74–99)
HCT VFR BLD AUTO: 25.5 % (ref 36–46)
HGB BLD-MCNC: 8.3 G/DL (ref 12–16)
MAGNESIUM SERPL-MCNC: 1.98 MG/DL (ref 1.6–2.4)
MCH RBC QN AUTO: 30.4 PG (ref 26–34)
MCHC RBC AUTO-ENTMCNC: 32.5 G/DL (ref 32–36)
MCV RBC AUTO: 93 FL (ref 80–100)
NRBC BLD-RTO: 0 /100 WBCS (ref 0–0)
PHOSPHATE SERPL-MCNC: 3 MG/DL (ref 2.5–4.9)
PLATELET # BLD AUTO: 315 X10*3/UL (ref 150–450)
POTASSIUM SERPL-SCNC: 3.6 MMOL/L (ref 3.5–5.3)
PRODUCT BLOOD TYPE: 6200
PRODUCT BLOOD TYPE: 6200
PRODUCT CODE: NORMAL
PRODUCT CODE: NORMAL
RBC # BLD AUTO: 2.73 X10*6/UL (ref 4–5.2)
SODIUM SERPL-SCNC: 138 MMOL/L (ref 136–145)
UNIT ABO: NORMAL
UNIT ABO: NORMAL
UNIT NUMBER: NORMAL
UNIT NUMBER: NORMAL
UNIT RH: NORMAL
UNIT RH: NORMAL
UNIT VOLUME: 350
UNIT VOLUME: 350
WBC # BLD AUTO: 5.9 X10*3/UL (ref 4.4–11.3)
XM INTEP: NORMAL
XM INTEP: NORMAL

## 2024-07-30 PROCEDURE — 85027 COMPLETE CBC AUTOMATED: CPT | Performed by: STUDENT IN AN ORGANIZED HEALTH CARE EDUCATION/TRAINING PROGRAM

## 2024-07-30 PROCEDURE — 99239 HOSP IP/OBS DSCHRG MGMT >30: CPT | Performed by: NURSE PRACTITIONER

## 2024-07-30 PROCEDURE — 83735 ASSAY OF MAGNESIUM: CPT | Performed by: STUDENT IN AN ORGANIZED HEALTH CARE EDUCATION/TRAINING PROGRAM

## 2024-07-30 PROCEDURE — 97535 SELF CARE MNGMENT TRAINING: CPT | Mod: GO,CO

## 2024-07-30 PROCEDURE — 2500000001 HC RX 250 WO HCPCS SELF ADMINISTERED DRUGS (ALT 637 FOR MEDICARE OP): Performed by: SURGERY

## 2024-07-30 PROCEDURE — 2500000001 HC RX 250 WO HCPCS SELF ADMINISTERED DRUGS (ALT 637 FOR MEDICARE OP): Performed by: NURSE PRACTITIONER

## 2024-07-30 PROCEDURE — 36415 COLL VENOUS BLD VENIPUNCTURE: CPT | Performed by: STUDENT IN AN ORGANIZED HEALTH CARE EDUCATION/TRAINING PROGRAM

## 2024-07-30 PROCEDURE — 80048 BASIC METABOLIC PNL TOTAL CA: CPT | Performed by: STUDENT IN AN ORGANIZED HEALTH CARE EDUCATION/TRAINING PROGRAM

## 2024-07-30 PROCEDURE — 2500000004 HC RX 250 GENERAL PHARMACY W/ HCPCS (ALT 636 FOR OP/ED): Performed by: STUDENT IN AN ORGANIZED HEALTH CARE EDUCATION/TRAINING PROGRAM

## 2024-07-30 PROCEDURE — 84100 ASSAY OF PHOSPHORUS: CPT | Performed by: STUDENT IN AN ORGANIZED HEALTH CARE EDUCATION/TRAINING PROGRAM

## 2024-07-30 PROCEDURE — 99233 SBSQ HOSP IP/OBS HIGH 50: CPT | Performed by: STUDENT IN AN ORGANIZED HEALTH CARE EDUCATION/TRAINING PROGRAM

## 2024-07-30 RX ORDER — CEPHALEXIN 500 MG/1
500 CAPSULE ORAL EVERY 6 HOURS SCHEDULED
Qty: 24 CAPSULE | Refills: 0 | Status: SHIPPED | OUTPATIENT
Start: 2024-07-30 | End: 2024-08-05

## 2024-07-30 RX ORDER — ROSUVASTATIN CALCIUM 20 MG/1
20 TABLET, COATED ORAL NIGHTLY
Qty: 30 TABLET | Refills: 0 | Status: SHIPPED | OUTPATIENT
Start: 2024-07-30 | End: 2024-08-29

## 2024-07-30 RX ORDER — CALCIUM CARBONATE 200(500)MG
500 TABLET,CHEWABLE ORAL ONCE
Status: DISCONTINUED | OUTPATIENT
Start: 2024-07-30 | End: 2024-07-30 | Stop reason: HOSPADM

## 2024-07-30 RX ADMIN — ASPIRIN 81 MG: 81 TABLET, COATED ORAL at 08:53

## 2024-07-30 RX ADMIN — CEPHALEXIN 500 MG: 500 CAPSULE ORAL at 12:30

## 2024-07-30 RX ADMIN — Medication 400 MG: at 08:53

## 2024-07-30 RX ADMIN — RIVAROXABAN 2.5 MG: 2.5 TABLET, FILM COATED ORAL at 08:54

## 2024-07-30 RX ADMIN — CEPHALEXIN 500 MG: 500 CAPSULE ORAL at 06:16

## 2024-07-30 RX ADMIN — AMLODIPINE BESYLATE 5 MG: 5 TABLET ORAL at 08:53

## 2024-07-30 ASSESSMENT — COGNITIVE AND FUNCTIONAL STATUS - GENERAL
WALKING IN HOSPITAL ROOM: A LOT
DAILY ACTIVITIY SCORE: 19
CLIMB 3 TO 5 STEPS WITH RAILING: A LOT
HELP NEEDED FOR BATHING: A LITTLE
PERSONAL GROOMING: A LITTLE
MOVING TO AND FROM BED TO CHAIR: A LOT
TOILETING: A LITTLE
DAILY ACTIVITIY SCORE: 21
MOBILITY SCORE: 15
TURNING FROM BACK TO SIDE WHILE IN FLAT BAD: A LITTLE
DRESSING REGULAR LOWER BODY CLOTHING: A LITTLE
DRESSING REGULAR LOWER BODY CLOTHING: A LITTLE
TOILETING: A LITTLE
HELP NEEDED FOR BATHING: A LITTLE
STANDING UP FROM CHAIR USING ARMS: A LOT
DRESSING REGULAR UPPER BODY CLOTHING: A LITTLE

## 2024-07-30 ASSESSMENT — PAIN SCALES - GENERAL
PAINLEVEL_OUTOF10: 0 - NO PAIN
PAINLEVEL_OUTOF10: 2

## 2024-07-30 ASSESSMENT — PAIN - FUNCTIONAL ASSESSMENT: PAIN_FUNCTIONAL_ASSESSMENT: 0-10

## 2024-07-30 ASSESSMENT — PAIN DESCRIPTION - DESCRIPTORS: DESCRIPTORS: SHARP

## 2024-07-30 ASSESSMENT — ACTIVITIES OF DAILY LIVING (ADL): HOME_MANAGEMENT_TIME_ENTRY: 31

## 2024-07-30 NOTE — CARE PLAN
The patient's goals for the shift include having a restful night.    The clinical goals for the shift include adequate peripheral vascularization.    Over the shift, the patient did not make progress toward the following goals. Barriers to progression include n/a. Recommendations to address these barriers include n/a.

## 2024-07-30 NOTE — HH CARE COORDINATION
Home Care received a Referral for Nursing, Physical Therapy, and Occupational Therapy. We have processed the referral for a Start of Care on 07/31/2024.     If you have any questions or concerns, please feel free to contact us at [phone]. Follow the prompts, enter your five digit zip code, and you will be directed to your care team on [facility] 1.

## 2024-07-30 NOTE — PROGRESS NOTES
Occupational Therapy    OT Treatment    Patient Name: Mechelle Alcantara  MRN: [id number]  Today's Date: 7/30/2024  Time Calculation  Start Time: 0957  Stop Time: 1028  Time Calculation (min): 31 min       3125/3125-A    Assessment:  End of Session Communication: Bedside nurse, Care Coordinator  End of Session Patient Position: Bed, 3 rail up, Alarm on       Plan:  OT Frequency: 5 times per week  OT Discharge Recommendations: High intensity level of continued care  Equipment Recommended upon Discharge: Wheeled walker (shower chair)     Subjective      07/30/24 0957   OT Last Visit   OT Received On 07/30/24   General   Reason for Referral Pt underwent redo left femoral endarterectomy with left femoral to PTA bypass with harvested, reversed GSV 7/25/24.   Referred By Africa Hoover MD   Prior to Session Communication Bedside nurse   Patient Position Received Bed, 3 rail up;Alarm on   General Comment Pt pleasant and agreeable to tx; cleared for participation.   Precautions   LE Weight Bearing Status Weight Bearing as Tolerated  (L LE)   Medical Precautions Fall precautions   Pain Assessment   0-10 (Numeric) Pain Score   (pt reports intermittent neuropathy pains, does not rate)   Cognition   Orientation Level Oriented X4   Grooming   Grooming Level of Assistance Contact guard   Grooming Where Assessed Standing sinkside   Grooming Comments Pt completed various grooming tasks standing at sink at fww level with cues to increase safety in stance, good follow through, unilateral UE support on counter.   LE Dressing   Pants Level of Assistance Close supervision   Sock Level of Assistance Close supervision   LE Dressing Where Assessed Edge of bed   LE Dressing Comments Pt adjusted L sock while seated EOB with comp technique elevating LE on bed. Pt demonstrated donning pants from seated level, educated in comp techniques to don L LE first with good follow through. Pt did not fully hike pants as did not prefer to wear- anticipate  CGA in stance while hiking.   Functional Standing Tolerance   Time ~5 minutes   Activity grooming ADLs   Functional Standing Tolerance Comments CGA with unilateral UE support; cues to encourage L foot flat on floor during stance with fair attempt and improvement noted   Bed Mobility 1   Bed Mobility 1 Supine to sitting   Level of Assistance 1 Close supervision   Bed Mobility Comments 1 HOB flat, no use of bed rails   Bed Mobility 2   Bed Mobility  2 Sitting to supine   Level of Assistance 2 Close supervision   Bed Mobility Comments 2 HOB flat, no use of bed rails, increased time   Functional Mobility   Functional Mobility Performed Yes   Functional Mobility 1   Assistance 1 Contact guard;Close supervision   Comments 1 Pt ambulated within room at fww level with SBA to CGA for safety; Pt noted to place L toes on floor, ecouraged flat foot as tolerated.   Transfer 1   Technique 1 Sit to stand;Stand to sit   Transfer Device 1 Gait belt;Walker   Transfer Level of Assistance 1 Minimum assistance;Contact guard   Trials/Comments 1 Initial STS from EOB level required Min A to boost, further STS trials from both EOB and recliner with CGA, cues for safety with good follow through.   Dynamic Sitting Balance   Dynamic Sitting-Balance Support No upper extremity supported   Dynamic Sitting-Balance Forward lean;Reaching across midline   Dynamic Sitting-Comments fair+, Sup   Dynamic Standing Balance   Dynamic Standing-Balance Support Left upper extremity supported   Dynamic Standing-Balance Reaching for objects;Reaching across midline   Dynamic Standing-Comments fair, CGA   Shower Transfers   Shower Transfers Comments Educated pt in benefits of shower chair with verbal understanding; provided with handout regarding DME. Pt reports is planning to dc home today. Encouraged waiting for  OT to assess shower safety with verbal understanding.   IP OT Assessment   End of Session Communication Bedside nurse;Care Coordinator   End of  Session Patient Position Bed, 3 rail up;Alarm on   Inpatient Plan   OT Frequency 5 times per week   OT Discharge Recommendations High intensity level of continued care   Equipment Recommended upon Discharge Wheeled walker  (shower chair)     Outcome Measures:[facility] Daily Activity  Putting on and taking off regular lower body clothing: A little  Bathing (including washing, rinsing, drying): A little  Putting on and taking off regular upper body clothing: A little  Toileting, which includes using toilet, bedpan or urinal: A little  Taking care of personal grooming such as brushing teeth: A little  Eating Meals: None  Daily Activity - Total Score: 19  Education Documentation  No documentation found.  Education Comments  No comments found.            Goals:  Encounter Problems       Encounter Problems (Active)       Dressings Lower Extremities       STG - Patient to complete lower body dressing SUP (Progressing)       Start:  07/26/24    Expected End:  07/31/24               Functional Balance       LTG - Patient will maintain standing and sitting balance to allow for completion of daily activities (Progressing)       Start:  07/26/24    Expected End:  08/09/24               Grooming       STG - Patient completes grooming SUP (Progressing)       Start:  07/26/24    Expected End:  07/31/24               OT Transfers       STG - Patient will perform toilet transfer SUP (Progressing)       Start:  07/26/24    Expected End:  08/09/24

## 2024-07-30 NOTE — PROGRESS NOTES
Physical Therapy                 Therapy Communication Note    Patient Name: Mechelle Alcantara  MRN: [id number]  Today's Date: 7/30/2024     Discipline: Physical Therapy    Missed Visit Reason: Missed Visit Reason: Patient refused    Missed Time: Attempt    Comment: Chart reviewed and cleared for therapy per nurse. Pt politely refused x2 d/t increased fatigue during first attempt and increased fatigue after working with OT on second attempt, pt states possible DC this afternoon. Will continue to see per POC.

## 2024-07-30 NOTE — PROGRESS NOTES
"Subjective Data:  77yo patient POD#5 redo left femoral endarterectomy with left femoral to PTA bypass with harvested, reversed GSV.     Overnight Events:    Low grade fever documented overnight.     Patient asleep in bed, unaccompanied. Denies abdominal pain and/or back pain. Denies non-joint related pain (I.e. aching, burning, cramping, discomfort) in buttock, thigh, calf, or ankle. Endorses intermittent neuropathy in left foot overnight. Eating drinking well. Denies N/V/D. Eager to be discharged today.  will be arriving at hospital at 12.      Objective Data:  Last Recorded Vitals:  Vitals:    07/30/24 0400 07/30/24 0600 07/30/24 0700 07/30/24 0800   BP: 147/65   133/58   BP Location: Left arm   Right arm   Patient Position: Lying   Lying   Pulse: 72   66   Resp: 19   18   Temp: 37.2 °C (99 °F)   36.1 °C (97 °F)   TempSrc: Temporal   Temporal   SpO2: 96%   96%   Weight:  60.5 kg (133 lb 4.8 oz) 61.4 kg (135 lb 5.8 oz)    Height:         Last Labs:  CBC - 7/30/2024:  6:02 AM  5.9 8.3 315    25.5      CMP - 7/30/2024:  6:02 AM  7.7 6.2 13 --- 0.3   3.0 3.6 8 131      PTT - 7/26/2024:  3:29 AM  1.1   11.9 21      Last I/O:  I/O last 3 completed shifts:  In: 480 (7.9 mL/kg) [P.O.:480]  Out: - (0 mL/kg)   Weight: 60.5 kg     Past Cardiology Tests (Last 3 Years):  EKG:  ECG 12 Lead 07/12/2024 (Preliminary)  Normal sinus rhythm  Left anterior fascicular block  Cannot rule out Inferior infarct (masked by fascicular block?) , age undetermined  Anterior infarct , age undetermined  Abnormal ECG  No previous ECGs available    Echo:  No results found for this or any previous visit from the past 1095 days.    Ejection Fractions:  No results found for: \"EF\"    Cath:  No results found for this or any previous visit from the past 1095 days.    Stress Test:  NM Stress 4/13/22  CONCLUSION Clinically, electrocardiographically, and echocardiographically normal stress echocardiogram, with no evidence of stress-induced ischemia " at maximum workload. Patient demonstrated limited functional capacity     Vascular Imaging:  Vasc US LE Vein Mapping Bilateral 5/31/24  CONCLUSIONS:  Right Lower Vein Mapping: The right great saphenous vein appears widely patent with no evidence of thrombosis or fibrosis. The great saphenous vein branches at mid calf. There is chronic post-thrombotic change in the small saphenous vein at proximal. Lymph node measuring 0.7 cm x 1.3 cm noted in groin.  Left Lower Vein Mapping: The left great saphenous vein appears widely patent with no evidence of thrombosis or fibrosis. The great saphenous vein branches at the mid thigh and mid calf. Lymph node measuring 1.0 cm x 1.9 cm noted in groin.     Vascular US KATHLEEN w/o Exercise 4/19/24   CONCLUSIONS:  Right Lower PVR: No evidence of arterial occlusive disease in the right lower extremity at rest. Normal digital perfusion noted. Biphasic flow is noted in the right posterior tibial artery and right dorsalis pedis artery. Triphasic flow is noted in the right common femoral artery.  Left Lower PVR: Evidence of moderate arterial occlusive disease in the left lower extremity at rest. Decreased digital perfusion noted. Monophasic flow is noted in the left common femoral artery, left posterior tibial artery and left dorsalis pedis artery.    Inpatient Medications:  Scheduled medications   Medication Dose Route Frequency    amLODIPine  5 mg oral Daily    aspirin  81 mg oral Daily    calcium carbonate  500 mg oral Once    cephalexin  500 mg oral q6h FRANSISCO    docusate sodium  100 mg oral BID    magnesium oxide  400 mg oral Daily    rivaroxaban  2.5 mg oral BID    rosuvastatin  20 mg oral Nightly     PRN medications   Medication    acetaminophen    dextrose    glucagon    hydrALAZINE    naloxone    oxyCODONE     Continuous Medications   Medication Dose Last Rate     Physical Exam:  Vital Signs Reviewed.  Constitutional:       General: A&Ox3. No apparent acute distress.   HENT:      Head:  Normocephalic and atraumatic.      Mouth: Mucous membranes are moist.  Cardiovascular:      Rate and Rhythm: Normal rate and regular rhythm.      Heart sounds: Normal heart sounds.    +2 right femoral pulse +2 left femoral pulse   +2 palpable right DP & PT. Able to doppler left DP & PT.      Bilateral lower extremities warm to touch. +1 pitting edema and erythema to LLE.      Left groin dressing intact, no drainage noted. Left Lower extremity medial incision clean/dry/intact thigh to calf.  Pulmonary:      Effort: Pulmonary effort is normal. No respiratory distress.      Breath sounds: Normal breath sounds.   Abdominal:      General: Bowel sounds are normal. There is no distension.      Palpations: Abdomen is soft.   Skin:     General: Skin is warm and dry.    Comments: Right thigh notable for telangiectases. Left lower extremity wound w/dressing intact on left medial distal calf and left lateral calf.      Assessment/Plan   PAD   POD #4 Redo Left Femoral Endarterectomy with Left Femoral to PTA Bypass with grafted, reversed GSV  Patient endorses intermittent neuropathy pain in left foot.   +2 palpable right DP & PT. Able to doppler left DP & PT signal.   Bilateral lower extremities warm to touch. +1 pitting edema and erythema to LLE.   Left groin dressing intact, no drainage noted. Left Lower extremity medial incisions clean/dry/intact thigh to calf.  Continue Aspirin 81mg every day, Xarelto 2.5mg BID and Crestor 5mg every day.   Continue Cephalexin 500mg Q6, will complete 8/4/24.     Left Lower Extremity Wounds  Left lower extremity wounds w/dressings intact on left medial distal calf and left lateral calf.   Order for calcium alginate and mepilex dressing to be changed daily.     Plan for discharge home with [facility].    I spent 35 minutes in the professional and overall care of this patient.  Plan of care discussed with Dr. Hoover.     Dulce Angeles, APRN-CNP  Vascular Surgery  [facility]  [facility]

## 2024-07-30 NOTE — PROGRESS NOTES
"Mechelle Alcantara is a 78 y.o. female on day 5 of admission presenting with PAD (peripheral artery disease) (CMS-[facility]).    Subjective   Patient seen and examined at bedside.  Patient reports she is doing fine but is tired today. She reports her leg feels about the same today. She has no complaints and is hopeful for discharge.        Objective     Physical Exam    Constitutional: Well developed, no distress, alert and cooperative  Skin: Warm and dry  Eyes: EOMI, clear sclera  ENMT: mucous membranes moist  Respiratory: Patent airways, CTAB  Cardiovascular: Regular, rate and rhythm  Abdominal: Nondistended, soft, non-tender, +BS  MSK: ROM intact, LLE swelling stable, warmth is improved today, area of erythema remains within the drawn lines, incision well approximated, dressings in place (1 groin, 2 distal LLE)  Neuro: alert and oriented x3    Last Recorded Vitals  Blood pressure 131/62, pulse 68, temperature 35.7 °C (96.3 °F), temperature source Temporal, resp. rate 17, height 1.676 m (5' 6\"), weight 61.4 kg (135 lb 5.8 oz), SpO2 96%.  Intake/Output last 3 Shifts:  I/O last 3 completed shifts:  In: 480 (7.9 mL/kg) [P.O.:480]  Out: - (0 mL/kg)   Weight: 60.5 kg     Relevant Results  Scheduled medications  amLODIPine, 5 mg, oral, Daily  aspirin, 81 mg, oral, Daily  calcium carbonate, 500 mg, oral, Once  cephalexin, 500 mg, oral, q6h FRANSISCO  docusate sodium, 100 mg, oral, BID  magnesium oxide, 400 mg, oral, Daily  rivaroxaban, 2.5 mg, oral, BID  rosuvastatin, 20 mg, oral, Nightly      Continuous medications     PRN medications  PRN medications: acetaminophen, dextrose, glucagon, hydrALAZINE, naloxone, oxyCODONE  Results from last 7 days   Lab Units 07/30/24  0602 07/29/24  0654 07/28/24  0615   WBC AUTO x10*3/uL 5.9 6.7 8.3   RBC AUTO x10*6/uL 2.73* 2.56* 2.85*   HEMOGLOBIN g/dL 8.3* 7.7* 8.5*     Results from last 7 days   Lab Units 07/30/24  0602 07/29/24  0654 07/28/24  0615   SODIUM mmol/L 138 136 136   POTASSIUM mmol/L " 3.6 3.7 3.6   CHLORIDE mmol/L 108* 106 105   CO2 mmol/L 23 24 25   BUN mg/dL 18 11 10   CREATININE mg/dL 0.54 0.54 0.53   CALCIUM mg/dL 7.7* 7.7* 7.6*   PHOSPHORUS mg/dL 3.0 3.1 2.2*   MAGNESIUM mg/dL 1.98 1.91 1.93       No results found.     Assessment/Plan   Principal Problem:    PAD (peripheral artery disease) (CMS-HCC)  Active Problems:    Non-healing wound of left lower extremity  HTN    -vascular primary   -LLE with much improved warmth, swelling stable and erythema remains within drawn lines, afebrile   -continue keflex for cellulitis for 7 days  -encourage LE elevation   -labs/VSS   -goal -150 per vascular  -continue pain regimen of tylenol and oxy PRN   -continue on xarelto in addition to aspirin for PAD per vascular   -continue other home meds as indicated   -PT/OT - pt hopeful to go home with family on d/c    Thank you for the consult, we will sign off. Please call with questions.     Keara Diaz,    Hospitalist

## 2024-07-30 NOTE — DISCHARGE SUMMARY
Discharge Diagnosis  PAD (peripheral artery disease) (CMS-[facility])    Issues Requiring Follow-Up  Wound Care  Resolution of Cellulitis  Incision Check with Vascular Surgeon, Dr. Hoover 8/14/24    Test Results Pending At Discharge  Pending Labs       Order Current Status    Surgical Pathology Exam In process          Hospital Course   79yo patient admitted for PAD in setting of nonhealing venous stasis ulcers of left lower calf s/p unsuccessful angiogram with high-grade focal stenosis of the profunda femoris artery and chronic occlusion throughout hte SFA, popliteal artery, and tibioperoneal trunk w/reconstitution of he tibial vessels in the mid calf s/p redo femoral endarterectomy with left femoral to PTA bypass with harvested, reversed GSV on 7/25/24 c/b hypoxia and hypothermia. Briefly required mau hugger, hypothermia resolved. Able to be weaned from supplemental oxygen without difficulty. LLE erythema noted, started on Keflex 500mg Q6 x 7 days. Will continue Aspirin 81mg every day, Xarelto 2.5mg BID, and Crestor 20mg at bedtime. PT/OT recommended HHC at discharge. Plan for discharge home today with . Patient followed with outpatient wound care clinic for LLE venous stasis ulcers.  Follow up as scheduled with Dr. Hoover on 8/14/24.     Pertinent Physical Exam At Time of Discharge  Physical Exam  Vital Signs Reviewed.  Constitutional:       General: A&Ox3. No apparent acute distress.   HENT:      Head: Normocephalic and atraumatic.      Mouth: Mucous membranes are moist.  Cardiovascular:      Rate and Rhythm: Normal rate and regular rhythm.      Heart sounds: Normal heart sounds.    +2 right femoral pulse +2 left femoral pulse   +2 palpable right DP & PT. Able to doppler left DP & PT.      Bilateral lower extremities warm to touch. +1 pitting edema and erythema to LLE.      Left groin dressing intact, no drainage noted. Left Lower extremity medial incision clean/dry/intact thigh to calf.  Pulmonary:       Effort: Pulmonary effort is normal. No respiratory distress.      Breath sounds: Normal breath sounds.   Abdominal:      General: Bowel sounds are normal. There is no distension.      Palpations: Abdomen is soft.   Skin:     General: Skin is warm and dry.    Comments: Right thigh notable for telangiectases. Left lower extremity wound w/dressing intact on left medial distal calf and left lateral calf.       Current Facility-Administered Medications:     acetaminophen (Tylenol) tablet 650 mg, 650 mg, oral, q4h PRN, MICHAEL Burroughs-CNP, 650 mg at 07/26/24 0109    amLODIPine (Norvasc) tablet 5 mg, 5 mg, oral, Daily, MICHAEL Burroughs-CNP, 5 mg at 07/30/24 0853    aspirin EC tablet 81 mg, 81 mg, oral, Daily, MICHAEL Burroughs-CNP, 81 mg at 07/30/24 0853    calcium carbonate (Tums) chewable tablet 500 mg, 500 mg, oral, Once, Keara Diaz DO    cephalexin (Keflex) capsule 500 mg, 500 mg, oral, q6h FRANSISCO, Dulce Angeles APRN-CNP, 500 mg at 07/30/24 0616    dextrose 50 % injection 12.5 g, 12.5 g, intravenous, q15 min PRN, QUENTIN Burroughs    docusate sodium (Colace) capsule 100 mg, 100 mg, oral, BID, MICHAEL Burroughs-CNP, 100 mg at 07/28/24 0840    glucagon (Glucagen) injection 1 mg, 1 mg, intramuscular, q15 min PRN, QUENTIN Burroughs    hydrALAZINE (Apresoline) injection 10 mg, 10 mg, intravenous, q4h PRN, QUENTIN Burroughs    magnesium oxide (Mag-Ox) tablet 400 mg, 400 mg, oral, Daily, Keara Diaz DO, 400 mg at 07/30/24 0853    naloxone (Narcan) injection 0.2 mg, 0.2 mg, intravenous, q5 min PRN, QUENTIN Burroughs    oxyCODONE (Roxicodone) immediate release tablet 5 mg, 5 mg, oral, q6h PRN, Keara Diaz DO    rivaroxaban (Xarelto) tablet 2.5 mg, 2.5 mg, oral, BID, Africa Hoover MD, 2.5 mg at 07/30/24 0854    rosuvastatin (Crestor) tablet 20 mg, 20 mg, oral, Nightly, Dany Drake MD, 20 mg at 07/29/24 2014    Outpatient  Follow-Up  Future Appointments   Date Time Provider Department Center   8/14/2024 11:45 AM Africa Hoover MD [id number]     Plan of care reviewed with Dr. Africa Hoover.   I spent > 30 minutes minutes in the professional and overall care of this patient's day of discharge instructions and summary.      Dulce Angeles, MICHAEL-CNP  Vascular Surgery  [facility]

## 2024-07-30 NOTE — PROGRESS NOTES
07/30/24 1342   Discharge Planning   Living Arrangements Spouse/significant other   Home or Post Acute Services In home services   Type of Home Care Services Home OT;Home PT;Home nursing visits   Patient Choice   Provider Choice list and CMS website (https://medicare.gov/care-compare#search) for post-acute Quality and Resource Measure Data were provided and reviewed with: Patient   Patient / Family choosing to utilize agency / facility established prior to hospitalization Yes     Patient is medically ready for discharge. Patient is not returning to her home in [place] but to her home in [place]: [address] [place] [address]. Message sent to Isabel Loomis RN, intake nurse for [facility] 1/2. They can still accept. Notified nursing patient can be discharged home care can accept with the address change.

## 2024-07-31 ENCOUNTER — HOME CARE VISIT (OUTPATIENT)
Dept: HOME HEALTH SERVICES | Facility: HOME HEALTH | Age: 79
End: 2024-07-31
Payer: MEDICARE

## 2024-07-31 VITALS
HEART RATE: 82 BPM | SYSTOLIC BLOOD PRESSURE: 108 MMHG | TEMPERATURE: 98 F | DIASTOLIC BLOOD PRESSURE: 58 MMHG | OXYGEN SATURATION: 96 %

## 2024-07-31 PROCEDURE — 0023 HH SOC

## 2024-07-31 PROCEDURE — 1090000002 HH PPS REVENUE DEBIT

## 2024-07-31 PROCEDURE — 169592 NO-PAY CLAIM PROCEDURE

## 2024-07-31 PROCEDURE — 1090000001 HH PPS REVENUE CREDIT

## 2024-07-31 PROCEDURE — G0299 HHS/HOSPICE OF RN EA 15 MIN: HCPCS | Mod: HHH

## 2024-07-31 ASSESSMENT — ENCOUNTER SYMPTOMS
PAIN LOCATION - PAIN SEVERITY: 2/10
PAIN LOCATION: LEFT LEG
PAIN SEVERITY GOAL: 4/10
LOWEST PAIN SEVERITY IN PAST 24 HOURS: 0/10
PAIN: 1
HIGHEST PAIN SEVERITY IN PAST 24 HOURS: 3/10
PERSON REPORTING PAIN: PATIENT

## 2024-07-31 ASSESSMENT — ACTIVITIES OF DAILY LIVING (ADL): ENTERING_EXITING_HOME: SUPERVISION

## 2024-08-01 ENCOUNTER — PATIENT OUTREACH (OUTPATIENT)
Dept: PRIMARY CARE | Facility: CLINIC | Age: 79
End: 2024-08-01
Payer: MEDICARE

## 2024-08-01 PROCEDURE — 1090000001 HH PPS REVENUE CREDIT

## 2024-08-01 PROCEDURE — 1090000002 HH PPS REVENUE DEBIT

## 2024-08-01 NOTE — PROGRESS NOTES
Discharge Facility: [facility]  Discharge Diagnosis: PAD   Admission Date: 7/25/2024  Discharge Date: 7/30/2024    PCP Appointment Date: none  Specialist Appointment Date:   -vascular surgery 8/14/2024    Hospital Encounter and Summary Linked: Yes    Issues Requiring Follow-Up  Wound Care  Resolution of Cellulitis  Incision Check with Vascular Surgeon, Dr. Hoover 8/14/24     Home Care start of care 7/31/2024    START taking:   cephalexin (Keflex)   rivaroxaban (Xarelto)      CHANGE how you take:   rosuvastatin (Crestor)      STOP taking:   chlorhexidine 0.12 % solution (Peridex)     Two attempts were made to reach patient within two business days after discharge. Voicemail left with contact information for patient to call back with any non-emergent questions or concerns.

## 2024-08-02 ENCOUNTER — HOME CARE VISIT (OUTPATIENT)
Dept: HOME HEALTH SERVICES | Facility: HOME HEALTH | Age: 79
End: 2024-08-02
Payer: MEDICARE

## 2024-08-02 DIAGNOSIS — I73.9 PAD (PERIPHERAL ARTERY DISEASE) (CMS-HCC): ICD-10-CM

## 2024-08-02 PROCEDURE — G0152 HHCP-SERV OF OT,EA 15 MIN: HCPCS | Mod: HHH

## 2024-08-02 PROCEDURE — 1090000002 HH PPS REVENUE DEBIT

## 2024-08-02 PROCEDURE — 1090000001 HH PPS REVENUE CREDIT

## 2024-08-02 RX ORDER — ROSUVASTATIN CALCIUM 20 MG/1
20 TABLET, COATED ORAL NIGHTLY
Qty: 30 TABLET | Refills: 0 | Status: CANCELLED | OUTPATIENT
Start: 2024-08-02 | End: 2024-09-01

## 2024-08-02 NOTE — TELEPHONE ENCOUNTER
"I have had the pleasure of speaking with  and Mrs Alcantara.  They have called concerned that  's  right groin would is draining a \"yellow fluid.\"    When asked directly the patient denies  fever, chills or feeling ill  She denies  redness  near the groin incision  although she states  \" I really have not looked  at the incision \"  I have offered to make an appointment for the patient to be seen today by Ms. Dulce Angelse . Per the patient  \" I don't think I need to come in today  I do  not want to make an appointment at this time . When can I shower?'.    I have instructed the patient  that  she is to inspect her incision daily  IF there are any signs of redness around the incision,  foul smelling drainage, pus, fever ,chills she is to report to the nearest emergency department.    The patient has been instructed that she may shower daily.   The patient is not to sit in a body of water , she is not to take baths.     She is to apply a dry sterile dressing to the right groin incision and change the  dressing daily.   DSD  bharat be purchased from her local pharmacy.    IF there are ANY issues which need immediate attention the patient is to go to the nearest emergency department.     I have  reminded the patient of her upcoming appointment  appointment scheduled for 8/14/24 @ 11:45 AM   MARY ramos Rn   "

## 2024-08-03 ENCOUNTER — HOME CARE VISIT (OUTPATIENT)
Dept: HOME HEALTH SERVICES | Facility: HOME HEALTH | Age: 79
End: 2024-08-03
Payer: MEDICARE

## 2024-08-03 VITALS
DIASTOLIC BLOOD PRESSURE: 60 MMHG | HEART RATE: 63 BPM | RESPIRATION RATE: 14 BRPM | SYSTOLIC BLOOD PRESSURE: 142 MMHG | OXYGEN SATURATION: 95 % | TEMPERATURE: 98.1 F

## 2024-08-03 PROCEDURE — 1090000002 HH PPS REVENUE DEBIT

## 2024-08-03 PROCEDURE — 1090000001 HH PPS REVENUE CREDIT

## 2024-08-03 PROCEDURE — G0151 HHCP-SERV OF PT,EA 15 MIN: HCPCS | Mod: HHH

## 2024-08-03 SDOH — HEALTH STABILITY: PHYSICAL HEALTH: EXERCISE ACTIVITY: LAQ

## 2024-08-03 SDOH — HEALTH STABILITY: PHYSICAL HEALTH: EXERCISE ACTIVITIES SETS: 1

## 2024-08-03 SDOH — HEALTH STABILITY: PHYSICAL HEALTH: PHYSICAL EXERCISE: 15

## 2024-08-03 SDOH — HEALTH STABILITY: PHYSICAL HEALTH: PHYSICAL EXERCISE: SEATED

## 2024-08-03 SDOH — HEALTH STABILITY: PHYSICAL HEALTH: EXERCISE TYPE: LE EXERCISES

## 2024-08-03 SDOH — HEALTH STABILITY: PHYSICAL HEALTH: EXERCISE ACTIVITY: ANKLE DF/PF

## 2024-08-03 SDOH — HEALTH STABILITY: PHYSICAL HEALTH: EXERCISE ACTIVITY: MARCHING

## 2024-08-03 ASSESSMENT — ENCOUNTER SYMPTOMS
PAIN LOCATION: LEFT LEG
PAIN LOCATION - PAIN QUALITY: BURNING
HIGHEST PAIN SEVERITY IN PAST 24 HOURS: 9/10
MUSCLE WEAKNESS: 1
PERSON REPORTING PAIN: PATIENT
PAIN LOCATION - PAIN FREQUENCY: CONSTANT
LOWEST PAIN SEVERITY IN PAST 24 HOURS: 3/10
PAIN LOCATION - RELIEVING FACTORS: WALKING
PAIN SEVERITY GOAL: 0/10
PAIN LOCATION - PAIN SEVERITY: 3/10
PAIN: 1
OCCASIONAL FEELINGS OF UNSTEADINESS: 1
SUBJECTIVE PAIN PROGRESSION: GRADUALLY IMPROVING

## 2024-08-03 ASSESSMENT — BALANCE ASSESSMENTS
BALANCE SCORE: 9
ATTEMPTS TO ARISE: 2 - ABLE TO RISE, ONE ATTEMPT
ARISING SCORE: 1
EYES CLOSED AT MAXIMUM POSITION NUDGED: 0 - UNSTEADY
TURNING 360 DEGREES STEPS: 0 - DISCONTINUOUS STEPS
IMMEDIATE STANDING BALANCE FIRST 5 SECONDS: 1 - STEADY BUT USES WALKER OR OTHER SUPPORT
NUDGED SCORE: 1
ARISES: 1 - ABLE, USES ARMS TO HELP
SITTING DOWN: 1 - USES ARMS OR NOT SMOOTH MOTION
STANDING BALANCE: 1 - STEADY BUT WIDE STANCE AND USES CANE OR OTHER SUPPORT
SITTING BALANCE: 1 - STEADY, SAFE
NUDGED: 1 - STAGGERS, GRABS, CATCHES SELF

## 2024-08-03 ASSESSMENT — GAIT ASSESSMENTS
PATH SCORE: 0
STEP CONTINUITY: 0 - STOPPING OR DISCONTINUITY BETWEEN STEPS
BALANCE AND GAIT SCORE: 14
PATH: 0 - MARKED DEVIATION
GAIT SCORE: 5
TRUNK: 0 - MARKED SWAY OR USES WALKING AID
INITIATION OF GAIT IMMEDIATELY AFTER GO: 1 - NO HESITANCY
TRUNK SCORE: 0
WALKING STANCE: 1 - HEELS ALMOST TOUCHING WHILE WALKING
STEP SYMMETRY: 0 - RIGHT AND LEFT STEP LENGTH NOT EQUAL

## 2024-08-03 ASSESSMENT — ACTIVITIES OF DAILY LIVING (ADL)
AMBULATION ASSISTANCE: STAND BY ASSIST
AMBULATION ASSISTANCE ON FLAT SURFACES: 1
AMBULATION_DISTANCE/DURATION_TOLERATED: 50 FT
CURRENT_FUNCTION: STAND BY ASSIST

## 2024-08-04 ENCOUNTER — HOME CARE VISIT (OUTPATIENT)
Dept: HOME HEALTH SERVICES | Facility: HOME HEALTH | Age: 79
End: 2024-08-04
Payer: MEDICARE

## 2024-08-04 VITALS
RESPIRATION RATE: 20 BRPM | SYSTOLIC BLOOD PRESSURE: 140 MMHG | DIASTOLIC BLOOD PRESSURE: 60 MMHG | HEART RATE: 60 BPM | OXYGEN SATURATION: 97 % | TEMPERATURE: 98.4 F

## 2024-08-04 LAB
ATRIAL RATE: 68 BPM
P AXIS: 27 DEGREES
P OFFSET: 199 MS
P ONSET: 147 MS
PR INTERVAL: 148 MS
Q ONSET: 221 MS
QRS COUNT: 11 BEATS
QRS DURATION: 80 MS
QT INTERVAL: 404 MS
QTC CALCULATION(BAZETT): 429 MS
QTC FREDERICIA: 421 MS
R AXIS: -50 DEGREES
T AXIS: 55 DEGREES
T OFFSET: 423 MS
VENTRICULAR RATE: 68 BPM

## 2024-08-04 PROCEDURE — 1090000001 HH PPS REVENUE CREDIT

## 2024-08-04 PROCEDURE — 1090000002 HH PPS REVENUE DEBIT

## 2024-08-04 PROCEDURE — G0299 HHS/HOSPICE OF RN EA 15 MIN: HCPCS | Mod: HHH

## 2024-08-04 SDOH — ECONOMIC STABILITY: GENERAL

## 2024-08-04 ASSESSMENT — ACTIVITIES OF DAILY LIVING (ADL)
DRESSING_UB_CURRENT_FUNCTION: SUPERVISION
HOUSEKEEPING ASSESSED: 1
AMBULATION ASSISTANCE: ONE PERSON
AMBULATION ASSISTANCE: 1
GROOMING_CURRENT_FUNCTION: SUPERVISION
LAUNDRY: DEPENDENT
MONEY MANAGEMENT (EXPENSES/BILLS): NEEDS ASSISTANCE
TOILETING: 1
GROOMING ASSESSED: 1
BATHING ASSESSED: 1
TOILETING: CONTACT GUARD ASSIST
PHYSICAL TRANSFERS ASSESSED: 1
GROOMING EQUIPMENT USED: SETUP
CURRENT_FUNCTION: ONE PERSON
DRESSING_LB_CURRENT_FUNCTION: CONTACT GUARD ASSIST
AMBULATION ASSISTANCE: 1
PHYSICAL_TRANSFERS_DEVICES: WALKER
LIGHT HOUSEKEEPING: DEPENDENT
PREPARING MEALS: DEPENDENT
LAUNDRY ASSESSED: 1

## 2024-08-04 ASSESSMENT — ENCOUNTER SYMPTOMS
PAIN SEVERITY GOAL: 0/10
LIMITED RANGE OF MOTION: 1
SUBJECTIVE PAIN PROGRESSION: UNCHANGED
APPETITE LEVEL: GOOD
PAIN: 1
PAIN SEVERITY GOAL: 0/10
LOWEST PAIN SEVERITY IN PAST 24 HOURS: 3/10
LOWER EXTREMITY EDEMA: 1
PAIN LOCATION - PAIN FREQUENCY: CONSTANT
HIGHEST PAIN SEVERITY IN PAST 24 HOURS: 3/10
LAST BOWEL MOVEMENT: 67056
HIGHEST PAIN SEVERITY IN PAST 24 HOURS: 9/10
PAIN LOCATION: LEFT LEG
PAIN LOCATION - PAIN DURATION: CONSTANT
LOWEST PAIN SEVERITY IN PAST 24 HOURS: 2/10
PAIN LOCATION - PAIN SEVERITY: 6/10
MUSCLE WEAKNESS: 1
PAIN: 1
PAIN LOCATION - PAIN QUALITY: ACHES
PERSON REPORTING PAIN: PATIENT

## 2024-08-05 PROCEDURE — 1090000002 HH PPS REVENUE DEBIT

## 2024-08-05 PROCEDURE — 1090000001 HH PPS REVENUE CREDIT

## 2024-08-05 SDOH — ECONOMIC STABILITY: HOUSING INSECURITY
HOME SAFETY: CURRENTLY STAYING IN WEEKEND COTTAGE SINCE IT'S A 1 STORY MOBILE HOME AND HER HOME IS A 2 STORY HOME WITH 2ND FLOOR BEDROOM AND FULL BATHROOM.

## 2024-08-05 ASSESSMENT — ACTIVITIES OF DAILY LIVING (ADL)
AMBULATION ASSISTANCE: CONTACT GUARD ASSIST
OASIS_M1830: 05

## 2024-08-05 ASSESSMENT — ENCOUNTER SYMPTOMS
LOWER EXTREMITY EDEMA: 1
APPETITE LEVEL: GOOD
MUSCLE WEAKNESS: 1

## 2024-08-06 ENCOUNTER — HOME CARE VISIT (OUTPATIENT)
Dept: HOME HEALTH SERVICES | Facility: HOME HEALTH | Age: 79
End: 2024-08-06
Payer: MEDICARE

## 2024-08-06 ENCOUNTER — OFFICE VISIT (OUTPATIENT)
Dept: WOUND CARE | Facility: CLINIC | Age: 79
End: 2024-08-06
Payer: MEDICARE

## 2024-08-06 LAB
LAB AP ASR DISCLAIMER: NORMAL
LABORATORY COMMENT REPORT: NORMAL
PATH REPORT.FINAL DX SPEC: NORMAL
PATH REPORT.GROSS SPEC: NORMAL
PATH REPORT.RELEVANT HX SPEC: NORMAL
PATH REPORT.TOTAL CANCER: NORMAL

## 2024-08-06 PROCEDURE — 11042 DBRDMT SUBQ TIS 1ST 20SQCM/<: CPT

## 2024-08-06 PROCEDURE — 1090000001 HH PPS REVENUE CREDIT

## 2024-08-06 PROCEDURE — 1090000002 HH PPS REVENUE DEBIT

## 2024-08-07 ENCOUNTER — HOME CARE VISIT (OUTPATIENT)
Dept: HOME HEALTH SERVICES | Facility: HOME HEALTH | Age: 79
End: 2024-08-07
Payer: MEDICARE

## 2024-08-07 PROCEDURE — 1090000001 HH PPS REVENUE CREDIT

## 2024-08-07 PROCEDURE — 1090000002 HH PPS REVENUE DEBIT

## 2024-08-08 ENCOUNTER — HOME CARE VISIT (OUTPATIENT)
Dept: HOME HEALTH SERVICES | Facility: HOME HEALTH | Age: 79
End: 2024-08-08
Payer: MEDICARE

## 2024-08-08 PROCEDURE — 1090000002 HH PPS REVENUE DEBIT

## 2024-08-08 PROCEDURE — 1090000001 HH PPS REVENUE CREDIT

## 2024-08-08 PROCEDURE — G0158 HHC OT ASSISTANT EA 15: HCPCS | Mod: CO,HHH

## 2024-08-08 SDOH — ECONOMIC STABILITY: HOUSING INSECURITY: TEMPORARY ARRANGEMENT: 1

## 2024-08-08 ASSESSMENT — ENCOUNTER SYMPTOMS
PAIN: 1
TINGLING: 1
HIGHEST PAIN SEVERITY IN PAST 24 HOURS: 7/10
PERSON REPORTING PAIN: PATIENT
PAIN LOCATION - PAIN SEVERITY: 0/10
PAIN LOCATION - PAIN FREQUENCY: INTERMITTENT
SUBJECTIVE PAIN PROGRESSION: UNCHANGED
PAIN SEVERITY GOAL: 0/10
PAIN LOCATION - PAIN QUALITY: BURNING
AGGRESSION WITHIN DEFINED LIMITS: 1
ANGER WITHIN DEFINED LIMITS: 1
PAIN LOCATION: LEFT FOOT
LOWEST PAIN SEVERITY IN PAST 24 HOURS: 0/10
PAIN LOCATION - PAIN DURATION: 30 MIN

## 2024-08-08 ASSESSMENT — PAIN SCALES - PAIN ASSESSMENT IN ADVANCED DEMENTIA (PAINAD)
BODYLANGUAGE: 0
CONSOLABILITY: 0 - NO NEED TO CONSOLE.
NEGVOCALIZATION: 0 - NONE.
FACIALEXPRESSION: 0
BREATHING: 0
NEGVOCALIZATION: 0
BODYLANGUAGE: 0 - RELAXED.
TOTALSCORE: 0
CONSOLABILITY: 0
FACIALEXPRESSION: 0 - SMILING OR INEXPRESSIVE.

## 2024-08-09 ENCOUNTER — HOME CARE VISIT (OUTPATIENT)
Dept: HOME HEALTH SERVICES | Facility: HOME HEALTH | Age: 79
End: 2024-08-09
Payer: MEDICARE

## 2024-08-09 ENCOUNTER — APPOINTMENT (OUTPATIENT)
Dept: HOME HEALTH SERVICES | Facility: HOME HEALTH | Age: 79
End: 2024-08-09
Payer: MEDICARE

## 2024-08-09 VITALS — HEART RATE: 75 BPM | OXYGEN SATURATION: 97 % | TEMPERATURE: 97.7 F

## 2024-08-09 PROCEDURE — 1090000002 HH PPS REVENUE DEBIT

## 2024-08-09 PROCEDURE — 1090000001 HH PPS REVENUE CREDIT

## 2024-08-09 PROCEDURE — G0151 HHCP-SERV OF PT,EA 15 MIN: HCPCS | Mod: HHH

## 2024-08-09 SDOH — HEALTH STABILITY: PHYSICAL HEALTH: PHYSICAL EXERCISE: SEATED

## 2024-08-09 SDOH — HEALTH STABILITY: PHYSICAL HEALTH: EXERCISE ACTIVITY: KNEE FLEXION

## 2024-08-09 SDOH — HEALTH STABILITY: PHYSICAL HEALTH: EXERCISE ACTIVITY: HIP ABD/ADDUCTION

## 2024-08-09 SDOH — HEALTH STABILITY: PHYSICAL HEALTH: EXERCISE ACTIVITY: KNEE EXTENSION

## 2024-08-09 SDOH — HEALTH STABILITY: PHYSICAL HEALTH: EXERCISE ACTIVITY: ANKLE PUMPS

## 2024-08-09 SDOH — HEALTH STABILITY: PHYSICAL HEALTH: EXERCISE ACTIVITY: SIT TO STAND

## 2024-08-09 SDOH — HEALTH STABILITY: PHYSICAL HEALTH: EXERCISE TYPE: INSTRUCTED AND DEMONSTRATED SUP INDEP SEATED THER EX PROGRAM

## 2024-08-09 SDOH — HEALTH STABILITY: PHYSICAL HEALTH: EXERCISE ACTIVITY: HIP FLEXIONI

## 2024-08-09 ASSESSMENT — ACTIVITIES OF DAILY LIVING (ADL): AMBULATION ASSISTANCE ON FLAT SURFACES: 1

## 2024-08-09 ASSESSMENT — ENCOUNTER SYMPTOMS
HIGHEST PAIN SEVERITY IN PAST 24 HOURS: 4/10
PERSON REPORTING PAIN: PATIENT
PAIN LOCATION: LEFT LEG
PAIN: 1
PAIN LOCATION - PAIN FREQUENCY: FREQUENT
PAIN SEVERITY GOAL: 0/10
LOWEST PAIN SEVERITY IN PAST 24 HOURS: 0/10
PAIN LOCATION - PAIN SEVERITY: 2/10
SUBJECTIVE PAIN PROGRESSION: UNCHANGED

## 2024-08-10 PROCEDURE — 1090000002 HH PPS REVENUE DEBIT

## 2024-08-10 PROCEDURE — G0180 MD CERTIFICATION HHA PATIENT: HCPCS | Performed by: INTERNAL MEDICINE

## 2024-08-10 PROCEDURE — 1090000001 HH PPS REVENUE CREDIT

## 2024-08-12 ENCOUNTER — HOME CARE VISIT (OUTPATIENT)
Dept: HOME HEALTH SERVICES | Facility: HOME HEALTH | Age: 79
End: 2024-08-12
Payer: MEDICARE

## 2024-08-13 ENCOUNTER — PATIENT OUTREACH (OUTPATIENT)
Dept: PRIMARY CARE | Facility: CLINIC | Age: 79
End: 2024-08-13
Payer: MEDICARE

## 2024-08-13 ENCOUNTER — OFFICE VISIT (OUTPATIENT)
Dept: WOUND CARE | Facility: CLINIC | Age: 79
End: 2024-08-13
Payer: MEDICARE

## 2024-08-13 PROCEDURE — 11042 DBRDMT SUBQ TIS 1ST 20SQCM/<: CPT

## 2024-08-13 NOTE — PROGRESS NOTES
Unable to reach patient for call back 14 days after patient's hospital discharge. No follow up appointment with PCP. LVM with call back number for patient to call if needed

## 2024-08-14 ENCOUNTER — APPOINTMENT (OUTPATIENT)
Dept: VASCULAR SURGERY | Facility: CLINIC | Age: 79
End: 2024-08-14
Payer: MEDICARE

## 2024-08-14 ENCOUNTER — HOME CARE VISIT (OUTPATIENT)
Dept: HOME HEALTH SERVICES | Facility: HOME HEALTH | Age: 79
End: 2024-08-14
Payer: MEDICARE

## 2024-08-14 VITALS
WEIGHT: 135 LBS | DIASTOLIC BLOOD PRESSURE: 60 MMHG | HEIGHT: 66 IN | HEART RATE: 60 BPM | OXYGEN SATURATION: 97 % | SYSTOLIC BLOOD PRESSURE: 140 MMHG | BODY MASS INDEX: 21.69 KG/M2

## 2024-08-14 DIAGNOSIS — Z98.890 S/P FEMORAL-TIBIAL BYPASS: ICD-10-CM

## 2024-08-14 DIAGNOSIS — I73.9 PAD (PERIPHERAL ARTERY DISEASE) (CMS-HCC): Primary | ICD-10-CM

## 2024-08-14 PROCEDURE — 1159F MED LIST DOCD IN RCRD: CPT | Performed by: SURGERY

## 2024-08-14 PROCEDURE — 1123F ACP DISCUSS/DSCN MKR DOCD: CPT | Performed by: SURGERY

## 2024-08-14 PROCEDURE — 1111F DSCHRG MED/CURRENT MED MERGE: CPT | Performed by: SURGERY

## 2024-08-14 PROCEDURE — 3078F DIAST BP <80 MM HG: CPT | Performed by: SURGERY

## 2024-08-14 PROCEDURE — 3077F SYST BP >= 140 MM HG: CPT | Performed by: SURGERY

## 2024-08-14 PROCEDURE — 1157F ADVNC CARE PLAN IN RCRD: CPT | Performed by: SURGERY

## 2024-08-14 PROCEDURE — 99024 POSTOP FOLLOW-UP VISIT: CPT | Performed by: SURGERY

## 2024-08-14 PROCEDURE — 1160F RVW MEDS BY RX/DR IN RCRD: CPT | Performed by: SURGERY

## 2024-08-14 NOTE — PROGRESS NOTES
History Of Present Illness  Mechelle Alcantara is a 78 y.o. female presenting for first postoperative visit status post left femoral to posterior tibial artery bypass with reverse saphenous vein.  Dates she has been doing well since discharge from the hospital.  She does note some swelling in the leg.  However her venous ulcer over the lateral calf has gotten significantly smaller.  To follow-up with the wound center.  Does note a small lump at the site of the left groin wound.     Past Medical History  She has a past medical history of Cellulitis, HLD (hyperlipidemia), HTN (hypertension), Neuropathy, Non-healing wound of left lower extremity, PAD (peripheral artery disease) (CMS-[facility]), PVD (peripheral vascular disease) (CMS-HCC), and Varicose veins of both lower extremities.    Surgical History  She has a past surgical history that includes Invasive Vascular Procedure (Left, 3/22/2024); Invasive Vascular Procedure (N/A, 3/22/2024); Invasive Vascular Procedure (Left, 3/28/2024); Invasive Vascular Procedure (N/A, 3/28/2024); Cardiac catheterization (N/A, 3/28/2024); and Invasive Vascular Procedure (N/A, 5/16/2024).     Social History  She reports that she quit smoking about 4 months ago. Her smoking use included cigarettes. She has been exposed to tobacco smoke. She has never used smokeless tobacco. She reports that she does not drink alcohol and does not use drugs.    Family History  Family History   Problem Relation Name Age of Onset    Heart failure Father      Hypertension Father      Heart disease Sister      Hypertension Sister      Heart disease Brother      Hypertension Brother          Allergies  Fish containing products, Shellfish containing products, Gadolinium-containing contrast media, and Iodinated contrast media    Review of Systems     Physical Exam  Constitutional:       General: She is not in acute distress.     Appearance: Normal appearance. She is normal weight.   HENT:      Head: Normocephalic and  "atraumatic.   Eyes:      Extraocular Movements: Extraocular movements intact.      Conjunctiva/sclera: Conjunctivae normal.   Cardiovascular:      Rate and Rhythm: Normal rate and regular rhythm.      Pulses:           Femoral pulses are 2+ on the right side and 2+ on the left side.       Dorsalis pedis pulses are detected w/ Doppler on the right side and detected w/ Doppler on the left side.        Posterior tibial pulses are detected w/ Doppler on the right side and detected w/ Doppler on the left side.      Heart sounds: Normal heart sounds.      Comments: Aphasic left DP and PT Doppler signals  Pulmonary:      Effort: Pulmonary effort is normal.      Breath sounds: Normal breath sounds.   Abdominal:      General: Abdomen is flat.      Palpations: Abdomen is soft.   Musculoskeletal:         General: No swelling. Normal range of motion.      Cervical back: Normal range of motion.      Right lower leg: No edema.      Left lower le+ Edema present.   Skin:     General: Skin is warm and dry.      Comments: Venous stasis ulcer of left mid lateral calf with granulating base, significantly reduced in size compared to last visit  Left lower extremity surgical incisions intact and healing well.  Small lump at the site of the left groin incision   Neurological:      General: No focal deficit present.      Mental Status: She is alert and oriented to person, place, and time.      Cranial Nerves: No cranial nerve deficit.      Sensory: No sensory deficit.      Motor: No weakness.   Psychiatric:         Mood and Affect: Mood normal.         Behavior: Behavior normal.          Last Recorded Vitals  Blood pressure 140/60, pulse 60, height 1.676 m (5' 6\"), weight 61.2 kg (135 lb), SpO2 97%.    Relevant Results      Current Outpatient Medications:     amLODIPine (Norvasc) 5 mg tablet, TAKE 1 TABLET(5 MG) BY MOUTH DAILY, Disp: 90 tablet, Rfl: 0    aspirin 81 mg EC tablet, Take 1 tablet (81 mg) by mouth once daily., Disp: , Rfl: "     capsaicin (Zostrix) 0.025 % cream, Apply topically 2 times a day as needed (neuropathic pain)., Disp: 50 g, Rfl: 0    rivaroxaban (Xarelto) 2.5 mg tablet, Take 1 tablet (2.5 mg) by mouth 2 times daily (morning and late afternoon)., Disp: 60 tablet, Rfl: 0    rosuvastatin (Crestor) 20 mg tablet, Take 1 tablet (20 mg) by mouth once daily at bedtime., Disp: 30 tablet, Rfl: 0          Assessment/Plan   Diagnoses and all orders for this visit:  PAD (peripheral artery disease) (CMS-[facility])  -     Vascular US lower extremity arterial duplex with graft left with KATHLEEN; Future  S/P femoral-tibial bypass  -     Vascular US lower extremity arterial duplex with graft left with KATHLEEN; Future      77yo well status post left femoral to posterior tibial artery bypass.  Her incisions are healing well.  She does have a small lump over the left groin that is likely a seroma.  However the skin is intact and therefore I have recommended allowing this to resolve on its own.  She has had significant improvement in the healing of her left lateral calf ulcer.  I recommended that she continue to follow-up in the wound center.  To follow-up in the vascular office in 1 month with first surveillance duplex of the bypass graft and KATHLEEN study.  Continue medical management with aspirin, Xarelto, and rosuvastatin.         Africa Hoover MD

## 2024-08-15 ENCOUNTER — HOME CARE VISIT (OUTPATIENT)
Dept: HOME HEALTH SERVICES | Facility: HOME HEALTH | Age: 79
End: 2024-08-15
Payer: MEDICARE

## 2024-08-15 ENCOUNTER — TELEPHONE (OUTPATIENT)
Dept: PRIMARY CARE | Facility: CLINIC | Age: 79
End: 2024-08-15
Payer: MEDICARE

## 2024-08-19 ENCOUNTER — HOME CARE VISIT (OUTPATIENT)
Dept: HOME HEALTH SERVICES | Facility: HOME HEALTH | Age: 79
End: 2024-08-19
Payer: MEDICARE

## 2024-08-19 PROCEDURE — G0158 HHC OT ASSISTANT EA 15: HCPCS | Mod: CO,HHH

## 2024-08-19 ASSESSMENT — PAIN SCALES - PAIN ASSESSMENT IN ADVANCED DEMENTIA (PAINAD)
BODYLANGUAGE: 0 - RELAXED.
NEGVOCALIZATION: 0 - NONE.
FACIALEXPRESSION: 0
BREATHING: 0
CONSOLABILITY: 0
CONSOLABILITY: 0 - NO NEED TO CONSOLE.
BODYLANGUAGE: 0
FACIALEXPRESSION: 0 - SMILING OR INEXPRESSIVE.
NEGVOCALIZATION: 0
TOTALSCORE: 0

## 2024-08-19 ASSESSMENT — ENCOUNTER SYMPTOMS
PERSON REPORTING PAIN: PATIENT
ANGER WITHIN DEFINED LIMITS: 1
AGGRESSION WITHIN DEFINED LIMITS: 1
DENIES PAIN: 1

## 2024-08-20 ENCOUNTER — OFFICE VISIT (OUTPATIENT)
Dept: WOUND CARE | Facility: CLINIC | Age: 79
End: 2024-08-20
Payer: MEDICARE

## 2024-08-20 ENCOUNTER — HOME CARE VISIT (OUTPATIENT)
Dept: HOME HEALTH SERVICES | Facility: HOME HEALTH | Age: 79
End: 2024-08-20
Payer: MEDICARE

## 2024-08-20 PROCEDURE — G0152 HHCP-SERV OF OT,EA 15 MIN: HCPCS | Mod: HHH

## 2024-08-20 PROCEDURE — 99212 OFFICE O/P EST SF 10 MIN: CPT

## 2024-08-21 ENCOUNTER — HOME CARE VISIT (OUTPATIENT)
Dept: HOME HEALTH SERVICES | Facility: HOME HEALTH | Age: 79
End: 2024-08-21
Payer: MEDICARE

## 2024-08-21 SDOH — ECONOMIC STABILITY: HOUSING INSECURITY
HOME SAFETY: 1ST FLOOR HALF BATHROOM. 2ND FLOOR BEDROOM AND FULL BATHROOM. HAS WALK IN SHOWER IN 1ST FLOOR LAUNDRY ROOM. THIS VISIT COMPLETED AT HER 2 STORY HOME. SHE HAD BEEN STAYING AT HER 1 STORY LAKE HOME/MOBILE HOME AFTER SX

## 2024-08-21 ASSESSMENT — ACTIVITIES OF DAILY LIVING (ADL)
LAUNDRY ASSESSED: 1
AMBULATION ASSISTANCE: 1
LAUNDRY: DEPENDENT
HOUSEKEEPING ASSESSED: 1
LIGHT HOUSEKEEPING: INDEPENDENT
DRESSING_LB_CURRENT_FUNCTION: INDEPENDENT
PREPARING MEALS: INDEPENDENT
GROOMING EQUIPMENT USED: STANDING AT SINK
BATHING_CURRENT_FUNCTION: INDEPENDENT
DRESSING_UB_CURRENT_FUNCTION: INDEPENDENT
ADLS_COMMENTS: UE GROSS AROM: WFL  UE GROSS STRENGTH: 4/5
GROOMING_CURRENT_FUNCTION: INDEPENDENT
GROOMING ASSESSED: 1
TOILETING: INDEPENDENT
AMBULATION ASSISTANCE: INDEPENDENT
BATHING ASSESSED: 1
TOILETING: 1

## 2024-08-21 ASSESSMENT — ENCOUNTER SYMPTOMS
DENIES PAIN: 1
PERSON REPORTING PAIN: PATIENT

## 2024-08-22 ENCOUNTER — HOME CARE VISIT (OUTPATIENT)
Dept: HOME HEALTH SERVICES | Facility: HOME HEALTH | Age: 79
End: 2024-08-22
Payer: MEDICARE

## 2024-08-27 ENCOUNTER — HOME CARE VISIT (OUTPATIENT)
Dept: HOME HEALTH SERVICES | Facility: HOME HEALTH | Age: 79
End: 2024-08-27
Payer: MEDICARE

## 2024-08-27 VITALS
DIASTOLIC BLOOD PRESSURE: 72 MMHG | RESPIRATION RATE: 16 BRPM | HEART RATE: 67 BPM | OXYGEN SATURATION: 97 % | TEMPERATURE: 97.9 F | SYSTOLIC BLOOD PRESSURE: 128 MMHG

## 2024-08-27 PROCEDURE — G0299 HHS/HOSPICE OF RN EA 15 MIN: HCPCS | Mod: HHH

## 2024-08-27 ASSESSMENT — ENCOUNTER SYMPTOMS
CHANGE IN APPETITE: UNCHANGED
APPETITE LEVEL: GOOD
PAIN SEVERITY GOAL: 4/10
MUSCLE WEAKNESS: 1
HIGHEST PAIN SEVERITY IN PAST 24 HOURS: 5/10
LOWEST PAIN SEVERITY IN PAST 24 HOURS: 2/10
PAIN LOCATION - PAIN SEVERITY: 4/10
PAIN: 1
PERSON REPORTING PAIN: PATIENT
LOWER EXTREMITY EDEMA: 1
PAIN LOCATION: LEFT LEG

## 2024-08-30 DIAGNOSIS — I73.9 PAD (PERIPHERAL ARTERY DISEASE) (CMS-HCC): ICD-10-CM

## 2024-09-03 ENCOUNTER — HOME CARE VISIT (OUTPATIENT)
Dept: HOME HEALTH SERVICES | Facility: HOME HEALTH | Age: 79
End: 2024-09-03
Payer: MEDICARE

## 2024-09-03 DIAGNOSIS — I73.9 PAD (PERIPHERAL ARTERY DISEASE) (CMS-HCC): ICD-10-CM

## 2024-09-03 PROCEDURE — G0299 HHS/HOSPICE OF RN EA 15 MIN: HCPCS | Mod: HHH

## 2024-09-04 VITALS
RESPIRATION RATE: 16 BRPM | HEART RATE: 78 BPM | DIASTOLIC BLOOD PRESSURE: 60 MMHG | TEMPERATURE: 98.5 F | SYSTOLIC BLOOD PRESSURE: 124 MMHG

## 2024-09-04 ASSESSMENT — ENCOUNTER SYMPTOMS
DENIES PAIN: 1
APPETITE LEVEL: GOOD
MUSCLE WEAKNESS: 1
LOWER EXTREMITY EDEMA: 1

## 2024-09-05 ENCOUNTER — HOSPITAL ENCOUNTER (OUTPATIENT)
Dept: CARDIOLOGY | Facility: HOSPITAL | Age: 79
Discharge: HOME | End: 2024-09-05
Payer: MEDICARE

## 2024-09-05 DIAGNOSIS — Z98.890 S/P FEMORAL-TIBIAL BYPASS: ICD-10-CM

## 2024-09-05 DIAGNOSIS — I73.9 PAD (PERIPHERAL ARTERY DISEASE) (CMS-HCC): ICD-10-CM

## 2024-09-05 DIAGNOSIS — I73.9 PERIPHERAL VASCULAR DISEASE, UNSPECIFIED (CMS-HCC): ICD-10-CM

## 2024-09-05 PROCEDURE — 93926 LOWER EXTREMITY STUDY: CPT

## 2024-09-05 PROCEDURE — 93922 UPR/L XTREMITY ART 2 LEVELS: CPT

## 2024-09-06 ENCOUNTER — PREP FOR PROCEDURE (OUTPATIENT)
Dept: VASCULAR SURGERY | Facility: HOSPITAL | Age: 79
End: 2024-09-06

## 2024-09-06 ENCOUNTER — APPOINTMENT (OUTPATIENT)
Dept: VASCULAR SURGERY | Facility: CLINIC | Age: 79
End: 2024-09-06
Payer: MEDICARE

## 2024-09-06 ENCOUNTER — OFFICE VISIT (OUTPATIENT)
Dept: VASCULAR SURGERY | Facility: CLINIC | Age: 79
End: 2024-09-06
Payer: MEDICARE

## 2024-09-06 VITALS
HEIGHT: 66 IN | HEART RATE: 71 BPM | OXYGEN SATURATION: 98 % | DIASTOLIC BLOOD PRESSURE: 60 MMHG | SYSTOLIC BLOOD PRESSURE: 142 MMHG | WEIGHT: 133 LBS | BODY MASS INDEX: 21.38 KG/M2

## 2024-09-06 DIAGNOSIS — I97.648 POSTOPERATIVE SEROMA INVOLVING CIRCULATORY SYSTEM AFTER OTHER CIRCULATORY SYSTEM PROCEDURE: Primary | ICD-10-CM

## 2024-09-06 DIAGNOSIS — T81.31XA POSTOPERATIVE WOUND DEHISCENCE, INITIAL ENCOUNTER: ICD-10-CM

## 2024-09-06 DIAGNOSIS — L02.416 CELLULITIS AND ABSCESS OF LEFT LEG: ICD-10-CM

## 2024-09-06 DIAGNOSIS — L76.34 POSTOPERATIVE SEROMA OF SKIN AFTER NON-DERMATOLOGIC PROCEDURE: ICD-10-CM

## 2024-09-06 DIAGNOSIS — I99.8 OTHER DISORDER OF CIRCULATORY SYSTEM: ICD-10-CM

## 2024-09-06 DIAGNOSIS — L03.116 LEFT LEG CELLULITIS: Primary | ICD-10-CM

## 2024-09-06 DIAGNOSIS — L03.116 CELLULITIS AND ABSCESS OF LEFT LEG: ICD-10-CM

## 2024-09-06 DIAGNOSIS — I73.9 PAD (PERIPHERAL ARTERY DISEASE) (CMS-HCC): ICD-10-CM

## 2024-09-06 PROCEDURE — 1123F ACP DISCUSS/DSCN MKR DOCD: CPT | Performed by: NURSE PRACTITIONER

## 2024-09-06 PROCEDURE — 3077F SYST BP >= 140 MM HG: CPT | Performed by: NURSE PRACTITIONER

## 2024-09-06 PROCEDURE — 99214 OFFICE O/P EST MOD 30 MIN: CPT | Performed by: NURSE PRACTITIONER

## 2024-09-06 PROCEDURE — 3078F DIAST BP <80 MM HG: CPT | Performed by: NURSE PRACTITIONER

## 2024-09-06 PROCEDURE — 1157F ADVNC CARE PLAN IN RCRD: CPT | Performed by: NURSE PRACTITIONER

## 2024-09-06 PROCEDURE — 1159F MED LIST DOCD IN RCRD: CPT | Performed by: NURSE PRACTITIONER

## 2024-09-06 PROCEDURE — 1126F AMNT PAIN NOTED NONE PRSNT: CPT | Performed by: NURSE PRACTITIONER

## 2024-09-06 RX ORDER — SODIUM CHLORIDE, SODIUM LACTATE, POTASSIUM CHLORIDE, CALCIUM CHLORIDE 600; 310; 30; 20 MG/100ML; MG/100ML; MG/100ML; MG/100ML
20 INJECTION, SOLUTION INTRAVENOUS CONTINUOUS
Status: CANCELLED | OUTPATIENT
Start: 2024-09-06

## 2024-09-06 RX ORDER — CEPHALEXIN 500 MG/1
500 CAPSULE ORAL 4 TIMES DAILY
Qty: 28 CAPSULE | Refills: 0 | Status: SHIPPED | OUTPATIENT
Start: 2024-09-06 | End: 2024-09-13

## 2024-09-06 RX ORDER — ROSUVASTATIN CALCIUM 20 MG/1
20 TABLET, COATED ORAL NIGHTLY
Qty: 90 TABLET | Refills: 0 | Status: SHIPPED | OUTPATIENT
Start: 2024-09-06 | End: 2024-12-05

## 2024-09-06 ASSESSMENT — PAIN SCALES - GENERAL: PAINLEVEL: 0-NO PAIN

## 2024-09-06 NOTE — TELEPHONE ENCOUNTER
Following  review  with Dr. Hoover .  The patient has been scheduled for a incision and drainage of the left groin seroma on 9*/16/2024 .     I have informed Mr. Alcantara that  a prescription for antibiotics has been called into her pharmacy today . She is to begin taking the medication today .     Prior to your surgery you will need to complete preadmission testing. This is a detailed health assessment completed by the nurse [facility] at [facility]. Please bring a list of your current medications with you. Please call [phone] to schedule this appointment .      The patient has been informed to arrive 1.5  hours prior to the  procedure and to remain NPO    8         hours before the  procedure.        Following a discussion with Dr. Hoover   please hold the following medications before  your procedure Xarelto for  2 days prior to surgery starting on 9/14/2024  '  The day before your procedure or surgery    - Nothing to eat nor drink after midnight               - The day of your procedure or surgery:  -Please park in parking lot E (nearest to the Emergency Department) enter through the outpatient  entrance.    Please wear comfortable clothing  Remember to bring your insurance cards, a form of identification and your COVID vaccination card with you.   Do not bring valuables such as credit cards, checkbook, money or jewelry with you.  - PLEASE BRING ALL MEDICATIONS OR AN UPDATED LIST OF CURRENT MEDICATONS WITH YOU      The patient has  been advised to have transportation to and from the hospital on the day of the procedure. The patient has verbalized an understanding of these instructions.     Directions to [facility] have been provided. The patient has been instructed to call Dr. Hoover Critical access hospital nurse if there are  any questions or  concerns. The phone number  [phone]  has been provided   Mechelle Childers RN BSN

## 2024-09-06 NOTE — H&P (VIEW-ONLY)
"Akira Alcantara is a 78 y.o. female.    Chief Complaint:  79yo patient referred after presenting to outpatient vascular lab for testing with abnormal results and left groin site drainage with malodor.     HPI  79yo patient referred after presenting to outpatient vascular lab for testing with abnormal results and left groin site drainage with malodor.     PMHx: HTN, HLD, PAD  PSHx: Left femoral-tibial bypass  Social Hx: Quit smoking 3/2024. Denies EtOH/illicit drug use.     Presents with spouse. Denies abdominal pain and/or back pain. Endorses left lower extremity pain at rest x 2-3 days. Endorses worsening erythema with purulent malodorous drainage from left groin site x 2-3 days. Endorses LLE edema x 2-3 days. Medication adherent. Eating/drinking well. Denies N/V/D.     Review of Systems   HENT:  Negative for nosebleeds.    Cardiovascular:  Positive for claudication and leg swelling.   Hematologic/Lymphatic: Negative for bleeding problem. Does not bruise/bleed easily.   Skin:  Positive for color change and poor wound healing.   Musculoskeletal:  Negative for back pain.   Gastrointestinal:  Negative for abdominal pain, diarrhea, hematemesis, hematochezia, melena, nausea and vomiting.   Genitourinary:  Negative for hematuria.   Neurological:  Negative for numbness and paresthesias.       Objective   Visit Vitals  /60 (BP Location: Right arm, Patient Position: Sitting, BP Cuff Size: Adult)   Pulse 71   Ht 1.676 m (5' 6\")   Wt 60.3 kg (133 lb)   SpO2 98%   BMI 21.47 kg/m²   OB Status Postmenopausal   Smoking Status Former   BSA 1.68 m²       Vitals reviewed.   Pulmonary:      Effort: Pulmonary effort is normal.      Breath sounds: Normal breath sounds.   Cardiovascular:      Normal rate. Regular rhythm. Normal S1. Normal S2.       Murmurs: There is no murmur.      No gallop.  No click. No rub.   Pulses:     Radial: 2+ bilaterally.     Femoral: 2+ bilaterally.  Edema:     Peripheral edema present.   "   Ankle: 1+ edema of the left ankle.     Feet: 1+ edema of the left foot.  Abdominal:      General: There is no distension.   Skin:     Comments: Left groin sight w/palpable mass erythema and wound dehiscence with purulent malodorous drainage.   LLE with diffuse erythema.    Neurological:      Mental Status: Alert and oriented to person, place and time.       Lab Review:   Lab Results   Component Value Date     07/30/2024    K 3.6 07/30/2024     (H) 07/30/2024    CO2 23 07/30/2024    BUN 18 07/30/2024    CREATININE 0.54 07/30/2024    GLUCOSE 95 07/30/2024    CALCIUM 7.7 (L) 07/30/2024     Lab Results   Component Value Date    WBC 5.9 07/30/2024    HGB 8.3 (L) 07/30/2024    HCT 25.5 (L) 07/30/2024    MCV 93 07/30/2024     07/30/2024     Lab Results   Component Value Date    CHOL 162 04/30/2024    TRIG 72 04/30/2024    HDL 90.9 04/30/2024       Current Outpatient Medications:     aspirin 81 mg EC tablet, Take 1 tablet (81 mg) by mouth once daily., Disp: , Rfl:     rivaroxaban (Xarelto) 2.5 mg tablet, Take 1 tablet (2.5 mg) by mouth 2 times daily (morning and late afternoon)., Disp: 60 tablet, Rfl: 11    rosuvastatin (Crestor) 20 mg tablet, Take 1 tablet (20 mg) by mouth once daily at bedtime., Disp: 30 tablet, Rfl: 0    amLODIPine (Norvasc) 5 mg tablet, TAKE 1 TABLET(5 MG) BY MOUTH DAILY (Patient not taking: Reported on 9/6/2024), Disp: 90 tablet, Rfl: 0    capsaicin (Zostrix) 0.025 % cream, Apply topically 2 times a day as needed (neuropathic pain). (Patient not taking: Reported on 9/6/2024), Disp: 50 g, Rfl: 0    Assessment/Plan   PAD s/p Redo Left Femoral Endarterectomy w/left femoral to PTA bypass w/reverse greater saphenous vein (7/25/24) c/b left groin seroma  Endorses left lower extremity pain at rest x 2-3 days. Endorses worsening erythema with purulent malodorous drainage from left groin site x 2-3 days. Endorses LLE edema x 2-3 days.   +2 palpable femoral pulses. Able to doppler signals  for bilateral DP/PT.   Continue Aspirin 81mg every day, Xarelto 2.5mg BID, and Crestor 20mg at bedtime.   Start Keflex 500mg Q6 x 7 days for cellulitis.   Discussed plan of care with Dr. Africa Hoover.   Scheduled for I & D In OR on 9/12/24.

## 2024-09-09 ENCOUNTER — TELEPHONE (OUTPATIENT)
Dept: VASCULAR SURGERY | Facility: HOSPITAL | Age: 79
End: 2024-09-09
Payer: MEDICARE

## 2024-09-09 PROBLEM — L76.34 POSTOPERATIVE SEROMA OF SKIN AFTER NON-DERMATOLOGIC PROCEDURE: Status: ACTIVE | Noted: 2024-09-06

## 2024-09-09 PROBLEM — T81.31XA WOUND DEHISCENCE, SURGICAL: Status: ACTIVE | Noted: 2024-09-06

## 2024-09-09 RX ORDER — SODIUM CHLORIDE 9 MG/ML
75 INJECTION, SOLUTION INTRAVENOUS CONTINUOUS
Status: CANCELLED | OUTPATIENT
Start: 2024-09-16

## 2024-09-09 NOTE — TELEPHONE ENCOUNTER
Due to a recent cancellation   I have been able to move Mrs. Alcantara's  surgical date from 9/16/2024 to 9/12/2024 .    Mr. Alcantara has been notified .     Prior to your surgery you will need to complete preadmission testing. This is a detailed health assessment completed by the nurse [facility] at [facility]. Please bring a list of your current medications with you. Please call [phone] to schedule this appointment .      The patient has been informed to arrive 1.5  hours prior to the  procedure and to remain NPO      8       hours before the  procedure.        Following a discussion with Dr. Hoover   please hold the following medications before  your procedure Xarelto 2 days prior to procedure  starting 9/10/2024    -The day before your procedure or surgery    - Nothing to eat nor drink after midnight               - The day of your procedure or surgery:  -Please park in parking lot E (nearest to the Emergency Department) enter through the outpatient  entrance.    Please wear comfortable clothing  Remember to bring your insurance cards, a form of identification and your COVID vaccination card with you.   Do not bring valuables such as credit cards, checkbook, money or jewelry with you.  - PLEASE BRING ALL MEDICATIONS OR AN UPDATED LIST OF CURRENT MEDICATONS WITH YOU      The patient has  been advised to have transportation to and from the hospital on the day of the procedure. The patient has verbalized an understanding of these instructions.     Directions to [facility] have been provided. The patient has been instructed to call Dr. Hoover  office nurse if there are  any questions or  concerns. The phone number  [phone]  has been provided   Mechelle Childers RN BSN

## 2024-09-10 ASSESSMENT — ENCOUNTER SYMPTOMS
HEMATURIA: 0
ABDOMINAL PAIN: 0
HEMATEMESIS: 0
BACK PAIN: 0
BRUISES/BLEEDS EASILY: 0
NAUSEA: 0
NUMBNESS: 0
HEMATOCHEZIA: 0
DIARRHEA: 0
VOMITING: 0
COLOR CHANGE: 1
CLAUDICATION: 1
POOR WOUND HEALING: 1
PARESTHESIAS: 0

## 2024-09-10 NOTE — PATIENT INSTRUCTIONS
Start Cephalexin 500mg every 6 hours for 7 days for infection in left leg.   You are scheduled for surgery to debride the seroma in your left leg at [facility] with Dr. Hoover on 9/12/24.   A member of our staff will call with more instructions prior to your schedule procedure.

## 2024-09-11 ENCOUNTER — HOME CARE VISIT (OUTPATIENT)
Dept: HOME HEALTH SERVICES | Facility: HOME HEALTH | Age: 79
End: 2024-09-11
Payer: MEDICARE

## 2024-09-11 ENCOUNTER — PRE-ADMISSION TESTING (OUTPATIENT)
Dept: PREADMISSION TESTING | Facility: HOSPITAL | Age: 79
End: 2024-09-11
Payer: MEDICARE

## 2024-09-11 ENCOUNTER — LAB (OUTPATIENT)
Dept: LAB | Facility: LAB | Age: 79
End: 2024-09-11
Payer: MEDICARE

## 2024-09-11 VITALS
WEIGHT: 131.39 LBS | SYSTOLIC BLOOD PRESSURE: 154 MMHG | BODY MASS INDEX: 21.12 KG/M2 | RESPIRATION RATE: 14 BRPM | DIASTOLIC BLOOD PRESSURE: 74 MMHG | HEART RATE: 66 BPM | HEIGHT: 66 IN | OXYGEN SATURATION: 97 % | TEMPERATURE: 98.1 F

## 2024-09-11 DIAGNOSIS — L76.34 POSTOPERATIVE SEROMA OF SKIN AFTER NON-DERMATOLOGIC PROCEDURE: ICD-10-CM

## 2024-09-11 DIAGNOSIS — T81.31XA POSTOPERATIVE WOUND DEHISCENCE, INITIAL ENCOUNTER: ICD-10-CM

## 2024-09-11 DIAGNOSIS — I99.8 OTHER DISORDER OF CIRCULATORY SYSTEM: ICD-10-CM

## 2024-09-11 LAB
ANION GAP SERPL CALC-SCNC: 14 MMOL/L (ref 10–20)
APTT PPP: 29 SECONDS (ref 27–38)
BUN SERPL-MCNC: 15 MG/DL (ref 6–23)
CALCIUM SERPL-MCNC: 8.8 MG/DL (ref 8.6–10.3)
CHLORIDE SERPL-SCNC: 105 MMOL/L (ref 98–107)
CO2 SERPL-SCNC: 26 MMOL/L (ref 21–32)
CREAT SERPL-MCNC: 0.71 MG/DL (ref 0.5–1.05)
EGFRCR SERPLBLD CKD-EPI 2021: 87 ML/MIN/1.73M*2
ERYTHROCYTE [DISTWIDTH] IN BLOOD BY AUTOMATED COUNT: 13.9 % (ref 11.5–14.5)
GLUCOSE SERPL-MCNC: 88 MG/DL (ref 74–99)
HCT VFR BLD AUTO: 34.2 % (ref 36–46)
HGB BLD-MCNC: 10.4 G/DL (ref 12–16)
INR PPP: 1 (ref 0.9–1.1)
MCH RBC QN AUTO: 29.1 PG (ref 26–34)
MCHC RBC AUTO-ENTMCNC: 30.4 G/DL (ref 32–36)
MCV RBC AUTO: 96 FL (ref 80–100)
NRBC BLD-RTO: 0 /100 WBCS (ref 0–0)
PLATELET # BLD AUTO: 548 X10*3/UL (ref 150–450)
POTASSIUM SERPL-SCNC: 4.8 MMOL/L (ref 3.5–5.3)
PROTHROMBIN TIME: 11.5 SECONDS (ref 9.8–12.8)
RBC # BLD AUTO: 3.58 X10*6/UL (ref 4–5.2)
SODIUM SERPL-SCNC: 140 MMOL/L (ref 136–145)
WBC # BLD AUTO: 7 X10*3/UL (ref 4.4–11.3)

## 2024-09-11 PROCEDURE — 85027 COMPLETE CBC AUTOMATED: CPT

## 2024-09-11 PROCEDURE — 85610 PROTHROMBIN TIME: CPT

## 2024-09-11 PROCEDURE — 80048 BASIC METABOLIC PNL TOTAL CA: CPT

## 2024-09-11 PROCEDURE — 99202 OFFICE O/P NEW SF 15 MIN: CPT | Performed by: NURSE PRACTITIONER

## 2024-09-11 PROCEDURE — 93010 ELECTROCARDIOGRAM REPORT: CPT | Performed by: INTERNAL MEDICINE

## 2024-09-11 PROCEDURE — 36415 COLL VENOUS BLD VENIPUNCTURE: CPT

## 2024-09-11 PROCEDURE — 85730 THROMBOPLASTIN TIME PARTIAL: CPT

## 2024-09-11 PROCEDURE — 93005 ELECTROCARDIOGRAM TRACING: CPT

## 2024-09-11 ASSESSMENT — DUKE ACTIVITY SCORE INDEX (DASI)
CAN YOU RUN A SHORT DISTANCE: NO
CAN YOU DO MODERATE WORK AROUND THE HOUSE LIKE VACUUMING, SWEEPING FLOORS OR CARRYING GROCERIES: YES
CAN YOU WALK A BLOCK OR TWO ON LEVEL GROUND: NO
TOTAL_SCORE: 16.2
CAN YOU HAVE SEXUAL RELATIONS: NO
CAN YOU TAKE CARE OF YOURSELF (EAT, DRESS, BATHE, OR USE TOILET): YES
CAN YOU DO HEAVY WORK AROUND THE HOUSE LIKE SCRUBBING FLOORS OR LIFTING AND MOVING HEAVY FURNITURE: NO
CAN YOU PARTICIPATE IN MODERATE RECREATIONAL ACTIVITIES LIKE GOLF, BOWLING, DANCING, DOUBLES TENNIS OR THROWING A BASEBALL OR FOOTBALL: NO
CAN YOU CLIMB A FLIGHT OF STAIRS OR WALK UP A HILL: YES
DASI METS SCORE: 4.7
CAN YOU PARTICIPATE IN STRENOUS SPORTS LIKE SWIMMING, SINGLES TENNIS, FOOTBALL, BASKETBALL, OR SKIING: NO
CAN YOU DO LIGHT WORK AROUND THE HOUSE LIKE DUSTING OR WASHING DISHES: YES
CAN YOU DO YARD WORK LIKE RAKING LEAVES, WEEDING OR PUSHING A MOWER: NO
CAN YOU WALK INDOORS, SUCH AS AROUND YOUR HOUSE: YES

## 2024-09-11 ASSESSMENT — ENCOUNTER SYMPTOMS
CONSTITUTIONAL NEGATIVE: 1
RESPIRATORY NEGATIVE: 1
CARDIOVASCULAR NEGATIVE: 1
EYES NEGATIVE: 1
NEUROLOGICAL NEGATIVE: 1
ENDOCRINE NEGATIVE: 1
NECK NEGATIVE: 1
GASTROINTESTINAL NEGATIVE: 1

## 2024-09-11 ASSESSMENT — ACTIVITIES OF DAILY LIVING (ADL): ADL_SCORE: 0

## 2024-09-11 ASSESSMENT — LIFESTYLE VARIABLES: SMOKING_STATUS: NONSMOKER

## 2024-09-11 ASSESSMENT — PAIN - FUNCTIONAL ASSESSMENT: PAIN_FUNCTIONAL_ASSESSMENT: 0-10

## 2024-09-11 NOTE — PREPROCEDURE INSTRUCTIONS
Medication List            Accurate as of September 11, 2024  2:52 PM. Always use your most recent med list.                amLODIPine 5 mg tablet  Commonly known as: Norvasc  TAKE 1 TABLET(5 MG) BY MOUTH DAILY  Medication Adjustments for Surgery: Take/Use as prescribed     aspirin 81 mg EC tablet  Notes to patient: Pt did not take ASA today  Pt to call doctor office to confirm stopping med     capsaicin 0.025 % cream  Commonly known as: Zostrix  Apply topically 2 times a day as needed (neuropathic pain).     cephalexin 500 mg capsule  Commonly known as: Keflex  Take 1 capsule (500 mg) by mouth 4 times a day for 7 days.  Medication Adjustments for Surgery: Take/Use as prescribed     rivaroxaban 2.5 mg tablet  Commonly known as: Xarelto  Take 1 tablet (2.5 mg) by mouth 2 times daily (morning and late afternoon).  Notes to patient: Pt did not take xarelto today  Pt to call doctor office today to confirm not taking medication     rosuvastatin 20 mg tablet  Commonly known as: Crestor  Take 1 tablet (20 mg) by mouth once daily at bedtime.  Medication Adjustments for Surgery: Take/Use as prescribed                                  PRE-OPERATIVE INSTRUCTIONS    You will receive notification one business day prior to your procedure to confirm your arrival time. It is important that you answer your phone and/or check your messages during this time. If you do not hear from the surgery center by 5 pm. the day before your procedure, please call [phone].     Please enter the building through the Outpatient entrance and take the elevator off the lobby to the 2nd floor then check in at the Outpatient Surgery desk on the 2nd floor.    INSTRUCTIONS:  Talk to your surgeon for instructions if you should stop your aspirin, blood thinner, or diabetes medicines.  DO NOT take any multivitamins or over the counter supplements for 7-10 days before surgery.  If not being admitted, you must have an adult immediately available to drive  you home after surgery. We also highly recommend you have someone stay with you for the entire day and night of your surgery.  For children having surgery, a parent or legal guardian must accompany them to the surgery center. If this is not possible, please call [phone] to make additional arrangements.  For adults who are unable to consent or make medical decisions for themselves, a legal guardian or Power of  must accompany them to the surgery center. If this is not possible, please call [phone] to make additional arrangements.  Wear comfortable, loose fitting clothing.  All jewelry and piercings must be removed. If you are unable to remove an item or have a dermal piercing, please be sure to tell the nurse when you arrive for surgery.  Nail polish and make-up must be removed.  Avoid smoking or consuming alcohol for 24 hours before surgery.  To help prevent infection, please take a shower/bath and wash your hair the night before and/or morning of surgery (or follow other specific bathing instructions provided).    Preoperative Fasting Guidelines    Why must I stop eating and drinking near surgery time?  With sedation, food or liquid in your stomach can enter your lungs causing serious complications  Increases nausea and vomiting    When do I need to stop eating and drinking before my surgery?  Do not eat any solid food after midnight the night before your surgery/procedure unless otherwise instructed by your surgeon.   You may have up to 13.5 ounces of clear liquid until TWO hours before your instructed arrival time to the hospital.  This includes water, black tea/coffee, (no milk or cream) apple juice, and electrolyte drinks (Gatorade).   You may chew gum until TWO hours before your surgery/procedure      If applicable, notify your surgeons office immediately of any new skin changes that occur to the surgical limb.      If you have any questions or concerns, please call Pre-Admission Testing at  [phone].         Home Preoperative Antibacterial Shower with Chlorhexidine gluconate (CHG)     What is a home preoperative antibacterial shower?  This shower is a way of cleaning the skin with a germ killing solution before surgery. The solution contains chlorhexidine gluconate, commonly known as CHG. CHG is a skin cleanser with germ killing ability. Let your doctor know if you are allergic to chlorhexidine.    Why do I need to take a preoperative antibacterial shower?  Skin is not sterile. It is best to try to make your skin as free of germs as possible before surgery. Proper cleansing with a germ killing soap before surgery can lower the number of germs on your skin. This helps to reduce the risk of infection at the surgical site. Following the instructions listed below will help you prepare your skin for surgery.    How do I use the solution?  Begin using your CHG soap the night before and again the morning of your procedure.   Do not shave the day before or day of surgery.  Remove all jewelry until after surgery. Take off rings and take out all body-piercing jewelry.  Wash your face and hair with normal soap and shampoo before you use the CHG soap.  Apply the CHG solution to a clean wet washcloth. Move away from the water to avoid premature rinsing of the CHG soap as you are applying. Firmly lather your entire body from the neck down. Do not use CHG on your face, eyes, ears, or genitals.   Pay special attention to the area where your incisions will be located.  Do not scrub your skin too hard.  It is important to allow the CHG soap to sit on your skin for 3-5 minutes.  Rinse the solution off your body completely. Do not wash with your normal soap after the CHG soap solution.  Pat yourself dry with a clean, soft towel.  Do not apply powders, lotions or deodorants as these might block how the CHG soap works.   Dress in clean clothing.  Be sure to sleep with clean, freshly laundered sheets.  Be aware that  CHG can cause stains on fabric. Rinse your washcloth and other linens that have contact with CHG completely. Use only non-chlorine detergents to launder the items used.

## 2024-09-11 NOTE — CPM/PAT H&P
CPM/PAT Evaluation       Name: Mechelle Alcantara (Mechelle Alcantara)  /Age: 10/14//78 y.o.     In-Person       Chief Complaint: leg wound /seroma    HPI This is a pleasant 77 yo with Phx of HTN, HLD, PAD, and nonhealing wound.    Pt is s/p Redo Left Femoral Endarterectomy w/left femoral to PTA bypass w/reverse greater saphenous vein (24) c/o left groin seroma.  Seen in vascular office on  given Abx Rx Keflex 500mg q 6 for 7 days for left groin site drainage and malodor.   Plan for debridement of seroma of left leg with Dr Hoover on .      Past Medical History:   Diagnosis Date    Cellulitis     Left leg    HLD (hyperlipidemia)     HTN (hypertension)     Neuropathy     Non-healing wound of left lower extremity     PAD (peripheral artery disease) (CMS-HCC)     Postoperative seroma of skin after non-dermatologic procedure     Postoperative wound dehiscence     PVD (peripheral vascular disease) (CMS-HCC)     Varicose veins of both lower extremities        Past Surgical History:   Procedure Laterality Date    CARDIAC CATHETERIZATION N/A 3/28/2024    Procedure: IVUS - Coronary;  Surgeon: Dany Drake MD;  Location: [facility] Cardiac Cath Lab;  Service: Cardiovascular;  Laterality: N/A;  PERIPHERAL/LOWER EXTREMITY    INVASIVE VASCULAR PROCEDURE Left 3/22/2024    Procedure: Lower Extremity Angiogram;  Surgeon: Africa Hoover MD;  Location: [facility] Cardiac Cath Lab;  Service: Vascular Surgery;  Laterality: Left;  Left leg angiogram with intervention    INVASIVE VASCULAR PROCEDURE N/A 3/22/2024    Procedure: Lower Extremity Intervention;  Surgeon: Africa Hoover MD;  Location: [facility] Cardiac Cath Lab;  Service: Vascular Surgery;  Laterality: N/A;    INVASIVE VASCULAR PROCEDURE Left 3/28/2024    Procedure: Lower Extremity Angiogram;  Surgeon: Dany Drake MD;  Location: [facility] Cardiac Cath Lab;  Service: Cardiovascular;  Laterality: Left;  Left LE angiogram ( 80526)   PEDAL APPROACH     INVASIVE VASCULAR PROCEDURE N/A 3/28/2024    Procedure: Lower Extremity Intervention;  Surgeon: Dany Drake MD;  Location: [facility] Cardiac Cath Lab;  Service: Cardiovascular;  Laterality: N/A;    INVASIVE VASCULAR PROCEDURE N/A 5/16/2024    Procedure: Lower Extremity Angiogram;  Surgeon: Dany Drake MD;  Location: [facility] Cardiac Cath Lab;  Service: Cardiovascular;  Laterality: N/A;       Patient  reports that she is not currently sexually active.    Family History   Problem Relation Name Age of Onset    Heart failure Father      Hypertension Father      Heart disease Sister      Hypertension Sister      Heart disease Brother      Hypertension Brother         Allergies   Allergen Reactions    Fish Containing Products Anaphylaxis    Shellfish Containing Products Anaphylaxis    Gadolinium-Containing Contrast Media Hives    Iodinated Contrast Media Hives              Prior to Admission medications    Medication Sig Start Date End Date Taking? Authorizing Provider   amLODIPine (Norvasc) 5 mg tablet TAKE 1 TABLET(5 MG) BY MOUTH DAILY  Patient not taking: Reported on 9/6/2024 3/23/24   Fior Hawkins, DO   aspirin 81 mg EC tablet Take 1 tablet (81 mg) by mouth once daily.    Historical Provider, MD   capsaicin (Zostrix) 0.025 % cream Apply topically 2 times a day as needed (neuropathic pain).  Patient not taking: Reported on 9/6/2024 3/23/24   Fior Hawkins,    cephalexin (Keflex) 500 mg capsule Take 1 capsule (500 mg) by mouth 4 times a day for 7 days. 9/6/24 9/13/24  QUENTIN Velázquez   rivaroxaban (Xarelto) 2.5 mg tablet Take 1 tablet (2.5 mg) by mouth 2 times daily (morning and late afternoon). 9/1/24 9/1/25  Africa Hoover MD   rosuvastatin (Crestor) 20 mg tablet Take 1 tablet (20 mg) by mouth once daily at bedtime. 9/6/24 12/5/24  QUENTIN Velázquez        PAT ROS:   Constitutional:   neg    Neuro/Psych:   neg    Eyes:   neg    Ears:   neg    Nose:   neg     Mouth:   neg    Throat:   neg    Neck:   neg    Cardio:    Patient denies chest pain neck arm or jaw pain  No chest heaviness no shortness of breath   no change in activity tolerance  neg    Respiratory:   neg    Endocrine:   neg    GI:   neg    :   neg    Musculoskeletal:    See HPI  Hematologic:   neg    Skin:   Healing surgical wound LLE -   Left groin pt has dressing to protect her clothes from drainage  LLE swelling vs RLE since surgery      Physical Exam  Constitutional:       Appearance: Normal appearance.   HENT:      Head: Normocephalic and atraumatic.      Nose: Nose normal.      Mouth/Throat:      Mouth: Mucous membranes are moist.   Eyes:      Pupils: Pupils are equal, round, and reactive to light.   Cardiovascular:      Rate and Rhythm: Normal rate and regular rhythm.   Pulmonary:      Effort: Pulmonary effort is normal.      Breath sounds: Normal breath sounds.   Abdominal:      General: Bowel sounds are normal.      Palpations: Abdomen is soft.   Skin:     General: Skin is warm and dry.   Neurological:      General: No focal deficit present.      Mental Status: She is alert and oriented to person, place, and time.   Psychiatric:         Mood and Affect: Mood normal.         Behavior: Behavior normal.      Airway: nl neck ROM  Anesthesia:  Patient denies any anesthesia complications.   Teeth: intact    Lab Results   Component Value Date    GLUCOSE 88 09/11/2024    CALCIUM 8.8 09/11/2024     09/11/2024    K 4.8 09/11/2024    CO2 26 09/11/2024     09/11/2024    BUN 15 09/11/2024    CREATININE 0.71 09/11/2024     Lab Results   Component Value Date    WBC 7.0 09/11/2024    HGB 10.4 (L) 09/11/2024    HCT 34.2 (L) 09/11/2024    MCV 96 09/11/2024     (H) 09/11/2024     Lab Results   Component Value Date    INR 1.0 09/11/2024    INR 1.1 07/26/2024    INR 1.0 07/12/2024    PROTIME 11.5 09/11/2024    PROTIME 11.9 07/26/2024    PROTIME 10.9 07/12/2024         ECG today - V1 & V2 with  flipped t waves when compared to 7/12 ECG     ECG: done 7/12/2024   Normal sinus rhythm  Left anterior fascicular block  Cannot rule out Inferior infarct (masked by fascicular block?) , age undetermined  Anterior infarct , age undetermined  Abnormal ECG  No previous ECGs available  Confirmed by Phani Jorgensen ([id number]) on 8/4/2024 6:44:47 PM     Stress ECHO 4/2022  CONCLUSION Clinically, electrocardiographically, and echocardiographically normal stress echocardiogram, with no evidence of stress-induced ischemia at maximum workload. Patient demonstrated limited functional capacity  [id number] Kevin Perry MD  Electronically signed on 4/17/2022 at 4:33:55 PM       Visit Vitals  /74   Pulse 66   Temp 36.7 °C (98.1 °F) (Temporal)   Resp 14       DASI Risk Score      Flowsheet Row Most Recent Value   DASI SCORE 16.2   METS Score (Will be calculated only when all the questions are answered) 4.7          Caprini DVT Assessment      Flowsheet Row Most Recent Value   DVT Score 6   Current Status Minor surgery planned   History Prior major surgery   Age Over 75 years   BMI 30 or less          Modified Frailty Index      Flowsheet Row Most Recent Value   Modified Frailty Index Calculator .1818          CHADS2 Stroke Risk  Current as of 25 minutes ago        N/A 3 to 100%: High Risk   2 to < 3%: Medium Risk   0 to < 2%: Low Risk     Last Change: N/A          This score determines the patient's risk of having a stroke if the patient has atrial fibrillation.        This score is not applicable to this patient. Components are not calculated.          Revised Cardiac Risk Index      Flowsheet Row Most Recent Value   Revised Cardiac Risk Calculator 0          Apfel Simplified Score      Flowsheet Row Most Recent Value   Apfel Simplified Score Calculator 3          Risk Analysis Index Results This Encounter         9/11/2024  1421             REYES Cancer History: Patient does not indicate history of cancer    Total Risk Analysis  Index Score Without Cancer: 28    Total Risk Analysis Index Score: 28          Stop Bang Score      Flowsheet Row Most Recent Value   Do you snore loudly? 0   Do you often feel tired or fatigued after your sleep? 0   Has anyone ever observed you stop breathing in your sleep? 0   Do you have or are you being treated for high blood pressure? 1   Recent BMI (Calculated) 21.5   Is BMI greater than 35 kg/m2? 0=No   Age older than 50 years old? 1=Yes   Is your neck circumference greater than 17 inches (Male) or 16 inches (Female)? 0   Gender - Male 0=No   STOP-BANG Total Score 2            Assessment and Plan:   PAD s/p Redo Left Femoral Endarterectomy w/left femoral to PTA bypass w/reverse greater saphenous vein (7/25/24) c/o left groin seroma   Pt started on Abx on 9/6 for wound purulent drainage, and erythema  Pt reports decrease edema and drainage since starting Abx  Plan for OR on 9/12 for        Debridement Lower Extremity [93055 (CPT®)] Left General   I&D of left groin seroma; possible debridement; possible wound vac placement       Postoperative seroma of skin after non-dermatologic procedure   Postoperative wound dehiscence, initial encounter   CBC, BMP, coag screen    History of abnormal EKG-EKG today with ST changes in V1 V2 when compared to EKG of July   she reports no change in activity tolerance or chest pain  She has a history of a normal stress echo in April 2022  Duke Activity Status Index (DASI)  DASI Score: 16.2   MET Score: 4.7  RCRI 0

## 2024-09-12 ENCOUNTER — HOSPITAL ENCOUNTER (OUTPATIENT)
Facility: HOSPITAL | Age: 79
Setting detail: OUTPATIENT SURGERY
Discharge: HOME | End: 2024-09-12
Attending: SURGERY | Admitting: SURGERY
Payer: MEDICARE

## 2024-09-12 ENCOUNTER — ANESTHESIA EVENT (OUTPATIENT)
Dept: OPERATING ROOM | Facility: HOSPITAL | Age: 79
End: 2024-09-12
Payer: MEDICARE

## 2024-09-12 ENCOUNTER — PATIENT OUTREACH (OUTPATIENT)
Dept: PRIMARY CARE | Facility: CLINIC | Age: 79
End: 2024-09-12
Payer: MEDICARE

## 2024-09-12 ENCOUNTER — ANESTHESIA (OUTPATIENT)
Dept: OPERATING ROOM | Facility: HOSPITAL | Age: 79
End: 2024-09-12
Payer: MEDICARE

## 2024-09-12 VITALS
WEIGHT: 130 LBS | BODY MASS INDEX: 20.89 KG/M2 | RESPIRATION RATE: 18 BRPM | DIASTOLIC BLOOD PRESSURE: 73 MMHG | TEMPERATURE: 97.2 F | HEART RATE: 67 BPM | OXYGEN SATURATION: 97 % | SYSTOLIC BLOOD PRESSURE: 170 MMHG | HEIGHT: 66 IN

## 2024-09-12 DIAGNOSIS — G89.18 POSTOPERATIVE PAIN: Primary | ICD-10-CM

## 2024-09-12 DIAGNOSIS — L76.34 POSTOPERATIVE SEROMA OF SKIN AFTER NON-DERMATOLOGIC PROCEDURE: ICD-10-CM

## 2024-09-12 DIAGNOSIS — T81.31XA POSTOPERATIVE WOUND DEHISCENCE, INITIAL ENCOUNTER: ICD-10-CM

## 2024-09-12 LAB
ATRIAL RATE: 61 BPM
P AXIS: 88 DEGREES
P OFFSET: 201 MS
P ONSET: 155 MS
PR INTERVAL: 140 MS
Q ONSET: 225 MS
QRS COUNT: 10 BEATS
QRS DURATION: 68 MS
QT INTERVAL: 416 MS
QTC CALCULATION(BAZETT): 418 MS
QTC FREDERICIA: 418 MS
R AXIS: -38 DEGREES
T AXIS: 44 DEGREES
T OFFSET: 433 MS
VENTRICULAR RATE: 61 BPM

## 2024-09-12 PROCEDURE — 7100000002 HC RECOVERY ROOM TIME - EACH INCREMENTAL 1 MINUTE: Performed by: SURGERY

## 2024-09-12 PROCEDURE — 3600000003 HC OR TIME - INITIAL BASE CHARGE - PROCEDURE LEVEL THREE: Performed by: SURGERY

## 2024-09-12 PROCEDURE — 99100 ANES PT EXTEME AGE<1 YR&>70: CPT | Performed by: ANESTHESIOLOGY

## 2024-09-12 PROCEDURE — 3600000008 HC OR TIME - EACH INCREMENTAL 1 MINUTE - PROCEDURE LEVEL THREE: Performed by: SURGERY

## 2024-09-12 PROCEDURE — 2500000004 HC RX 250 GENERAL PHARMACY W/ HCPCS (ALT 636 FOR OP/ED): Mod: JZ | Performed by: SURGERY

## 2024-09-12 PROCEDURE — 3700000001 HC GENERAL ANESTHESIA TIME - INITIAL BASE CHARGE: Performed by: SURGERY

## 2024-09-12 PROCEDURE — 10140 I&D HMTMA SEROMA/FLUID COLLJ: CPT | Performed by: SURGERY

## 2024-09-12 PROCEDURE — 2720000007 HC OR 272 NO HCPCS: Performed by: SURGERY

## 2024-09-12 PROCEDURE — 2500000004 HC RX 250 GENERAL PHARMACY W/ HCPCS (ALT 636 FOR OP/ED): Performed by: NURSE ANESTHETIST, CERTIFIED REGISTERED

## 2024-09-12 PROCEDURE — 2500000004 HC RX 250 GENERAL PHARMACY W/ HCPCS (ALT 636 FOR OP/ED): Performed by: ANESTHESIOLOGY

## 2024-09-12 PROCEDURE — 7100000009 HC PHASE TWO TIME - INITIAL BASE CHARGE: Performed by: SURGERY

## 2024-09-12 PROCEDURE — 7100000010 HC PHASE TWO TIME - EACH INCREMENTAL 1 MINUTE: Performed by: SURGERY

## 2024-09-12 PROCEDURE — 2500000005 HC RX 250 GENERAL PHARMACY W/O HCPCS: Performed by: NURSE ANESTHETIST, CERTIFIED REGISTERED

## 2024-09-12 PROCEDURE — A10140 PR DRAINAGE OF HEMATOMA/FLUID: Performed by: NURSE ANESTHETIST, CERTIFIED REGISTERED

## 2024-09-12 PROCEDURE — 87077 CULTURE AEROBIC IDENTIFY: CPT | Mod: STJLAB | Performed by: SURGERY

## 2024-09-12 PROCEDURE — 7100000001 HC RECOVERY ROOM TIME - INITIAL BASE CHARGE: Performed by: SURGERY

## 2024-09-12 PROCEDURE — 2500000005 HC RX 250 GENERAL PHARMACY W/O HCPCS: Performed by: ANESTHESIOLOGY

## 2024-09-12 PROCEDURE — A10140 PR DRAINAGE OF HEMATOMA/FLUID: Performed by: ANESTHESIOLOGY

## 2024-09-12 PROCEDURE — 3700000002 HC GENERAL ANESTHESIA TIME - EACH INCREMENTAL 1 MINUTE: Performed by: SURGERY

## 2024-09-12 RX ORDER — PROPOFOL 10 MG/ML
INJECTION, EMULSION INTRAVENOUS AS NEEDED
Status: DISCONTINUED | OUTPATIENT
Start: 2024-09-12 | End: 2024-09-12

## 2024-09-12 RX ORDER — OXYCODONE AND ACETAMINOPHEN 5; 325 MG/1; MG/1
1 TABLET ORAL EVERY 6 HOURS PRN
Qty: 8 TABLET | Refills: 0 | Status: SHIPPED | OUTPATIENT
Start: 2024-09-12

## 2024-09-12 RX ORDER — DIPHENHYDRAMINE HYDROCHLORIDE 50 MG/ML
12.5 INJECTION INTRAMUSCULAR; INTRAVENOUS ONCE AS NEEDED
Status: DISCONTINUED | OUTPATIENT
Start: 2024-09-12 | End: 2024-09-12 | Stop reason: HOSPADM

## 2024-09-12 RX ORDER — LIDOCAINE HYDROCHLORIDE 10 MG/ML
0.1 INJECTION, SOLUTION INFILTRATION; PERINEURAL ONCE
Status: DISCONTINUED | OUTPATIENT
Start: 2024-09-12 | End: 2024-09-12 | Stop reason: HOSPADM

## 2024-09-12 RX ORDER — SODIUM CHLORIDE, SODIUM LACTATE, POTASSIUM CHLORIDE, CALCIUM CHLORIDE 600; 310; 30; 20 MG/100ML; MG/100ML; MG/100ML; MG/100ML
100 INJECTION, SOLUTION INTRAVENOUS CONTINUOUS
Status: DISCONTINUED | OUTPATIENT
Start: 2024-09-12 | End: 2024-09-12 | Stop reason: HOSPADM

## 2024-09-12 RX ORDER — HYDROMORPHONE HYDROCHLORIDE 0.2 MG/ML
0.2 INJECTION INTRAMUSCULAR; INTRAVENOUS; SUBCUTANEOUS EVERY 5 MIN PRN
Status: DISCONTINUED | OUTPATIENT
Start: 2024-09-12 | End: 2024-09-12 | Stop reason: HOSPADM

## 2024-09-12 RX ORDER — OXYCODONE HYDROCHLORIDE 5 MG/1
5 TABLET ORAL EVERY 4 HOURS PRN
Status: DISCONTINUED | OUTPATIENT
Start: 2024-09-12 | End: 2024-09-12 | Stop reason: HOSPADM

## 2024-09-12 RX ORDER — ONDANSETRON HYDROCHLORIDE 2 MG/ML
INJECTION, SOLUTION INTRAVENOUS AS NEEDED
Status: DISCONTINUED | OUTPATIENT
Start: 2024-09-12 | End: 2024-09-12

## 2024-09-12 RX ORDER — OXYCODONE AND ACETAMINOPHEN 5; 325 MG/1; MG/1
1 TABLET ORAL EVERY 4 HOURS PRN
Status: DISCONTINUED | OUTPATIENT
Start: 2024-09-12 | End: 2024-09-12 | Stop reason: HOSPADM

## 2024-09-12 RX ORDER — ACETAMINOPHEN 325 MG/1
975 TABLET ORAL ONCE
Status: DISCONTINUED | OUTPATIENT
Start: 2024-09-12 | End: 2024-09-12 | Stop reason: HOSPADM

## 2024-09-12 RX ORDER — ONDANSETRON HYDROCHLORIDE 2 MG/ML
4 INJECTION, SOLUTION INTRAVENOUS ONCE AS NEEDED
Status: DISCONTINUED | OUTPATIENT
Start: 2024-09-12 | End: 2024-09-12 | Stop reason: HOSPADM

## 2024-09-12 RX ORDER — FENTANYL CITRATE 50 UG/ML
INJECTION, SOLUTION INTRAMUSCULAR; INTRAVENOUS AS NEEDED
Status: DISCONTINUED | OUTPATIENT
Start: 2024-09-12 | End: 2024-09-12

## 2024-09-12 RX ORDER — ALBUTEROL SULFATE 0.83 MG/ML
2.5 SOLUTION RESPIRATORY (INHALATION) ONCE AS NEEDED
Status: DISCONTINUED | OUTPATIENT
Start: 2024-09-12 | End: 2024-09-12 | Stop reason: HOSPADM

## 2024-09-12 RX ORDER — CEFAZOLIN SODIUM 2 G/100ML
INJECTION, SOLUTION INTRAVENOUS AS NEEDED
Status: DISCONTINUED | OUTPATIENT
Start: 2024-09-12 | End: 2024-09-12

## 2024-09-12 RX ORDER — BUPIVACAINE HYDROCHLORIDE 5 MG/ML
INJECTION, SOLUTION EPIDURAL; INTRACAUDAL AS NEEDED
Status: DISCONTINUED | OUTPATIENT
Start: 2024-09-12 | End: 2024-09-12 | Stop reason: HOSPADM

## 2024-09-12 RX ORDER — HYDRALAZINE HYDROCHLORIDE 20 MG/ML
5 INJECTION INTRAMUSCULAR; INTRAVENOUS EVERY 30 MIN PRN
Status: DISCONTINUED | OUTPATIENT
Start: 2024-09-12 | End: 2024-09-12 | Stop reason: HOSPADM

## 2024-09-12 RX ORDER — LIDOCAINE HYDROCHLORIDE 20 MG/ML
INJECTION, SOLUTION EPIDURAL; INFILTRATION; INTRACAUDAL; PERINEURAL AS NEEDED
Status: DISCONTINUED | OUTPATIENT
Start: 2024-09-12 | End: 2024-09-12

## 2024-09-12 RX ORDER — LABETALOL HYDROCHLORIDE 5 MG/ML
5 INJECTION, SOLUTION INTRAVENOUS ONCE AS NEEDED
Status: COMPLETED | OUTPATIENT
Start: 2024-09-12 | End: 2024-09-12

## 2024-09-12 RX ORDER — PHENYLEPHRINE HYDROCHLORIDE 10 MG/ML
INJECTION INTRAVENOUS AS NEEDED
Status: DISCONTINUED | OUTPATIENT
Start: 2024-09-12 | End: 2024-09-12

## 2024-09-12 RX ORDER — DEXAMETHASONE SODIUM PHOSPHATE 10 MG/ML
INJECTION INTRAMUSCULAR; INTRAVENOUS AS NEEDED
Status: DISCONTINUED | OUTPATIENT
Start: 2024-09-12 | End: 2024-09-12

## 2024-09-12 RX ADMIN — DEXAMETHASONE SODIUM PHOSPHATE 10 MG: 10 INJECTION, SOLUTION INTRAMUSCULAR; INTRAVENOUS at 15:02

## 2024-09-12 RX ADMIN — LIDOCAINE HYDROCHLORIDE 100 MG: 20 INJECTION, SOLUTION EPIDURAL; INFILTRATION; INTRACAUDAL; PERINEURAL at 14:42

## 2024-09-12 RX ADMIN — CEFAZOLIN SODIUM 2 G: 2 INJECTION, SOLUTION INTRAVENOUS at 15:03

## 2024-09-12 RX ADMIN — PROPOFOL 50 MG: 10 INJECTION, EMULSION INTRAVENOUS at 14:44

## 2024-09-12 RX ADMIN — HYDRALAZINE HYDROCHLORIDE 5 MG: 20 INJECTION INTRAMUSCULAR; INTRAVENOUS at 16:32

## 2024-09-12 RX ADMIN — PROPOFOL 150 MG: 10 INJECTION, EMULSION INTRAVENOUS at 14:42

## 2024-09-12 RX ADMIN — ONDANSETRON 4 MG: 2 INJECTION, SOLUTION INTRAMUSCULAR; INTRAVENOUS at 15:14

## 2024-09-12 RX ADMIN — Medication 6 L/MIN: at 15:34

## 2024-09-12 RX ADMIN — FENTANYL CITRATE 25 MCG: 50 INJECTION, SOLUTION INTRAMUSCULAR; INTRAVENOUS at 14:42

## 2024-09-12 RX ADMIN — PHENYLEPHRINE HYDROCHLORIDE 100 MCG: 10 INJECTION INTRAVENOUS at 15:12

## 2024-09-12 RX ADMIN — FENTANYL CITRATE 25 MCG: 50 INJECTION, SOLUTION INTRAMUSCULAR; INTRAVENOUS at 15:30

## 2024-09-12 RX ADMIN — LABETALOL HYDROCHLORIDE 5 MG: 5 INJECTION, SOLUTION INTRAVENOUS at 17:04

## 2024-09-12 RX ADMIN — FENTANYL CITRATE 50 MCG: 50 INJECTION, SOLUTION INTRAMUSCULAR; INTRAVENOUS at 15:05

## 2024-09-12 RX ADMIN — SODIUM CHLORIDE, POTASSIUM CHLORIDE, SODIUM LACTATE AND CALCIUM CHLORIDE: 600; 310; 30; 20 INJECTION, SOLUTION INTRAVENOUS at 14:36

## 2024-09-12 SDOH — HEALTH STABILITY: MENTAL HEALTH: CURRENT SMOKER: 0

## 2024-09-12 ASSESSMENT — PAIN - FUNCTIONAL ASSESSMENT
PAIN_FUNCTIONAL_ASSESSMENT: 0-10

## 2024-09-12 ASSESSMENT — PAIN SCALES - GENERAL
PAINLEVEL_OUTOF10: 0 - NO PAIN
PAIN_LEVEL: 0
PAINLEVEL_OUTOF10: 0 - NO PAIN
PAINLEVEL_OUTOF10: 0 - NO PAIN

## 2024-09-12 NOTE — ANESTHESIA POSTPROCEDURE EVALUATION
Patient: Mechelle Alcantara    Procedure Summary       Date: 09/12/24 Room / Location: [facility] OR 07 / Virtual [facility] OR    Anesthesia Start: 1436 Anesthesia Stop:     Procedure: Debridement Lower Extremity (Left) Diagnosis:       Postoperative seroma of skin after non-dermatologic procedure      Postoperative wound dehiscence, initial encounter      (Postoperative seroma of skin after non-dermatologic procedure [L76.34])      (Postoperative wound dehiscence, initial encounter [T81.31XA])    Surgeons: Africa Hoover MD Responsible Provider: Mihir Callejas MD    Anesthesia Type: general ASA Status: 3            Anesthesia Type: general    Vitals Value Taken Time   /71 09/12/24 1534   Temp 36.2 09/12/24 1534   Pulse 67 09/12/24 1534   Resp 10 09/12/24 1534   SpO2 100 09/12/24 1534       Anesthesia Post Evaluation    Patient location during evaluation: PACU  Patient participation: complete - patient participated  Level of consciousness: awake and alert  Pain score: 0  Pain management: adequate  Airway patency: patent  Cardiovascular status: acceptable  Respiratory status: acceptable  Hydration status: acceptable  Postoperative Nausea and Vomiting: none        There were no known notable events for this encounter.

## 2024-09-12 NOTE — ANESTHESIA PREPROCEDURE EVALUATION
Patient: Mechelle Alcantara    Procedure Information       Date/Time: 09/12/24 1455    Procedure: Debridement Lower Extremity (Left) - I&D of left groin seroma; possible debridement; possible wound vac placement    Location: [facility] OR 07 / [facility] OR    Surgeons: Africa Hoover MD            Relevant Problems   Cardiac   (+) Abnormal EKG   (+) Athscl native arteries of left leg w ulceration oth prt foot (Multi)   (+) Essential hypertension   (+) Extremity atherosclerosis with resting pain (Multi)   (+) Hyperlipemia   (+) PAD (peripheral artery disease) (CMS-HCC)   (+) Peripheral artery disease (CMS-HCC)   (+) Rest pain of lower extremity due to atherosclerosis (Multi)       Clinical information reviewed:   Tobacco  Allergies  Meds   Med Hx  Surg Hx   Fam Hx  Soc Hx        NPO Detail:  NPO/Void Status  Carbohydrate Drink Given Prior to Surgery? : N  Date of Last Liquid: 09/11/24  Time of Last Liquid: 2200  Date of Last Solid: 09/11/24  Time of Last Solid: 2200  Last Intake Type: Food  Time of Last Void: 1233         Physical Exam    Airway  Mallampati: II  Neck ROM: limited     Cardiovascular   Rhythm: regular  Rate: normal     Dental - normal exam     Pulmonary   Breath sounds clear to auscultation     Abdominal            Anesthesia Plan    History of general anesthesia?: yes  History of complications of general anesthesia?: no    ASA 3     general     The patient is not a current smoker.    intravenous induction   Anesthetic plan and risks discussed with patient.    Plan discussed with CAA.

## 2024-09-12 NOTE — OP NOTE
Debridement Lower Extremity (L) Operative Note     Date: 2024  OR Location: [facility] OR    Name: Mechelle Alcantara, : 1945, Age: 78 y.o., MRN: [id number], Sex: female    Diagnosis  Pre-op Diagnosis      * Postoperative seroma of skin after non-dermatologic procedure [L76.34]     * Postoperative wound dehiscence, initial encounter [T81.31XA] Post-op Diagnosis     * Postoperative seroma of skin after non-dermatologic procedure [L76.34]     * Postoperative wound dehiscence, initial encounter [T81.31XA]     Procedures  Incision and drainage of right groin seroma with placement of DREAD drain      Surgeons      * Africa Hoover - Primary    Resident/Fellow/Other Assistant:  Surgeons and Role:  * No surgeons found with a matching role *    Procedure Summary  Anesthesia: General  ASA: III  Anesthesia Staff: Anesthesiologist: Mihir Callejas MD  C-AA: BHAVESH Gonzalez  JOSE MIGUEL: Giselle Wilkinson  Estimated Blood Loss: 5mL  Intra-op Medications:   Administrations occurring from 1455 to 1610 on 24:   Medication Name Total Dose   bupivacaine PF (Marcaine) 0.5 % (5 mg/mL) injection 10 mL   oxygen (O2) therapy 216 L              Anesthesia Record               Intraprocedure I/O Totals          Intake    LR bolus 500.00 mL    Total Intake 500 mL          Specimen:   ID Type Source Tests Collected by Time   A : surgical wound left groin Tissue ABSCESS TISSUE/WOUND CULTURE/SMEAR Africa Hoover MD 2024 1505        Staff:   Circulator: Rashawn Sepulvedaub Person: Zach Garcia Circulator: Mendel  Scrub Person: Stephani         Drains and/or Catheters: * None in log *    Tourniquet Times:         Implants:     Findings: small amount of serous fluid    Indications: Mechelle Alcantara is an 78 y.o. female who is having surgery for Postoperative seroma of skin after non-dermatologic procedure [L76.34]  Postoperative wound dehiscence, initial encounter [T81.31XA].     The patient was seen in the preoperative area. The risks,  benefits, complications, treatment options, non-operative alternatives, expected recovery and outcomes were discussed with the patient. The possibilities of reaction to medication, pulmonary aspiration, injury to surrounding structures, bleeding, recurrent infection, the need for additional procedures, failure to diagnose a condition, and creating a complication requiring transfusion or operation were discussed with the patient. The patient concurred with the proposed plan, giving informed consent.  The site of surgery was properly noted/marked if necessary per policy. The patient has been actively warmed in preoperative area. Preoperative antibiotics have been ordered and given within 1 hours of incision. Venous thrombosis prophylaxis have been ordered including bilateral sequential compression devices    Procedure Details:   After the proper preoperative workup and informed consent obtained, the patient was taken to the operating room and laid in the supine position.  She was placed on hemodynamic monitoring and placed under anesthesia by the anesthesia team.  The left groin was prepped and draped in sterile fashion.  A longitudinal skin incision was made over the area of the seroma.  Dissection was continued through subcutaneous tissues to the surroundings open.  A small amount of serous fluid was expressed.  A culture swab was sent.  Once the cavity was completely drained, the level was cauterized using Bovie cautery.  The wound was then irrigated with saline.  Then a DREAD drain was left in place and sutured in with a 3-0 nylon suture.  The incision was then closed with 3-0 Vicryl suture in the subcutaneous layer and 4-0 Vicryl suture in the subcuticular layer.  Then Dermabond and dressings were applied and the procedure was completed.  The patient tolerated the procedure well and there were no complications.  She was transferred to the recovery area in stable condition.  All instrument and sponge counts were  correct at the conclusion of the procedure.    Complications:  None; patient tolerated the procedure well.    Disposition: PACU - hemodynamically stable.  Condition: stable         Additional Details: none    Attending Attestation: I performed the procedure.    Africa Hoover  Phone Number: [phone]

## 2024-09-12 NOTE — ANESTHESIA PROCEDURE NOTES
Airway  Date/Time: 9/12/2024 2:45 PM  Urgency: elective    Airway not difficult    Staffing  Performed: JOSE MIGUEL   Authorized by: Mihir Callejas MD    Performed by: BHAVESH Gonzalez  Patient location during procedure: OR    Indications and Patient Condition  Indications for airway management: anesthesia  Spontaneous Ventilation: absent  Sedation level: deep  Preoxygenated: yes  Patient position: sniffing  MILS maintained throughout  Mask difficulty assessment: 1 - vent by mask  Planned trial extubation    Final Airway Details  Final airway type: supraglottic airway      Successful airway: unique  Size 3     Number of attempts at approach: 1

## 2024-09-15 LAB
BACTERIA SPEC CULT: ABNORMAL
GRAM STN SPEC: ABNORMAL
GRAM STN SPEC: ABNORMAL

## 2024-09-16 ENCOUNTER — OFFICE VISIT (OUTPATIENT)
Dept: VASCULAR SURGERY | Facility: CLINIC | Age: 79
End: 2024-09-16
Payer: MEDICARE

## 2024-09-16 VITALS
WEIGHT: 130 LBS | SYSTOLIC BLOOD PRESSURE: 160 MMHG | DIASTOLIC BLOOD PRESSURE: 80 MMHG | OXYGEN SATURATION: 97 % | BODY MASS INDEX: 20.89 KG/M2 | HEIGHT: 66 IN | HEART RATE: 76 BPM

## 2024-09-16 DIAGNOSIS — I97.648 POSTOPERATIVE SEROMA AS SEQUELA OF FEMORAL BYPASS GRAFT: Primary | ICD-10-CM

## 2024-09-16 DIAGNOSIS — A49.01 MSSA (METHICILLIN SUSCEPTIBLE STAPHYLOCOCCUS AUREUS) INFECTION: ICD-10-CM

## 2024-09-16 PROCEDURE — 1157F ADVNC CARE PLAN IN RCRD: CPT | Performed by: NURSE PRACTITIONER

## 2024-09-16 PROCEDURE — 1159F MED LIST DOCD IN RCRD: CPT | Performed by: NURSE PRACTITIONER

## 2024-09-16 PROCEDURE — 1123F ACP DISCUSS/DSCN MKR DOCD: CPT | Performed by: NURSE PRACTITIONER

## 2024-09-16 PROCEDURE — 3079F DIAST BP 80-89 MM HG: CPT | Performed by: NURSE PRACTITIONER

## 2024-09-16 PROCEDURE — 3077F SYST BP >= 140 MM HG: CPT | Performed by: NURSE PRACTITIONER

## 2024-09-16 RX ORDER — SULFAMETHOXAZOLE AND TRIMETHOPRIM 800; 160 MG/1; MG/1
1 TABLET ORAL 2 TIMES DAILY
Qty: 14 TABLET | Refills: 0 | Status: SHIPPED | OUTPATIENT
Start: 2024-09-16 | End: 2024-09-23

## 2024-09-16 ASSESSMENT — ENCOUNTER SYMPTOMS
CHILLS: 0
HEMATEMESIS: 0
HEMATOCHEZIA: 0
FEVER: 0
NUMBNESS: 0
PARESTHESIAS: 0
BRUISES/BLEEDS EASILY: 0
COLOR CHANGE: 1
POOR WOUND HEALING: 0
HEMATURIA: 0

## 2024-09-16 NOTE — PROGRESS NOTES
"Akira Alcantara is a 78 y.o. female.    Chief Complaint:  79yo patient here for post-operative follow up s/p I & D of left groin seroma w/placement of drain.     HPI  Most recent visit 9/6/24 noteable for increased LLE pain, erythema, and purulent malodorous drainage from left groin site x 2-3 days. Referred for I & D on 9/12/24.     PMHx: HTN, HLD, PAD  PSHx: Left femoral-tibial bypass  Social Hx: Quit smoking 3/2024. Denies EtOH/illicit drug use.      Presents with spouse. Denies abdominal pain and/or back pain. Endorses left lower extremity pain at rest x 2-3 days. Endorses worsening erythema with purulent malodorous drainage from left groin site x 2-3 days. Endorses LLE edema x 2-3 days. Medication adherent. Eating/drinking well. Denies N/V/D.     Review of Systems   Constitutional: Negative for chills and fever.   HENT:  Negative for nosebleeds.    Cardiovascular:  Positive for leg swelling.   Hematologic/Lymphatic: Negative for bleeding problem. Does not bruise/bleed easily.   Skin:  Positive for color change. Negative for poor wound healing.   Gastrointestinal:  Negative for hematemesis, hematochezia and melena.   Genitourinary:  Negative for hematuria.   Neurological:  Negative for numbness and paresthesias.     Objective   Visit Vitals  /80 (BP Location: Left arm, Patient Position: Sitting)   Pulse 76   Ht 1.676 m (5' 6\")   Wt 59 kg (130 lb)   SpO2 97%   BMI 20.98 kg/m²   OB Status Postmenopausal   Smoking Status Former   BSA 1.66 m²     Vitals reviewed.   Constitutional:       Appearance: Healthy appearance.   Pulmonary:      Effort: Pulmonary effort is normal.      Breath sounds: Normal breath sounds.   Cardiovascular:      Normal rate. Regular rhythm. Normal S2.       Murmurs: There is no murmur.      No gallop.  No click. No rub.   Pulses:     Radial: 2+ bilaterally.     Femoral: 2+ bilaterally.     Dorsalis pedis: detected w/ Doppler on the left side and 2+ on the right side.     " Posterior tibial: detected w/ Doppler on the left side and 2+ on the right side.  Edema:     Ankle: 1+ edema of the left ankle.     Feet: 1+ edema of the left foot.  Abdominal:      General: There is no distension.   Skin:     General: Skin is warm and dry.      Comments: Left groin DREAD intact w/sanguineous drainage.    Neurological:      Mental Status: Alert and oriented to person, place and time.       Lab Review:   Lab Results   Component Value Date     09/11/2024    K 4.8 09/11/2024     09/11/2024    CO2 26 09/11/2024    BUN 15 09/11/2024    CREATININE 0.71 09/11/2024    GLUCOSE 88 09/11/2024    CALCIUM 8.8 09/11/2024     Lab Results   Component Value Date    WBC 7.0 09/11/2024    HGB 10.4 (L) 09/11/2024    HCT 34.2 (L) 09/11/2024    MCV 96 09/11/2024     (H) 09/11/2024     Lab Results   Component Value Date    CHOL 162 04/30/2024    TRIG 72 04/30/2024    HDL 90.9 04/30/2024       Tissue/Wound Culture/Smear (1+) Rare Methicillin Susceptible Staphylococcus aureus (MSSA) Abnormal           Current Outpatient Medications:     amLODIPine (Norvasc) 5 mg tablet, TAKE 1 TABLET(5 MG) BY MOUTH DAILY, Disp: 90 tablet, Rfl: 0    aspirin 81 mg EC tablet, Take 1 tablet (81 mg) by mouth once daily., Disp: , Rfl:     oxyCODONE-acetaminophen (Percocet) 5-325 mg tablet, Take 1 tablet by mouth every 6 hours if needed for severe pain (7 - 10) or moderate pain (4 - 6)., Disp: 8 tablet, Rfl: 0    rivaroxaban (Xarelto) 2.5 mg tablet, Take 1 tablet (2.5 mg) by mouth 2 times daily (morning and late afternoon)., Disp: 60 tablet, Rfl: 11    rosuvastatin (Crestor) 20 mg tablet, Take 1 tablet (20 mg) by mouth once daily at bedtime., Disp: 90 tablet, Rfl: 0    capsaicin (Zostrix) 0.025 % cream, Apply topically 2 times a day as needed (neuropathic pain). (Patient not taking: Reported on 9/6/2024), Disp: 50 g, Rfl: 0    sulfamethoxazole-trimethoprim (Bactrim DS) 800-160 mg tablet, Take 1 tablet by mouth 2 times a day for 7  days., Disp: 14 tablet, Rfl: 0    Assessment/Plan   PAD s/p Redo Left Femoral Endarterectomy w/left femoral to PTA bypass w/reverse greater saphenous vein (7/25/24) c/b left groin seroma s/p I &D  LLE pain improved. LLE erythema improved. Left groin incision no longer dehisced, clean/dry intact.   +2 palpable femoral pulses. Able to doppler signals for left DP/PT. Left groin DREAD sutures removed, drain removed. Dressing placed.   Continue Aspirin 81mg every day, Xarelto 2.5mg BID, and Crestor 20mg at bedtime.   Start Bactrim BID x 7 days for MSSA positive wound culture.   Follow up with Dr. Dubon as scheduled in 2 weeks.

## 2024-09-17 ENCOUNTER — APPOINTMENT (OUTPATIENT)
Dept: HOME HEALTH SERVICES | Facility: HOME HEALTH | Age: 79
End: 2024-09-17
Payer: MEDICARE

## 2024-09-19 ENCOUNTER — HOME CARE VISIT (OUTPATIENT)
Dept: HOME HEALTH SERVICES | Facility: HOME HEALTH | Age: 79
End: 2024-09-19
Payer: MEDICARE

## 2024-09-19 PROCEDURE — G0299 HHS/HOSPICE OF RN EA 15 MIN: HCPCS | Mod: HHH

## 2024-09-20 ASSESSMENT — ENCOUNTER SYMPTOMS
APPETITE LEVEL: GOOD
PAIN: 1
PAIN LOCATION: GROIN
CHANGE IN APPETITE: UNCHANGED
PAIN LOCATION - PAIN SEVERITY: 2/10
PERSON REPORTING PAIN: PATIENT

## 2024-09-24 ENCOUNTER — HOME CARE VISIT (OUTPATIENT)
Dept: HOME HEALTH SERVICES | Facility: HOME HEALTH | Age: 79
End: 2024-09-24
Payer: MEDICARE

## 2024-09-24 VITALS
RESPIRATION RATE: 18 BRPM | HEART RATE: 75 BPM | OXYGEN SATURATION: 98 % | TEMPERATURE: 97.6 F | DIASTOLIC BLOOD PRESSURE: 62 MMHG | SYSTOLIC BLOOD PRESSURE: 132 MMHG

## 2024-09-24 PROCEDURE — G0299 HHS/HOSPICE OF RN EA 15 MIN: HCPCS | Mod: HHH

## 2024-09-24 ASSESSMENT — ENCOUNTER SYMPTOMS
DENIES PAIN: 1
PERSON REPORTING PAIN: PATIENT

## 2024-09-24 ASSESSMENT — ACTIVITIES OF DAILY LIVING (ADL)
HOME_HEALTH_OASIS: 00
OASIS_M1830: 00

## 2024-09-25 ENCOUNTER — TELEPHONE (OUTPATIENT)
Dept: VASCULAR SURGERY | Facility: CLINIC | Age: 79
End: 2024-09-25
Payer: MEDICARE

## 2024-09-25 ENCOUNTER — PREP FOR PROCEDURE (OUTPATIENT)
Dept: VASCULAR SURGERY | Facility: CLINIC | Age: 79
End: 2024-09-25
Payer: MEDICARE

## 2024-09-25 ENCOUNTER — OFFICE VISIT (OUTPATIENT)
Dept: VASCULAR SURGERY | Facility: CLINIC | Age: 79
End: 2024-09-25
Payer: MEDICARE

## 2024-09-25 VITALS
WEIGHT: 129 LBS | HEIGHT: 66 IN | HEART RATE: 70 BPM | BODY MASS INDEX: 20.73 KG/M2 | SYSTOLIC BLOOD PRESSURE: 142 MMHG | DIASTOLIC BLOOD PRESSURE: 62 MMHG

## 2024-09-25 DIAGNOSIS — I73.9 PAD (PERIPHERAL ARTERY DISEASE) (CMS-HCC): Primary | ICD-10-CM

## 2024-09-25 PROCEDURE — 3077F SYST BP >= 140 MM HG: CPT | Performed by: NURSE PRACTITIONER

## 2024-09-25 PROCEDURE — 99214 OFFICE O/P EST MOD 30 MIN: CPT | Performed by: NURSE PRACTITIONER

## 2024-09-25 PROCEDURE — 1123F ACP DISCUSS/DSCN MKR DOCD: CPT | Performed by: NURSE PRACTITIONER

## 2024-09-25 PROCEDURE — 1159F MED LIST DOCD IN RCRD: CPT | Performed by: NURSE PRACTITIONER

## 2024-09-25 PROCEDURE — 1157F ADVNC CARE PLAN IN RCRD: CPT | Performed by: NURSE PRACTITIONER

## 2024-09-25 PROCEDURE — 3078F DIAST BP <80 MM HG: CPT | Performed by: NURSE PRACTITIONER

## 2024-09-25 NOTE — PROGRESS NOTES
"Subjective   Mechelle Alcantara is a 78 y.o. female.    Chief Complaint:  77yo patient of Dr. Africa Hoover here for 1 week follow up for left groin incision dehiscence and drainage.     HPI  Most recent visit 9/16/24 s/p left groin I & D. DREAD Drain removed.  Started on Bactrim BID x 7 days for MSSA positive wound culture.      PMHx: HTN, HLD, PAD  PSHx: Left femoral-tibial bypass  Social Hx: Quit smoking 3/2024. Denies EtOH/illicit drug use.      Presents today accompanied by . Denies abdominal pain and/or back pain. Denies BLE pain at rest or with exertion. Endorses left foot paresthesias & edema. LLE erythema/rash aggravated by aquaphor. Left groin site oozing scant amounts of serous drainage. No malodor. Medication adherent. Eating/drinking well. Denies N/V/D.     Review of Systems   HENT:  Negative for nosebleeds.    Cardiovascular:  Positive for leg swelling. Negative for claudication.   Hematologic/Lymphatic: Negative for bleeding problem. Does not bruise/bleed easily.   Skin:  Positive for rash. Negative for nail changes and poor wound healing.   Musculoskeletal:  Negative for back pain.   Gastrointestinal:  Negative for abdominal pain, hematemesis, hematochezia and melena.   Genitourinary:  Negative for hematuria.   Neurological:  Positive for numbness and paresthesias.     Objective   Visit Vitals  /62 (BP Location: Left arm, Patient Position: Sitting)   Pulse 70   Ht 1.676 m (5' 6\")   Wt 58.5 kg (129 lb)   BMI 20.82 kg/m²   OB Status Postmenopausal   Smoking Status Former   BSA 1.65 m²     Vitals reviewed.   Constitutional:       Appearance: Normal and healthy appearance. Well-developed.   Neck:      Vascular: No carotid bruit.   Pulmonary:      Effort: Pulmonary effort is normal.      Breath sounds: Normal breath sounds.   Cardiovascular:      PMI at left midclavicular line. Normal rate. Regular rhythm. Normal S1. Normal S2.       Murmurs: There is no murmur.      No gallop.  No click. No " rub.   Pulses:     Radial: 2+ bilaterally.     Dorsalis pedis: detected w/ Doppler on the left side and Detected w/ Doppler on the right side.     Posterior tibial: detected w/ Doppler on the left side and Detected w/ Doppler on the right side.  Edema:     Peripheral edema present.     Ankle: 1+ edema of the left ankle.     Feet: 1+ edema of the left foot.  Abdominal:      General: There is no distension.   Skin:     Comments: Left groin incision w/scant serous drainage. No erythema. No malodor.    Neurological:      Mental Status: Oriented to person, place and time.   Psychiatric:         Behavior: Behavior is cooperative.       Lab Review:   Lab Results   Component Value Date     09/11/2024    K 4.8 09/11/2024     09/11/2024    CO2 26 09/11/2024    BUN 15 09/11/2024    CREATININE 0.71 09/11/2024    GLUCOSE 88 09/11/2024    CALCIUM 8.8 09/11/2024     Lab Results   Component Value Date    WBC 7.0 09/11/2024    HGB 10.4 (L) 09/11/2024    HCT 34.2 (L) 09/11/2024    MCV 96 09/11/2024     (H) 09/11/2024     Lab Results   Component Value Date    CHOL 162 04/30/2024    TRIG 72 04/30/2024    HDL 90.9 04/30/2024     Diagnostic Imaging:   Vascular US LE Arterial Duplex Left 9/5/24  CONCLUSIONS:  Left Lower Arterial: There is >50% stenosis documented at the external iliac artery. There is an occlusion documented at the posterior tibial artery dist.  Left Bypass Graft: The left femoral to PTA bypass graft demonstrates a >50% stenosis. The bypass is constructed of vein graft. Non vascular structure left groin measuring 2.3 x 3.5 x 3.4 cm. Stenosis at mid bypass. >50% stenosis at distal anastamosis.  Additional Findings:  Technically difficult exam due to vessel depth/ scarring.    Vascular US KATHLEEN w/o Exercise 9/5/24  CONCLUSIONS:  Right Lower PVR: No evidence of arterial occlusive disease in the right lower extremity at rest. Normal digital perfusion noted. Monophasic flow is noted in the right posterior  tibial artery. Multiphasic flow is noted in the right common femoral artery and right dorsalis pedis artery.  Left Lower PVR: Evidence of mild arterial occlusive disease in the left lower extremity at rest. Monophasic flow is noted in the left common femoral artery, left posterior tibial artery and left dorsalis pedis artery.  Additional Findings:  See arterial duplex.        Comparison:  Compared with study from 4/19/2024, Previous right normal, left moderate. Decreased TBI on left.     Imaging & Doppler Findings:     RIGHT Lower PVR                Pressures Ratios  Right Posterior Tibial (Ankle) 176 mmHg  1.14  Right Dorsalis Pedis (Ankle)   173 mmHg  1.12  Right Digit (Great Toe)        123 mmHg  0.79       LEFT Lower PVR                Pressures Ratios  Left Posterior Tibial (Ankle) 109 mmHg  0.70  Left Dorsalis Pedis (Ankle)   139 mmHg  0.90  Left Digit (Great Toe)        44 mmHg   0.28                        Right     Left  Brachial Pressure 151 mmHg 155 mmHg       [id number] Hedy Arvizu MD  Electronically signed by [id number] Hedy Arvizu MD on 9/9/2024 at 10:05:14 AM    Current Outpatient Medications:     amLODIPine (Norvasc) 5 mg tablet, TAKE 1 TABLET(5 MG) BY MOUTH DAILY, Disp: 90 tablet, Rfl: 0    aspirin 81 mg EC tablet, Take 1 tablet (81 mg) by mouth once daily., Disp: , Rfl:     capsaicin (Zostrix) 0.025 % cream, Apply topically 2 times a day as needed (neuropathic pain). (Patient not taking: Reported on 9/6/2024), Disp: 50 g, Rfl: 0    oxyCODONE-acetaminophen (Percocet) 5-325 mg tablet, Take 1 tablet by mouth every 6 hours if needed for severe pain (7 - 10) or moderate pain (4 - 6)., Disp: 8 tablet, Rfl: 0    rivaroxaban (Xarelto) 2.5 mg tablet, Take 1 tablet (2.5 mg) by mouth 2 times daily (morning and late afternoon)., Disp: 60 tablet, Rfl: 11    rosuvastatin (Crestor) 20 mg tablet, Take 1 tablet (20 mg) by mouth once daily at bedtime., Disp: 90 tablet, Rfl: 0    Assessment/Plan   PAD s/p Redo Left Femoral  Endarterectomy w/left femoral to PTA bypass w/reverse greater saphenous vein (7/25/24) c/b left groin seroma s/p I &D (9/12/24)  Scant serous drainage from left groin incision. +1 pitting LLE edema.   +2 palpable femoral pulses. Able to doppler signals for left DP/PT.   Vascular US LE Arterial Duplex Left 9/5/24 Left Lower Arterial: There is >50% stenosis documented at the external iliac artery. There is an occlusion documented at the posterior tibial artery dist. Left Bypass Graft: The left femoral to PTA bypass graft demonstrates a >50% stenosis. The bypass is constructed of vein graft. Non vascular structure left groin measuring 2.3 x 3.5 x 3.4 cm. Stenosis at mid bypass. >50% stenosis at distal anastomosis  Vascular US KATHLEEN w/o Exercise 9/5/24 Evidence of mild arterial occlusive disease in the left lower extremity at rest. Monophasic flow is noted in the left common femoral artery, left posterior tibial artery and left dorsalis pedis artery.  Continue Aspirin 81mg every day, Xarelto 2.5mg BID, and Crestor 20mg at bedtime.   Completed Bactrim BID x 7 days for MSSA positive wound culture on 9/23/24.  Schedule angiogram.

## 2024-09-25 NOTE — H&P (VIEW-ONLY)
"Subjective   Mechelle Alcantara is a 78 y.o. female.    Chief Complaint:  79yo patient of Dr. Africa Hoover here for 1 week follow up for left groin incision dehiscence and drainage.     HPI  Most recent visit 9/16/24 s/p left groin I & D. DREAD Drain removed.  Started on Bactrim BID x 7 days for MSSA positive wound culture.      PMHx: HTN, HLD, PAD  PSHx: Left femoral-tibial bypass  Social Hx: Quit smoking 3/2024. Denies EtOH/illicit drug use.      Presents today accompanied by . Denies abdominal pain and/or back pain. Denies BLE pain at rest or with exertion. Endorses left foot paresthesias & edema. LLE erythema/rash aggravated by aquaphor. Left groin site oozing scant amounts of serous drainage. No malodor. Medication adherent. Eating/drinking well. Denies N/V/D.     Review of Systems   HENT:  Negative for nosebleeds.    Cardiovascular:  Positive for leg swelling. Negative for claudication.   Hematologic/Lymphatic: Negative for bleeding problem. Does not bruise/bleed easily.   Skin:  Positive for rash. Negative for nail changes and poor wound healing.   Musculoskeletal:  Negative for back pain.   Gastrointestinal:  Negative for abdominal pain, hematemesis, hematochezia and melena.   Genitourinary:  Negative for hematuria.   Neurological:  Positive for numbness and paresthesias.     Objective   Visit Vitals  /62 (BP Location: Left arm, Patient Position: Sitting)   Pulse 70   Ht 1.676 m (5' 6\")   Wt 58.5 kg (129 lb)   BMI 20.82 kg/m²   OB Status Postmenopausal   Smoking Status Former   BSA 1.65 m²     Vitals reviewed.   Constitutional:       Appearance: Normal and healthy appearance. Well-developed.   Neck:      Vascular: No carotid bruit.   Pulmonary:      Effort: Pulmonary effort is normal.      Breath sounds: Normal breath sounds.   Cardiovascular:      PMI at left midclavicular line. Normal rate. Regular rhythm. Normal S1. Normal S2.       Murmurs: There is no murmur.      No gallop.  No click. No " rub.   Pulses:     Radial: 2+ bilaterally.     Dorsalis pedis: detected w/ Doppler on the left side and Detected w/ Doppler on the right side.     Posterior tibial: detected w/ Doppler on the left side and Detected w/ Doppler on the right side.  Edema:     Peripheral edema present.     Ankle: 1+ edema of the left ankle.     Feet: 1+ edema of the left foot.  Abdominal:      General: There is no distension.   Skin:     Comments: Left groin incision w/scant serous drainage. No erythema. No malodor.    Neurological:      Mental Status: Oriented to person, place and time.   Psychiatric:         Behavior: Behavior is cooperative.       Lab Review:   Lab Results   Component Value Date     09/11/2024    K 4.8 09/11/2024     09/11/2024    CO2 26 09/11/2024    BUN 15 09/11/2024    CREATININE 0.71 09/11/2024    GLUCOSE 88 09/11/2024    CALCIUM 8.8 09/11/2024     Lab Results   Component Value Date    WBC 7.0 09/11/2024    HGB 10.4 (L) 09/11/2024    HCT 34.2 (L) 09/11/2024    MCV 96 09/11/2024     (H) 09/11/2024     Lab Results   Component Value Date    CHOL 162 04/30/2024    TRIG 72 04/30/2024    HDL 90.9 04/30/2024     Diagnostic Imaging:   Vascular US LE Arterial Duplex Left 9/5/24  CONCLUSIONS:  Left Lower Arterial: There is >50% stenosis documented at the external iliac artery. There is an occlusion documented at the posterior tibial artery dist.  Left Bypass Graft: The left femoral to PTA bypass graft demonstrates a >50% stenosis. The bypass is constructed of vein graft. Non vascular structure left groin measuring 2.3 x 3.5 x 3.4 cm. Stenosis at mid bypass. >50% stenosis at distal anastamosis.  Additional Findings:  Technically difficult exam due to vessel depth/ scarring.    Vascular US KATHLEEN w/o Exercise 9/5/24  CONCLUSIONS:  Right Lower PVR: No evidence of arterial occlusive disease in the right lower extremity at rest. Normal digital perfusion noted. Monophasic flow is noted in the right posterior  tibial artery. Multiphasic flow is noted in the right common femoral artery and right dorsalis pedis artery.  Left Lower PVR: Evidence of mild arterial occlusive disease in the left lower extremity at rest. Monophasic flow is noted in the left common femoral artery, left posterior tibial artery and left dorsalis pedis artery.  Additional Findings:  See arterial duplex.        Comparison:  Compared with study from 4/19/2024, Previous right normal, left moderate. Decreased TBI on left.     Imaging & Doppler Findings:     RIGHT Lower PVR                Pressures Ratios  Right Posterior Tibial (Ankle) 176 mmHg  1.14  Right Dorsalis Pedis (Ankle)   173 mmHg  1.12  Right Digit (Great Toe)        123 mmHg  0.79       LEFT Lower PVR                Pressures Ratios  Left Posterior Tibial (Ankle) 109 mmHg  0.70  Left Dorsalis Pedis (Ankle)   139 mmHg  0.90  Left Digit (Great Toe)        44 mmHg   0.28                        Right     Left  Brachial Pressure 151 mmHg 155 mmHg       [id number] Hedy Arvizu MD  Electronically signed by [id number] Hedy Arvizu MD on 9/9/2024 at 10:05:14 AM    Current Outpatient Medications:     amLODIPine (Norvasc) 5 mg tablet, TAKE 1 TABLET(5 MG) BY MOUTH DAILY, Disp: 90 tablet, Rfl: 0    aspirin 81 mg EC tablet, Take 1 tablet (81 mg) by mouth once daily., Disp: , Rfl:     capsaicin (Zostrix) 0.025 % cream, Apply topically 2 times a day as needed (neuropathic pain). (Patient not taking: Reported on 9/6/2024), Disp: 50 g, Rfl: 0    oxyCODONE-acetaminophen (Percocet) 5-325 mg tablet, Take 1 tablet by mouth every 6 hours if needed for severe pain (7 - 10) or moderate pain (4 - 6)., Disp: 8 tablet, Rfl: 0    rivaroxaban (Xarelto) 2.5 mg tablet, Take 1 tablet (2.5 mg) by mouth 2 times daily (morning and late afternoon)., Disp: 60 tablet, Rfl: 11    rosuvastatin (Crestor) 20 mg tablet, Take 1 tablet (20 mg) by mouth once daily at bedtime., Disp: 90 tablet, Rfl: 0    Assessment/Plan   PAD s/p Redo Left Femoral  Endarterectomy w/left femoral to PTA bypass w/reverse greater saphenous vein (7/25/24) c/b left groin seroma s/p I &D (9/12/24)  Scant serous drainage from left groin incision. +1 pitting LLE edema.   +2 palpable femoral pulses. Able to doppler signals for left DP/PT.   Vascular US LE Arterial Duplex Left 9/5/24 Left Lower Arterial: There is >50% stenosis documented at the external iliac artery. There is an occlusion documented at the posterior tibial artery dist. Left Bypass Graft: The left femoral to PTA bypass graft demonstrates a >50% stenosis. The bypass is constructed of vein graft. Non vascular structure left groin measuring 2.3 x 3.5 x 3.4 cm. Stenosis at mid bypass. >50% stenosis at distal anastomosis  Vascular US KATHLEEN w/o Exercise 9/5/24 Evidence of mild arterial occlusive disease in the left lower extremity at rest. Monophasic flow is noted in the left common femoral artery, left posterior tibial artery and left dorsalis pedis artery.  Continue Aspirin 81mg every day, Xarelto 2.5mg BID, and Crestor 20mg at bedtime.   Completed Bactrim BID x 7 days for MSSA positive wound culture on 9/23/24.  Schedule angiogram.

## 2024-09-25 NOTE — TELEPHONE ENCOUNTER
I have spoken to   And Mrs. Alcantara   during her recent office visit.  The patient has chosen the following date for his vascular surgery with Dr. Hoover 10/10/2024  LLE  angio     .     The patient has been informed to arrive 1.5  hours prior to the  procedure and to remain NPO     8  hours before the  procedure.        Following a discussion with Dr. Dubon   please hold the following medications before  your procedure Xarelto  2 days prior to procedure.    I have reviewed with the patient his/her allergy to IVP dye    The following medications have been ordered and reviewed with the patient  Prednisone 50 mg one tablet PO orally 13 hours prior to procedure    Prednisone 50 mg one tablet PO orally 7 hours prior to procedure   Prednisone 50 mg one tablet PO orally 1 hour prior to procedure      Benadryl 50 mg one tablet PO one hour prior to procedure      -The day before your procedure or surgery    - Nothing to eat nor drink after midnight               - The day of your procedure or surgery:  -Please park in parking lot E (nearest to the Emergency Department) enter through the outpatient  entrance.    Please wear comfortable clothing  Remember to bring your insurance cards, a form of identification and your COVID vaccination card with you.   Do not bring valuables such as credit cards, checkbook, money or jewelry with you.  - PLEASE BRING ALL MEDICATIONS OR AN UPDATED LIST OF CURRENT MEDICATONS WITH YOU      The patient has  been advised to have transportation to and from the hospital on the day of the procedure. The patient has verbalized an understanding of these instructions.     Directions to [facility] have been provided. The patient has been instructed to call Dr. Hoover  office nurse if there are  any questions or  concerns. The phone number  [phone]  has been provided   Mechelle Childers RN BSN

## 2024-09-26 ASSESSMENT — ENCOUNTER SYMPTOMS
PARESTHESIAS: 1
ABDOMINAL PAIN: 0
HEMATEMESIS: 0
POOR WOUND HEALING: 0
BRUISES/BLEEDS EASILY: 0
NUMBNESS: 1
HEMATURIA: 0
CLAUDICATION: 0
BACK PAIN: 0
NAIL CHANGES: 0
HEMATOCHEZIA: 0

## 2024-10-02 ENCOUNTER — APPOINTMENT (OUTPATIENT)
Dept: VASCULAR SURGERY | Facility: CLINIC | Age: 79
End: 2024-10-02
Payer: MEDICARE

## 2024-10-10 ENCOUNTER — HOSPITAL ENCOUNTER (INPATIENT)
Facility: HOSPITAL | Age: 79
LOS: 2 days | Discharge: HOME | DRG: 271 | End: 2024-10-12
Attending: SURGERY | Admitting: INTERNAL MEDICINE
Payer: MEDICARE

## 2024-10-10 DIAGNOSIS — I73.9 PAD (PERIPHERAL ARTERY DISEASE) (CMS-HCC): Primary | ICD-10-CM

## 2024-10-10 DIAGNOSIS — T82.898A: ICD-10-CM

## 2024-10-10 LAB
ACT BLD: 182 SEC (ref 83–199)
FIBRINOGEN PPP-MCNC: 328 MG/DL (ref 200–400)
GLUCOSE BLD MANUAL STRIP-MCNC: 128 MG/DL (ref 74–99)

## 2024-10-10 PROCEDURE — 37224 HC REVASCULARIZE FEM/POP ARTERY,ANGIOPLASTY: CPT | Performed by: SURGERY

## 2024-10-10 PROCEDURE — 04CL3ZZ EXTIRPATION OF MATTER FROM LEFT FEMORAL ARTERY, PERCUTANEOUS APPROACH: ICD-10-PCS | Performed by: SURGERY

## 2024-10-10 PROCEDURE — 85347 COAGULATION TIME ACTIVATED: CPT

## 2024-10-10 PROCEDURE — 37799 UNLISTED PX VASCULAR SURGERY: CPT | Performed by: SURGERY

## 2024-10-10 PROCEDURE — 04CS3ZZ EXTIRPATION OF MATTER FROM LEFT POSTERIOR TIBIAL ARTERY, PERCUTANEOUS APPROACH: ICD-10-PCS | Performed by: SURGERY

## 2024-10-10 PROCEDURE — 37211 THROMBOLYTIC ART THERAPY: CPT | Performed by: SURGERY

## 2024-10-10 PROCEDURE — 99153 MOD SED SAME PHYS/QHP EA: CPT | Performed by: SURGERY

## 2024-10-10 PROCEDURE — C1760 CLOSURE DEV, VASC: HCPCS | Performed by: SURGERY

## 2024-10-10 PROCEDURE — B41GYZZ FLUOROSCOPY OF LEFT LOWER EXTREMITY ARTERIES USING OTHER CONTRAST: ICD-10-PCS | Performed by: SURGERY

## 2024-10-10 PROCEDURE — 75774 ARTERY X-RAY EACH VESSEL: CPT | Performed by: SURGERY

## 2024-10-10 PROCEDURE — 2500000004 HC RX 250 GENERAL PHARMACY W/ HCPCS (ALT 636 FOR OP/ED): Performed by: SURGERY

## 2024-10-10 PROCEDURE — 047L3Z1 DILATION OF LEFT FEMORAL ARTERY USING DRUG-COATED BALLOON, PERCUTANEOUS APPROACH: ICD-10-PCS | Performed by: SURGERY

## 2024-10-10 PROCEDURE — 96373 THER/PROPH/DIAG INJ IA: CPT | Performed by: SURGERY

## 2024-10-10 PROCEDURE — 82947 ASSAY GLUCOSE BLOOD QUANT: CPT

## 2024-10-10 PROCEDURE — C1757 CATH, THROMBECTOMY/EMBOLECT: HCPCS | Performed by: SURGERY

## 2024-10-10 PROCEDURE — C1894 INTRO/SHEATH, NON-LASER: HCPCS | Performed by: SURGERY

## 2024-10-10 PROCEDURE — 99152 MOD SED SAME PHYS/QHP 5/>YRS: CPT | Performed by: SURGERY

## 2024-10-10 PROCEDURE — 85384 FIBRINOGEN ACTIVITY: CPT | Performed by: SURGERY

## 2024-10-10 PROCEDURE — 75630 X-RAY AORTA LEG ARTERIES: CPT | Performed by: SURGERY

## 2024-10-10 PROCEDURE — 3E04317 INTRODUCTION OF OTHER THROMBOLYTIC INTO CENTRAL VEIN, PERCUTANEOUS APPROACH: ICD-10-PCS | Performed by: SURGERY

## 2024-10-10 PROCEDURE — 2780000003 HC OR 278 NO HCPCS: Performed by: SURGERY

## 2024-10-10 PROCEDURE — B410YZZ FLUOROSCOPY OF ABDOMINAL AORTA USING OTHER CONTRAST: ICD-10-PCS | Performed by: SURGERY

## 2024-10-10 PROCEDURE — 37224 PR REVSC OPN/PRG FEM/POP W/ANGIOPLASTY UNI: CPT | Performed by: SURGERY

## 2024-10-10 PROCEDURE — 2550000001 HC RX 255 CONTRASTS: Performed by: SURGERY

## 2024-10-10 PROCEDURE — 76937 US GUIDE VASCULAR ACCESS: CPT | Performed by: SURGERY

## 2024-10-10 PROCEDURE — 2720000007 HC OR 272 NO HCPCS: Performed by: SURGERY

## 2024-10-10 PROCEDURE — C1887 CATHETER, GUIDING: HCPCS | Performed by: SURGERY

## 2024-10-10 PROCEDURE — C1769 GUIDE WIRE: HCPCS | Performed by: SURGERY

## 2024-10-10 PROCEDURE — 36246 INS CATH ABD/L-EXT ART 2ND: CPT | Performed by: SURGERY

## 2024-10-10 PROCEDURE — 047S3Z1 DILATION OF LEFT POSTERIOR TIBIAL ARTERY USING DRUG-COATED BALLOON, PERCUTANEOUS APPROACH: ICD-10-PCS | Performed by: SURGERY

## 2024-10-10 PROCEDURE — 2020000001 HC ICU ROOM DAILY

## 2024-10-10 PROCEDURE — C2623 CATH, TRANSLUMIN, DRUG-COAT: HCPCS | Performed by: SURGERY

## 2024-10-10 PROCEDURE — C1725 CATH, TRANSLUMIN NON-LASER: HCPCS | Performed by: SURGERY

## 2024-10-10 RX ORDER — HEPARIN SODIUM 1000 [USP'U]/ML
INJECTION, SOLUTION INTRAVENOUS; SUBCUTANEOUS AS NEEDED
Status: DISCONTINUED | OUTPATIENT
Start: 2024-10-10 | End: 2024-10-10 | Stop reason: HOSPADM

## 2024-10-10 RX ORDER — NITROGLYCERIN 5 MG/ML
INJECTION, SOLUTION INTRAVENOUS AS NEEDED
Status: DISCONTINUED | OUTPATIENT
Start: 2024-10-10 | End: 2024-10-10 | Stop reason: HOSPADM

## 2024-10-10 RX ORDER — ASPIRIN 81 MG/1
81 TABLET ORAL DAILY
Status: DISCONTINUED | OUTPATIENT
Start: 2024-10-10 | End: 2024-10-11

## 2024-10-10 RX ORDER — HEPARIN SODIUM 10000 [USP'U]/100ML
INJECTION, SOLUTION INTRAVENOUS CONTINUOUS PRN
Status: COMPLETED | OUTPATIENT
Start: 2024-10-10 | End: 2024-10-10

## 2024-10-10 RX ORDER — SODIUM CHLORIDE 9 MG/ML
INJECTION, SOLUTION INTRAVENOUS CONTINUOUS PRN
Status: COMPLETED | OUTPATIENT
Start: 2024-10-10 | End: 2024-10-10

## 2024-10-10 RX ORDER — LIDOCAINE HYDROCHLORIDE 20 MG/ML
INJECTION, SOLUTION INFILTRATION; PERINEURAL AS NEEDED
Status: DISCONTINUED | OUTPATIENT
Start: 2024-10-10 | End: 2024-10-10 | Stop reason: HOSPADM

## 2024-10-10 RX ORDER — AMLODIPINE BESYLATE 5 MG/1
5 TABLET ORAL DAILY
Status: DISCONTINUED | OUTPATIENT
Start: 2024-10-10 | End: 2024-10-11 | Stop reason: ENTERED-IN-ERROR

## 2024-10-10 RX ORDER — ROSUVASTATIN CALCIUM 20 MG/1
20 TABLET, COATED ORAL NIGHTLY
Status: DISCONTINUED | OUTPATIENT
Start: 2024-10-10 | End: 2024-10-11

## 2024-10-10 RX ORDER — HEPARIN SODIUM 10000 [USP'U]/100ML
800 INJECTION, SOLUTION INTRAVENOUS CONTINUOUS
Status: DISCONTINUED | OUTPATIENT
Start: 2024-10-10 | End: 2024-10-11

## 2024-10-10 RX ORDER — DIPHENHYDRAMINE HYDROCHLORIDE 50 MG/ML
INJECTION INTRAMUSCULAR; INTRAVENOUS AS NEEDED
Status: DISCONTINUED | OUTPATIENT
Start: 2024-10-10 | End: 2024-10-10 | Stop reason: HOSPADM

## 2024-10-10 RX ORDER — MIDAZOLAM HYDROCHLORIDE 1 MG/ML
INJECTION, SOLUTION INTRAMUSCULAR; INTRAVENOUS AS NEEDED
Status: DISCONTINUED | OUTPATIENT
Start: 2024-10-10 | End: 2024-10-10 | Stop reason: HOSPADM

## 2024-10-10 RX ORDER — FENTANYL CITRATE 50 UG/ML
INJECTION, SOLUTION INTRAMUSCULAR; INTRAVENOUS AS NEEDED
Status: DISCONTINUED | OUTPATIENT
Start: 2024-10-10 | End: 2024-10-10 | Stop reason: HOSPADM

## 2024-10-10 RX ORDER — HEPARIN SODIUM 10000 [USP'U]/100ML
INJECTION, SOLUTION INTRAVENOUS CONTINUOUS PRN
Status: DISCONTINUED | OUTPATIENT
Start: 2024-10-10 | End: 2024-10-10 | Stop reason: HOSPADM

## 2024-10-10 RX ORDER — HYDRALAZINE HYDROCHLORIDE 20 MG/ML
INJECTION INTRAMUSCULAR; INTRAVENOUS AS NEEDED
Status: DISCONTINUED | OUTPATIENT
Start: 2024-10-10 | End: 2024-10-10 | Stop reason: HOSPADM

## 2024-10-10 SDOH — ECONOMIC STABILITY: HOUSING INSECURITY: AT ANY TIME IN THE PAST 12 MONTHS, WERE YOU HOMELESS OR LIVING IN A SHELTER (INCLUDING NOW)?: NO

## 2024-10-10 SDOH — HEALTH STABILITY: MENTAL HEALTH: HOW OFTEN DO YOU HAVE 6 OR MORE DRINKS ON ONE OCCASION?: NEVER

## 2024-10-10 SDOH — SOCIAL STABILITY: SOCIAL INSECURITY: HAS ANYONE EVER THREATENED TO HURT YOUR FAMILY OR YOUR PETS?: NO

## 2024-10-10 SDOH — SOCIAL STABILITY: SOCIAL INSECURITY
WITHIN THE LAST YEAR, HAVE YOU BEEN RAPED OR FORCED TO HAVE ANY KIND OF SEXUAL ACTIVITY BY YOUR PARTNER OR EX-PARTNER?: NO

## 2024-10-10 SDOH — ECONOMIC STABILITY: INCOME INSECURITY: IN THE PAST 12 MONTHS, HAS THE ELECTRIC, GAS, OIL, OR WATER COMPANY THREATENED TO SHUT OFF SERVICE IN YOUR HOME?: NO

## 2024-10-10 SDOH — SOCIAL STABILITY: SOCIAL INSECURITY: DOES ANYONE TRY TO KEEP YOU FROM HAVING/CONTACTING OTHER FRIENDS OR DOING THINGS OUTSIDE YOUR HOME?: NO

## 2024-10-10 SDOH — HEALTH STABILITY: MENTAL HEALTH: HOW OFTEN DO YOU HAVE SIX OR MORE DRINKS ON ONE OCCASION?: NEVER

## 2024-10-10 SDOH — SOCIAL STABILITY: SOCIAL INSECURITY
WITHIN THE LAST YEAR, HAVE YOU BEEN KICKED, HIT, SLAPPED, OR OTHERWISE PHYSICALLY HURT BY YOUR PARTNER OR EX-PARTNER?: NO

## 2024-10-10 SDOH — ECONOMIC STABILITY: HOUSING INSECURITY: IN THE PAST 12 MONTHS, HOW MANY TIMES HAVE YOU MOVED WHERE YOU WERE LIVING?: 0

## 2024-10-10 SDOH — ECONOMIC STABILITY: TRANSPORTATION INSECURITY: IN THE PAST 12 MONTHS, HAS LACK OF TRANSPORTATION KEPT YOU FROM MEDICAL APPOINTMENTS OR FROM GETTING MEDICATIONS?: NO

## 2024-10-10 SDOH — SOCIAL STABILITY: SOCIAL INSECURITY: WITHIN THE LAST YEAR, HAVE YOU BEEN HUMILIATED OR EMOTIONALLY ABUSED IN OTHER WAYS BY YOUR PARTNER OR EX-PARTNER?: NO

## 2024-10-10 SDOH — SOCIAL STABILITY: SOCIAL INSECURITY: WITHIN THE LAST YEAR, HAVE YOU BEEN AFRAID OF YOUR PARTNER OR EX-PARTNER?: NO

## 2024-10-10 SDOH — SOCIAL STABILITY: SOCIAL INSECURITY: ABUSE: ADULT

## 2024-10-10 SDOH — SOCIAL STABILITY: SOCIAL INSECURITY: DO YOU FEEL ANYONE HAS EXPLOITED OR TAKEN ADVANTAGE OF YOU FINANCIALLY OR OF YOUR PERSONAL PROPERTY?: NO

## 2024-10-10 SDOH — ECONOMIC STABILITY: FOOD INSECURITY: WITHIN THE PAST 12 MONTHS, THE FOOD YOU BOUGHT JUST DIDN'T LAST AND YOU DIDN'T HAVE MONEY TO GET MORE.: NEVER TRUE

## 2024-10-10 SDOH — HEALTH STABILITY: MENTAL HEALTH: HOW OFTEN DO YOU HAVE A DRINK CONTAINING ALCOHOL?: NEVER

## 2024-10-10 SDOH — HEALTH STABILITY: MENTAL HEALTH: HOW MANY STANDARD DRINKS CONTAINING ALCOHOL DO YOU HAVE ON A TYPICAL DAY?: PATIENT DOES NOT DRINK

## 2024-10-10 SDOH — ECONOMIC STABILITY: INCOME INSECURITY: IN THE PAST 12 MONTHS HAS THE ELECTRIC, GAS, OIL, OR WATER COMPANY THREATENED TO SHUT OFF SERVICES IN YOUR HOME?: NO

## 2024-10-10 SDOH — SOCIAL STABILITY: SOCIAL INSECURITY: ARE THERE ANY APPARENT SIGNS OF INJURIES/BEHAVIORS THAT COULD BE RELATED TO ABUSE/NEGLECT?: NO

## 2024-10-10 SDOH — ECONOMIC STABILITY: INCOME INSECURITY: IN THE LAST 12 MONTHS, WAS THERE A TIME WHEN YOU WERE NOT ABLE TO PAY THE MORTGAGE OR RENT ON TIME?: NO

## 2024-10-10 SDOH — ECONOMIC STABILITY: HOUSING INSECURITY: IN THE LAST 12 MONTHS, WAS THERE A TIME WHEN YOU WERE NOT ABLE TO PAY THE MORTGAGE OR RENT ON TIME?: NO

## 2024-10-10 SDOH — ECONOMIC STABILITY: FOOD INSECURITY: WITHIN THE PAST 12 MONTHS, YOU WORRIED THAT YOUR FOOD WOULD RUN OUT BEFORE YOU GOT MONEY TO BUY MORE.: NEVER TRUE

## 2024-10-10 SDOH — SOCIAL STABILITY: SOCIAL INSECURITY
WITHIN THE LAST YEAR, HAVE TO BEEN RAPED OR FORCED TO HAVE ANY KIND OF SEXUAL ACTIVITY BY YOUR PARTNER OR EX-PARTNER?: NO

## 2024-10-10 SDOH — SOCIAL STABILITY: SOCIAL INSECURITY: DO YOU FEEL UNSAFE GOING BACK TO THE PLACE WHERE YOU ARE LIVING?: NO

## 2024-10-10 SDOH — ECONOMIC STABILITY: FOOD INSECURITY: WITHIN THE PAST 12 MONTHS, YOU WORRIED THAT YOUR FOOD WOULD RUN OUT BEFORE YOU GOT THE MONEY TO BUY MORE.: NEVER TRUE

## 2024-10-10 SDOH — HEALTH STABILITY: PHYSICAL HEALTH: ON AVERAGE, HOW MANY DAYS PER WEEK DO YOU ENGAGE IN MODERATE TO STRENUOUS EXERCISE (LIKE A BRISK WALK)?: 0 DAYS

## 2024-10-10 SDOH — SOCIAL STABILITY: SOCIAL INSECURITY: HAVE YOU HAD ANY THOUGHTS OF HARMING ANYONE ELSE?: NO

## 2024-10-10 SDOH — ECONOMIC STABILITY: INCOME INSECURITY: HOW HARD IS IT FOR YOU TO PAY FOR THE VERY BASICS LIKE FOOD, HOUSING, MEDICAL CARE, AND HEATING?: NOT HARD AT ALL

## 2024-10-10 SDOH — ECONOMIC STABILITY: FOOD INSECURITY: HOW HARD IS IT FOR YOU TO PAY FOR THE VERY BASICS LIKE FOOD, HOUSING, MEDICAL CARE, AND HEATING?: NOT HARD AT ALL

## 2024-10-10 SDOH — HEALTH STABILITY: MENTAL HEALTH: HOW MANY DRINKS CONTAINING ALCOHOL DO YOU HAVE ON A TYPICAL DAY WHEN YOU ARE DRINKING?: PATIENT DOES NOT DRINK

## 2024-10-10 SDOH — SOCIAL STABILITY: SOCIAL INSECURITY: HAVE YOU HAD THOUGHTS OF HARMING ANYONE ELSE?: NO

## 2024-10-10 SDOH — SOCIAL STABILITY: SOCIAL INSECURITY: ARE YOU OR HAVE YOU BEEN THREATENED OR ABUSED PHYSICALLY, EMOTIONALLY, OR SEXUALLY BY ANYONE?: NO

## 2024-10-10 SDOH — SOCIAL STABILITY: SOCIAL INSECURITY: WERE YOU ABLE TO COMPLETE ALL THE BEHAVIORAL HEALTH SCREENINGS?: YES

## 2024-10-10 ASSESSMENT — COGNITIVE AND FUNCTIONAL STATUS - GENERAL
MOBILITY SCORE: 17
DRESSING REGULAR LOWER BODY CLOTHING: A LITTLE
DAILY ACTIVITIY SCORE: 18
TURNING FROM BACK TO SIDE WHILE IN FLAT BAD: A LITTLE
DRESSING REGULAR UPPER BODY CLOTHING: A LITTLE
DRESSING REGULAR LOWER BODY CLOTHING: A LOT
HELP NEEDED FOR BATHING: A LITTLE
DRESSING REGULAR UPPER BODY CLOTHING: A LITTLE
TOILETING: A LITTLE
MOBILITY SCORE: 23
WALKING IN HOSPITAL ROOM: A LITTLE
WALKING IN HOSPITAL ROOM: A LITTLE
DAILY ACTIVITIY SCORE: 17
PERSONAL GROOMING: A LITTLE
DAILY ACTIVITIY SCORE: 24
WALKING IN HOSPITAL ROOM: A LITTLE
MOBILITY SCORE: 23
HELP NEEDED FOR BATHING: A LOT
STANDING UP FROM CHAIR USING ARMS: A LITTLE
MOVING TO AND FROM BED TO CHAIR: A LITTLE
CLIMB 3 TO 5 STEPS WITH RAILING: A LOT
EATING MEALS: A LITTLE
MOVING FROM LYING ON BACK TO SITTING ON SIDE OF FLAT BED WITH BEDRAILS: A LITTLE
TOILETING: A LOT
PATIENT BASELINE BEDBOUND: NO

## 2024-10-10 ASSESSMENT — PAIN SCALES - GENERAL
PAINLEVEL_OUTOF10: 0 - NO PAIN

## 2024-10-10 ASSESSMENT — ACTIVITIES OF DAILY LIVING (ADL)
HEARING - LEFT EAR: FUNCTIONAL
DRESSING YOURSELF: INDEPENDENT
ADEQUATE_TO_COMPLETE_ADL: YES
HEARING - RIGHT EAR: FUNCTIONAL
FEEDING YOURSELF: INDEPENDENT
GROOMING: INDEPENDENT
JUDGMENT_ADEQUATE_SAFELY_COMPLETE_DAILY_ACTIVITIES: YES
PATIENT'S MEMORY ADEQUATE TO SAFELY COMPLETE DAILY ACTIVITIES?: YES
LACK_OF_TRANSPORTATION: NO
TOILETING: INDEPENDENT
WALKS IN HOME: INDEPENDENT
LACK_OF_TRANSPORTATION: NO
BATHING: INDEPENDENT

## 2024-10-10 ASSESSMENT — LIFESTYLE VARIABLES
SKIP TO QUESTIONS 9-10: 1
AUDIT-C TOTAL SCORE: 0
HOW OFTEN DO YOU HAVE 6 OR MORE DRINKS ON ONE OCCASION: NEVER
HOW OFTEN DO YOU HAVE A DRINK CONTAINING ALCOHOL: NEVER
HOW MANY STANDARD DRINKS CONTAINING ALCOHOL DO YOU HAVE ON A TYPICAL DAY: PATIENT DOES NOT DRINK
SKIP TO QUESTIONS 9-10: 1

## 2024-10-10 ASSESSMENT — PATIENT HEALTH QUESTIONNAIRE - PHQ9
2. FEELING DOWN, DEPRESSED OR HOPELESS: NOT AT ALL
1. LITTLE INTEREST OR PLEASURE IN DOING THINGS: NOT AT ALL
SUM OF ALL RESPONSES TO PHQ9 QUESTIONS 1 & 2: 0

## 2024-10-10 ASSESSMENT — PAIN - FUNCTIONAL ASSESSMENT
PAIN_FUNCTIONAL_ASSESSMENT: 0-10

## 2024-10-10 NOTE — Clinical Note
Vessel(s): left iliac artery, left PFA, left SFA, left popliteal artery, left PTA, left peroneal artery, left DARREN, left tibio-peroneal trunk and left dorsalis pedis artery. Injected with hand injections.

## 2024-10-11 PROBLEM — T82.898A: Status: ACTIVE | Noted: 2024-09-25

## 2024-10-11 LAB
ANION GAP SERPL CALC-SCNC: 12 MMOL/L (ref 10–20)
APTT PPP: 73 SECONDS (ref 27–38)
BUN SERPL-MCNC: 23 MG/DL (ref 6–23)
CALCIUM SERPL-MCNC: 8.1 MG/DL (ref 8.6–10.3)
CHLORIDE SERPL-SCNC: 108 MMOL/L (ref 98–107)
CO2 SERPL-SCNC: 22 MMOL/L (ref 21–32)
CREAT SERPL-MCNC: 0.68 MG/DL (ref 0.5–1.05)
EGFRCR SERPLBLD CKD-EPI 2021: 89 ML/MIN/1.73M*2
ERYTHROCYTE [DISTWIDTH] IN BLOOD BY AUTOMATED COUNT: 14.6 % (ref 11.5–14.5)
FIBRINOGEN PPP-MCNC: 263 MG/DL (ref 200–400)
FIBRINOGEN PPP-MCNC: 266 MG/DL (ref 200–400)
FIBRINOGEN PPP-MCNC: 275 MG/DL (ref 200–400)
GLUCOSE BLD MANUAL STRIP-MCNC: 123 MG/DL (ref 74–99)
GLUCOSE BLD MANUAL STRIP-MCNC: 137 MG/DL (ref 74–99)
GLUCOSE SERPL-MCNC: 123 MG/DL (ref 74–99)
HCT VFR BLD AUTO: 29.5 % (ref 36–46)
HGB BLD-MCNC: 9.2 G/DL (ref 12–16)
INR PPP: 1 (ref 0.9–1.1)
MCH RBC QN AUTO: 28.6 PG (ref 26–34)
MCHC RBC AUTO-ENTMCNC: 31.2 G/DL (ref 32–36)
MCV RBC AUTO: 92 FL (ref 80–100)
NRBC BLD-RTO: 0 /100 WBCS (ref 0–0)
PLATELET # BLD AUTO: 360 X10*3/UL (ref 150–450)
POTASSIUM SERPL-SCNC: 3.8 MMOL/L (ref 3.5–5.3)
PROTHROMBIN TIME: 11.5 SECONDS (ref 9.8–12.8)
RBC # BLD AUTO: 3.22 X10*6/UL (ref 4–5.2)
SODIUM SERPL-SCNC: 138 MMOL/L (ref 136–145)
WBC # BLD AUTO: 6.4 X10*3/UL (ref 4.4–11.3)

## 2024-10-11 PROCEDURE — 37214 CESSJ THERAPY CATH REMOVAL: CPT | Performed by: SURGERY

## 2024-10-11 PROCEDURE — 2500000002 HC RX 250 W HCPCS SELF ADMINISTERED DRUGS (ALT 637 FOR MEDICARE OP, ALT 636 FOR OP/ED)

## 2024-10-11 PROCEDURE — 80048 BASIC METABOLIC PNL TOTAL CA: CPT | Performed by: SURGERY

## 2024-10-11 PROCEDURE — B41GYZZ FLUOROSCOPY OF LEFT LOWER EXTREMITY ARTERIES USING OTHER CONTRAST: ICD-10-PCS | Performed by: SURGERY

## 2024-10-11 PROCEDURE — 75710 ARTERY X-RAYS ARM/LEG: CPT | Performed by: SURGERY

## 2024-10-11 PROCEDURE — 2500000005 HC RX 250 GENERAL PHARMACY W/O HCPCS: Performed by: INTERNAL MEDICINE

## 2024-10-11 PROCEDURE — 1100000001 HC PRIVATE ROOM DAILY

## 2024-10-11 PROCEDURE — 82947 ASSAY GLUCOSE BLOOD QUANT: CPT

## 2024-10-11 PROCEDURE — 85384 FIBRINOGEN ACTIVITY: CPT | Performed by: SURGERY

## 2024-10-11 PROCEDURE — 2550000001 HC RX 255 CONTRASTS: Performed by: SURGERY

## 2024-10-11 PROCEDURE — 99152 MOD SED SAME PHYS/QHP 5/>YRS: CPT | Performed by: SURGERY

## 2024-10-11 PROCEDURE — 2780000003 HC OR 278 NO HCPCS: Performed by: SURGERY

## 2024-10-11 PROCEDURE — 2500000004 HC RX 250 GENERAL PHARMACY W/ HCPCS (ALT 636 FOR OP/ED): Performed by: SURGERY

## 2024-10-11 PROCEDURE — 85730 THROMBOPLASTIN TIME PARTIAL: CPT | Performed by: SURGERY

## 2024-10-11 PROCEDURE — 37799 UNLISTED PX VASCULAR SURGERY: CPT | Performed by: SURGERY

## 2024-10-11 PROCEDURE — G0269 OCCLUSIVE DEVICE IN VEIN ART: HCPCS | Performed by: SURGERY

## 2024-10-11 PROCEDURE — 99233 SBSQ HOSP IP/OBS HIGH 50: CPT | Performed by: INTERNAL MEDICINE

## 2024-10-11 PROCEDURE — 85027 COMPLETE CBC AUTOMATED: CPT | Performed by: SURGERY

## 2024-10-11 PROCEDURE — C1760 CLOSURE DEV, VASC: HCPCS | Performed by: SURGERY

## 2024-10-11 PROCEDURE — 2500000005 HC RX 250 GENERAL PHARMACY W/O HCPCS: Performed by: SURGERY

## 2024-10-11 PROCEDURE — 2500000004 HC RX 250 GENERAL PHARMACY W/ HCPCS (ALT 636 FOR OP/ED): Mod: JZ | Performed by: SURGERY

## 2024-10-11 RX ORDER — POTASSIUM CHLORIDE 1.5 G/1.58G
20 POWDER, FOR SOLUTION ORAL EVERY 6 HOURS PRN
Status: DISCONTINUED | OUTPATIENT
Start: 2024-10-11 | End: 2024-10-12 | Stop reason: HOSPADM

## 2024-10-11 RX ORDER — MAGNESIUM SULFATE HEPTAHYDRATE 40 MG/ML
4 INJECTION, SOLUTION INTRAVENOUS EVERY 6 HOURS PRN
Status: DISCONTINUED | OUTPATIENT
Start: 2024-10-11 | End: 2024-10-12 | Stop reason: HOSPADM

## 2024-10-11 RX ORDER — POTASSIUM CHLORIDE 1.5 G/1.58G
40 POWDER, FOR SOLUTION ORAL EVERY 6 HOURS PRN
Status: DISCONTINUED | OUTPATIENT
Start: 2024-10-11 | End: 2024-10-12 | Stop reason: HOSPADM

## 2024-10-11 RX ORDER — DIPHENHYDRAMINE HYDROCHLORIDE 50 MG/ML
INJECTION INTRAMUSCULAR; INTRAVENOUS AS NEEDED
Status: DISCONTINUED | OUTPATIENT
Start: 2024-10-11 | End: 2024-10-11 | Stop reason: HOSPADM

## 2024-10-11 RX ORDER — FENTANYL CITRATE 50 UG/ML
INJECTION, SOLUTION INTRAMUSCULAR; INTRAVENOUS AS NEEDED
Status: DISCONTINUED | OUTPATIENT
Start: 2024-10-11 | End: 2024-10-11 | Stop reason: HOSPADM

## 2024-10-11 RX ORDER — MAGNESIUM SULFATE HEPTAHYDRATE 40 MG/ML
2 INJECTION, SOLUTION INTRAVENOUS EVERY 6 HOURS PRN
Status: DISCONTINUED | OUTPATIENT
Start: 2024-10-11 | End: 2024-10-12 | Stop reason: HOSPADM

## 2024-10-11 RX ORDER — POTASSIUM CHLORIDE 20 MEQ/1
40 TABLET, EXTENDED RELEASE ORAL EVERY 6 HOURS PRN
Status: DISCONTINUED | OUTPATIENT
Start: 2024-10-11 | End: 2024-10-12 | Stop reason: HOSPADM

## 2024-10-11 RX ORDER — MIDAZOLAM HYDROCHLORIDE 1 MG/ML
INJECTION, SOLUTION INTRAMUSCULAR; INTRAVENOUS AS NEEDED
Status: DISCONTINUED | OUTPATIENT
Start: 2024-10-11 | End: 2024-10-11 | Stop reason: HOSPADM

## 2024-10-11 RX ORDER — ROSUVASTATIN CALCIUM 20 MG/1
20 TABLET, COATED ORAL NIGHTLY
Status: DISCONTINUED | OUTPATIENT
Start: 2024-10-11 | End: 2024-10-12 | Stop reason: HOSPADM

## 2024-10-11 RX ORDER — POTASSIUM CHLORIDE 14.9 MG/ML
20 INJECTION INTRAVENOUS EVERY 6 HOURS PRN
Status: DISCONTINUED | OUTPATIENT
Start: 2024-10-11 | End: 2024-10-12 | Stop reason: HOSPADM

## 2024-10-11 RX ORDER — ASPIRIN 81 MG/1
81 TABLET ORAL DAILY
Status: DISCONTINUED | OUTPATIENT
Start: 2024-10-11 | End: 2024-10-12 | Stop reason: HOSPADM

## 2024-10-11 RX ORDER — CALCIUM GLUCONATE 20 MG/ML
2 INJECTION, SOLUTION INTRAVENOUS EVERY 6 HOURS PRN
Status: DISCONTINUED | OUTPATIENT
Start: 2024-10-11 | End: 2024-10-12 | Stop reason: HOSPADM

## 2024-10-11 RX ORDER — CALCIUM GLUCONATE 20 MG/ML
1 INJECTION, SOLUTION INTRAVENOUS EVERY 6 HOURS PRN
Status: DISCONTINUED | OUTPATIENT
Start: 2024-10-11 | End: 2024-10-12 | Stop reason: HOSPADM

## 2024-10-11 RX ORDER — POTASSIUM CHLORIDE 20 MEQ/1
20 TABLET, EXTENDED RELEASE ORAL EVERY 6 HOURS PRN
Status: DISCONTINUED | OUTPATIENT
Start: 2024-10-11 | End: 2024-10-12 | Stop reason: HOSPADM

## 2024-10-11 ASSESSMENT — PAIN SCALES - GENERAL
PAINLEVEL_OUTOF10: 0 - NO PAIN

## 2024-10-11 ASSESSMENT — COGNITIVE AND FUNCTIONAL STATUS - GENERAL
STANDING UP FROM CHAIR USING ARMS: A LITTLE
TURNING FROM BACK TO SIDE WHILE IN FLAT BAD: A LITTLE
HELP NEEDED FOR BATHING: A LITTLE
DRESSING REGULAR LOWER BODY CLOTHING: A LITTLE
MOVING FROM LYING ON BACK TO SITTING ON SIDE OF FLAT BED WITH BEDRAILS: A LITTLE
TOILETING: A LITTLE
MOBILITY SCORE: 18
WALKING IN HOSPITAL ROOM: A LITTLE
CLIMB 3 TO 5 STEPS WITH RAILING: A LITTLE
DAILY ACTIVITIY SCORE: 21
MOVING TO AND FROM BED TO CHAIR: A LITTLE

## 2024-10-11 ASSESSMENT — PAIN - FUNCTIONAL ASSESSMENT
PAIN_FUNCTIONAL_ASSESSMENT: 0-10

## 2024-10-11 NOTE — SIGNIFICANT EVENT
Transfer of care note:    Patient no longer needing ICU level of care.  Patient was initially admitted to the intensive care unit status post left lower extremity angiogram with thrombolytic therapy to treat clot burden and previous femoral-popliteal bypass.  Patient was on thrombolytic therapy initially, return to PCI lab, and is longer requiring thrombolytic therapy.  Patient is also no longer requiring every hour vascular checks by nursing staff.  Thus, she no longer has ICU needs at this time.  The patient will be downgraded to vascular surgery service under the care of Dr. Hoover.  Dr. Hoover has accepted the patient and will resume care after 24 hours or once the patient physically leaves the intensive care unit.  Internal medicine service will also be on consult per vascular surgeons request.  A consult has been placed.    Kirt Sharma M.D.  Internal Medicine PGY-2

## 2024-10-11 NOTE — PROGRESS NOTES
10/11/24 0957   Discharge Planning   Living Arrangements Spouse/significant other   Support Systems Spouse/significant other   Type of Residence Private residence   Home or Post Acute Services Community services   Expected Discharge Disposition Home H   Patient Choice   Patient / Family choosing to utilize agency / facility established prior to hospitalization Yes     Met with patient at bedside. Admitted for PAD. Pt lives with spouse and was independent PTA with no HHC. Pt has a walker, but usually doesn't use it. PCP is [facility]. Pt feels she is able to manage her health and understands her medications. Pts spouse or children provide transportation and obtain meds. Pt had home care after recent hospital admission. She was released from home care 9/24. Pt would like to return home with [facility] again. Internal referral requested. Family will provide transport home.

## 2024-10-11 NOTE — PROGRESS NOTES
Physical Therapy                 Therapy Communication Note    Patient Name: Mechelle Alcantara  MRN: [id number]  Department: [facility]  Room: [facility] Card *  Today's Date: 10/11/2024     Discipline: Physical Therapy    Missed Time: Attempt    Comment: Pt chart reviewed. 1322 pt off floor to cath lab. Will attempt again for PT eval as appropriate and able.

## 2024-10-11 NOTE — SIGNIFICANT EVENT
Restraint not in place upon return to ICU from cardiac cath lab. Will keep restraint off. Pt bedrest for 2 hours

## 2024-10-11 NOTE — H&P
Critical Care Medicine History and Physical        Subjective   Patient is a 78 y.o. female admitted on 10/10/2024 11:23 AM  Following outpatient lower extremity angiogram with thrombolysis.     HPI:  Patient is a 78 year old female with pmhx of HTN, HLD, and PAD s/p Redo Left Femoral Endarterectomy w/left femoral to PTA bypass w/reverse greater saphenous vein (7/25/24) c/b left groin seroma s/p I &D (9/12/24) presented as an outpatient for left angiogram with thrombolysis. Most recent vascular US with KATHLEEN showed >50% stenosis of the external iliac artery as well as >50% stenosis of the PTA bypass graft. Patient was being treated with daily ASA, xarelto, and crestor and has been compliant. She completed a 7 day course of bactrim following positive wound culture on her left incision that grew MSSA on 9/23/2024. Since then she states that her drainage has been improving and denies any fevers, chills, pain, cp, sob, or redness.     Vascular procedure went well without any complications of bleeding and thrombolysis was administered. Admitted to ICU for further monitoring. On arrival to ICU, patient was slightly hypertensive and on RA. She had no complaints and felt okay without any pain, sob, or chest pain.    Past Medical History:   Diagnosis Date    Cellulitis     Left leg    HLD (hyperlipidemia)     HTN (hypertension)     Neuropathy     Non-healing wound of left lower extremity     PAD (peripheral artery disease) (CMS-[facility])     Postoperative seroma of skin after non-dermatologic procedure     Postoperative wound dehiscence     PVD (peripheral vascular disease) (CMS-[facility])     Varicose veins of both lower extremities      Past Surgical History:   Procedure Laterality Date    CARDIAC CATHETERIZATION N/A 3/28/2024    Procedure: IVUS - Coronary;  Surgeon: Dany Drake MD;  Location: [facility] Cardiac Cath Lab;  Service: Cardiovascular;  Laterality: N/A;  PERIPHERAL/LOWER EXTREMITY    INVASIVE VASCULAR PROCEDURE  Left 3/22/2024    Procedure: Lower Extremity Angiogram;  Surgeon: Africa Hoover MD;  Location: [facility] Cardiac Cath Lab;  Service: Vascular Surgery;  Laterality: Left;  Left leg angiogram with intervention    INVASIVE VASCULAR PROCEDURE N/A 3/22/2024    Procedure: Lower Extremity Intervention;  Surgeon: Africa Hoover MD;  Location: [facility] Cardiac Cath Lab;  Service: Vascular Surgery;  Laterality: N/A;    INVASIVE VASCULAR PROCEDURE Left 3/28/2024    Procedure: Lower Extremity Angiogram;  Surgeon: Dany Drake MD;  Location: [facility] Cardiac Cath Lab;  Service: Cardiovascular;  Laterality: Left;  Left LE angiogram ( 54612)   PEDAL APPROACH    INVASIVE VASCULAR PROCEDURE N/A 3/28/2024    Procedure: Lower Extremity Intervention;  Surgeon: Dany Drake MD;  Location: [facility] Cardiac Cath Lab;  Service: Cardiovascular;  Laterality: N/A;    INVASIVE VASCULAR PROCEDURE N/A 2024    Procedure: Lower Extremity Angiogram;  Surgeon: Dany Drake MD;  Location: [facility] Cardiac Cath Lab;  Service: Cardiovascular;  Laterality: N/A;     Medications Prior to Admission   Medication Sig Dispense Refill Last Dose    aspirin 81 mg EC tablet Take 1 tablet (81 mg) by mouth once daily.   Past Week    rivaroxaban (Xarelto) 2.5 mg tablet Take 1 tablet (2.5 mg) by mouth 2 times daily (morning and late afternoon). 60 tablet 11 Past Week    rosuvastatin (Crestor) 20 mg tablet Take 1 tablet (20 mg) by mouth once daily at bedtime. 90 tablet 0 Past Week    amLODIPine (Norvasc) 5 mg tablet TAKE 1 TABLET(5 MG) BY MOUTH DAILY (Patient not taking: Reported on 10/10/2024) 90 tablet 0 Not Taking     Fish containing products, Shellfish containing products, Gadolinium-containing contrast media, and Iodinated contrast media  Social History     Tobacco Use    Smoking status: Former     Current packs/day: 0.00     Types: Cigarettes     Quit date: 3/18/2024     Years since quittin.5     Passive exposure: Past     Smokeless tobacco: Never   Vaping Use    Vaping status: Never Used   Substance Use Topics    Alcohol use: Never    Drug use: Never     Family History   Problem Relation Name Age of Onset    Heart failure Father      Hypertension Father      Heart disease Sister      Hypertension Sister      Heart disease Brother      Hypertension Brother         Scheduled Medications:   [Held by provider] amLODIPine, 5 mg, oral, Daily  [Held by provider] aspirin, 81 mg, oral, Daily  oxygen, , inhalation, Continuous - Inhalation  [Held by provider] rivaroxaban, 2.5 mg, oral, BID  [Held by provider] rosuvastatin, 20 mg, oral, Nightly         Continuous Medications:   alteplase (Cathflo Activase) 8 mg in sodium chloride 0.9% 160 mL (0.05 mg/mL) infusion, 1 mg/hr  heparin (porcine), , Last Rate: 800 Units/hr (10/10/24 1829)  heparin, 800 Units/hr         PRN Medications:   PRN medications: heparin (porcine)    Review of Systems:  Review of Systems  12 point review system done negative except for stated in HPI    Objective   Vitals:  24hr Min/Max:  Temp  Min: 36.2 °C (97.2 °F)  Max: 36.8 °C (98.2 °F)  Pulse  Min: 69  Max: 78  BP  Min: 122/59  Max: 181/138  Resp  Min: 13  Max: 16  SpO2  Min: 93 %  Max: 100 %    Physical exam:    Physical Exam  Constitutional: A&Ox4, NAD, resting comfortable   Head and Face: Atraumatic, normocephalic   Eyes: Normal external exam, EOMI  ENT: Normal external inspection of ears and nose. Oropharynx normal.  Cardiovascular: RRR, S1/S2, no murmurs, rubs, or gallops, radial pulses +2  Pulmonary: CTAB, no respiratory distress, no wheezing, rales or rhonchi, on RA  Abdomen: +BS, soft, non-tender, nondistended, no guarding rigidity or rebound tenderness, no masses noted  MSK: Negative for edema, No joint swelling, normal movements of all extremities.   Neuro: No focal deficits, normal motor function, normal sensation, follows all commands  Skin- No lesions, contusions, or erythema.  Psychiatric: Judgment intact.  Appropriate mood, affect and behavior    Assessment /Plan      Neuro: No active issues  -History: none  -Home meds: none  -GCS: E 5 V 5 M 6  -Pain: None  -Sedation: None  -CAM ICU    Cardiac: PAD s/p left angiogram with thrombolysis   - History: s/p Redo Left Femoral Endarterectomy w/left femoral to PTA bypass w/reverse greater saphenous vein (24) c/b left groin seroma s/p I &D (24), HTN, HLD  - Goals:  [MAP> 65, HR ]  - Home meds: Aspirin, Xarelto, Crestor, amlodipine  - Last echo: EF 60%   - Pressors: None  - ALTEPLASE Infusion  - Heparin infusion  - Vascular following along, appreciate recommendations     Pulmonary: No active issues  -History: None  -Home meds: None      Continuous pulse oximetry   O2 PRN to maintain SpO2 > 94%, wean as tolerated  -Imaging: None    Gastrointestinal: No active issues  -History: None  -Home meds: None  - Diet: N.p.o. with sips of meds  - Supplementation: None  - Prophylaxis: Not indicated  - Bowel regimen: MiraLAX  - Last BM:      Renal: No active issues  - Daily RFP,Mg,Phos  - History: None  - Home meds: None  -Monitor I's and O's  Net IO Since Admission: 31.39 mL [10/10/24 2012]          - Fluids: None  - Electrolytes: Replete per protocol, goal K>4 Phos >3 Mg >2    Endocrine: No active issues  -History: None  -Home meds: None  -sliding scale: None  Results from last 7 days   Lab Units 10/10/24  1958   POCT GLUCOSE mg/dL 128*      -BG < 180 goal    Hematology: No active issues  - Daily CBC, coags  -History: None  -Home meds: Xarelto      - DVT Prophylaxis: Contraindicated given thrombolysis    Infectious Disease: MSSA wound 2/2 incisional dehiscence  -History: MSSA on 2024  -Home meds: s/p bactrim      Temp (24hrs), Av.5 °C (97.7 °F), Min:36.2 °C (97.2 °F), Max:36.8 °C (98.2 °F)     -Abx: None  -Cultures: None      LDA:   Arterial Sheath 10/10/24 1607 6 Fr. Right (Active)   Placement Date/Time: 10/10/24 1607   Sheath Size: (c) 6 Fr.  Line Orientation:  Right   Number of days: 0       External Urinary Catheter Female (Active)   Placement Date/Time: 10/10/24 1845   Placed by: Mark  Hand Hygiene Completed: Yes  External Catheter Type: Female   Number of days: 0         CODE STATUS: Full Code    ABCDEF Checklist  Analgesia: Spontaneous awakening trial to be pursued if clinically appropriate. RASS goal reviewed  Breathing: Spontaneous breathing trial to be pursued if clinically appropriate. Mechanical power of assisted ventilation reviewed  Choice of analgesia/sedation: Analgesic and sedative agents adjusted per clinical context.   Delirium assessed by CAM, will avoid exacerbating factors  Early mobility and exercise: Physical and occupational therapy engaged  Family: Plan of care, overall trajectory of patient shared with family. Questions elicited and answered as appropriate.       Due to the high probability of life threatening clinical decompensation, the patient required critical care time evaluating and managing this patient.  Critical care time included obtaining a history, examining the patient, ordering and reviewing studies, discussing, developing, and implementing a management plan, evaluating the patient's response to treatment, and discussion with other care team providers. I saw and evaluated the patient myself.  Critical care time was performed exclusive of billable procedures.    RESTRAINTS: Type -   - None    Case to be discussed with attending , Dr. Leatha Marti, DO  Internal Medicine, PGY-2      Agree w/ above

## 2024-10-11 NOTE — PROGRESS NOTES
"Nutrition Initial Assessment:   Nutrition Assessment    Reason for Assessment: Provider consult order    Nutrition Note:  Mechelle Alcantara is a 78 y.o. female presenting 10/10 s/p left angiogram with thrombolysis; back to cath lab 10/11.     Past Medical History  HTN, HLD, and PAD s/p Redo Left Femoral Endarterectomy w/left femoral to PTA bypass w/reverse greater saphenous vein (7/25/24) c/b left groin seroma s/p I &D (9/12/24) presented as an outpatient for left angiogram with thrombolysis.   Surgical History   has a past surgical history that includes Invasive Vascular Procedure (Left, 3/22/2024); Invasive Vascular Procedure (N/A, 3/22/2024); Invasive Vascular Procedure (Left, 3/28/2024); Invasive Vascular Procedure (N/A, 3/28/2024); Cardiac catheterization (N/A, 3/28/2024); and Invasive Vascular Procedure (N/A, 5/16/2024).       Nutrition History:  Energy Intake: Fair 50-75 %, Good > 75 %  Food and Nutrient History: 10/11: Pt from home with ; states  cooks most of the time and children bring meals over as well. Allergy to shellfish and avoids all fish. PT reports she has been gaining weight which is welcomed as \"I hadn't been up and about so not eating as much.\"  Vitamin/Herbal Supplement Use: none  Food Allergies/Intolerances:   allergy to shell fish; avoids fish  GI Symptoms: None  Oral Problems: None       Anthropometrics:  Height: 167.6 cm (5' 5.98\")   Weight: 62.3 kg (137 lb 5.6 oz)   BMI (Calculated): 22.18   Admit weight stated: 59.6kg          Weight History:   9/2024: 59kg  7/24: 58.1kg  6/2024: 58.1kg  3/2024: 61.2kg  11/2024: 57.2kg  Weight Change %: no significant changes       Nutrition Focused Physical Exam Findings:  defer: pt lying flat  Subcutaneous Fat Loss:      Muscle Wasting:     Edema:  Edema: none  Physical Findings:  Eyes: Negative  Mouth: Negative  Skin: Negative    Nutrition Significant Labs:  BMP Trend:   Results from last 7 days   Lab Units 10/11/24  0453   GLUCOSE mg/dL " 123*   CALCIUM mg/dL 8.1*   SODIUM mmol/L 138   POTASSIUM mmol/L 3.8   CO2 mmol/L 22   CHLORIDE mmol/L 108*   BUN mg/dL 23   CREATININE mg/dL 0.68    , A1C:  Lab Results   Component Value Date    HGBA1C 6.1 (H) 04/30/2024   , BG POCT trend:   Results from last 7 days   Lab Units 10/11/24  0352 10/11/24  0002 10/10/24  1958   POCT GLUCOSE mg/dL 137* 123* 128*    , Liver Function Trend:        Nutrition Specific Medications:  Scheduled medications  [Held by provider] aspirin, 81 mg, oral, Daily  [Held by provider] rivaroxaban, 2.5 mg, oral, BID  rosuvastatin, 20 mg, oral, Nightly      Continuous medications     PRN medications  PRN medications: calcium gluconate, calcium gluconate, magnesium sulfate, magnesium sulfate, oxygen, potassium chloride CR **OR** potassium chloride, potassium chloride CR **OR** potassium chloride, potassium chloride     I/O:   Last BM Date: 10/09/24;      Dietary Orders (From admission, onward)       Start     Ordered    10/11/24 1635  Snacks  Until discontinued        Comments: ECC milkshakes PRN please    10/11/24 1634    10/11/24 1421  Adult diet Regular  Diet effective now        Question:  Diet type  Answer:  Regular    10/11/24 1420                     Estimated Needs:   Total Energy Estimated Needs (kCal):  (1700-1900kcal (27-30kcal/kg of 62.3kg))     Total Protein Estimated Needs (g):  (68-80g (1.1-1.3g/kg of 62.3kg))     Total Fluid Estimated Needs (mL):  (1mL/kcal/d or as per physician)           Nutrition Diagnosis   Malnutrition Diagnosis  Patient has Malnutrition Diagnosis: No    Nutrition Diagnosis  Patient has Nutrition Diagnosis: Yes  Diagnosis Status (1): New  Nutrition Diagnosis 1: Increased nutrient needs  Related to (1): acute nutritional stress  As Evidenced by (1): OR 10/10 and 10/11  Additional Assessment Information (1): 10/11: Case discusse during CCM rounds.       Nutrition Interventions/Recommendations         Nutrition Prescription:  Individualized Nutrition  Prescription Provided for : Advance to regular diet post op.        Nutrition Interventions:   Interventions: Meals and snacks, Medical food supplement  Meals and Snacks: General healthful diet  Goal: advance to regular diet  Medical Food Supplement: Commercial beverage  Goal: currently denies need for ONS    Collaboration and Referral of Nutrition Care: Collaboration by nutrition professional with other providers, Team meeting involving nutrition professional  Goal: TARUN Curry and CCM team during pt care rounds    Nutrition Education:   10/11: Met with pt at bedside. AYR services reviewed; reviewed PRN snack options available as well. Pt admits currently not hungry yet aware will try small and frequent meals/snacks. Pt denies need for further MNT intervention.        Nutrition Monitoring and Evaluation   Food/Nutrient Related History Monitoring  Monitoring and Evaluation Plan: Energy intake  Energy Intake: Estimated energy intake  Criteria: >75% of estimated nutrient needs via meals and PRN snacks    Body Composition/Growth/Weight History  Monitoring and Evaluation Plan: Weight  Weight: Measured weight  Criteria: promote weight maintenance/gain    Biochemical Data, Medical Tests and Procedures  Monitoring and Evaluation Plan: Electrolyte/renal panel  Electrolyte and Renal Panel: Magnesium, Phosphorus, Potassium, Sodium  Criteria: BG 70-180mg/dL    Nutrition Focused Physical Findings  Monitoring and Evaluation Plan: Skin  Skin: Impaired wound healing  Criteria: promote incisional wound healing.         Time Spent (min): 45 minutes

## 2024-10-11 NOTE — CARE PLAN
Problem: Pain - Adult  Goal: Verbalizes/displays adequate comfort level or baseline comfort level  10/11/2024 0645 by Jonah Kelley RN  Outcome: Progressing  10/11/2024 0329 by Jonah Kelley RN  Outcome: Progressing  10/11/2024 0329 by Jonah Kelley RN  Outcome: Progressing     Problem: Safety - Adult  Goal: Free from fall injury  10/11/2024 0645 by Jnoah Kelley RN  Outcome: Progressing  10/11/2024 0329 by Jonah Kelley RN  Outcome: Progressing  10/11/2024 0329 by Jonah Kelley RN  Outcome: Progressing     Problem: Discharge Planning  Goal: Discharge to home or other facility with appropriate resources  10/11/2024 0645 by Jonah Kelley RN  Outcome: Progressing  10/11/2024 0329 by Jonah Kelley RN  Outcome: Progressing  10/11/2024 0329 by Jonah Kelley RN  Outcome: Progressing     Problem: Chronic Conditions and Co-morbidities  Goal: Patient's chronic conditions and co-morbidity symptoms are monitored and maintained or improved  10/11/2024 0645 by Jonah Kelley RN  Outcome: Progressing  10/11/2024 0329 by Jonah Kelley RN  Outcome: Progressing  10/11/2024 0329 by Jonah Kelley RN  Outcome: Progressing     Problem: Safety - Medical Restraint  Goal: Remains free of injury from restraints (Restraint for Interference with Medical Device)  10/11/2024 0645 by Jonah Kelley RN  Outcome: Progressing  10/11/2024 0329 by Jonah Kelley RN  Outcome: Progressing  10/11/2024 0329 by Jonah Kelley RN  Outcome: Progressing  Goal: Free from restraint(s) (Restraint for Interference with Medical Device)  10/11/2024 0645 by Jonah Kelley RN  Outcome: Progressing  10/11/2024 0329 by Jonah Kelley RN  Outcome: Progressing  10/11/2024 0329 by Jonah Kelley RN  Outcome: Progressing     Problem: Cardiac catheterization  Goal: Free from pain  10/11/2024 0645 by Jonah Kelley RN  Outcome: Progressing  10/11/2024 0329 by Jonah Kelley RN  Outcome: Progressing  10/11/2024 0329 by Jonah Kelley,  RN  Outcome: Progressing  Goal: No evidence of post procedure complications  10/11/2024 0645 by Jonah Kelley RN  Outcome: Progressing  10/11/2024 0329 by Jonah Kelley RN  Outcome: Progressing  10/11/2024 0329 by Jonah Kelley RN  Outcome: Progressing  Goal: Verbalize understanding of procedure  10/11/2024 0645 by Jonah Kelley RN  Outcome: Progressing  10/11/2024 0329 by Jonah Kelley RN  Outcome: Progressing  10/11/2024 0329 by Jonah Kelley RN  Outcome: Progressing  Goal: Care and maintenance of device (specify)  10/11/2024 0645 by Jonah Kelley RN  Outcome: Progressing  10/11/2024 0329 by Jonah Kelley RN  Outcome: Progressing  10/11/2024 0329 by Jonah Kelley RN  Outcome: Progressing

## 2024-10-11 NOTE — OP NOTE
Lower Extremity Angiogram (L) Operative Note     Date: 10/11/2024  OR Location: [facility] Cardiac Cath Lab    Name: Mechelle Alcantara, : 1945, Age: 78 y.o., MRN: [id number], Sex: female    Diagnosis  Pre-op Diagnosis      * PAD (peripheral artery disease) (CMS-HCC) [I73.9]     * Occlusion of left femorotibial bypass graft, initial encounter (CMS-HCC) [T82.898A] Post-op Diagnosis     * PAD (peripheral artery disease) (CMS-HCC) [I73.9]     * Occlusion of left femorotibial bypass graft, initial encounter (CMS-HCC) [T82.898A]     Procedures  Lysis catheter removal  Left lower extremity angiogram  Mynx closure of right femoral access site      Surgeons      * Africa Hoover - Primary    Resident/Fellow/Other Assistant:  Surgeons and Role:  * No surgeons found with a matching role *    Procedure Summary  Anesthesia: Anesthesia type not filed in the log.  ASA: ASA status not filed in the log.  Anesthesia Staff: No anesthesia staff entered.  Estimated Blood Loss: 5mL  Intra-op Medications:   Administrations occurring from 1321 to 1351 on 10/11/24:   Medication Name Total Dose   methylPREDNISolone sod succinate (SOLU-Medrol) injection 125 mg   diphenhydrAMINE (BENADryl) injection 50 mg   fentaNYL PF (Sublimaze) injection 50 mcg   midazolam (Versed) injection 1 mg   iohexol (OMNIPaque) 300 mg iodine/mL solution 20 mL   calcium gluconate 1 g in sodium chloride (iso) IV 50 mL Cannot be calculated   calcium gluconate 2 g in sodium chloride (iso)  mL Cannot be calculated   magnesium sulfate 2 g in sterile water for injection 50 mL Cannot be calculated   magnesium sulfate 4 g in sterile water for injection 100 mL Cannot be calculated   potassium chloride 20 mEq in sterile water for injection 100 mL Cannot be calculated         Intraprocedure I/O Totals       None           Specimen: No specimens collected     Staff:   Scrub Person: Amilcar  Circulator: Carol         Drains and/or Catheters:   External Urinary  Catheter Female (Active)   Wall Suction (mmHg) 50 10/11/24 0000   External Catheter Status In place 10/11/24 0000   Securement Method Adhesive device 10/10/24 1845       [REMOVED] Closed/Suction Drain Left;Anterior Thigh Bulb (Removed)       Tourniquet Times:         Implants:     Findings: see procedure details    Indications: Mechelle Alcantara is an 78 y.o. female who is having surgery for PAD (peripheral artery disease) (CMS-HCC) [I73.9]  Occlusion of left femorotibial bypass graft, initial encounter (CMS-HCC) [T82.898A].     The patient was seen in the preoperative area. The risks, benefits, complications, treatment options, non-operative alternatives, expected recovery and outcomes were discussed with the patient. The possibilities of reaction to medication, pulmonary aspiration, injury to surrounding structures, bleeding, recurrent infection, the need for additional procedures, failure to diagnose a condition, and creating a complication requiring transfusion or operation were discussed with the patient. The patient concurred with the proposed plan, giving informed consent.  The site of surgery was properly noted/marked if necessary per policy. The patient has been actively warmed in preoperative area. Preoperative antibiotics are not indicated. Venous thrombosis prophylaxis are not indicated.    Procedure Details:   After the proper preoperative workup and informed consent was obtained, the patient was taken to the procedure room and laid in the supine position.  Lysis catheters were disconnected from the drapes and from the EKOS machine.  The right groin, sheath, and catheters were prepped and draped in sterile fashion.  The lysis catheters were removed and the sheath was flushed.  A left lower extremity arteriogram was performed in a sequential fashion from the groin to the foot.  This did reveal that the left rolled to the posterior tibial bypass graft was now widely patent outflow was back to baseline  with flow into the proximal posterior tibial artery and retrograde up to the TP trunk and down the peroneal and anterior tibial arteries.  There was no evidence of any residual thrombus.  Therefore no further intervention was needed.  The procedure was completed at this time.  All wires and catheters were removed.  The Terumo destination sheath was removed and exchanged out for a short 6 [place] sheath.  Then the right femoral access site was closed with a Mynx closure device and good hemostasis was achieved.  Procedure was completed.  The patient tolerated the procedure well and there were no complications.  She was transferred to recovery area in stable condition.      Complications:  None; patient tolerated the procedure well.    Disposition: PACU - hemodynamically stable.  Condition: stable         Additional Details: none    Attending Attestation: I performed the procedure.    Africa Hoover  Phone Number: [phone]

## 2024-10-11 NOTE — PROGRESS NOTES
Spiritual Care Visit    Clinical Encounter Type  Visited With: Patient  Routine Visit: Introduction              Sacramental Encounters  Sacrament of Sick-Anointing: Anointed                             Taxonomy  Intended Effects: Build relationship of care and support, Convey a calming presence, Preserve dignity and respect, Demonstrate caring and concern  Methods: Assist with spiritual/Presybeterian practices, Encouraging spiritual/Presybeterian practices, Offer spiritual/Presybeterian support  Interventions: Active listening, Ask guided questions, Perform a Presybeterian rite or ritual, Share words of hope and inspiration

## 2024-10-11 NOTE — CARE PLAN
Patient safe and free from injury. Pt returned to cath lab today, 10/10's intervention was successful and pt returned to ICU with RLE restraint off. Pt supine most of day d/t limb movement restrictions with bedrest. Pt remains hemodynamically stable. Pt pain and c/o free this shift. Pt to downgrade out of ICU and plan for DC to home tomorrow. PT/ OT eval pending. Will continue to monitor pt condition

## 2024-10-11 NOTE — CARE PLAN
Problem: Pain - Adult  Goal: Verbalizes/displays adequate comfort level or baseline comfort level  10/11/2024 0329 by Jonah Kelley RN  Outcome: Progressing  10/11/2024 0329 by Jonah Kelley RN  Outcome: Progressing     Problem: Safety - Adult  Goal: Free from fall injury  10/11/2024 0329 by Jonah Kelley RN  Outcome: Progressing  10/11/2024 0329 by Jonah Kelley RN  Outcome: Progressing     Problem: Discharge Planning  Goal: Discharge to home or other facility with appropriate resources  10/11/2024 0329 by Jonah Kelley RN  Outcome: Progressing  10/11/2024 0329 by Jonah Kelley RN  Outcome: Progressing     Problem: Chronic Conditions and Co-morbidities  Goal: Patient's chronic conditions and co-morbidity symptoms are monitored and maintained or improved  10/11/2024 0329 by Jonah Kelley RN  Outcome: Progressing  10/11/2024 0329 by Jonah Kelley RN  Outcome: Progressing     Problem: Safety - Medical Restraint  Goal: Remains free of injury from restraints (Restraint for Interference with Medical Device)  10/11/2024 0329 by Jonah Kelley RN  Outcome: Progressing  10/11/2024 0329 by Jonah Kelley RN  Outcome: Progressing  Goal: Free from restraint(s) (Restraint for Interference with Medical Device)  10/11/2024 0329 by Jonah Kelley RN  Outcome: Progressing  10/11/2024 0329 by Jonah Kelley RN  Outcome: Progressing     Problem: Cardiac catheterization  Goal: Free from pain  10/11/2024 0329 by Jonah Kelley RN  Outcome: Progressing  10/11/2024 0329 by Jonah Kelley RN  Outcome: Progressing  Goal: No evidence of post procedure complications  10/11/2024 0329 by Jonah Kelley RN  Outcome: Progressing  10/11/2024 0329 by Jonah Kelley RN  Outcome: Progressing  Goal: Verbalize understanding of procedure  10/11/2024 0329 by Jonah Kelley RN  Outcome: Progressing  10/11/2024 0329 by Jonah Kelley RN  Outcome: Progressing  Goal: Care and maintenance of device (specify)  10/11/2024 0329 by  Jonah Kelley, RN  Outcome: Progressing  10/11/2024 0329 by Jonah Kelley, RN  Outcome: Progressing

## 2024-10-11 NOTE — PROGRESS NOTES
Critical Care Daily Progress        Subjective   Patient is a 78 y.o. female admitted on 10/10/2024 11:23 AM    Overnight Events:  -Patient seen evaluated this morning resting comfortably in bed  -VSS WNL  -NAEON  -No urine output documented overnight  -Patient's labs this morning significant for glucose 123, Hgb 9.2 hematocrit 29.5  -Patient denies all symptomatology pertaining to a 12 point ROS      Scheduled Medications:   [Held by provider] aspirin, 81 mg, oral, Daily  [Held by provider] rivaroxaban, 2.5 mg, oral, BID  [Held by provider] rosuvastatin, 20 mg, oral, Nightly         Continuous Medications:   alteplase (Cathflo Activase) 8 mg in sodium chloride 0.9% 160 mL (0.05 mg/mL) infusion, 1 mg/hr  heparin, 800 Units/hr, Last Rate: 800 Units/hr (10/10/24 2126)         PRN Medications:   PRN medications: calcium gluconate, calcium gluconate, magnesium sulfate, magnesium sulfate, oxygen, potassium chloride CR **OR** potassium chloride, potassium chloride CR **OR** potassium chloride, potassium chloride    Objective   Vitals:  Temp  Min: 36.2 °C (97.2 °F)  Max: 36.8 °C (98.2 °F)  Pulse  Min: 64  Max: 82  BP  Min: 105/43  Max: 181/138  Resp  Min: 11  Max: 22  SpO2  Min: 88 %  Max: 100 %    Physical Exam:   Physical Exam   Physical Exam  General: Not in acute distress, A&O x 3, alert, cooperative, well-developed  HEENT: Normocephalic, atraumatic, EOMI, moist mucous membranes, NC in place  Neck: Neck supple, trachea midline, no evidence of trauma  Cardiovascular: RRR, S1 and S2 appreciated, no murmurs rubs gallops appreciated, distal pulses 2+ bilaterally (radial and dorsalis pedis)  Respiratory: Vesicular breath sounds appreciated bilaterally, no adventitious sounds appreciated, no increased work of breathing on 2 L via NC  GI: Abdomen soft, nondistended, nontender to palpation, bowel sounds present  Extremities: No edema appreciated in lower extremities bilaterally, no cyanosis, old incision on left lower  extremity healing appropriately, right groin with catheter in place without signs of infection, mild seepage from site noted.  Neuro: A&O X3, no focal deficits, strength and sensation intact bilaterally  Skin: Warm and dry, without lesions or rashes with exception of those documented above       Assessment/Plan   Patient is a 78 year old female with pmhx of HTN, HLD, and PAD s/p Redo Left Femoral Endarterectomy w/left femoral to PTA bypass w/reverse greater saphenous vein (7/25/24) c/b left groin seroma s/p I &D (9/12/24) presented as an outpatient for left angiogram with thrombolysis. Most recent vascular US with KATHLEEN showed >50% stenosis of the external iliac artery as well as >50% stenosis of the PTA bypass graft. Patient was being treated with daily ASA, xarelto, and crestor and has been compliant. She completed a 7 day course of bactrim following positive wound culture on her left incision that grew MSSA on 9/23/2024.  Patient presented to [facility] for elective vascular LEFT lower extremity angiogram with vascular surgery, Dr. Hoover,.  Patient underwent LEFT lower extremity angiogram with thrombolysis, and was admitted to the ICU on EKOS's machine with continuous tPA into patent catheter as well as every hour vascular checks.    Neuro:   -History: None  -Home meds: None  -Pain: No complaints of any pains currently will add on medications if needed   -Sedation: None at this time  -[facility] ICU    Cardiac:   #PAD s/p left angiogram with thrombolysis   - History: s/p Redo Left Femoral Endarterectomy w/left femoral to PTA bypass w/reverse greater saphenous vein (7/25/24) c/b left groin seroma s/p I &D (9/12/24), HTN, HLD  - Goals:  [MAP> 65, HR ]  - Home meds: Aspirin, Xarelto, Crestor, amlodipine  - Last echo: EF 60% 2022  - Pressors: None  - ALTEPLASE Infusion  - Heparin infusion  - Vascular following, continue to appreciate recommendations   --> Plan is for repeat angiogram today to assess efficacy of  thrombolytic therapy    Pulmonary:   -History: none  -Home meds: none  -Continuous pulse oximetry   -Currently on 2 L via NC, O2 PRN to maintain SpO2 > 94%, wean as tolerated  -Imaging: none for this admission    Gastrointestinal:   -History:  none  -Home meds: none  - Diet: NPO sips with meds  - Supplementation: none  - Prophylaxis: None currently indicated  - Bowel regimen: miralax  - Last BM: Last BM Date: 10/09/24    Renal:   - Daily CMP,Mg,Phos  - History: None  - Home meds: None  -Continue strict I/O  Net IO Since Admission: 311.39 mL [10/11/24 1054]  Results from last 72 hours   Lab Units 10/11/24  0453   BUN mg/dL 23   CREATININE mg/dL 0.68   - Fluids: None at this time  - Electrolytes: Replete per protocol, goal K>4 Phos >3 Mg >2    Endocrine:   -History: None  -Home meds: None  -sliding scale: None currently, continue to reassess glucose levels and add SSI as indicated  Results from last 7 days   Lab Units 10/11/24  0453 10/11/24  0352 10/11/24  0002 10/10/24  1958   POCT GLUCOSE mg/dL  --  137* 123* 128*   GLUCOSE mg/dL 123*  --   --   --    -BG < 180 goal    Hematology:   - Daily CBC, coags  -History: None  -Home meds: Xarelto  Results from last 7 days   Lab Units 10/11/24  0453   WBC AUTO x10*3/uL 6.4   HEMOGLOBIN g/dL 9.2*   HEMATOCRIT % 29.5*   PLATELETS AUTO x10*3/uL 360   - DVT Prophylaxis: Contraindicated at this time as patient is receiving thrombolysis    Infectious Disease:   -History: MSSA wound 2/2 incisional dehiscence  -Home meds: Finished course of Bactrim outpatient  Temp (24hrs), Av.6 °C (97.9 °F), Min:36.2 °C (97.2 °F), Max:36.8 °C (98.2 °F)  -Abx: None currently, will continue to evaluate for signs or symptoms of infection which are not present at this time  -Cultures: None    Musculoskeletal:   -History: None  -Home meds: None  - PT to see   - OT to see     Integumentary:   -History: S/p left femoral popliteal bypass  -Home meds: None  -Wound care as per vascular surgery  recommendations    LDA:  Arterial Sheath 10/10/24 1607 6 Fr. Right (Active)   Placement Date/Time: 10/10/24 1607   Sheath Size: (c) 6 Fr.  Line Orientation: Right   Number of days: 0       External Urinary Catheter Female (Active)   Placement Date/Time: 10/10/24 1845   Placed by: Mark  Hand Hygiene Completed: Yes  External Catheter Type: Female   Number of days: 0         CODE STATUS: Full Code    Disposition: To remain in ICU    Kirt Sharma M.D.  Internal Medicine PGY-2    Seen and examined  Doing better from ICU perspective  Likely discharge if okay by VS

## 2024-10-12 VITALS
DIASTOLIC BLOOD PRESSURE: 63 MMHG | HEART RATE: 63 BPM | BODY MASS INDEX: 22.07 KG/M2 | OXYGEN SATURATION: 93 % | TEMPERATURE: 97.3 F | HEIGHT: 66 IN | RESPIRATION RATE: 16 BRPM | SYSTOLIC BLOOD PRESSURE: 144 MMHG | WEIGHT: 137.35 LBS

## 2024-10-12 PROBLEM — T82.898A: Status: RESOLVED | Noted: 2024-09-25 | Resolved: 2024-10-12

## 2024-10-12 LAB
ALBUMIN SERPL BCP-MCNC: 3.2 G/DL (ref 3.4–5)
ALP SERPL-CCNC: 108 U/L (ref 33–136)
ALT SERPL W P-5'-P-CCNC: 9 U/L (ref 7–45)
ANION GAP SERPL CALC-SCNC: 10 MMOL/L (ref 10–20)
AST SERPL W P-5'-P-CCNC: 12 U/L (ref 9–39)
BILIRUB SERPL-MCNC: 0.2 MG/DL (ref 0–1.2)
BUN SERPL-MCNC: 31 MG/DL (ref 6–23)
CALCIUM SERPL-MCNC: 8.2 MG/DL (ref 8.6–10.3)
CHLORIDE SERPL-SCNC: 107 MMOL/L (ref 98–107)
CO2 SERPL-SCNC: 24 MMOL/L (ref 21–32)
CREAT SERPL-MCNC: 0.69 MG/DL (ref 0.5–1.05)
EGFRCR SERPLBLD CKD-EPI 2021: 89 ML/MIN/1.73M*2
ERYTHROCYTE [DISTWIDTH] IN BLOOD BY AUTOMATED COUNT: 14.6 % (ref 11.5–14.5)
GLUCOSE SERPL-MCNC: 116 MG/DL (ref 74–99)
HCT VFR BLD AUTO: 31.9 % (ref 36–46)
HGB BLD-MCNC: 9.7 G/DL (ref 12–16)
MAGNESIUM SERPL-MCNC: 2.23 MG/DL (ref 1.6–2.4)
MCH RBC QN AUTO: 27.9 PG (ref 26–34)
MCHC RBC AUTO-ENTMCNC: 30.4 G/DL (ref 32–36)
MCV RBC AUTO: 92 FL (ref 80–100)
NRBC BLD-RTO: 0 /100 WBCS (ref 0–0)
PHOSPHATE SERPL-MCNC: 4.1 MG/DL (ref 2.5–4.9)
PLATELET # BLD AUTO: 312 X10*3/UL (ref 150–450)
POTASSIUM SERPL-SCNC: 4 MMOL/L (ref 3.5–5.3)
PROT SERPL-MCNC: 5.6 G/DL (ref 6.4–8.2)
RBC # BLD AUTO: 3.48 X10*6/UL (ref 4–5.2)
SODIUM SERPL-SCNC: 137 MMOL/L (ref 136–145)
WBC # BLD AUTO: 6.8 X10*3/UL (ref 4.4–11.3)

## 2024-10-12 PROCEDURE — 36415 COLL VENOUS BLD VENIPUNCTURE: CPT | Performed by: SURGERY

## 2024-10-12 PROCEDURE — 99238 HOSP IP/OBS DSCHRG MGMT 30/<: CPT | Performed by: SURGERY

## 2024-10-12 PROCEDURE — 80053 COMPREHEN METABOLIC PANEL: CPT | Performed by: SURGERY

## 2024-10-12 PROCEDURE — 85027 COMPLETE CBC AUTOMATED: CPT | Performed by: SURGERY

## 2024-10-12 PROCEDURE — 84100 ASSAY OF PHOSPHORUS: CPT | Performed by: SURGERY

## 2024-10-12 PROCEDURE — 83735 ASSAY OF MAGNESIUM: CPT | Performed by: SURGERY

## 2024-10-12 ASSESSMENT — COGNITIVE AND FUNCTIONAL STATUS - GENERAL
DAILY ACTIVITIY SCORE: 21
MOBILITY SCORE: 18
HELP NEEDED FOR BATHING: A LITTLE
DRESSING REGULAR LOWER BODY CLOTHING: A LITTLE
TOILETING: A LITTLE
TURNING FROM BACK TO SIDE WHILE IN FLAT BAD: A LITTLE
MOVING FROM LYING ON BACK TO SITTING ON SIDE OF FLAT BED WITH BEDRAILS: A LITTLE
MOVING TO AND FROM BED TO CHAIR: A LITTLE
WALKING IN HOSPITAL ROOM: A LITTLE
CLIMB 3 TO 5 STEPS WITH RAILING: A LITTLE
STANDING UP FROM CHAIR USING ARMS: A LITTLE

## 2024-10-12 ASSESSMENT — PAIN SCALES - GENERAL: PAINLEVEL_OUTOF10: 0 - NO PAIN

## 2024-10-12 NOTE — DISCHARGE SUMMARY
Discharge Diagnosis  PAD (peripheral artery disease) (CMS-[facility])    Issues Requiring Follow-Up  PAD (peripheral artery disease) (CMS-[facility])    Test Results Pending At Discharge  Pending Labs       No current pending labs.            Hospital Course   77yo female with known PAD who is status post left femoral to posterior tibial artery bypass with saphenous vein graft.  She presented electively for angiogram procedure due to stenosis of the bypass graft as seen on arterial duplex.  She underwent an angiogram with angioplasty of the bypass that resulted in thrombosis of the graft status post angioplasty.  Initial thrombectomy attempts were unsuccessful, therefore thrombolysis catheters were placed and patient was admitted to the ICU for lysis therapy overnight.  She did return to the Cath Lab the next day and at lysis check angiogram revealed that her bypass was now widely patent with flow through the anterior tibial and peroneal vessels all the way to the foot, which was her baseline outflow prior to angiogram intervention.  Lysis catheters were removed as was the sheath and the access site was closed with a closure device.  Patient was transferred to the floor post procedurally.  She was found to have return of good Doppler signals in the foot.  This morning she denies any complaints.  Doppler signals are still strong.  She is hemodynamically stable, and ready for discharge.  She is to resume all home medications and restart her Xarelto tomorrow.  She is to follow-up in the vascular office in 10 to 14 days.    Pertinent Physical Exam At Time of Discharge  Physical Exam  Vitals reviewed.   Constitutional:       General: She is not in acute distress.     Appearance: Normal appearance. She is normal weight.   HENT:      Head: Normocephalic and atraumatic.   Eyes:      Extraocular Movements: Extraocular movements intact.      Conjunctiva/sclera: Conjunctivae normal.   Neck:      Vascular: No carotid bruit.   Cardiovascular:       Rate and Rhythm: Normal rate and regular rhythm.      Pulses:           Femoral pulses are 2+ on the right side and 2+ on the left side.       Dorsalis pedis pulses are detected w/ Doppler on the right side and detected w/ Doppler on the left side.        Posterior tibial pulses are detected w/ Doppler on the right side and detected w/ Doppler on the left side.      Heart sounds: Normal heart sounds.   Pulmonary:      Effort: Pulmonary effort is normal.      Breath sounds: Normal breath sounds.   Abdominal:      General: Abdomen is flat.      Palpations: Abdomen is soft.   Musculoskeletal:         General: No swelling. Normal range of motion.      Cervical back: Normal range of motion. No tenderness.      Right lower leg: No edema.      Left lower leg: No edema.   Skin:     General: Skin is warm and dry.      Comments: Right groin access site intact   Neurological:      General: No focal deficit present.      Mental Status: She is alert and oriented to person, place, and time.      Cranial Nerves: No cranial nerve deficit.      Sensory: No sensory deficit.      Motor: No weakness.   Psychiatric:         Mood and Affect: Mood normal.         Behavior: Behavior normal.         Home Medications     Medication List      CONTINUE taking these medications     aspirin 81 mg EC tablet   rivaroxaban 2.5 mg tablet; Commonly known as: Xarelto; Take 1 tablet   (2.5 mg) by mouth 2 times daily (morning and late afternoon).   rosuvastatin 20 mg tablet; Commonly known as: Crestor; Take 1 tablet (20   mg) by mouth once daily at bedtime.     ASK your doctor about these medications     amLODIPine 5 mg tablet; Commonly known as: Norvasc; TAKE 1 TABLET(5 MG)   BY MOUTH DAILY       Outpatient Follow-Up  Future Appointments   Date Time Provider Department [place]   10/23/2024 11:30 AM [facility] [id number] Quincy Hoover MD

## 2024-10-12 NOTE — CARE PLAN
"The patient's goals for the shift include \"to go home.\"    The clinical goals for the shift include Neurovasculkar sixtocks will remain WNL though end of shift.    Patient to discharge home this afternoon.  "

## 2024-10-12 NOTE — NURSING NOTE
Patient discharged to home via wc with  to transport via private car. Patient is aware that she will need to keep her bandage on for 24hrs and will also need to follow up with vasc surgery (already scheduled for 10/23/24).

## 2024-10-12 NOTE — PROGRESS NOTES
10/12/24 1104   Discharge Planning   Who is requesting discharge planning? Provider   Home or Post Acute Services None   Expected Discharge Disposition Home   Does the patient need discharge transport arranged? No     Spoke with patient via phone in patient's room. Wanted to verify patient was going home with [facility] and what services were needed. Patient has decided that she really doesn't need HC and will be able to manage returning home. Patient stated that if she decides that she needs HC later, she will contact her PCP for orders at that time. CT to follow patient.

## 2024-10-13 NOTE — OP NOTE
Lower Extremity Angiogram (L), EKOS, Arterial Infusion For Thrombolysis, Other Than Coronary Operative Note     Date: 10/10/2024  OR Location: [facility]    Name: Mechelle Alcantara, : 1945, Age: 78 y.o., MRN: [id number], Sex: female    Diagnosis  Pre-op Diagnosis      * PAD (peripheral artery disease) (CMS-HCC) [I73.9] Post-op Diagnosis     * PAD (peripheral artery disease) (CMS-HCC) [I73.9]     Procedures  Aortogram via US guided right femoral access  Selective left lower extremity angiogram  Balloon angioplasty of left femoro-tibial bypass vein graft  Placement of EKOS lysis catheter in left fem-tib bypass graft    Surgeons      * [place] Hoovre - Primary    Resident/Fellow/Other Assistant:  Surgeons and Role:  * No surgeons found with a matching role *    Procedure Summary  Anesthesia: Anesthesia type not filed in the log.  ASA: ASA status not filed in the log.  Anesthesia Staff: No anesthesia staff entered.  Estimated Blood Loss: 5mL  Intra-op Medications:   Administrations occurring from 1548 to 1834 on 10/10/24:   Medication Name Total Dose   diphenhydrAMINE (BENADryl) injection 50 mg   fentaNYL PF (Sublimaze) injection 200 mcg   midazolam (Versed) injection 4 mg   lidocaine (Xylocaine) 20 mg/mL (2 %) injection 10 mL   heparin 1,000 unit/mL injection 8,000 Units   hydrALAZINE (Apresoline) injection 20 mg   nitroglycerin (Tridil) injection 400 mcg   alteplase (Cathflo Activase) injection 4 mg   sodium chloride 0.9% infusion Cannot be calculated   iohexol (OMNIPaque) 350 mg iodine/mL solution 100 mL   heparin 25,000 Units in dextrose 5% 250 mL (100 Units/mL) infusion (premix) Cannot be calculated   alteplase (Cathflo Activase) 8 mg in sodium chloride 0.9% 160 mL (0.05 mg/mL) infusion 0.04 mg         Intraprocedure I/O Totals       None           Specimen: No specimens collected     Staff:   Circulator: Luzmaria Mann Person: Mona  Circulator: [place]         Drains and/or Catheters: * None in  log *    Tourniquet Times:         Implants:     Findings: see procedure details      Indications: Mechelle Alcantara is an 78 y.o. female who is having surgery for PAD (peripheral artery disease) (CMS-[facility]) [I73.9].     The patient was seen in the preoperative area. The risks, benefits, complications, treatment options, non-operative alternatives, expected recovery and outcomes were discussed with the patient. The possibilities of reaction to medication, pulmonary aspiration, injury to surrounding structures, bleeding, recurrent infection, the need for additional procedures, failure to diagnose a condition, and creating a complication requiring transfusion or operation were discussed with the patient. The patient concurred with the proposed plan, giving informed consent.  The site of surgery was properly noted/marked if necessary per policy. The patient has been actively warmed in preoperative area. Preoperative antibiotics are not indicated. Venous thrombosis prophylaxis are not indicated.    Procedure Details:   After the proper preoperative workup and informed consent was obtained, the patient was taken to the procedure room and laid in the supine position.  She was placed on hemodynamic monitoring and given moderate sedation in the form of fentanyl and Versed.  Steroids and Benadryl has been given for contrast allergy prophylaxis.  Bilateral groins were prepped and draped in sterile fashion.  The right femoral artery was visualized under duplex ultrasound and found to be patent with no significant stenosis.  Therefore the right groin was anesthetized with 1% lidocaine and the right femoral artery was accessed under direct ultrasound visualization using a micropuncture needle.  Micropuncture sheath was placed via Seldinger technique.  This was immediately upsized to a 5 Serbian sheath.  The sheath was flushed and then an Omni Flush catheter was passed through the sheath and guided into the abdominal aorta.  The tip  of the catheter was positioned at the level of L1.  Then a flush aortogram was performed was performed in the AP projection.  This did reveal that the aorta and bilateral renal arteries were patent.  The catheter was then moved to the distal aorta and a pelvic oblique angiogram was performed in the 25 degree RAMOS projection.  This did reveal that bilateral iliac systems were patent.  Therefore the catheter was passed up and over the aortic bifurcation and advanced down to the level of the left femoral head.  Then a selective left lower extremity angiogram was performed in a sequential fashion from the groin to the foot.  This did reveal that the common femoral and profunda femoris arteries are patent.  The superficial femoral artery popliteal artery were occluded.  But the patient did have a patent femoral to posterior tibial bypass vein graft.  There were 2 focal areas of stenosis noted within the vein graft thought to be due to valves.  The distal anastomosis was patent, but the posterior tibial artery distal to this anastomosis was occluded.  Outflow was through the proximal posterior tibial artery and back up into the TP trunk, and then down into the peroneal and anterior tibial arteries, which were patent to the foot.    Decision was made to attempt recanalization of the distal posterior tibial artery.  The 5 Tajik sheath was upsized to a 6 Tajik 45 cm Terumo destination sheath, the tip of which was passed into the proximal segment of the vein graft.  The patient was then given a bolus of 5000 units of heparin.  An angled Glidewire and angled glide catheter were passed through the bypass graft and down into the distal anastomosis.  Several attempts were made to pass the wire and catheter into the distal posterior tibial artery, but without success.  We then exchanged out for an 018 command wire.  This also would not pass into the distal posterior tibial artery.  The patient did have good outflow through the  collateralization into the peroneal and anterior tibial arteries.  Therefore no further attempts were made to access the distal posterior tibial artery.    Attention at this time was turned to the focal lesions within the vein graft itself.  A 4 x 150 mm ultra verse balloon was selected and placed across the lesions.  The balloon was inflated with note of waist, but it did inflate to profile.  It was held for 2 minutes and then deflated and removed.  This was followed by a 5 x 220 mm Lutonix drug-coated balloon.  This balloon was placed across the lesions and inflated with note of waste, but it did inflate to profile.  It was held for 2 minutes and then deflated and removed follow-up imaging revealed the vein graft was now occluded.  Initially this was thought to be secondary to spasm from the balloon angioplasty.  The patient was given 200 mg of nitro and angiogram was repeated.  This did reveal that the bypass graft was still occluded which was concerning for thrombosis.  Therefore the patient was given 2 mg of tPA directly into the vein graft.  Follow-up imaging revealed some improvement in flow in the proximal portion, but the remainder of the graft remained occluded.  Decision was made at this time to place lysis catheters to treat the thrombus that had formed.  A 30 cm EKOS catheter was placed into the bypass graft and connected to a tPA infusion.  The coolant was also connected to the outer catheter of the EKOS, and heparin drip was connected to the port of the sheath.  The sheath and catheter were then secured with suture and Tegaderm.  The patient was transferred to the ICU for lysis infusion overnight with plans to return the next day.    Complications:   thrombosis of femoro-tibial bypass graft     Disposition: ICU - extubated and stable.  Condition: stable         Additional Details: none    Attending Attestation: I performed the procedure.    Africa Hoover  Phone Number: [phone]

## 2024-10-15 ENCOUNTER — PATIENT OUTREACH (OUTPATIENT)
Dept: PRIMARY CARE | Facility: CLINIC | Age: 79
End: 2024-10-15
Payer: MEDICARE

## 2024-10-15 NOTE — PROGRESS NOTES
Discharge Facility: [facility]  Discharge Diagnosis: Decreased circulation   Admission Date: 10/10/2024  Discharge Date: 10/12/2024    PCP Appointment Date: none  Specialist Appointment Date:   -vascular surgery 10/23/2024    Hospital Encounter and Summary Linked: Yes    Issues Requiring Follow-Up  PAD (peripheral artery disease) (CMS-HCC)     She presented electively for angiogram procedure due to stenosis of the bypass graft as seen on arterial duplex.  She underwent an angiogram with angioplasty of the bypass that resulted in thrombosis of the graft status post angioplasty.  Initial thrombectomy attempts were unsuccessful, therefore thrombolysis catheters were placed and patient was admitted to the ICU for lysis therapy overnight.  She did return to the Cath Lab the next day and at lysis check angiogram revealed that her bypass was now widely patent with flow through the anterior tibial and peroneal vessels all the way to the foot, which was her baseline outflow prior to angiogram intervention.  Lysis catheters were removed as was the sheath and the access site was closed with a closure device.     Two attempts were made to reach patient within two business days after discharge. Voicemail left with contact information for patient to call back with any non-emergent questions or concerns.

## 2024-10-23 ENCOUNTER — OFFICE VISIT (OUTPATIENT)
Dept: VASCULAR SURGERY | Facility: CLINIC | Age: 79
End: 2024-10-23
Payer: MEDICARE

## 2024-10-23 VITALS
HEIGHT: 66 IN | DIASTOLIC BLOOD PRESSURE: 80 MMHG | HEART RATE: 68 BPM | SYSTOLIC BLOOD PRESSURE: 158 MMHG | WEIGHT: 134 LBS | BODY MASS INDEX: 21.53 KG/M2

## 2024-10-23 DIAGNOSIS — I73.9 PERIPHERAL ARTERY DISEASE (CMS-HCC): Primary | ICD-10-CM

## 2024-10-23 PROCEDURE — 1123F ACP DISCUSS/DSCN MKR DOCD: CPT | Performed by: NURSE PRACTITIONER

## 2024-10-23 PROCEDURE — 3079F DIAST BP 80-89 MM HG: CPT | Performed by: NURSE PRACTITIONER

## 2024-10-23 PROCEDURE — 1111F DSCHRG MED/CURRENT MED MERGE: CPT | Performed by: NURSE PRACTITIONER

## 2024-10-23 PROCEDURE — 3077F SYST BP >= 140 MM HG: CPT | Performed by: NURSE PRACTITIONER

## 2024-10-23 PROCEDURE — 1157F ADVNC CARE PLAN IN RCRD: CPT | Performed by: NURSE PRACTITIONER

## 2024-10-23 PROCEDURE — 1126F AMNT PAIN NOTED NONE PRSNT: CPT | Performed by: NURSE PRACTITIONER

## 2024-10-23 PROCEDURE — 1160F RVW MEDS BY RX/DR IN RCRD: CPT | Performed by: NURSE PRACTITIONER

## 2024-10-23 PROCEDURE — 1159F MED LIST DOCD IN RCRD: CPT | Performed by: NURSE PRACTITIONER

## 2024-10-23 ASSESSMENT — PAIN SCALES - GENERAL: PAINLEVEL_OUTOF10: 0-NO PAIN

## 2024-10-25 ENCOUNTER — PATIENT OUTREACH (OUTPATIENT)
Dept: PRIMARY CARE | Facility: CLINIC | Age: 79
End: 2024-10-25
Payer: MEDICARE

## 2024-10-28 ASSESSMENT — ENCOUNTER SYMPTOMS
VOMITING: 0
BRUISES/BLEEDS EASILY: 0
CLAUDICATION: 0
HEMATOCHEZIA: 0
NAUSEA: 0
BACK PAIN: 0
ABDOMINAL PAIN: 0
PARESTHESIAS: 1
NUMBNESS: 1
DIARRHEA: 0

## 2024-11-20 ENCOUNTER — PATIENT OUTREACH (OUTPATIENT)
Dept: PRIMARY CARE | Facility: CLINIC | Age: 79
End: 2024-11-20
Payer: MEDICARE

## 2024-11-21 ENCOUNTER — HOSPITAL ENCOUNTER (OUTPATIENT)
Dept: CARDIOLOGY | Facility: HOSPITAL | Age: 79
Discharge: HOME | End: 2024-11-21
Payer: MEDICARE

## 2024-11-21 DIAGNOSIS — I73.9 PERIPHERAL ARTERY DISEASE (CMS-HCC): ICD-10-CM

## 2024-11-21 DIAGNOSIS — R00.2 PALPITATIONS: Primary | ICD-10-CM

## 2024-11-21 PROCEDURE — 93922 UPR/L XTREMITY ART 2 LEVELS: CPT

## 2024-11-21 PROCEDURE — 93922 UPR/L XTREMITY ART 2 LEVELS: CPT | Performed by: INTERNAL MEDICINE

## 2024-11-21 PROCEDURE — 93926 LOWER EXTREMITY STUDY: CPT

## 2024-11-21 PROCEDURE — 93926 LOWER EXTREMITY STUDY: CPT | Performed by: INTERNAL MEDICINE

## 2024-12-02 ENCOUNTER — OFFICE VISIT (OUTPATIENT)
Dept: VASCULAR SURGERY | Facility: CLINIC | Age: 79
End: 2024-12-02
Payer: MEDICARE

## 2024-12-02 ENCOUNTER — HOSPITAL ENCOUNTER (OUTPATIENT)
Dept: CARDIOLOGY | Facility: HOSPITAL | Age: 79
Discharge: HOME | End: 2024-12-02
Payer: MEDICARE

## 2024-12-02 VITALS
SYSTOLIC BLOOD PRESSURE: 132 MMHG | BODY MASS INDEX: 23.16 KG/M2 | WEIGHT: 139 LBS | HEART RATE: 70 BPM | DIASTOLIC BLOOD PRESSURE: 70 MMHG | HEIGHT: 65 IN

## 2024-12-02 DIAGNOSIS — R00.2 PALPITATIONS: ICD-10-CM

## 2024-12-02 DIAGNOSIS — I73.9 PAD (PERIPHERAL ARTERY DISEASE) (CMS-HCC): Primary | ICD-10-CM

## 2024-12-02 DIAGNOSIS — I49.9 IRREGULAR HEART RHYTHM: ICD-10-CM

## 2024-12-02 LAB — BODY SURFACE AREA: 1.7 M2

## 2024-12-02 PROCEDURE — 3078F DIAST BP <80 MM HG: CPT | Performed by: NURSE PRACTITIONER

## 2024-12-02 PROCEDURE — 1160F RVW MEDS BY RX/DR IN RCRD: CPT | Performed by: NURSE PRACTITIONER

## 2024-12-02 PROCEDURE — 1126F AMNT PAIN NOTED NONE PRSNT: CPT | Performed by: NURSE PRACTITIONER

## 2024-12-02 PROCEDURE — 1159F MED LIST DOCD IN RCRD: CPT | Performed by: NURSE PRACTITIONER

## 2024-12-02 PROCEDURE — 99214 OFFICE O/P EST MOD 30 MIN: CPT | Performed by: NURSE PRACTITIONER

## 2024-12-02 PROCEDURE — 1123F ACP DISCUSS/DSCN MKR DOCD: CPT | Performed by: NURSE PRACTITIONER

## 2024-12-02 PROCEDURE — 93246 EXT ECG>7D<15D RECORDING: CPT

## 2024-12-02 PROCEDURE — 1157F ADVNC CARE PLAN IN RCRD: CPT | Performed by: NURSE PRACTITIONER

## 2024-12-02 PROCEDURE — 3075F SYST BP GE 130 - 139MM HG: CPT | Performed by: NURSE PRACTITIONER

## 2024-12-02 ASSESSMENT — PAIN SCALES - GENERAL: PAINLEVEL_OUTOF10: 0-NO PAIN

## 2024-12-02 NOTE — PROGRESS NOTES
"Akira Alcantara is a 79 y.o. female.    Chief Complaint:  80yo patient of Dr. Africa Hoover here for 6 week follow up for PAD.     HPI  Most recent visit 10/23/24, postprocedure (LLE angiogram 10/11/24 w/balloon angioplasty of left femoro-tibial bypass vein graft w/placement of EKOS lysis catheter in graft)) follow up. Order for postprocedure KATHLEEN & Duplex.      PMHx: HTN, HLD, PAD  PSHx: Left femoral-tibial bypass (7/2024)  Social Hx: Quit smoking 3/2024. Denies EtOH/illicit drug use.     Presents today accompanied by . Denies abdominal pain and/or back pain. Endorses left foot/ankle pain at rest and aggravated by exertion. Endorses left foot paresthesias & edema. LLE erythema. Endorses missing activities/events due to LLE pain. Endorses left medial malleolar laceration that is not healing as well as she would like. Has not seen wound care since 11/4/24. Medication nonadherent-reports stopping her medications x 1 week due to bruising of BUE, resumed but continues to report she misses 1 dose of Xarelto almost daily.  Eating/drinking well. Denies N/V/D.     Review of Systems   Constitutional: Positive for weight gain.   HENT:  Negative for nosebleeds.    Cardiovascular:  Positive for claudication and leg swelling.   Hematologic/Lymphatic: Negative for bleeding problem. Bruises/bleeds easily.   Skin:  Positive for color change and poor wound healing.   Musculoskeletal:  Negative for back pain.   Gastrointestinal:  Negative for abdominal pain, diarrhea, hematochezia, melena, nausea and vomiting.   Genitourinary:  Negative for hematuria.   Neurological:  Positive for numbness and paresthesias.     Objective   Visit Vitals  /70 (BP Location: Right arm, Patient Position: Sitting, BP Cuff Size: Adult)   Pulse 70   Ht 1.651 m (5' 5\")   Wt 63 kg (139 lb)   BMI 23.13 kg/m²   OB Status Postmenopausal   Smoking Status Former   BSA 1.7 m²     Vitals reviewed.   Constitutional:       Appearance: " Healthy appearance.   Neck:      Vascular: No carotid bruit.   Pulmonary:      Effort: Pulmonary effort is normal.      Breath sounds: Normal breath sounds.   Cardiovascular:      Normal rate. Regular rhythm. Normal S1. Normal S2.       No gallop.  No click. No rub.   Pulses:     Radial: 2+ bilaterally.     Femoral: 2+ bilaterally.     Dorsalis pedis: detected w/ Doppler on the left side and 2+ on the right side.     Posterior tibial: detected w/ Doppler on the left side and 2+ on the right side.  Edema:     Pretibial: 1+ edema of the left pretibial area.     Ankle: 1+ edema of the left ankle.     Feet: 1+ edema of the left foot.  Skin:     Comments: Multiple healed incisions on LLE. Laceration to medial aspect of left malleolus.    Feet:      Left foot:      Skin integrity: Erythema present.   Neurological:      Mental Status: Alert and oriented to person, place and time.       Lab Review:   Lab Results   Component Value Date     10/12/2024    K 4.0 10/12/2024     10/12/2024    CO2 24 10/12/2024    BUN 31 (H) 10/12/2024    CREATININE 0.69 10/12/2024    GLUCOSE 116 (H) 10/12/2024    CALCIUM 8.2 (L) 10/12/2024     Lab Results   Component Value Date    WBC 6.8 10/12/2024    HGB 9.7 (L) 10/12/2024    HCT 31.9 (L) 10/12/2024    MCV 92 10/12/2024     10/12/2024     Lab Results   Component Value Date    CHOL 162 04/30/2024    TRIG 72 04/30/2024    HDL 90.9 04/30/2024     Diagnostic Imaging:   Vascular US KATHLEEN w/o Exercise 11/21/24  CONCLUSIONS:  Right Lower PVR: No evidence of arterial occlusive disease in the right lower extremity at rest. Decreased digital perfusion noted. Monophasic flow is noted in the right posterior tibial artery. Multiphasic flow is noted in the right common femoral artery and right dorsalis pedis artery.     Left Lower PVR: Evidence of moderate arterial occlusive disease in the left lower extremity at rest. Decreased digital perfusion noted. Monophasic flow is noted in the left  common femoral artery, left posterior tibial artery and left dorsalis pedis artery.     Additional Findings:  Irregular heart rhythm noted.        Comparison:  Compared with study from 9/5/2024, left KATHLEEN is lower in moderate disease category today.     Imaging & Doppler Findings:     RIGHT Lower PVR                Pressures Ratios  Right Posterior Tibial (Ankle) 204 mmHg  1.08  Right Dorsalis Pedis (Ankle)   178 mmHg  0.94  Right Digit (Great Toe)        78 mmHg   0.41     LEFT Lower PVR                Pressures Ratios  Left Posterior Tibial (Ankle) 108 mmHg  0.57  Left Dorsalis Pedis (Ankle)   96 mmHg   0.51  Left Digit (Great Toe)        26 mmHg   0.14                        Right     Left  Brachial Pressure 187 mmHg 189 mmHg     [id number] Dulce Bailey MD, NIDIA  Electronically signed by [id number] Dulce Bailey MD, NIDIA on 11/21/2024 at 5:42:33 PM    Vascular US LE Arterial Duplex Left 11/21/24  CONCLUSIONS:  Left Lower Arterial: There is >50% stenosis documented at the external iliac artery. There is an occlusion documented at the posterior tibial artery mid. Collateral visualized at distal posterior tibial artery.     Left Bypass Graft: The left femoral to PTA bypass graft demonstrates a >50% stenosis in the distal graft and distal anastomosis. The bypass is constructed of vein graft. Distal anastomosis difficult to determine; may be to peroneal artery.     Additional Findings:  Technically difficult exam due to scarring/ movement.        Comparison:  Compared with study from 9/5/2024, higher velocities at the distal graft/distal anastomosis today.     Imaging & Doppler Findings:     Bypass Graft Surveillance: Left Fem to PTA  Inflow Artery    256 cm/s  Prox Anast       86 cm/s  Prox Graft       139 cm/s  Prox/Mid Graft   72 cm/s  Mid Graft        108 cm/s  Mid/Distal Graft 110 cm/s  Distal Graft     622 cm/s  Distal Anast     339 cm/s  Outflow Artery   76 cm/s     Right                   Left   PSV                     PSV              EIA       469 cm/s              CFA       256 cm/s         DARREN Proximal   62 cm/s            DARREN Mid     64 cm/s          DARREN Distal    65 cm/s         Peroneal Mid   69 cm/s        Peroneal Distal 66 cm/s            PTA Mid      0 cm/s          PTA Distal    36 cm/s        [id number] Dulce Bailey MD, RPVI  Electronically signed by [id number] Dulce Bailey MD, RPVI on 11/21/2024 at 10:22:46    Current Outpatient Medications:     amLODIPine (Norvasc) 5 mg tablet, TAKE 1 TABLET(5 MG) BY MOUTH DAILY (Patient not taking: Reported on 10/10/2024), Disp: 90 tablet, Rfl: 0    aspirin 81 mg EC tablet, Take 1 tablet (81 mg) by mouth once daily., Disp: , Rfl:     rivaroxaban (Xarelto) 2.5 mg tablet, Take 1 tablet (2.5 mg) by mouth 2 times daily (morning and late afternoon)., Disp: 60 tablet, Rfl: 11    rosuvastatin (Crestor) 20 mg tablet, Take 1 tablet (20 mg) by mouth once daily at bedtime., Disp: 90 tablet, Rfl: 0    Assessment/Plan   PAD s/p Redo Left Femoral Endarterectomy w/left femoral to PTA bypass w/reverse greater saphenous vein (7/25/24) c/b left groin seroma s/p I &D (9/12/24) requiring LLE angio w/EKOS lysis catheter (10/10/24)  Denies abdominal pain and/or back pain. Endorses left foot/ankle pain at rest and aggravated by exertion. Endorses left foot paresthesias & edema. LLE erythema. Endorses missing activities/events due to LLE pain. Endorses left medial malleolar laceration that is not healing as well as she would like.   +1 pitting LLE edema. +2 palpable bilateral femoral pulses. +2 palpable right DP/PT. Able to doppler signals for left DP/PT.   Vascular US KATHLEEN w/o Exercise 11/21/24 Left Lower PVR: Evidence of moderate arterial occlusive disease in the left lower extremity at rest. Decreased digital perfusion noted. Monophasic flow is noted in the left common femoral artery, left posterior tibial artery and left dorsalis pedis artery.  Vascular US LE Arterial Duplex Left 11/21/24 Left Lower  Arterial: There is >50% stenosis documented at the external iliac artery. There is an occlusion documented at the posterior tibial artery mid. Collateral visualized at distal posterior tibial artery. Left Bypass Graft: The left femoral to PTA bypass graft demonstrates a >50% stenosis in the distal graft and distal anastomosis. The bypass is constructed of vein graft. Distal anastomosis difficult to determine; may be to peroneal artery.  Continue Aspirin 81mg every day, Xarelto 2.5mg BID (reinforced compliance), and Crestor 20mg at bedtime.     Irregular Heart Rhythm  Asymptomatic. Patient endorses irregular rhythm noted on home BP monitor.   Noted during most recent vascular US by tech. RRR on exam today.  Most recent EKG 9/11/24 NSR 2/HR 61bpm.   Ambulatory ECG monitor placed today. Will review.

## 2024-12-02 NOTE — PATIENT INSTRUCTIONS
Take all medications as prescribed.   Have heart monitor placed today, wear and remove as instructed.   Schedule 1 month follow up.

## 2024-12-03 ASSESSMENT — ENCOUNTER SYMPTOMS
WEIGHT GAIN: 1
HEMATOCHEZIA: 0
NUMBNESS: 1
BRUISES/BLEEDS EASILY: 1
VOMITING: 0
DIARRHEA: 0
BACK PAIN: 0
CLAUDICATION: 1
NAUSEA: 0
COLOR CHANGE: 1
POOR WOUND HEALING: 1
HEMATURIA: 0
ABDOMINAL PAIN: 0
PARESTHESIAS: 1

## 2024-12-20 ENCOUNTER — PATIENT OUTREACH (OUTPATIENT)
Dept: PRIMARY CARE | Facility: CLINIC | Age: 79
End: 2024-12-20
Payer: MEDICARE

## 2025-01-06 ENCOUNTER — OFFICE VISIT (OUTPATIENT)
Dept: VASCULAR SURGERY | Facility: CLINIC | Age: 80
End: 2025-01-06
Payer: MEDICARE

## 2025-01-06 VITALS
SYSTOLIC BLOOD PRESSURE: 138 MMHG | HEART RATE: 70 BPM | OXYGEN SATURATION: 95 % | WEIGHT: 138 LBS | DIASTOLIC BLOOD PRESSURE: 70 MMHG | BODY MASS INDEX: 22.99 KG/M2 | HEIGHT: 65 IN

## 2025-01-06 DIAGNOSIS — L97.922: ICD-10-CM

## 2025-01-06 DIAGNOSIS — I73.9 PERIPHERAL ARTERY DISEASE (CMS-HCC): ICD-10-CM

## 2025-01-06 DIAGNOSIS — I47.10 SVT (SUPRAVENTRICULAR TACHYCARDIA) (CMS-HCC): Primary | ICD-10-CM

## 2025-01-06 PROCEDURE — 1123F ACP DISCUSS/DSCN MKR DOCD: CPT | Performed by: NURSE PRACTITIONER

## 2025-01-06 PROCEDURE — 1160F RVW MEDS BY RX/DR IN RCRD: CPT | Performed by: NURSE PRACTITIONER

## 2025-01-06 PROCEDURE — 3078F DIAST BP <80 MM HG: CPT | Performed by: NURSE PRACTITIONER

## 2025-01-06 PROCEDURE — 99214 OFFICE O/P EST MOD 30 MIN: CPT | Performed by: NURSE PRACTITIONER

## 2025-01-06 PROCEDURE — 1159F MED LIST DOCD IN RCRD: CPT | Performed by: NURSE PRACTITIONER

## 2025-01-06 PROCEDURE — 3075F SYST BP GE 130 - 139MM HG: CPT | Performed by: NURSE PRACTITIONER

## 2025-01-06 PROCEDURE — 1157F ADVNC CARE PLAN IN RCRD: CPT | Performed by: NURSE PRACTITIONER

## 2025-01-06 NOTE — PROGRESS NOTES
"Subjective   Mechelle Alcantara is a 79 y.o. female.    Chief Complaint:  80yo patient of Dr. Africa Hoover/Dr. Dany Cruz here for 1 month follow up for irregular heartbeats.     HPI  Most recent visit 12/2/24, follow up KATHLEEN & arterial duplex. Concern of irregular heart rhythm. Ambulatory ECG monitor placed.     PMHx: HTN, HLD, PAD  PSHx: Left femoral-tibial bypass (7/2024)  Social Hx: Quit smoking 3/2024. Denies EtOH/illicit drug use.      Presents today accompanied by . Denies abdominal pain and/or back pain. Endorses left foot/ankle pain at rest and aggravated by exertion. Endorses left foot paresthesias & edema. LLE erythema. Endorses left medial malleolar laceration that is not healing as well as she would like. Has not seen wound care since 11/4/24. Medication adherent.  Eating/drinking well. Denies N/V/D.     Review of Systems   HENT:  Negative for nosebleeds.    Cardiovascular:  Positive for leg swelling.   Hematologic/Lymphatic: Negative for bleeding problem. Does not bruise/bleed easily.   Skin:  Positive for color change and poor wound healing.   Musculoskeletal:  Negative for back pain.   Gastrointestinal:  Negative for abdominal pain, hematochezia and melena.   Genitourinary:  Negative for hematuria.   Neurological:  Positive for numbness and paresthesias.     Objective   Visit Vitals  /70 (BP Location: Left arm, Patient Position: Sitting)   Pulse 70   Ht 1.651 m (5' 5\")   Wt 62.6 kg (138 lb)   SpO2 95%   BMI 22.96 kg/m²   OB Status Postmenopausal   Smoking Status Former   BSA 1.69 m²     Vitals reviewed.   Cardiovascular:      Normal rate. Regular rhythm.      Murmurs: There is no murmur.      No gallop.  No click. No rub.   Pulses:     Radial: 2+ bilaterally.     Femoral: 2+ bilaterally.     Dorsalis pedis: 2+ on the left side and Detected w/ Doppler on the right side.     Posterior tibial: 2+ on the left side and Detected w/ Doppler on the right side.  Edema:     Ankle: " 1+ edema of the left ankle.     Feet: 1+ edema of the left foot.      Lab Review:   Lab Results   Component Value Date     10/12/2024    K 4.0 10/12/2024     10/12/2024    CO2 24 10/12/2024    BUN 31 (H) 10/12/2024    CREATININE 0.69 10/12/2024    GLUCOSE 116 (H) 10/12/2024    CALCIUM 8.2 (L) 10/12/2024     Lab Results   Component Value Date    WBC 6.8 10/12/2024    HGB 9.7 (L) 10/12/2024    HCT 31.9 (L) 10/12/2024    MCV 92 10/12/2024     10/12/2024     Lab Results   Component Value Date    CHOL 162 04/30/2024    TRIG 72 04/30/2024    HDL 90.9 04/30/2024     Diagnostic Imaging:   Vascular US KATHLEEN w/o Exercise 11/21/24  CONCLUSIONS:  Right Lower PVR: No evidence of arterial occlusive disease in the right lower extremity at rest. Decreased digital perfusion noted. Monophasic flow is noted in the right posterior tibial artery. Multiphasic flow is noted in the right common femoral artery and right dorsalis pedis artery.     Left Lower PVR: Evidence of moderate arterial occlusive disease in the left lower extremity at rest. Decreased digital perfusion noted. Monophasic flow is noted in the left common femoral artery, left posterior tibial artery and left dorsalis pedis artery.     Additional Findings:  Irregular heart rhythm noted.        Comparison:  Compared with study from 9/5/2024, left KATHLEEN is lower in moderate disease category today.     Imaging & Doppler Findings:     RIGHT Lower PVR                Pressures Ratios  Right Posterior Tibial (Ankle) 204 mmHg  1.08  Right Dorsalis Pedis (Ankle)   178 mmHg  0.94  Right Digit (Great Toe)        78 mmHg   0.41     LEFT Lower PVR                Pressures Ratios  Left Posterior Tibial (Ankle) 108 mmHg  0.57  Left Dorsalis Pedis (Ankle)   96 mmHg   0.51  Left Digit (Great Toe)        26 mmHg   0.14                        Right     Left  Brachial Pressure 187 mmHg 189 mmHg     [id number] Dulce Bailey MD, RPVI  Electronically signed by [id number] Dulce Bailey MD,  RPVI on 11/21/2024 at 5:42:33 PM     Vascular US LE Arterial Duplex Left 11/21/24  CONCLUSIONS:  Left Lower Arterial: There is >50% stenosis documented at the external iliac artery. There is an occlusion documented at the posterior tibial artery mid. Collateral visualized at distal posterior tibial artery.     Left Bypass Graft: The left femoral to PTA bypass graft demonstrates a >50% stenosis in the distal graft and distal anastomosis. The bypass is constructed of vein graft. Distal anastomosis difficult to determine; may be to peroneal artery.     Additional Findings:  Technically difficult exam due to scarring/ movement.        Comparison:  Compared with study from 9/5/2024, higher velocities at the distal graft/distal anastomosis today.     Imaging & Doppler Findings:     Bypass Graft Surveillance: Left Fem to PTA  Inflow Artery    256 cm/s  Prox Anast       86 cm/s  Prox Graft       139 cm/s  Prox/Mid Graft   72 cm/s  Mid Graft        108 cm/s  Mid/Distal Graft 110 cm/s  Distal Graft     622 cm/s  Distal Anast     339 cm/s  Outflow Artery   76 cm/s     Right                   Left   PSV                    PSV              EIA       469 cm/s              CFA       256 cm/s         DARREN Proximal   62 cm/s            DARREN Mid     64 cm/s          DARREN Distal    65 cm/s         Peroneal Mid   69 cm/s        Peroneal Distal 66 cm/s            PTA Mid      0 cm/s          PTA Distal    36 cm/s        [id number] Dulce Bailey MD, RPVI  Electronically signed by [id number] Dulce Bailey MD, RPVI on 11/21/2024 at 10:22:46           Current Outpatient Medications:     amLODIPine (Norvasc) 5 mg tablet, TAKE 1 TABLET(5 MG) BY MOUTH DAILY (Patient not taking: Reported on 10/10/2024), Disp: 90 tablet, Rfl: 0    aspirin 81 mg EC tablet, Take 1 tablet (81 mg) by mouth once daily., Disp: , Rfl:     rivaroxaban (Xarelto) 2.5 mg tablet, Take 1 tablet (2.5 mg) by mouth 2 times daily (morning and late afternoon)., Disp: 60 tablet, Rfl: 11     rosuvastatin (Crestor) 20 mg tablet, Take 1 tablet (20 mg) by mouth once daily at bedtime., Disp: 90 tablet, Rfl: 0    Assessment/Plan   PAD s/p Redo Left Femoral Endarterectomy (7/2024) w/left femoral to PTA bypass w/reverse greater saphenous vein (7/25/24) c/b left groin seroma s/p I &D (9/12/24) requiring LLE angio w/EKOS lysis catheter (10/10/24)  Denies abdominal pain and/or back pain. Endorses left foot/ankle pain at rest and aggravated by exertion. Endorses left foot paresthesias & edema. LLE erythema. Endorses missing activities/events due to LLE pain. Endorses left medial malleolar laceration that is not healing as well as she would like.   +1 pitting LLE edema. +2 palpable bilateral femoral pulses. +2 palpable right DP/PT. Able to doppler signals for left DP/PT.   Plan for KATHLEEN & Arterial Duplex 5/2024.   Continue Aspirin 81mg every day, Xarelto 2.5mg BID (reinforced compliance), and Crestor 20mg at bedtime.   Referral to wound care for left medial malleolar skin excoriation.      Irregular Heart Rhythm  Asymptomatic. Patient endorses irregular rhythm noted on home BP monitor.   Noted during most recent vascular US by tech. RRR on exam today.  Most recent EKG 9/11/24 NSR 2/HR 61bpm.   Ambulatory ECG monitor showed predominantly NSR w/61 episodes of SVT.

## 2025-01-06 NOTE — PATIENT INSTRUCTIONS
[facility] (Dr. Hoffman).   Schedule KATHLEEN and arterial duplex with follow up in May 2025.

## 2025-01-07 DIAGNOSIS — I73.9 PAD (PERIPHERAL ARTERY DISEASE) (CMS-HCC): ICD-10-CM

## 2025-01-08 RX ORDER — ROSUVASTATIN CALCIUM 20 MG/1
20 TABLET, COATED ORAL NIGHTLY
Qty: 90 TABLET | Refills: 0 | Status: SHIPPED | OUTPATIENT
Start: 2025-01-08 | End: 2025-04-08

## 2025-01-09 ASSESSMENT — ENCOUNTER SYMPTOMS
BACK PAIN: 0
HEMATURIA: 0
HEMATOCHEZIA: 0
POOR WOUND HEALING: 1
PARESTHESIAS: 1
BRUISES/BLEEDS EASILY: 0
ABDOMINAL PAIN: 0
COLOR CHANGE: 1
NUMBNESS: 1

## 2025-01-14 ENCOUNTER — OFFICE VISIT (OUTPATIENT)
Dept: WOUND CARE | Facility: CLINIC | Age: 80
End: 2025-01-14
Payer: MEDICARE

## 2025-01-14 ENCOUNTER — HOSPITAL ENCOUNTER (EMERGENCY)
Facility: HOSPITAL | Age: 80
Discharge: HOME | End: 2025-01-14
Attending: EMERGENCY MEDICINE
Payer: MEDICARE

## 2025-01-14 VITALS
OXYGEN SATURATION: 97 % | DIASTOLIC BLOOD PRESSURE: 75 MMHG | WEIGHT: 135 LBS | TEMPERATURE: 96.8 F | SYSTOLIC BLOOD PRESSURE: 163 MMHG | HEART RATE: 70 BPM | RESPIRATION RATE: 16 BRPM | HEIGHT: 66 IN | BODY MASS INDEX: 21.69 KG/M2

## 2025-01-14 DIAGNOSIS — Z51.89 VISIT FOR WOUND CHECK: Primary | ICD-10-CM

## 2025-01-14 LAB
ALBUMIN SERPL BCP-MCNC: 4.5 G/DL (ref 3.4–5)
ALP SERPL-CCNC: 173 U/L (ref 33–136)
ALT SERPL W P-5'-P-CCNC: 13 U/L (ref 7–45)
ANION GAP SERPL CALC-SCNC: 13 MMOL/L (ref 10–20)
APTT PPP: 32 SECONDS (ref 27–38)
AST SERPL W P-5'-P-CCNC: 19 U/L (ref 9–39)
BASOPHILS # BLD AUTO: 0.05 X10*3/UL (ref 0–0.1)
BASOPHILS NFR BLD AUTO: 1 %
BILIRUB SERPL-MCNC: 0.4 MG/DL (ref 0–1.2)
BUN SERPL-MCNC: 17 MG/DL (ref 6–23)
CALCIUM SERPL-MCNC: 9.3 MG/DL (ref 8.6–10.3)
CHLORIDE SERPL-SCNC: 105 MMOL/L (ref 98–107)
CO2 SERPL-SCNC: 25 MMOL/L (ref 21–32)
CREAT SERPL-MCNC: 0.75 MG/DL (ref 0.5–1.05)
EGFRCR SERPLBLD CKD-EPI 2021: 81 ML/MIN/1.73M*2
EOSINOPHIL # BLD AUTO: 0.38 X10*3/UL (ref 0–0.4)
EOSINOPHIL NFR BLD AUTO: 7.6 %
ERYTHROCYTE [DISTWIDTH] IN BLOOD BY AUTOMATED COUNT: 16.7 % (ref 11.5–14.5)
GLUCOSE SERPL-MCNC: 100 MG/DL (ref 74–99)
HCT VFR BLD AUTO: 30 % (ref 36–46)
HGB BLD-MCNC: 9.1 G/DL (ref 12–16)
IMM GRANULOCYTES # BLD AUTO: 0.01 X10*3/UL (ref 0–0.5)
IMM GRANULOCYTES NFR BLD AUTO: 0.2 % (ref 0–0.9)
INR PPP: 1.1 (ref 0.9–1.1)
LYMPHOCYTES # BLD AUTO: 0.9 X10*3/UL (ref 0.8–3)
LYMPHOCYTES NFR BLD AUTO: 17.9 %
MAGNESIUM SERPL-MCNC: 2.3 MG/DL (ref 1.6–2.4)
MCH RBC QN AUTO: 26.8 PG (ref 26–34)
MCHC RBC AUTO-ENTMCNC: 30.3 G/DL (ref 32–36)
MCV RBC AUTO: 89 FL (ref 80–100)
MONOCYTES # BLD AUTO: 0.49 X10*3/UL (ref 0.05–0.8)
MONOCYTES NFR BLD AUTO: 9.8 %
NEUTROPHILS # BLD AUTO: 3.19 X10*3/UL (ref 1.6–5.5)
NEUTROPHILS NFR BLD AUTO: 63.5 %
NRBC BLD-RTO: 0 /100 WBCS (ref 0–0)
PLATELET # BLD AUTO: 290 X10*3/UL (ref 150–450)
POTASSIUM SERPL-SCNC: 4 MMOL/L (ref 3.5–5.3)
PROT SERPL-MCNC: 8 G/DL (ref 6.4–8.2)
PROTHROMBIN TIME: 12 SECONDS (ref 9.8–12.8)
RBC # BLD AUTO: 3.39 X10*6/UL (ref 4–5.2)
SODIUM SERPL-SCNC: 139 MMOL/L (ref 136–145)
WBC # BLD AUTO: 5 X10*3/UL (ref 4.4–11.3)

## 2025-01-14 PROCEDURE — 99283 EMERGENCY DEPT VISIT LOW MDM: CPT | Mod: 25 | Performed by: EMERGENCY MEDICINE

## 2025-01-14 PROCEDURE — 85730 THROMBOPLASTIN TIME PARTIAL: CPT | Performed by: EMERGENCY MEDICINE

## 2025-01-14 PROCEDURE — 85025 COMPLETE CBC W/AUTO DIFF WBC: CPT | Performed by: EMERGENCY MEDICINE

## 2025-01-14 PROCEDURE — 36415 COLL VENOUS BLD VENIPUNCTURE: CPT | Performed by: EMERGENCY MEDICINE

## 2025-01-14 PROCEDURE — 85610 PROTHROMBIN TIME: CPT | Performed by: EMERGENCY MEDICINE

## 2025-01-14 PROCEDURE — 80053 COMPREHEN METABOLIC PANEL: CPT | Performed by: EMERGENCY MEDICINE

## 2025-01-14 PROCEDURE — 11042 DBRDMT SUBQ TIS 1ST 20SQCM/<: CPT

## 2025-01-14 PROCEDURE — 2500000001 HC RX 250 WO HCPCS SELF ADMINISTERED DRUGS (ALT 637 FOR MEDICARE OP)

## 2025-01-14 PROCEDURE — 83735 ASSAY OF MAGNESIUM: CPT | Performed by: EMERGENCY MEDICINE

## 2025-01-14 RX ORDER — AMLODIPINE BESYLATE 5 MG/1
5 TABLET ORAL DAILY
Status: DISCONTINUED | OUTPATIENT
Start: 2025-01-14 | End: 2025-01-14

## 2025-01-14 RX ADMIN — RIVAROXABAN 2.5 MG: 2.5 TABLET, FILM COATED ORAL at 14:37

## 2025-01-14 ASSESSMENT — PAIN - FUNCTIONAL ASSESSMENT: PAIN_FUNCTIONAL_ASSESSMENT: 0-10

## 2025-01-14 ASSESSMENT — COLUMBIA-SUICIDE SEVERITY RATING SCALE - C-SSRS
1. IN THE PAST MONTH, HAVE YOU WISHED YOU WERE DEAD OR WISHED YOU COULD GO TO SLEEP AND NOT WAKE UP?: NO
2. HAVE YOU ACTUALLY HAD ANY THOUGHTS OF KILLING YOURSELF?: NO
6. HAVE YOU EVER DONE ANYTHING, STARTED TO DO ANYTHING, OR PREPARED TO DO ANYTHING TO END YOUR LIFE?: NO

## 2025-01-14 ASSESSMENT — LIFESTYLE VARIABLES
EVER HAD A DRINK FIRST THING IN THE MORNING TO STEADY YOUR NERVES TO GET RID OF A HANGOVER: NO
HAVE YOU EVER FELT YOU SHOULD CUT DOWN ON YOUR DRINKING: NO
TOTAL SCORE: 0
HAVE PEOPLE ANNOYED YOU BY CRITICIZING YOUR DRINKING: NO
EVER FELT BAD OR GUILTY ABOUT YOUR DRINKING: NO

## 2025-01-14 ASSESSMENT — PAIN SCALES - GENERAL: PAINLEVEL_OUTOF10: 0 - NO PAIN

## 2025-01-14 NOTE — DISCHARGE INSTRUCTIONS
You are seen the emergency department today for evaluation of your left foot.  We spoke with Dr. Hoover she indicates there is nothing to currently do.  We do advise that you take your Xarelto at 1:30 in the afternoon and 1:30 at night daily.  Continue to monitor your blood pressure and take your medication appropriately.  Please follow-up with your primary care provider within the week.  If you develop any worsening symptoms of foot pain nausea vomiting or any other concerning findings please return back to emergency department soon as possible.

## 2025-01-14 NOTE — ED PROVIDER NOTES
"EMERGENCY DEPARTMENT ENCOUNTER      Pt Name: Mechelle Alcantara  MRN: [id number]  Birthdate 1945  Date of evaluation: 1/14/2025  Provider: Ruben Grant DO    CHIEF COMPLAINT       Chief Complaint   Patient presents with    Wound Check     Pt with recent vascular surgery to left foot. Pt at doctor today and sent here for \"dead foot\". Unable to palpate pulses in triage. +delayed cap refill to toes.      HISTORY OF PRESENT ILLNESS    HPI  79-year-old female presents emergency department chief complaint of a wound check.  Patient was evaluated at her vascular surgery doctor today and they indicated that she had delayed cap refill and were not able to palpate pulses they became concerned and sent the patient in for evaluation.  The patient did recently have an intervention with Dr. Hoover vascular surgery she was made aware.  Patient denies any nausea vomiting chest pain shortness of breath or any other concerning findings.  Nursing Notes were reviewed.    PAST MEDICAL HISTORY     Past Medical History:   Diagnosis Date    Cellulitis     Left leg    HLD (hyperlipidemia)     HTN (hypertension)     Neuropathy     Non-healing wound of left lower extremity     PAD (peripheral artery disease) (CMS-[facility])     Postoperative seroma of skin after non-dermatologic procedure     Postoperative wound dehiscence     PVD (peripheral vascular disease) (CMS-[facility])     Varicose veins of both lower extremities          SURGICAL HISTORY       Past Surgical History:   Procedure Laterality Date    CARDIAC CATHETERIZATION N/A 3/28/2024    Procedure: IVUS - Coronary;  Surgeon: Dany Drake MD;  Location: [facility] Cardiac Cath Lab;  Service: Cardiovascular;  Laterality: N/A;  PERIPHERAL/LOWER EXTREMITY    INVASIVE VASCULAR PROCEDURE Left 3/22/2024    Procedure: Lower Extremity Angiogram;  Surgeon: Africa Hoover MD;  Location: [facility] Cardiac Cath Lab;  Service: Vascular Surgery;  Laterality: Left;  Left leg angiogram with " intervention    INVASIVE VASCULAR PROCEDURE N/A 3/22/2024    Procedure: Lower Extremity Intervention;  Surgeon: Africa Hoover MD;  Location: [facility] Cardiac Cath Lab;  Service: Vascular Surgery;  Laterality: N/A;    INVASIVE VASCULAR PROCEDURE Left 3/28/2024    Procedure: Lower Extremity Angiogram;  Surgeon: Dany Drake MD;  Location: [facility] Cardiac Cath Lab;  Service: Cardiovascular;  Laterality: Left;  Left LE angiogram ( [id number])   PEDAL APPROACH    INVASIVE VASCULAR PROCEDURE N/A 3/28/2024    Procedure: Lower Extremity Intervention;  Surgeon: Dany Drake MD;  Location: [facility] Cardiac Cath Lab;  Service: Cardiovascular;  Laterality: N/A;    INVASIVE VASCULAR PROCEDURE N/A 5/16/2024    Procedure: Lower Extremity Angiogram;  Surgeon: Dany Drake MD;  Location: [facility] Cardiac Cath Lab;  Service: Cardiovascular;  Laterality: N/A;    INVASIVE VASCULAR PROCEDURE Left 10/11/2024    Procedure: Lower Extremity Angiogram;  Surgeon: Africa Hoover MD;  Location: [facility] Cardiac Cath Lab;  Service: Vascular Surgery;  Laterality: Left;  Left leg angiogram with lysis check and possible intevention    INVASIVE VASCULAR PROCEDURE Left 10/10/2024    Procedure: Lower Extremity Angiogram;  Surgeon: Africa Hoover MD;  Location: [facility] Cardiac Cath Lab;  Service: Vascular Surgery;  Laterality: Left;    INVASIVE VASCULAR PROCEDURE N/A 10/10/2024    Procedure: EKOS, Arterial Infusion For Thrombolysis, Other Than Coronary;  Surgeon: Africa Hoover MD;  Location: [facility] Cardiac Cath Lab;  Service: Vascular Surgery;  Laterality: N/A;         CURRENT MEDICATIONS       Discharge Medication List as of 1/14/2025  2:41 PM        CONTINUE these medications which have NOT CHANGED    Details   amLODIPine (Norvasc) 5 mg tablet TAKE 1 TABLET(5 MG) BY MOUTH DAILY, Starting Sat 3/23/2024, Normal      aspirin 81 mg EC tablet Take 1 tablet (81 mg) by mouth once daily., Historical Med      rivaroxaban (Xarelto)  2.5 mg tablet Take 1 tablet (2.5 mg) by mouth 2 times daily (morning and late afternoon)., Starting Sun 2024, Until Mon 2025, Normal      rosuvastatin (Crestor) 20 mg tablet Take 1 tablet (20 mg) by mouth once daily at bedtime., Starting Wed 2025, Until Tu2025, Normal             ALLERGIES     Fish containing products, Shellfish containing products, Gadolinium-containing contrast media, and Iodinated contrast media    FAMILY HISTORY       Family History   Problem Relation Name Age of Onset    Heart failure Father      Hypertension Father      Heart disease Sister      Hypertension Sister      Heart disease Brother      Hypertension Brother            SOCIAL HISTORY       Social History     Socioeconomic History    Marital status:    Tobacco Use    Smoking status: Former     Current packs/day: 0.00     Types: Cigarettes     Quit date: 3/18/2024     Years since quittin.8     Passive exposure: Past    Smokeless tobacco: Never   Vaping Use    Vaping status: Never Used   Substance and Sexual Activity    Alcohol use: Never    Drug use: Never    Sexual activity: Not Currently     Social Drivers of Health     Financial Resource Strain: Low Risk  (10/10/2024)    Overall Financial Resource Strain (CARDIA)     Difficulty of Paying Living Expenses: Not hard at all   Food Insecurity: No Food Insecurity (10/10/2024)    Hunger Vital Sign     Worried About Running Out of Food in the Last Year: Never true     Ran Out of Food in the Last Year: Never true   Transportation Needs: No Transportation Needs (10/10/2024)    PRAPARE - Transportation     Lack of Transportation (Medical): No     Lack of Transportation (Non-Medical): No   Physical Activity: Unknown (10/10/2024)    Exercise Vital Sign     Days of Exercise per Week: 0 days   Stress: No Stress Concern Present (2024)    [employer] - Occupational Stress Questionnaire     Feeling of Stress : Only a little   Social  Connections: Feeling Socially Integrated (9/24/2024)    OASIS : Social Isolation     Frequency of experiencing loneliness or isolation: Never   Intimate Partner Violence: Not At Risk (10/10/2024)    Humiliation, Afraid, Rape, and Kick questionnaire     Fear of Current or Ex-Partner: No     Emotionally Abused: No     Physically Abused: No     Sexually Abused: No   Housing Stability: Low Risk  (10/10/2024)    Housing Stability Vital Sign     Unable to Pay for Housing in the Last Year: No     Number of Times Moved in the Last Year: 0     Homeless in the Last Year: No       SCREENINGS                        PHYSICAL EXAM    (up to 7 for level 4, 8 or more for level 5)     ED Triage Vitals [01/14/25 1220]   Temperature Heart Rate Respirations BP   36 °C (96.8 °F) 72 15 (!) 230/103      Pulse Ox Temp Source Heart Rate Source Patient Position   98 % Temporal Monitor Sitting      BP Location FiO2 (%)     Right arm --       Physical Exam  Constitutional:       Appearance: Normal appearance.   HENT:      Head: Normocephalic.      Nose: Nose normal.      Mouth/Throat:      Mouth: Mucous membranes are moist.      Pharynx: Oropharynx is clear.   Eyes:      Extraocular Movements: Extraocular movements intact.      Pupils: Pupils are equal, round, and reactive to light.   Cardiovascular:      Rate and Rhythm: Normal rate and regular rhythm.      Pulses: Normal pulses.      Heart sounds: Normal heart sounds.   Pulmonary:      Effort: Pulmonary effort is normal.      Breath sounds: Normal breath sounds.   Abdominal:      General: Abdomen is flat.      Palpations: Abdomen is soft.   Musculoskeletal:      Cervical back: Normal range of motion and neck supple.      Right lower leg: Normal.      Left lower leg: Swelling present.      Comments: We were unable to palpate a pulse however with Doppler we were able to evaluate a pulse it was strong and persistent.  The foot did feel an appropriate temperature cap refill was sufficient  on both sides.  She does have mild swelling of the left leg.   Neurological:      Mental Status: She is alert.          DIAGNOSTIC RESULTS     LABS:  Labs Reviewed   CBC WITH AUTO DIFFERENTIAL - Abnormal       Result Value    WBC 5.0      nRBC 0.0      RBC 3.39 (*)     Hemoglobin 9.1 (*)     Hematocrit 30.0 (*)     MCV 89      MCH 26.8      MCHC 30.3 (*)     RDW 16.7 (*)     Platelets 290      Neutrophils % 63.5      Immature Granulocytes %, Automated 0.2      Lymphocytes % 17.9      Monocytes % 9.8      Eosinophils % 7.6      Basophils % 1.0      Neutrophils Absolute 3.19      Immature Granulocytes Absolute, Automated 0.01      Lymphocytes Absolute 0.90      Monocytes Absolute 0.49      Eosinophils Absolute 0.38      Basophils Absolute 0.05     COMPREHENSIVE METABOLIC PANEL - Abnormal    Glucose 100 (*)     Sodium 139      Potassium 4.0      Chloride 105      Bicarbonate 25      Anion Gap 13      Urea Nitrogen 17      Creatinine 0.75      eGFR 81      Calcium 9.3      Albumin 4.5      Alkaline Phosphatase 173 (*)     Total Protein 8.0      AST 19      Bilirubin, Total 0.4      ALT 13     MAGNESIUM - Normal    Magnesium 2.30     APTT - Normal    aPTT 32      Narrative:     The APTT is no longer used for monitoring Unfractionated Heparin Therapy. For monitoring Heparin Therapy, use the Heparin Assay.   PROTIME-INR - Normal    Protime 12.0      INR 1.1         All other labs were within normal range or not returned as of this dictation.    Imaging  No orders to display        Procedures  Procedures     EMERGENCY DEPARTMENT COURSE/MDM:   Medical Decision Making  79-year-old female presents emergency department chief complaint of a wound check.  Medical management treatment emergency department will consist of patient of Dr. Hoover.  Dr. Hoover recommended no further intervention indicated the patient take her blood thinner appropriately.  We have discussed this with the patient we have also provided her with her  home dose of Xarelto.  We have informed the patient that she needs to take her Xarelto appropriately and she should follow-up with her primary care provider as well as take her blood pressure medication on a normal basis.  The patient will be discharged home with strict return precautions.    ED Course as of 01/15/25 2249   Tue Jan 14, 2025 1327 Discussed with Dr. Hoover.  Notified that the foot looked ischemic in the doctors office at wound care however now, there is not ischemia noted.   The foot is warm.  There is a dopplerable pulse with a capillary refill about 3 seconds.    At this time, Dr. Hoover is recommended no further intervention, and the patient is to take her blood thinner on time every 12 hours, not 1 in the morning and 8 or 9 in the evening.  She is to follow-up with her in the office in the next 1 week. [GR]   1328 Further discussion with the patient, she takes her medicine at 1 in the afternoon, and then 8:00 in the evening.  This is when she takes her Xarelto which means that there are 4 hours during the day that she is not being anticoagulated or not appropriately anticoagulated.  It is very important for her to take it every 12 hours.  She states that she is normally up later on at night therefore she would rather do 1 PM like now, and 1 AM rather than 8 in the morning and 8 at night.  Therefore we will give her dose of Xarelto here.  She states that when she is not at the hospital, her blood pressure is totally normal in the 120s 130s systolic, she suffers from severe whitecoat hypertension, and whenever she is here her blood pressure skyrocket's.  She states that she supposed be on amlodipine but does not take it as her blood pressure at home is normal.  She does not want any blood pressure medication at this time. [GR]      ED Course User Index  [GR] Ruben Grant DO         Diagnoses as of 01/15/25 2249   Visit for wound check        Patient and or family in agreement and  understanding of treatment plan.  All questions answered.      I reviewed the case with the attending ED physician. The attending ED physician agrees with the plan. Patient and/or patient´s representative was counseled regarding labs, imaging, likely diagnosis, and plan. All questions were answered.    ED Medications administered this visit:    Medications   rivaroxaban (Xarelto) tablet 2.5 mg (2.5 mg oral Given 1/14/25 1437)       New Prescriptions from this visit:    Discharge Medication List as of 1/14/2025  2:41 PM          Follow-up:  Mj Latif, DO  [address] [facility] [address]  [phone]    Schedule an appointment as soon as possible for a visit       [facility] Emergency Medicine  [address] [facility] [address] [phone]    As needed        Final Impression:   1. Visit for wound check          (Please note that portions of this note were completed with a voice recognition program.  Efforts were made to edit the dictations but occasionally words are mis-transcribed.)     Eliot Friend MD  Resident  01/14/25 143      The patient was seen by the resident/fellow.  I have personally performed a substantive portion of the encounter.  I have seen and examined the patient; agree with the workup, evaluation, MDM, management and diagnosis.  The care plan has been discussed with the resident; I have reviewed the resident’s note and agree with the documented findings.                                                    Ruben Grant,   01/15/25 1703

## 2025-01-17 LAB — BODY SURFACE AREA: 1.7 M2

## 2025-01-21 ENCOUNTER — OFFICE VISIT (OUTPATIENT)
Dept: WOUND CARE | Facility: CLINIC | Age: 80
End: 2025-01-21
Payer: MEDICARE

## 2025-01-21 ENCOUNTER — APPOINTMENT (OUTPATIENT)
Dept: WOUND CARE | Facility: CLINIC | Age: 80
End: 2025-01-21
Payer: MEDICARE

## 2025-01-21 PROCEDURE — 99212 OFFICE O/P EST SF 10 MIN: CPT

## 2025-01-28 ENCOUNTER — OFFICE VISIT (OUTPATIENT)
Dept: WOUND CARE | Facility: CLINIC | Age: 80
End: 2025-01-28
Payer: MEDICARE

## 2025-01-28 PROCEDURE — 11042 DBRDMT SUBQ TIS 1ST 20SQCM/<: CPT

## 2025-01-29 NOTE — Clinical Note
Admission/Transfer from: ED  2 RN skin assessment completed. Yes Dana Sosa  Significant findings include: scratches on right hand  WOC Nurse Consult Ordered? No      The DP pulses are 2+ bilaterally.

## 2025-02-04 ENCOUNTER — OFFICE VISIT (OUTPATIENT)
Dept: WOUND CARE | Facility: CLINIC | Age: 80
End: 2025-02-04
Payer: MEDICARE

## 2025-02-04 PROCEDURE — 11042 DBRDMT SUBQ TIS 1ST 20SQCM/<: CPT

## 2025-02-11 ENCOUNTER — OFFICE VISIT (OUTPATIENT)
Dept: WOUND CARE | Facility: CLINIC | Age: 80
End: 2025-02-11
Payer: MEDICARE

## 2025-02-11 PROCEDURE — 11042 DBRDMT SUBQ TIS 1ST 20SQCM/<: CPT

## 2025-02-18 ENCOUNTER — APPOINTMENT (OUTPATIENT)
Dept: WOUND CARE | Facility: CLINIC | Age: 80
End: 2025-02-18
Payer: MEDICARE

## 2025-02-25 ENCOUNTER — APPOINTMENT (OUTPATIENT)
Dept: WOUND CARE | Facility: CLINIC | Age: 80
End: 2025-02-25
Payer: MEDICARE

## 2025-03-04 ENCOUNTER — OFFICE VISIT (OUTPATIENT)
Dept: WOUND CARE | Facility: CLINIC | Age: 80
End: 2025-03-04
Payer: MEDICARE

## 2025-03-04 PROCEDURE — 11042 DBRDMT SUBQ TIS 1ST 20SQCM/<: CPT

## 2025-03-07 ENCOUNTER — TELEPHONE (OUTPATIENT)
Dept: PRIMARY CARE | Facility: CLINIC | Age: 80
End: 2025-03-07
Payer: MEDICARE

## 2025-03-07 DIAGNOSIS — I10 ESSENTIAL HYPERTENSION: Primary | ICD-10-CM

## 2025-03-07 RX ORDER — OLMESARTAN MEDOXOMIL 20 MG/1
20 TABLET ORAL DAILY
Qty: 30 TABLET | Refills: 11 | Status: SHIPPED | OUTPATIENT
Start: 2025-03-07 | End: 2026-03-07

## 2025-03-11 ENCOUNTER — OFFICE VISIT (OUTPATIENT)
Dept: WOUND CARE | Facility: CLINIC | Age: 80
End: 2025-03-11
Payer: MEDICARE

## 2025-03-11 PROCEDURE — 11042 DBRDMT SUBQ TIS 1ST 20SQCM/<: CPT

## 2025-03-18 ENCOUNTER — OFFICE VISIT (OUTPATIENT)
Dept: WOUND CARE | Facility: CLINIC | Age: 80
End: 2025-03-18
Payer: MEDICARE

## 2025-03-18 PROCEDURE — 11042 DBRDMT SUBQ TIS 1ST 20SQCM/<: CPT

## 2025-03-19 ENCOUNTER — APPOINTMENT (OUTPATIENT)
Dept: PRIMARY CARE | Facility: CLINIC | Age: 80
End: 2025-03-19
Payer: MEDICARE

## 2025-03-19 VITALS
BODY MASS INDEX: 22.98 KG/M2 | WEIGHT: 143 LBS | HEIGHT: 66 IN | OXYGEN SATURATION: 97 % | RESPIRATION RATE: 14 BRPM | SYSTOLIC BLOOD PRESSURE: 130 MMHG | TEMPERATURE: 97.9 F | DIASTOLIC BLOOD PRESSURE: 80 MMHG | HEART RATE: 80 BPM

## 2025-03-19 DIAGNOSIS — I73.9 PERIPHERAL ARTERY DISEASE (CMS-HCC): ICD-10-CM

## 2025-03-19 DIAGNOSIS — I74.3 EMBOLISM AND THROMBOSIS OF ARTERIES OF THE LOWER EXTREMITIES (MULTI): ICD-10-CM

## 2025-03-19 DIAGNOSIS — G62.9 NEUROPATHY: ICD-10-CM

## 2025-03-19 DIAGNOSIS — Z00.00 ROUTINE GENERAL MEDICAL EXAMINATION AT HEALTH CARE FACILITY: Primary | ICD-10-CM

## 2025-03-19 DIAGNOSIS — I10 ESSENTIAL HYPERTENSION: ICD-10-CM

## 2025-03-19 DIAGNOSIS — D64.9 ANEMIA, UNSPECIFIED TYPE: ICD-10-CM

## 2025-03-19 DIAGNOSIS — L97.922: ICD-10-CM

## 2025-03-19 PROCEDURE — 1123F ACP DISCUSS/DSCN MKR DOCD: CPT | Performed by: INTERNAL MEDICINE

## 2025-03-19 PROCEDURE — 1158F ADVNC CARE PLAN TLK DOCD: CPT | Performed by: INTERNAL MEDICINE

## 2025-03-19 PROCEDURE — 1036F TOBACCO NON-USER: CPT | Performed by: INTERNAL MEDICINE

## 2025-03-19 PROCEDURE — G0439 PPPS, SUBSEQ VISIT: HCPCS | Performed by: INTERNAL MEDICINE

## 2025-03-19 PROCEDURE — 1157F ADVNC CARE PLAN IN RCRD: CPT | Performed by: INTERNAL MEDICINE

## 2025-03-19 PROCEDURE — 1160F RVW MEDS BY RX/DR IN RCRD: CPT | Performed by: INTERNAL MEDICINE

## 2025-03-19 PROCEDURE — 1159F MED LIST DOCD IN RCRD: CPT | Performed by: INTERNAL MEDICINE

## 2025-03-19 PROCEDURE — 3075F SYST BP GE 130 - 139MM HG: CPT | Performed by: INTERNAL MEDICINE

## 2025-03-19 PROCEDURE — 99214 OFFICE O/P EST MOD 30 MIN: CPT | Performed by: INTERNAL MEDICINE

## 2025-03-19 PROCEDURE — 3079F DIAST BP 80-89 MM HG: CPT | Performed by: INTERNAL MEDICINE

## 2025-03-19 PROCEDURE — 1170F FXNL STATUS ASSESSED: CPT | Performed by: INTERNAL MEDICINE

## 2025-03-19 ASSESSMENT — ACTIVITIES OF DAILY LIVING (ADL)
MANAGING_FINANCES: TOTAL CARE
GROCERY_SHOPPING: TOTAL CARE
DOING_HOUSEWORK: TOTAL CARE
DRESSING: INDEPENDENT
BATHING: INDEPENDENT
TAKING_MEDICATION: TOTAL CARE

## 2025-03-19 ASSESSMENT — ENCOUNTER SYMPTOMS
RHINORRHEA: 1
CONSTIPATION: 0
WHEEZING: 0
ABDOMINAL PAIN: 0
COUGH: 0
DIARRHEA: 0
SHORTNESS OF BREATH: 0
NAUSEA: 0
PALPITATIONS: 0

## 2025-03-19 ASSESSMENT — PATIENT HEALTH QUESTIONNAIRE - PHQ9
1. LITTLE INTEREST OR PLEASURE IN DOING THINGS: NOT AT ALL
SUM OF ALL RESPONSES TO PHQ9 QUESTIONS 1 AND 2: 0
2. FEELING DOWN, DEPRESSED OR HOPELESS: NOT AT ALL

## 2025-03-19 NOTE — PROGRESS NOTES
"Subjective   Patient ID: Mechelle Alcantara is a 79 y.o. female who presents for Medicare Annual Wellness Visit Subsequent and Hypertension.    Overall she has been doing fairly well.  She continues to have recurrent issues with her left lower extremity.  She is currently following with the wound clinic due to chronic ulceration.  She has had significant peripheral arterial disease and does follow with vascular surgery as well.  She does have some neuropathy pain in this leg as well.  She did not tolerate gabapentin.  She does have erythema of her left lower leg currently but she states this redness comes and goes.  No warmth and no drainage.  No fevers chills or sweats.    Review of Systems   HENT:  Positive for rhinorrhea.    Respiratory:  Negative for cough, shortness of breath and wheezing.    Cardiovascular:  Negative for chest pain and palpitations.   Gastrointestinal:  Negative for abdominal pain, constipation, diarrhea and nausea.       Objective   /80 (BP Location: Left arm, Patient Position: Sitting, BP Cuff Size: Adult)   Pulse 80   Temp 36.6 °C (97.9 °F) (Tympanic)   Resp 14   Ht 1.676 m (5' 6\")   Wt 64.9 kg (143 lb)   SpO2 97%   BMI 23.08 kg/m²     Physical Exam  Vitals reviewed.   Constitutional:       Appearance: Normal appearance.   HENT:      Head: Normocephalic.   Cardiovascular:      Rate and Rhythm: Normal rate.   Pulmonary:      Effort: Pulmonary effort is normal.   Musculoskeletal:         General: Normal range of motion.   Neurological:      General: No focal deficit present.      Mental Status: She is alert.   Psychiatric:         Mood and Affect: Mood normal.         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  Erythema for her leg which does not seem consistent with cellulitis.  There is edema and is more likely a component of stasis dermatitis.  We discussed avoiding salt which she states she has had foods higher in sodium lately.  Due to her ulceration and PAD we will avoid compression stockings for the present time.  We did review and discuss her medications the importance of her statin as well as her anticoagulation.  She is no longer a smoker.  Will continue to monitor and treat her ongoing risk factors.  We did discuss her neuropathy as well.  She did not tolerate gabapentin.  We discussed her anemia.  She is due for reassessment with blood tests and will look for other potential causes of her neuropathy other than small vessel disease.  Annual Wellness Visit questions and answers were reviewed and discussed including the importance of discussing end of life wishes as well as having a living will and health care power of .     Will follow-up in 6 months, sooner if any issues or concerns

## 2025-03-20 LAB
BASOPHILS # BLD AUTO: 63 CELLS/UL (ref 0–200)
BASOPHILS NFR BLD AUTO: 1.1 %
EOSINOPHIL # BLD AUTO: 445 CELLS/UL (ref 15–500)
EOSINOPHIL NFR BLD AUTO: 7.8 %
ERYTHROCYTE [DISTWIDTH] IN BLOOD BY AUTOMATED COUNT: 15.6 % (ref 11–15)
FOLATE SERPL-MCNC: 9.6 NG/ML
HCT VFR BLD AUTO: 36.9 % (ref 35–45)
HGB BLD-MCNC: 11.8 G/DL (ref 11.7–15.5)
IRON SATN MFR SERPL: 18 % (CALC) (ref 16–45)
IRON SERPL-MCNC: 80 MCG/DL (ref 45–160)
LYMPHOCYTES # BLD AUTO: 1579 CELLS/UL (ref 850–3900)
LYMPHOCYTES NFR BLD AUTO: 27.7 %
MCH RBC QN AUTO: 28.1 PG (ref 27–33)
MCHC RBC AUTO-ENTMCNC: 32 G/DL (ref 32–36)
MCV RBC AUTO: 87.9 FL (ref 80–100)
MONOCYTES # BLD AUTO: 804 CELLS/UL (ref 200–950)
MONOCYTES NFR BLD AUTO: 14.1 %
NEUTROPHILS # BLD AUTO: 2810 CELLS/UL (ref 1500–7800)
NEUTROPHILS NFR BLD AUTO: 49.3 %
PLATELET # BLD AUTO: 395 THOUSAND/UL (ref 140–400)
PMV BLD REES-ECKER: 10 FL (ref 7.5–12.5)
RBC # BLD AUTO: 4.2 MILLION/UL (ref 3.8–5.1)
RETICS #: NORMAL CELLS/UL (ref 20000–80000)
RETICS/RBC NFR AUTO: 0.9 %
TIBC SERPL-MCNC: 455 MCG/DL (CALC) (ref 250–450)
VIT B12 SERPL-MCNC: 517 PG/ML (ref 200–1100)
WBC # BLD AUTO: 5.7 THOUSAND/UL (ref 3.8–10.8)

## 2025-03-25 ENCOUNTER — OFFICE VISIT (OUTPATIENT)
Dept: WOUND CARE | Facility: CLINIC | Age: 80
End: 2025-03-25
Payer: MEDICARE

## 2025-03-25 PROCEDURE — 11042 DBRDMT SUBQ TIS 1ST 20SQCM/<: CPT

## 2025-04-01 ENCOUNTER — OFFICE VISIT (OUTPATIENT)
Dept: WOUND CARE | Facility: CLINIC | Age: 80
End: 2025-04-01
Payer: MEDICARE

## 2025-04-01 PROCEDURE — 11042 DBRDMT SUBQ TIS 1ST 20SQCM/<: CPT

## 2025-04-08 ENCOUNTER — OFFICE VISIT (OUTPATIENT)
Dept: WOUND CARE | Facility: CLINIC | Age: 80
End: 2025-04-08
Payer: MEDICARE

## 2025-04-08 PROCEDURE — 99212 OFFICE O/P EST SF 10 MIN: CPT

## 2025-04-15 ENCOUNTER — OFFICE VISIT (OUTPATIENT)
Dept: WOUND CARE | Facility: CLINIC | Age: 80
End: 2025-04-15
Payer: MEDICARE

## 2025-04-15 PROCEDURE — 11042 DBRDMT SUBQ TIS 1ST 20SQCM/<: CPT

## 2025-04-22 ENCOUNTER — OFFICE VISIT (OUTPATIENT)
Dept: WOUND CARE | Facility: CLINIC | Age: 80
End: 2025-04-22
Payer: MEDICARE

## 2025-04-22 PROCEDURE — 11042 DBRDMT SUBQ TIS 1ST 20SQCM/<: CPT

## 2025-04-29 ENCOUNTER — OFFICE VISIT (OUTPATIENT)
Dept: WOUND CARE | Facility: CLINIC | Age: 80
End: 2025-04-29
Payer: MEDICARE

## 2025-04-29 PROCEDURE — 99213 OFFICE O/P EST LOW 20 MIN: CPT

## 2025-05-02 ENCOUNTER — CLINICAL SUPPORT (OUTPATIENT)
Dept: WOUND CARE | Facility: CLINIC | Age: 80
End: 2025-05-02
Payer: MEDICARE

## 2025-05-02 PROCEDURE — 99212 OFFICE O/P EST SF 10 MIN: CPT

## 2025-05-06 ENCOUNTER — OFFICE VISIT (OUTPATIENT)
Dept: WOUND CARE | Facility: CLINIC | Age: 80
End: 2025-05-06
Payer: MEDICARE

## 2025-05-06 PROCEDURE — 11042 DBRDMT SUBQ TIS 1ST 20SQCM/<: CPT

## 2025-05-13 ENCOUNTER — OFFICE VISIT (OUTPATIENT)
Dept: WOUND CARE | Facility: CLINIC | Age: 80
End: 2025-05-13
Payer: MEDICARE

## 2025-05-13 PROCEDURE — 99212 OFFICE O/P EST SF 10 MIN: CPT

## 2025-05-20 ENCOUNTER — HOSPITAL ENCOUNTER (OUTPATIENT)
Dept: CARDIOLOGY | Facility: HOSPITAL | Age: 80
Discharge: HOME | End: 2025-05-20
Payer: MEDICARE

## 2025-05-20 DIAGNOSIS — I70.444: ICD-10-CM

## 2025-05-20 DIAGNOSIS — I73.9 PERIPHERAL ARTERY DISEASE: ICD-10-CM

## 2025-05-20 PROCEDURE — 93926 LOWER EXTREMITY STUDY: CPT | Performed by: SURGERY

## 2025-05-20 PROCEDURE — 93926 LOWER EXTREMITY STUDY: CPT

## 2025-05-20 PROCEDURE — 93922 UPR/L XTREMITY ART 2 LEVELS: CPT | Performed by: SURGERY

## 2025-05-20 PROCEDURE — 93922 UPR/L XTREMITY ART 2 LEVELS: CPT

## 2025-05-27 ENCOUNTER — OFFICE VISIT (OUTPATIENT)
Dept: WOUND CARE | Facility: CLINIC | Age: 80
End: 2025-05-27
Payer: MEDICARE

## 2025-05-27 PROCEDURE — 99213 OFFICE O/P EST LOW 20 MIN: CPT

## 2025-05-28 ENCOUNTER — OFFICE VISIT (OUTPATIENT)
Dept: VASCULAR SURGERY | Facility: CLINIC | Age: 80
End: 2025-05-28
Payer: MEDICARE

## 2025-05-28 VITALS
SYSTOLIC BLOOD PRESSURE: 120 MMHG | WEIGHT: 147 LBS | HEART RATE: 76 BPM | BODY MASS INDEX: 23.63 KG/M2 | OXYGEN SATURATION: 98 % | DIASTOLIC BLOOD PRESSURE: 60 MMHG | HEIGHT: 66 IN

## 2025-05-28 DIAGNOSIS — I73.9 PAD (PERIPHERAL ARTERY DISEASE): Primary | ICD-10-CM

## 2025-05-28 DIAGNOSIS — L97.921 NON-PRESSURE CHRONIC ULCER LEFT LOWER LEG, LIMITED TO BREAKDOWN SKIN: ICD-10-CM

## 2025-05-28 DIAGNOSIS — I70.229 REST PAIN OF LOWER EXTREMITY DUE TO ATHEROSCLEROSIS: ICD-10-CM

## 2025-05-28 PROCEDURE — 99214 OFFICE O/P EST MOD 30 MIN: CPT | Performed by: NURSE PRACTITIONER

## 2025-05-28 PROCEDURE — 3078F DIAST BP <80 MM HG: CPT | Performed by: NURSE PRACTITIONER

## 2025-05-28 PROCEDURE — 1159F MED LIST DOCD IN RCRD: CPT | Performed by: NURSE PRACTITIONER

## 2025-05-28 PROCEDURE — 3074F SYST BP LT 130 MM HG: CPT | Performed by: NURSE PRACTITIONER

## 2025-05-28 PROCEDURE — 1160F RVW MEDS BY RX/DR IN RCRD: CPT | Performed by: NURSE PRACTITIONER

## 2025-05-28 NOTE — PROGRESS NOTES
"Subjective   Mechelle Alcantara is a 79 y.o. female.    Chief Complaint:      HPI  Most recent visit 1/6/25, endorses LLE pain on exertion w/paresthesias, edema, erythema. Laceration noted w/no wound care follow up. Referred to wound care. Repeat KATHLEEN & Arterial Duplex in 6 months.     PMHx: HTN, HLD, PAD  PSHx: Left femoral-tibial bypass (7/2024)  Social Hx: Quit smoking 3/2024. Denies EtOH/illicit drug use.      Presents today accompanied by . Denies abdominal pain. Endorses lower left back pain upon wakening. Endorses left medial ankle wound pain aggravated by exertion.  Endorses left foot paresthesias & edema. LLE erythema improves with elevation. Following with wound care (Dr. Hoffman) and has [facility] nurses coming to the house 3 days/week. Medication nonadherent- stopped taking Aspirin 81mg (no reason) and Crestor (people told her it kills the good fat in your brain) x 2 weeks. Continues to intermittently forget second dose of Xarelto. Has been on antibiotics recently for left ankle.     ROS    Objective   Visit Vitals  /60 (BP Location: Left arm, Patient Position: Sitting)   Pulse 76   Ht 1.676 m (5' 6\")   Wt 66.7 kg (147 lb)   SpO2 98%   BMI 23.73 kg/m²   OB Status Postmenopausal   Smoking Status Former   BSA 1.76 m²     Physical Exam    Lab Review:   Lab Results   Component Value Date     01/14/2025    K 4.0 01/14/2025     01/14/2025    CO2 25 01/14/2025    BUN 17 01/14/2025    CREATININE 0.75 01/14/2025    GLUCOSE 100 (H) 01/14/2025    CALCIUM 9.3 01/14/2025     Lab Results   Component Value Date    WBC 5.7 03/19/2025    HGB 11.8 03/19/2025    HCT 36.9 03/19/2025    MCV 87.9 03/19/2025     03/19/2025     Lab Results   Component Value Date    CHOL 162 04/30/2024    TRIG 72 04/30/2024    HDL 90.9 04/30/2024     Diagnostic Imaging:   VASC US LOWER EXTREMITY ARTERIAL DUPLEX LEFT     Patient Name:      MECHELLE ALCANTARA     Reading Physician:  [id number] Mila Marsh                          "                                      MD, NIDIA  Study Date:        5/20/2025             Ordering Provider:  [id number] TANYA DO  MRN/PID:           [id number]              Fellow:  Accession#:        [id number]          Technologist:       Yanira Meyers RVT  Date of Birth/Age: 1945 / 79 years Technologist 2:  Gender:            F                     Encounter#:         [id number]  Admission Status:  Outpatient            Location Performed: [facility]        Diagnosis/ICD: Atherosclerosis of autologous vein bypass graft(s) of the left                 leg with ulceration of heel and midfoot-I70.444  Indication:    Atherosclerosis of native arteries-extremities w/ulceration  CPT Codes:     61223 Peripheral artery Lower arterial Duplex limited        Pertinent History: Vascular surgery, PVD and Leg pain. LFem-PTA/PeroA BPG.        **CRITICAL RESULT**  Critical Result: Possible AVF connection  Notification called to Catalina Hoover MD on 5/20/2025 at 3:35:28 PM. Acknowledged critical results notification communicated via secure chat by Yanira Meyers RVT.     CONCLUSIONS:  Left Lower Arterial: Elevated velocities with turbulent flow noted in DEANNA. PTA not visualized and previously noted occluded PTA.  Left Bypass Graft: The left femoral to PTA bypass graft demonstrates a >50% stenosis in the distal graft and distal anastomosis. The bypass is constructed of vein graft. Lt Fem-Tibial/PTA BPG. Outflow appears to be LPeroneal Artery.  There appears to be a venous branch at the distal BPG/anastamosis. A turbulent arterio-venous waveform is noted post elevated velocities of the distal BPG.  Additional Findings:  See KATHLEEN done today RABI 1.04 LABI 0.55.  Further imaging can be considered if clinically indicated.        Comparison:  Compared with study from 11/21/2024, Elevated velocities as seen  previously remain the same. New finding of possible venous connection at distal BPG.     Imaging & Doppler Findings:     Bypass Graft Surveillance: Left Fem to PTA  Inflow Artery    169 cm/s  Prox Graft       108 cm/s  Prox/Mid Graft   73 cm/s  Mid Graft        64 cm/s  Mid/Distal Graft 54 cm/s  Distal Graft     765 cm/s  Distal Anast     180 cm/s  Outflow Artery   84 cm/s     Right                     Left   PSV                      PSV               EIA        336 cm/s               CFA        169 cm/s        Profunda Proximal 253 cm/s          DARREN Proximal    135 cm/s        Peroneal Proximal 84 cm/s         Peroneal Distal  76 cm/s        [id number] Mila Marsh MD, NIDIA  Electronically signed by [id number] NIDIA Edgar MD on 5/21/2025 at 11:33:38 PM    VASC US ANKLE BRACHIAL INDEX (KATHLEEN) WITHOUT EXERCISE     Patient Name:      SUSIE RIDDLE     Reading Physician:  [id number] NIDIA Edgar MD  Study Date:        5/20/2025             Ordering Provider:  [id number] TANYA DO  MRN/PID:           [id number]              Fellow:  Accession#:        [id number]          Technologist:       Yanira Meyers RVT  Date of Birth/Age: 1945 / 79 years Technologist 2:  Gender:            F                     Encounter#:         [id number]  Admission Status:  Outpatient            Location Performed: [facility]        Diagnosis/ICD: Atherosclerosis of autologous vein bypass graft(s) of the left                 leg with ulceration of heel and midfoot-I70.444  Indication:    Peripheral vascular disease with ulceration  CPT Codes:     66426 Peripheral artery KATHLEEN Only        Pertinent History: PVD, Vascular surgery, Endarterectomy, LE Bypass graft and                     HTN. LFem-Tibial BPG.        CONCLUSIONS:  Right Lower PVR: No  evidence of arterial occlusive disease in the right lower extremity at rest. Decreased digital perfusion noted. Monophasic flow is noted in the right posterior tibial artery. Multiphasic flow is noted in the right common femoral artery and right dorsalis pedis artery. Dampened great toe PPG waveform.  Ankle tracing appears within normal limits.  Left Lower PVR: Evidence of moderate arterial occlusive disease in the left lower extremity at rest. Decreased digital perfusion noted. Monophasic flow is noted in the left posterior tibial artery, left dorsalis pedis artery and left common femoral artery. Ankle and digit tracings appear dampened.  Additional Findings:  See duplex of LFem-Tibial BPG done today.        Comparison:  Compared with study from 11/21/2024, no significant change.     Imaging & Doppler Findings:     RIGHT Lower PVR                Pressures Ratios  Right Posterior Tibial (Ankle) 191 mmHg  1.04  Right Dorsalis Pedis (Ankle)   170 mmHg  0.92  Right Digit (Great Toe)        89 mmHg   0.48           LEFT Lower PVR                Pressures Ratios  Left Posterior Tibial (Ankle) 91 mmHg   0.49  Left Dorsalis Pedis (Ankle)   101 mmHg  0.55  Left Digit (Great Toe)        72 mmHg   0.39                              Right     Left  Brachial Pressure 184 mmHg 174 mmHg           [id number] Mila Marsh MD, RPVI  Electronically signed by [id number] Mila Marsh MD, RPVI on 5/22/2025 at 1:27:02 AM    Current Medications[1]    Assessment/Plan   PAD s/p Redo Left Femoral Endarterectomy (7/2024) w/left femoral to PTA bypass w/reverse greater saphenous vein (7/25/24) c/b left groin seroma s/p I &D (9/12/24) requiring LLE angio w/EKOS lysis catheter (10/10/24)  Denies abdominal pain and/or back pain. Endorses left foot/ankle pain at rest and aggravated by exertion. Endorses left foot paresthesias & edema. LLE erythema. Endorses missing activities/events due to LLE pain. Endorses left medial malleolar laceration that is not healing  as well as she would like.   +1 pitting LLE edema. +2 palpable bilateral femoral pulses. +2 palpable right DP/PT. Able to doppler signals for left DP/PT.   Plan for KATHLEEN & Arterial Duplex 11/2025.  Continue Aspirin 81mg every day, Xarelto 2.5mg BID, and Crestor 20mg at bedtime.   Follows with wound care.          [1]    Current Outpatient Medications:   •  aspirin 81 mg EC tablet, Take 1 tablet (81 mg) by mouth once daily., Disp: , Rfl:   •  olmesartan (BENIcar) 20 mg tablet, Take 1 tablet (20 mg) by mouth once daily., Disp: 30 tablet, Rfl: 11  •  rivaroxaban (Xarelto) 2.5 mg tablet, Take 1 tablet (2.5 mg) by mouth 2 times daily (morning and late afternoon)., Disp: 60 tablet, Rfl: 11  •  rosuvastatin (Crestor) 20 mg tablet, TAKE 1 TABLET(20 MG) BY MOUTH DAILY AT BEDTIME, Disp: 90 tablet, Rfl: 0   wound care.   Follow up with Dr. Hoover in 1 month.        [1]   Current Outpatient Medications:     aspirin 81 mg EC tablet, Take 1 tablet (81 mg) by mouth once daily., Disp: , Rfl:     olmesartan (BENIcar) 20 mg tablet, Take 1 tablet (20 mg) by mouth once daily., Disp: 30 tablet, Rfl: 11    rivaroxaban (Xarelto) 2.5 mg tablet, Take 1 tablet (2.5 mg) by mouth 2 times daily (morning and late afternoon)., Disp: 60 tablet, Rfl: 11    rosuvastatin (Crestor) 20 mg tablet, TAKE 1 TABLET(20 MG) BY MOUTH DAILY AT BEDTIME, Disp: 90 tablet, Rfl: 0

## 2025-06-03 ASSESSMENT — ENCOUNTER SYMPTOMS
HEMATOCHEZIA: 0
COLOR CHANGE: 1
PARESTHESIAS: 1
ABDOMINAL PAIN: 0
NUMBNESS: 1
BACK PAIN: 1
POOR WOUND HEALING: 1
HEMATURIA: 0

## 2025-06-10 ENCOUNTER — OFFICE VISIT (OUTPATIENT)
Dept: WOUND CARE | Facility: CLINIC | Age: 80
End: 2025-06-10
Payer: MEDICARE

## 2025-06-10 PROCEDURE — 11042 DBRDMT SUBQ TIS 1ST 20SQCM/<: CPT

## 2025-06-24 ENCOUNTER — APPOINTMENT (OUTPATIENT)
Dept: WOUND CARE | Facility: CLINIC | Age: 80
End: 2025-06-24
Payer: MEDICARE

## 2025-07-01 ENCOUNTER — APPOINTMENT (OUTPATIENT)
Dept: CARDIOLOGY | Facility: HOSPITAL | Age: 80
End: 2025-07-01
Payer: MEDICARE

## 2025-07-01 ENCOUNTER — HOSPITAL ENCOUNTER (INPATIENT)
Facility: HOSPITAL | Age: 80
LOS: 3 days | Discharge: NURSING, REHAB OR LONG-TERM CARE | End: 2025-07-05
Attending: EMERGENCY MEDICINE | Admitting: STUDENT IN AN ORGANIZED HEALTH CARE EDUCATION/TRAINING PROGRAM
Payer: MEDICARE

## 2025-07-01 ENCOUNTER — APPOINTMENT (OUTPATIENT)
Dept: RADIOLOGY | Facility: HOSPITAL | Age: 80
End: 2025-07-01
Payer: MEDICARE

## 2025-07-01 ENCOUNTER — OFFICE VISIT (OUTPATIENT)
Dept: WOUND CARE | Facility: CLINIC | Age: 80
End: 2025-07-01
Payer: MEDICARE

## 2025-07-01 DIAGNOSIS — I10 ESSENTIAL HYPERTENSION: ICD-10-CM

## 2025-07-01 DIAGNOSIS — L03.116 CELLULITIS OF LEFT LOWER EXTREMITY: Primary | ICD-10-CM

## 2025-07-01 DIAGNOSIS — Z98.890 S/P FEMORAL-TIBIAL BYPASS: ICD-10-CM

## 2025-07-01 DIAGNOSIS — I73.9 PAD (PERIPHERAL ARTERY DISEASE): ICD-10-CM

## 2025-07-01 DIAGNOSIS — M79.89 OTHER SPECIFIED SOFT TISSUE DISORDERS: ICD-10-CM

## 2025-07-01 DIAGNOSIS — I70.245 ATHSCL NATIVE ARTERIES OF LEFT LEG W ULCERATION OTH PRT FOOT (MULTI): ICD-10-CM

## 2025-07-01 LAB
ALBUMIN SERPL BCP-MCNC: 3.5 G/DL (ref 3.4–5)
ALP SERPL-CCNC: 116 U/L (ref 33–136)
ALT SERPL W P-5'-P-CCNC: 16 U/L (ref 7–45)
ANION GAP SERPL CALC-SCNC: 14 MMOL/L (ref 10–20)
AST SERPL W P-5'-P-CCNC: 19 U/L (ref 9–39)
BASOPHILS # BLD AUTO: 0.03 X10*3/UL (ref 0–0.1)
BASOPHILS NFR BLD AUTO: 0.3 %
BILIRUB SERPL-MCNC: 0.5 MG/DL (ref 0–1.2)
BUN SERPL-MCNC: 21 MG/DL (ref 6–23)
CALCIUM SERPL-MCNC: 8.7 MG/DL (ref 8.6–10.3)
CHLORIDE SERPL-SCNC: 98 MMOL/L (ref 98–107)
CO2 SERPL-SCNC: 26 MMOL/L (ref 21–32)
CREAT SERPL-MCNC: 0.69 MG/DL (ref 0.5–1.05)
CRP SERPL-MCNC: 23.72 MG/DL
EGFRCR SERPLBLD CKD-EPI 2021: 88 ML/MIN/1.73M*2
EOSINOPHIL # BLD AUTO: 0.08 X10*3/UL (ref 0–0.4)
EOSINOPHIL NFR BLD AUTO: 0.9 %
ERYTHROCYTE [DISTWIDTH] IN BLOOD BY AUTOMATED COUNT: 15.9 % (ref 11.5–14.5)
ERYTHROCYTE [SEDIMENTATION RATE] IN BLOOD BY WESTERGREN METHOD: 44 MM/H (ref 0–30)
GLUCOSE SERPL-MCNC: 103 MG/DL (ref 74–99)
HCT VFR BLD AUTO: 36.5 % (ref 36–46)
HGB BLD-MCNC: 11.8 G/DL (ref 12–16)
IMM GRANULOCYTES # BLD AUTO: 0.08 X10*3/UL (ref 0–0.5)
IMM GRANULOCYTES NFR BLD AUTO: 0.9 % (ref 0–0.9)
LYMPHOCYTES # BLD AUTO: 1.17 X10*3/UL (ref 0.8–3)
LYMPHOCYTES NFR BLD AUTO: 13.4 %
MCH RBC QN AUTO: 29.3 PG (ref 26–34)
MCHC RBC AUTO-ENTMCNC: 32.3 G/DL (ref 32–36)
MCV RBC AUTO: 91 FL (ref 80–100)
MONOCYTES # BLD AUTO: 0.55 X10*3/UL (ref 0.05–0.8)
MONOCYTES NFR BLD AUTO: 6.3 %
NEUTROPHILS # BLD AUTO: 6.84 X10*3/UL (ref 1.6–5.5)
NEUTROPHILS NFR BLD AUTO: 78.2 %
NRBC BLD-RTO: 0 /100 WBCS (ref 0–0)
PLATELET # BLD AUTO: 495 X10*3/UL (ref 150–450)
POTASSIUM SERPL-SCNC: 3.8 MMOL/L (ref 3.5–5.3)
PROT SERPL-MCNC: 7.7 G/DL (ref 6.4–8.2)
RBC # BLD AUTO: 4.03 X10*6/UL (ref 4–5.2)
SODIUM SERPL-SCNC: 134 MMOL/L (ref 136–145)
WBC # BLD AUTO: 8.8 X10*3/UL (ref 4.4–11.3)

## 2025-07-01 PROCEDURE — 73630 X-RAY EXAM OF FOOT: CPT | Mod: LEFT SIDE | Performed by: STUDENT IN AN ORGANIZED HEALTH CARE EDUCATION/TRAINING PROGRAM

## 2025-07-01 PROCEDURE — 99223 1ST HOSP IP/OBS HIGH 75: CPT | Performed by: STUDENT IN AN ORGANIZED HEALTH CARE EDUCATION/TRAINING PROGRAM

## 2025-07-01 PROCEDURE — 85025 COMPLETE CBC W/AUTO DIFF WBC: CPT | Performed by: EMERGENCY MEDICINE

## 2025-07-01 PROCEDURE — 86140 C-REACTIVE PROTEIN: CPT

## 2025-07-01 PROCEDURE — 99285 EMERGENCY DEPT VISIT HI MDM: CPT | Mod: 25 | Performed by: EMERGENCY MEDICINE

## 2025-07-01 PROCEDURE — 96372 THER/PROPH/DIAG INJ SC/IM: CPT

## 2025-07-01 PROCEDURE — 2500000004 HC RX 250 GENERAL PHARMACY W/ HCPCS (ALT 636 FOR OP/ED): Performed by: EMERGENCY MEDICINE

## 2025-07-01 PROCEDURE — G0378 HOSPITAL OBSERVATION PER HR: HCPCS

## 2025-07-01 PROCEDURE — 99285 EMERGENCY DEPT VISIT HI MDM: CPT | Performed by: EMERGENCY MEDICINE

## 2025-07-01 PROCEDURE — 96367 TX/PROPH/DG ADDL SEQ IV INF: CPT

## 2025-07-01 PROCEDURE — 36415 COLL VENOUS BLD VENIPUNCTURE: CPT | Performed by: EMERGENCY MEDICINE

## 2025-07-01 PROCEDURE — 73630 X-RAY EXAM OF FOOT: CPT | Mod: LT

## 2025-07-01 PROCEDURE — 85652 RBC SED RATE AUTOMATED: CPT

## 2025-07-01 PROCEDURE — 96365 THER/PROPH/DIAG IV INF INIT: CPT | Mod: 59

## 2025-07-01 PROCEDURE — 93971 EXTREMITY STUDY: CPT

## 2025-07-01 PROCEDURE — 2500000004 HC RX 250 GENERAL PHARMACY W/ HCPCS (ALT 636 FOR OP/ED): Performed by: STUDENT IN AN ORGANIZED HEALTH CARE EDUCATION/TRAINING PROGRAM

## 2025-07-01 PROCEDURE — 2500000004 HC RX 250 GENERAL PHARMACY W/ HCPCS (ALT 636 FOR OP/ED)

## 2025-07-01 PROCEDURE — 2500000002 HC RX 250 W HCPCS SELF ADMINISTERED DRUGS (ALT 637 FOR MEDICARE OP, ALT 636 FOR OP/ED): Performed by: STUDENT IN AN ORGANIZED HEALTH CARE EDUCATION/TRAINING PROGRAM

## 2025-07-01 PROCEDURE — 99213 OFFICE O/P EST LOW 20 MIN: CPT

## 2025-07-01 PROCEDURE — 2500000001 HC RX 250 WO HCPCS SELF ADMINISTERED DRUGS (ALT 637 FOR MEDICARE OP): Performed by: EMERGENCY MEDICINE

## 2025-07-01 PROCEDURE — 80053 COMPREHEN METABOLIC PANEL: CPT | Performed by: EMERGENCY MEDICINE

## 2025-07-01 RX ORDER — ACETAMINOPHEN 325 MG/1
650 TABLET ORAL EVERY 6 HOURS PRN
Status: DISCONTINUED | OUTPATIENT
Start: 2025-07-01 | End: 2025-07-05 | Stop reason: HOSPADM

## 2025-07-01 RX ORDER — ENOXAPARIN SODIUM 100 MG/ML
1 INJECTION SUBCUTANEOUS EVERY 12 HOURS
Status: DISCONTINUED | OUTPATIENT
Start: 2025-07-01 | End: 2025-07-03

## 2025-07-01 RX ORDER — VANCOMYCIN HYDROCHLORIDE 750 MG/150ML
750 INJECTION, SOLUTION INTRAVENOUS EVERY 12 HOURS
Status: DISCONTINUED | OUTPATIENT
Start: 2025-07-02 | End: 2025-07-01

## 2025-07-01 RX ORDER — ASPIRIN 81 MG/1
81 TABLET ORAL DAILY
Status: DISCONTINUED | OUTPATIENT
Start: 2025-07-01 | End: 2025-07-05 | Stop reason: HOSPADM

## 2025-07-01 RX ORDER — POLYETHYLENE GLYCOL 3350 17 G/17G
17 POWDER, FOR SOLUTION ORAL DAILY
Status: DISCONTINUED | OUTPATIENT
Start: 2025-07-01 | End: 2025-07-05 | Stop reason: HOSPADM

## 2025-07-01 RX ORDER — ROSUVASTATIN CALCIUM 20 MG/1
20 TABLET, COATED ORAL NIGHTLY
Status: DISCONTINUED | OUTPATIENT
Start: 2025-07-01 | End: 2025-07-05 | Stop reason: HOSPADM

## 2025-07-01 RX ORDER — ACETAMINOPHEN 325 MG/1
975 TABLET ORAL ONCE
Status: COMPLETED | OUTPATIENT
Start: 2025-07-01 | End: 2025-07-01

## 2025-07-01 RX ORDER — VANCOMYCIN HYDROCHLORIDE 1 G/20ML
INJECTION, POWDER, LYOPHILIZED, FOR SOLUTION INTRAVENOUS DAILY PRN
Status: DISCONTINUED | OUTPATIENT
Start: 2025-07-01 | End: 2025-07-01

## 2025-07-01 RX ORDER — VANCOMYCIN HYDROCHLORIDE 1 G/200ML
1000 INJECTION, SOLUTION INTRAVENOUS ONCE
Status: COMPLETED | OUTPATIENT
Start: 2025-07-01 | End: 2025-07-01

## 2025-07-01 RX ORDER — CEFAZOLIN SODIUM 2 G/100ML
2 INJECTION, SOLUTION INTRAVENOUS EVERY 8 HOURS
Status: DISCONTINUED | OUTPATIENT
Start: 2025-07-01 | End: 2025-07-04

## 2025-07-01 RX ADMIN — ACETAMINOPHEN 975 MG: 325 TABLET ORAL at 14:36

## 2025-07-01 RX ADMIN — ROSUVASTATIN CALCIUM 20 MG: 20 TABLET, FILM COATED ORAL at 21:54

## 2025-07-01 RX ADMIN — PIPERACILLIN SODIUM AND TAZOBACTAM SODIUM 3.38 G: 3; .375 INJECTION, SOLUTION INTRAVENOUS at 14:36

## 2025-07-01 RX ADMIN — CEFAZOLIN SODIUM 2 G: 2 INJECTION, SOLUTION INTRAVENOUS at 21:54

## 2025-07-01 RX ADMIN — VANCOMYCIN HYDROCHLORIDE 1000 MG: 1 INJECTION, SOLUTION INTRAVENOUS at 15:46

## 2025-07-01 RX ADMIN — ENOXAPARIN SODIUM 60 MG: 60 INJECTION SUBCUTANEOUS at 19:52

## 2025-07-01 SDOH — ECONOMIC STABILITY: FOOD INSECURITY: WITHIN THE PAST 12 MONTHS, YOU WORRIED THAT YOUR FOOD WOULD RUN OUT BEFORE YOU GOT THE MONEY TO BUY MORE.: NEVER TRUE

## 2025-07-01 SDOH — ECONOMIC STABILITY: INCOME INSECURITY: IN THE PAST 12 MONTHS HAS THE ELECTRIC, GAS, OIL, OR WATER COMPANY THREATENED TO SHUT OFF SERVICES IN YOUR HOME?: NO

## 2025-07-01 SDOH — SOCIAL STABILITY: SOCIAL INSECURITY: DO YOU FEEL UNSAFE GOING BACK TO THE PLACE WHERE YOU ARE LIVING?: NO

## 2025-07-01 SDOH — ECONOMIC STABILITY: TRANSPORTATION INSECURITY: IN THE PAST 12 MONTHS, HAS LACK OF TRANSPORTATION KEPT YOU FROM MEDICAL APPOINTMENTS OR FROM GETTING MEDICATIONS?: NO

## 2025-07-01 SDOH — HEALTH STABILITY: MENTAL HEALTH: HOW OFTEN DO YOU HAVE SIX OR MORE DRINKS ON ONE OCCASION?: NEVER

## 2025-07-01 SDOH — HEALTH STABILITY: MENTAL HEALTH: HOW MANY DRINKS CONTAINING ALCOHOL DO YOU HAVE ON A TYPICAL DAY WHEN YOU ARE DRINKING?: PATIENT DOES NOT DRINK

## 2025-07-01 SDOH — ECONOMIC STABILITY: HOUSING INSECURITY: IN THE LAST 12 MONTHS, WAS THERE A TIME WHEN YOU WERE NOT ABLE TO PAY THE MORTGAGE OR RENT ON TIME?: NO

## 2025-07-01 SDOH — SOCIAL STABILITY: SOCIAL INSECURITY: DO YOU FEEL ANYONE HAS EXPLOITED OR TAKEN ADVANTAGE OF YOU FINANCIALLY OR OF YOUR PERSONAL PROPERTY?: NO

## 2025-07-01 SDOH — SOCIAL STABILITY: SOCIAL INSECURITY: WITHIN THE LAST YEAR, HAVE YOU BEEN HUMILIATED OR EMOTIONALLY ABUSED IN OTHER WAYS BY YOUR PARTNER OR EX-PARTNER?: NO

## 2025-07-01 SDOH — SOCIAL STABILITY: SOCIAL INSECURITY: HAVE YOU HAD ANY THOUGHTS OF HARMING ANYONE ELSE?: NO

## 2025-07-01 SDOH — HEALTH STABILITY: MENTAL HEALTH: HOW OFTEN DO YOU HAVE A DRINK CONTAINING ALCOHOL?: NEVER

## 2025-07-01 SDOH — ECONOMIC STABILITY: FOOD INSECURITY: WITHIN THE PAST 12 MONTHS, THE FOOD YOU BOUGHT JUST DIDN'T LAST AND YOU DIDN'T HAVE MONEY TO GET MORE.: NEVER TRUE

## 2025-07-01 SDOH — ECONOMIC STABILITY: HOUSING INSECURITY: AT ANY TIME IN THE PAST 12 MONTHS, WERE YOU HOMELESS OR LIVING IN A SHELTER (INCLUDING NOW)?: NO

## 2025-07-01 SDOH — SOCIAL STABILITY: SOCIAL INSECURITY: WITHIN THE LAST YEAR, HAVE YOU BEEN AFRAID OF YOUR PARTNER OR EX-PARTNER?: NO

## 2025-07-01 SDOH — ECONOMIC STABILITY: FOOD INSECURITY: HOW HARD IS IT FOR YOU TO PAY FOR THE VERY BASICS LIKE FOOD, HOUSING, MEDICAL CARE, AND HEATING?: NOT HARD AT ALL

## 2025-07-01 SDOH — SOCIAL STABILITY: SOCIAL INSECURITY: WERE YOU ABLE TO COMPLETE ALL THE BEHAVIORAL HEALTH SCREENINGS?: YES

## 2025-07-01 SDOH — ECONOMIC STABILITY: HOUSING INSECURITY: IN THE PAST 12 MONTHS, HOW MANY TIMES HAVE YOU MOVED WHERE YOU WERE LIVING?: 0

## 2025-07-01 SDOH — SOCIAL STABILITY: SOCIAL INSECURITY: HAS ANYONE EVER THREATENED TO HURT YOUR FAMILY OR YOUR PETS?: NO

## 2025-07-01 SDOH — SOCIAL STABILITY: SOCIAL INSECURITY: ARE YOU OR HAVE YOU BEEN THREATENED OR ABUSED PHYSICALLY, EMOTIONALLY, OR SEXUALLY BY ANYONE?: NO

## 2025-07-01 SDOH — SOCIAL STABILITY: SOCIAL INSECURITY: ABUSE: ADULT

## 2025-07-01 SDOH — SOCIAL STABILITY: SOCIAL INSECURITY: ARE THERE ANY APPARENT SIGNS OF INJURIES/BEHAVIORS THAT COULD BE RELATED TO ABUSE/NEGLECT?: NO

## 2025-07-01 SDOH — SOCIAL STABILITY: SOCIAL INSECURITY: HAVE YOU HAD THOUGHTS OF HARMING ANYONE ELSE?: NO

## 2025-07-01 SDOH — SOCIAL STABILITY: SOCIAL INSECURITY: DOES ANYONE TRY TO KEEP YOU FROM HAVING/CONTACTING OTHER FRIENDS OR DOING THINGS OUTSIDE YOUR HOME?: NO

## 2025-07-01 ASSESSMENT — COGNITIVE AND FUNCTIONAL STATUS - GENERAL
DRESSING REGULAR LOWER BODY CLOTHING: A LITTLE
CLIMB 3 TO 5 STEPS WITH RAILING: A LOT
STANDING UP FROM CHAIR USING ARMS: A LITTLE
PATIENT BASELINE BEDBOUND: NO
TOILETING: A LITTLE
MOVING TO AND FROM BED TO CHAIR: A LITTLE
HELP NEEDED FOR BATHING: A LITTLE
WALKING IN HOSPITAL ROOM: A LITTLE
MOBILITY SCORE: 19
DAILY ACTIVITIY SCORE: 21

## 2025-07-01 ASSESSMENT — ENCOUNTER SYMPTOMS
DYSURIA: 0
HEADACHES: 0
FEVER: 0
CHILLS: 0
ABDOMINAL PAIN: 0
PALPITATIONS: 0
DIAPHORESIS: 0
DIZZINESS: 0
ACTIVITY CHANGE: 0
CHEST TIGHTNESS: 0
WOUND: 1
SHORTNESS OF BREATH: 0
APNEA: 0
VOMITING: 0
COUGH: 0
NAUSEA: 0
FATIGUE: 0

## 2025-07-01 ASSESSMENT — LIFESTYLE VARIABLES
EVER FELT BAD OR GUILTY ABOUT YOUR DRINKING: NO
AUDIT-C TOTAL SCORE: 0
HAVE PEOPLE ANNOYED YOU BY CRITICIZING YOUR DRINKING: NO
TOTAL SCORE: 0
SKIP TO QUESTIONS 9-10: 1
EVER HAD A DRINK FIRST THING IN THE MORNING TO STEADY YOUR NERVES TO GET RID OF A HANGOVER: NO
HAVE YOU EVER FELT YOU SHOULD CUT DOWN ON YOUR DRINKING: NO

## 2025-07-01 ASSESSMENT — PAIN DESCRIPTION - PAIN TYPE: TYPE: ACUTE PAIN

## 2025-07-01 ASSESSMENT — PATIENT HEALTH QUESTIONNAIRE - PHQ9
2. FEELING DOWN, DEPRESSED OR HOPELESS: NOT AT ALL
SUM OF ALL RESPONSES TO PHQ9 QUESTIONS 1 & 2: 0
1. LITTLE INTEREST OR PLEASURE IN DOING THINGS: NOT AT ALL

## 2025-07-01 ASSESSMENT — ACTIVITIES OF DAILY LIVING (ADL)
TOILETING: NEEDS ASSISTANCE
FEEDING YOURSELF: INDEPENDENT
DRESSING YOURSELF: NEEDS ASSISTANCE
LACK_OF_TRANSPORTATION: NO
PATIENT'S MEMORY ADEQUATE TO SAFELY COMPLETE DAILY ACTIVITIES?: YES
HEARING - LEFT EAR: DIFFICULTY WITH NOISE
HEARING - RIGHT EAR: DIFFICULTY WITH NOISE
ADEQUATE_TO_COMPLETE_ADL: YES
BATHING: NEEDS ASSISTANCE
WALKS IN HOME: NEEDS ASSISTANCE
JUDGMENT_ADEQUATE_SAFELY_COMPLETE_DAILY_ACTIVITIES: YES
GROOMING: INDEPENDENT

## 2025-07-01 ASSESSMENT — PAIN DESCRIPTION - ORIENTATION: ORIENTATION: LEFT

## 2025-07-01 ASSESSMENT — PAIN DESCRIPTION - LOCATION: LOCATION: LEG

## 2025-07-01 ASSESSMENT — PAIN SCALES - GENERAL
PAINLEVEL_OUTOF10: 10 - WORST POSSIBLE PAIN
PAINLEVEL_OUTOF10: 0 - NO PAIN

## 2025-07-01 ASSESSMENT — PAIN - FUNCTIONAL ASSESSMENT: PAIN_FUNCTIONAL_ASSESSMENT: 0-10

## 2025-07-01 NOTE — ED PROVIDER NOTES
EMERGENCY DEPARTMENT ENCOUNTER      Pt Name: Mechelle Alcantara  MRN: [id number]  Birthdate 1945  Date of evaluation: 7/1/2025  Provider: Edilberto Lopez DO    CHIEF COMPLAINT       Chief Complaint   Patient presents with    Wound Infection     Pt with chronic wound to left lower leg. Pt was sent by wound care center for further management. + redness and swelling noted around dressed wound to left lower leg.      HISTORY OF PRESENT ILLNESS    Mechelle Alcantara is a 79 y.o. year old female who presents to the ER for 5 days of left lower leg pain, redness, swelling.  Went to Dr. Hoffman's office earlier today, was directed to the ED for antibiotics.  On xarelto, unsure why. No chest pain, abdominal pain, dyspnea, knee pain, numbness, tingling, or recent travel.      PMH HTN, HLD, PAD    PAST MEDICAL HISTORY   Medical History[1]  CURRENT MEDICATIONS       Current Discharge Medication List        CONTINUE these medications which have NOT CHANGED    Details   rivaroxaban (Xarelto) 2.5 mg tablet Take 1 tablet (2.5 mg) by mouth 2 times daily (morning and late afternoon).  Qty: 60 tablet, Refills: 11    Associated Diagnoses: PAD (peripheral artery disease)      aspirin 81 mg EC tablet Take 1 tablet (81 mg) by mouth once daily.      olmesartan (BENIcar) 20 mg tablet Take 1 tablet (20 mg) by mouth once daily.  Qty: 30 tablet, Refills: 11    Associated Diagnoses: Essential hypertension      rosuvastatin (Crestor) 20 mg tablet TAKE 1 TABLET(20 MG) BY MOUTH DAILY AT BEDTIME  Qty: 90 tablet, Refills: 0    Associated Diagnoses: PAD (peripheral artery disease)           SURGICAL HISTORY     Surgical History[2]  ALLERGIES     Fish containing products, Shellfish containing products, Gadolinium-containing contrast media, and Iodinated contrast media  FAMILY HISTORY     Family History[3]  SOCIAL HISTORY     Social History[4]  PHYSICAL EXAM  (up to 7 for level 4, 8 or more for level 5)     ED Triage Vitals [07/01/25 1301]    Temperature Heart Rate Respirations BP   36.8 °C (98.2 °F) 72 14 (!) 185/84      Pulse Ox Temp Source Heart Rate Source Patient Position   98 % Temporal Monitor Sitting      BP Location FiO2 (%)     Right arm --       Physical Exam  Vitals and nursing note reviewed.   Constitutional:       General: She is not in acute distress.     Appearance: Normal appearance. She is not ill-appearing.   HENT:      Head: Normocephalic and atraumatic. No raccoon eyes, Mario's sign, contusion or laceration.      Jaw: No trismus or malocclusion.      Right Ear: External ear normal.      Left Ear: External ear normal.      Mouth/Throat:      Mouth: Mucous membranes are moist.      Pharynx: Oropharynx is clear.   Eyes:      Extraocular Movements: Extraocular movements intact.      Pupils: Pupils are equal, round, and reactive to light.   Neck:      Trachea: No tracheal deviation.   Cardiovascular:      Rate and Rhythm: Normal rate and regular rhythm.      Pulses: Normal pulses.   Pulmonary:      Effort: No respiratory distress.      Breath sounds: No wheezing, rhonchi or rales.   Chest:      Chest wall: No tenderness.   Abdominal:      General: Abdomen is flat.      Palpations: Abdomen is soft. There is no mass.      Tenderness: There is no abdominal tenderness.   Musculoskeletal:         General: Swelling (left lower leg) and tenderness (left lower leg) present. No signs of injury.      Right lower leg: No edema.      Left lower leg: No edema.   Skin:     Coloration: Skin is not jaundiced or pale.      Findings: Erythema (left lower leg) present. No petechiae, rash or wound.   Neurological:      Mental Status: She is alert.      Sensory: No sensory deficit.      Motor: No weakness.        DIAGNOSTIC RESULTS   LABS:  Labs Reviewed   CBC WITH AUTO DIFFERENTIAL - Abnormal       Result Value    WBC 8.8      nRBC 0.0      RBC 4.03      Hemoglobin 11.8 (*)     Hematocrit 36.5      MCV 91      MCH 29.3      MCHC 32.3      RDW 15.9 (*)      "Platelets 495 (*)     Neutrophils % 78.2      Immature Granulocytes %, Automated 0.9      Lymphocytes % 13.4      Monocytes % 6.3      Eosinophils % 0.9      Basophils % 0.3      Neutrophils Absolute 6.84 (*)     Immature Granulocytes Absolute, Automated 0.08      Lymphocytes Absolute 1.17      Monocytes Absolute 0.55      Eosinophils Absolute 0.08      Basophils Absolute 0.03     COMPREHENSIVE METABOLIC PANEL - Abnormal    Glucose 103 (*)     Sodium 134 (*)     Potassium 3.8      Chloride 98      Bicarbonate 26      Anion Gap 14      Urea Nitrogen 21      Creatinine 0.69      eGFR 88      Calcium 8.7      Albumin 3.5      Alkaline Phosphatase 116      Total Protein 7.7      AST 19      Bilirubin, Total 0.5      ALT 16     SEDIMENTATION RATE, AUTOMATED - Abnormal    Sedimentation Rate 44 (*)    C-REACTIVE PROTEIN - Abnormal    C-Reactive Protein 23.72 (*)      All other labs were within normal range or not returned as of this dictation.  Imaging  Lower extremity venous duplex left   Final Result      XR foot left 3+ views    (Results Pending)      Procedure  Procedures  EMERGENCY DEPARTMENT COURSE/MDM:   Medical Decision Making    Vitals:    Vitals:    07/01/25 1301 07/01/25 1549 07/01/25 1625   BP: (!) 185/84 170/72 140/51   BP Location: Right arm Right arm    Patient Position: Sitting Sitting    Pulse: 72 74 70   Resp: 14 15 16   Temp: 36.8 °C (98.2 °F)  36.2 °C (97.2 °F)   TempSrc: Temporal     SpO2: 98% 97% 96%   Weight: 65.8 kg (145 lb)  64.6 kg (142 lb 6.7 oz)   Height: 1.676 m (5' 6\")  1.676 m (5' 6\")     Mechelle Alcantara is a female 79 y.o. who presents to the ER for left leg pain, swelling, redness. On arrival the patients vital signs were: Afebrile, Reguar HR, Hypertensive, Regular RR, and Normoxic on room air. History obtained from: patient.  Given red warm swollen tender left leg, vasculopath history, will obtain to rule out DVT.  No numbness, tingling, paralysis, lower extremity soft, do not suspect " compartment syndrome at this time.  No crepitus or fluctuance, do not suspect NSTI at this time.  History physical consistent with cellulitis, will treat with vancomycin and Zosyn.  Dr. Hoffman did call ahead, request duplex and admission for IV antibiotics.  Will obtain basic labs.  ED Course as of 07/01/25 1837   Tue Jul 01, 2025   1423 CBC and Auto Differential(!)  Thrombocytosis, otherwise no acute leukocytosis, leukopenia or anemia [CB]   1423 Comprehensive metabolic panel(!)  Mild hyponatremia, otherwise normoglycemic, no acute electrolyte or hepatorenal abnormality [CB]   1533 Lower extremity venous duplex left  No evidence of DVT in the left leg. There is a non occlusive thrombus in the left common femoral artery. The fem-tib BPG is patent. [CB]      ED Course User Index  [CB] Edilberto Lopez DO         Diagnoses as of 07/01/25 1837   Cellulitis of left lower extremity     External Records Reviewed: I reviewed recent and relevant outside records including inpatient notes, outpatient records    Shared decision making for disposition  Patient and/or patient´s representative was counseled regarding labs, imaging, likely diagnosis. All questions were answered. Recommendation was made   for Admission given the need for further escalation of care to inpatient management. Patient agreed and was admitted in stable condition. Admitting team was notified of any pending labs or imaging to ensure continuity of care.     ED Medications administered this visit:    Medications   polyethylene glycol (Glycolax, Miralax) packet 17 g (17 g oral Not Given 7/1/25 1617)   acetaminophen (Tylenol) tablet 650 mg (has no administration in time range)   enoxaparin (Lovenox) syringe 60 mg (has no administration in time range)   aspirin EC tablet 81 mg (81 mg oral Not Given 7/1/25 1824)   rosuvastatin (Crestor) tablet 20 mg (has no administration in time range)   ceFAZolin (Ancef) 2 g in dextrose (iso)  mL (has no administration  in time range)   vancomycin (Vancocin) 1,000 mg in dextrose 5%  mL (0 mg intravenous Stopped 7/1/25 1646)   piperacillin-tazobactam (Zosyn) 3.375 g in dextrose (iso) IV 50 mL (0 g intravenous Stopped 7/1/25 1537)   acetaminophen (Tylenol) tablet 975 mg (975 mg oral Given 7/1/25 1436)       Final Impression:   1. Cellulitis of left lower extremity    2. Other specified soft tissue disorders          Please excuse any misspellings or unintended errors related to the Dragon speech recognition software used to dictate this note.    I reviewed the case with the attending ED physician. The attending ED physician agrees with the plan.       =================Attending note===============    The patient was seen by the resident/fellow.  I have personally performed a substantive portion of the encounter.  I have seen and examined the patient; agree with the workup, evaluation, MDM,   management and diagnosis.  The care plan has been discussed with the resident; I have reviewed the resident’s note and agree with the documented findings.          History of Present Illness  The patient presents for evaluation of chronic wound issues.    They have been experiencing chronic wound issues, which have significantly worsened since their last visit with Dr Hoffman at the wound care center.  He did call and discussed the case with me prior to the patient's arrival.. They report increased swelling and redness in their leg but do not experience any systemic symptoms such as fevers, chills, nausea, or vomiting. They note a sensation of warmth localized to the leg. There have been no recent injuries to the affected leg. They are currently on Xarelto, a blood thinner, but are unsure of the reason for its prescription. They have no history of stroke. Their medical history includes multiple vascular procedures due to poor vasculature in their leg, which typically result in the formation of chronic ulcers.       Physical Exam    Please  see photo of the leg.  There is an ulceration on the medial portion just proximal to the ankle.  There is warmth and erythema going almost up to the knee.  There is no calf tenderness.  Cap refill is less than 3 seconds.    Chemistry unremarkable.  CBC shows no leukocytosis and no significant anemia and platelets are mildly elevated which could be inflammatory.    Patient is started on antibiotics.    Venous duplex is ordered as requested by wound care.    Patient is admitted to the hospital for further treatment evaluation.            ==========================================         [1]   Past Medical History:  Diagnosis Date    Cellulitis     Left leg    HLD (hyperlipidemia)     HTN (hypertension)     Neuropathy     Non-healing wound of left lower extremity     PAD (peripheral artery disease)     Postoperative seroma of skin after non-dermatologic procedure     Postoperative wound dehiscence     PVD (peripheral vascular disease)     Varicose veins of both lower extremities    [2]   Past Surgical History:  Procedure Laterality Date    CARDIAC CATHETERIZATION N/A 3/28/2024    Procedure: IVUS - Coronary;  Surgeon: Dany Drake MD;  Location: [facility] Cardiac Cath Lab;  Service: Cardiovascular;  Laterality: N/A;  PERIPHERAL/LOWER EXTREMITY    INVASIVE VASCULAR PROCEDURE Left 3/22/2024    Procedure: Lower Extremity Angiogram;  Surgeon: Africa Hoover MD;  Location: [facility] Cardiac Cath Lab;  Service: Vascular Surgery;  Laterality: Left;  Left leg angiogram with intervention    INVASIVE VASCULAR PROCEDURE N/A 3/22/2024    Procedure: Lower Extremity Intervention;  Surgeon: Africa Hoover MD;  Location: [facility] Cardiac Cath Lab;  Service: Vascular Surgery;  Laterality: N/A;    INVASIVE VASCULAR PROCEDURE Left 3/28/2024    Procedure: Lower Extremity Angiogram;  Surgeon: Dany Drake MD;  Location: [facility] Cardiac Cath Lab;  Service: Cardiovascular;  Laterality: Left;  Left LE angiogram ( [id number])    PEDAL APPROACH    INVASIVE VASCULAR PROCEDURE N/A 3/28/2024    Procedure: Lower Extremity Intervention;  Surgeon: Dany Drake MD;  Location: [facility] Cardiac Cath Lab;  Service: Cardiovascular;  Laterality: N/A;    INVASIVE VASCULAR PROCEDURE N/A 2024    Procedure: Lower Extremity Angiogram;  Surgeon: Dany Drake MD;  Location: [facility] Cardiac Cath Lab;  Service: Cardiovascular;  Laterality: N/A;    INVASIVE VASCULAR PROCEDURE Left 10/11/2024    Procedure: Lower Extremity Angiogram;  Surgeon: Africa Hoover MD;  Location: [facility] Cardiac Cath Lab;  Service: Vascular Surgery;  Laterality: Left;  Left leg angiogram with lysis check and possible intevention    INVASIVE VASCULAR PROCEDURE Left 10/10/2024    Procedure: Lower Extremity Angiogram;  Surgeon: Africa Hoover MD;  Location: [facility] Cardiac Cath Lab;  Service: Vascular Surgery;  Laterality: Left;    INVASIVE VASCULAR PROCEDURE N/A 10/10/2024    Procedure: EKOS, Arterial Infusion For Thrombolysis, Other Than Coronary;  Surgeon: Africa Hoover MD;  Location: [facility] Cardiac Cath Lab;  Service: Vascular Surgery;  Laterality: N/A;   [3]   Family History  Problem Relation Name Age of Onset    Heart failure Father      Hypertension Father      Heart disease Sister      Hypertension Sister      Heart disease Brother      Hypertension Brother     [4]   Social History  Tobacco Use    Smoking status: Former     Current packs/day: 0.00     Types: Cigarettes     Quit date: 3/18/2024     Years since quittin.2     Passive exposure: Past    Smokeless tobacco: Never   Vaping Use    Vaping status: Never Used   Substance Use Topics    Alcohol use: Never    Drug use: Never        Edilberto Lopez DO  Resident  25 [phone]

## 2025-07-01 NOTE — CARE PLAN
The clinical goals for the shift include pt will not show any new or worsening signs and symptoms of infection.  Pt met the above goal during the shift.

## 2025-07-01 NOTE — CONSULTS
Vancomycin Dosing by Pharmacy- INITIAL    Mechelle Alcantara is a 79 y.o. year old female who Pharmacy has been consulted for vancomycin dosing for cellulitis, skin and soft tissue. Based on the patient's indication and renal status this patient will be dosed based on a goal AUC of 400-600.     Renal function is currently stable.    Visit Vitals  /51   Pulse 70   Temp 36.2 °C (97.2 °F)   Resp 16        Lab Results   Component Value Date    CREATININE 0.69 2025    CREATININE 0.75 2025    CREATININE 0.69 10/12/2024    CREATININE 0.68 10/11/2024        Patient weight is as follows:   Vitals:    25 1625   Weight: 64.6 kg (142 lb 6.7 oz)       Cultures:  No results found for the encounter in last 14 days.        No intake/output data recorded.  I/O during current shift:  No intake/output data recorded.    Temp (24hrs), Av.5 °C (97.7 °F), Min:36.2 °C (97.2 °F), Max:36.8 °C (98.2 °F)         Assessment/Plan     Patient received 1000 mg x1 dose today.  Will initiate vancomycin maintenance, 750 mg every 12 hours.    This dosing regimen is predicted by [facility] to result in the following pharmacokinetic parameters:  Loading dose: N/A  Regimen: 750 mg IV every 12 hours.  Start time: 15:46 on 2025  Exposure target: AUC24 (range) 400-600 mg/L.hr   [id number]: 438 mg/L.hr  AUC24,ss: 528 mg/L.hr  Probability of AUC24 > 400: 77 %  Ctrough,ss: 17.3 mg/L  Probability of Ctrough,ss > 20: 38 %    Follow-up level will be ordered on  at 1200 unless clinically indicated sooner.  Will continue to monitor renal function daily while on vancomycin and order serum creatinine at least every 48 hours if not already ordered.  Follow for continued vancomycin needs, clinical response, and signs/symptoms of toxicity.       Jonna Youngblood, PharmD

## 2025-07-01 NOTE — PROGRESS NOTES
Vancomycin Dosing by Pharmacy- Cessation of Therapy    Consult to pharmacy for vancomycin dosing has been discontinued by the prescriber, pharmacy will sign off at this time.    Please call pharmacy if there are further questions or re-enter a consult if vancomycin is resumed.     Jonna Youngblood, PharmD

## 2025-07-01 NOTE — H&P
Internal Medicine History & Physical     Patient Mechelle Alcantara PCP Mj Latif DO    MRN [id number]  Admission Date 7/1/2025      Chief Complaint/Reason for Admission:  Mechelle Alcantara is a 79 y.o. female who presented 7/1/2025 with worsening wound of left lower extremity concerning for cellulitis.    Subjective    Subjective   HPI  Ms. Mechelle Alcantara is a 79 y.o. female with PMH PAD s/p Redo Left Femoral Endarterectomy (7/2024) w/left femoral to PTA bypass w/reverse greater saphenous vein (7/25/24) c/b left groin seroma s/p I &D (9/12/24) requiring LLE angio w/EKOS lysis catheter (10/10/24) who presented to the ED from her wound care appointment for worsening of a LLE wound concerning for cellulitis.    She states that she has had the wound for several years and sees wound care weekly. She is accompanied today by her son who reports that the wound has been worsening for about two weeks. Last week she began having pain with weight bearing and is now unable to tolerate weight bearing. She reports the wound produces a clear to yellow fluid which has been ongoing. It is better with elevation. She notes that the area has had increased swelling and redness, which now extends to the upper shin.     Her son reports poor compliance with her medications. She states she did not take her medications for two days because she wasn't feeling well.  Of note, she is on Xarelto and is unsure of the reason. She denies any past medical history other than her PAD. No heart history reported. She denies previous DVT, chest pain, SOB, dizziness, fever or chills.     ED Course  Vitals: Visit Vitals  /51   Pulse 70   Temp 36.2 °C (97.2 °F)   Resp 16     Labs: Na 134, Hgb 11.8, Plt 495, sed rate 44, remainder of labs WNL  Micro:  EKG:  Imaging:  Interventions:    Full Code    Review of Systems   Constitutional:  Negative for activity change, chills, diaphoresis, fatigue and fever.   Respiratory:  Negative for apnea,  "cough, chest tightness and shortness of breath.    Cardiovascular:  Negative for chest pain and palpitations.   Gastrointestinal:  Negative for abdominal pain, nausea and vomiting.   Genitourinary:  Negative for dysuria.   Skin:  Positive for wound.   Neurological:  Negative for dizziness and headaches.         Objective   Vital Signs:  Visit Vitals  /72 (BP Location: Right arm, Patient Position: Sitting)   Pulse 74   Temp 36.8 °C (98.2 °F) (Temporal)   Resp 15   Ht 1.676 m (5' 6\")   Wt 65.8 kg (145 lb)   SpO2 97%   BMI 23.40 kg/m²   OB Status Postmenopausal   Smoking Status Former   BSA 1.75 m²        Physical Examination:  Physical Exam  Constitutional:       General: She is not in acute distress.     Appearance: Normal appearance. She is normal weight. She is not ill-appearing, toxic-appearing or diaphoretic.   HENT:      Head: Normocephalic and atraumatic.   Eyes:      Conjunctiva/sclera: Conjunctivae normal.   Cardiovascular:      Rate and Rhythm: Normal rate and regular rhythm.      Heart sounds: Normal heart sounds.   Pulmonary:      Effort: Pulmonary effort is normal.      Breath sounds: Normal breath sounds. No stridor. No wheezing or rhonchi.   Musculoskeletal:      Left lower leg: Edema present.   Skin:     Comments: Wound of LLE, clear drainage with erythema extending to upper shin.    Neurological:      General: No focal deficit present.      Mental Status: She is alert and oriented to person, place, and time.       Laboratory Data:    Results from last 7 days   Lab Units 07/01/25  1305   WBC AUTO x10*3/uL 8.8   RBC AUTO x10*6/uL 4.03   HEMOGLOBIN g/dL 11.8*     Results from last 7 days   Lab Units 07/01/25  1305   SODIUM mmol/L 134*   POTASSIUM mmol/L 3.8   CHLORIDE mmol/L 98   CO2 mmol/L 26   BUN mg/dL 21   CREATININE mg/dL 0.69   CALCIUM mg/dL 8.7   BILIRUBIN TOTAL mg/dL 0.5   ALT U/L 16   AST U/L 19       Imaging:  Imaging  No results found.         Medications:  Scheduled " medications  Scheduled Medications[1]  Continuous medications  Continuous Medications[2]  PRN medications  PRN Medications[3]    Assessment    Assessment & Plan   Ms. Mechelle Alcantara is a 79 y.o. female who presents with worsening wound of the left lower extremity, asymmetrical swelling and crisis of ambulation ISO PAD.    Acute Left CFA Thrombus  Asymmetrical Swelling 2/2 to above   Crisis of ambulation 2/2 to above   Chronic wound of LLE  PAD s/p Redo Left Femoral Endarterectomy (7/2024) w/left femoral to PTA bypass w/reverse greater saphenous vein (7/25/24) c/b left groin seroma s/p I &D (9/12/24) requiring LLE angio w/EKOS lysis catheter (10/10/24)  - LLE venous duplex showing CFA thrombus  - s/p vanc/zosyn in the ED, continue  - Pulses heard with doppler  - PT/OT  - Consult to podiatry, wound care  - Follow up X-ray of L foot   - Follow up ESR/CRP  - AM labs with RFP, mag, CBC          Global Plan of Care  Fluid: PRN  Electrolytes: K>4, Mg >2  Nutrition: Regular  DVT Prophylaxis: Lovenox    Code Status: Full Code        Patient seen and discussed with the attending.  Signature: Giselle Manjarrez MD  Date: July 1, 2025  This note has been transcribed using Dragon voice recognition system and there is a possibility of unintentional typing misprints.  Any information found to be copied from previous providers is done in the best interest of the patient to provide accurate, quality, and continuity of care.       Attending Physician Attestation  I saw and evaluated the patient.  I personally obtained the key and critical portions of the history and physical exam or was physically present for key and  critical portions performed by the resident/student. I reviewed the resident/student's documentation and discussed the patient with the resident/student. I agree with the documentation as detailed in the note unless stated otherwise in this attestation. Physical exam findings as documented in attestation.     S: Patient  reports worsening left lower extremity pain over the past 7 to 10 days.  She states that her  had a CABG last week and this was an extremely stressful event for her, ultimately she was not taking care of herself.  She reports minimal adherence to her medications but states that she does occasionally take Xarelto.  She denies fever, chills, chest pain, shortness of breath.    O:  Constitutional: resting comfortably in bed, no acute distress   Eyes: clear sclera, making eye contact   Respiratory/Thorax: CTA bilaterally, no acute respiratory distress  Cardiovascular: regular rate and rhythm, unable to palpate peripheral lower extremity pulses however successful Doppler of left DP pulse  Gastrointestinal: Nondistended, soft, non-tender  Musculoskeletal: Moderate to severe edema noted to left lower extremity extending up to the thigh  Neurological: alert, answering questions appropriately, speech is clear and fluent   Psychological: Appropriate mood and behavior  Skin: Warm and dry, erythema extending from the dorsal foot up to knee, mild warmth noted, tenderness to palpation appreciated, please see image below        A/P:  Acute symptomatic common femoral artery DVT  Left lower extremity cellulitis  Chronic left lower extremity wound  Crisis of ambulation secondary to pain  Complicated PAD  Medication nonadherence    Patient reports poor adherence to medication regimen at home.  Resume home aspirin and rosuvastatin.  Start therapeutic Lovenox for now with plans to transition to DOAC at time of discharge.  Patient has pretty severe symptoms including inability to ambulate due to the pain, vascular surgery consulted to see if she is a candidate for thrombolysis or thrombectomy if no significant improvement with conservative management.  Neurovascular checks every 6 hours of left lower extremity.  Elevate.  Bedrest with bathroom privileges.  Continue antibiotic coverage with Ancef for possible cellulitis.  Wound  care nurse consulted.    Discussed with Dr. Manjarrez and resident team   Discussed with patient and RN    DISPO: >48 hours  Complexity: High    Misty Solorio DO            [1] polyethylene glycol, 17 g, oral, Daily  vancomycin, 1,000 mg, intravenous, Once  [2]    [3]

## 2025-07-02 ENCOUNTER — APPOINTMENT (OUTPATIENT)
Dept: CARDIOLOGY | Facility: HOSPITAL | Age: 80
End: 2025-07-02
Payer: MEDICARE

## 2025-07-02 ENCOUNTER — APPOINTMENT (OUTPATIENT)
Dept: RADIOLOGY | Facility: HOSPITAL | Age: 80
End: 2025-07-02
Payer: MEDICARE

## 2025-07-02 LAB
ALBUMIN SERPL BCP-MCNC: 2.6 G/DL (ref 3.4–5)
ANION GAP SERPL CALC-SCNC: 11 MMOL/L (ref 10–20)
BUN SERPL-MCNC: 18 MG/DL (ref 6–23)
CALCIUM SERPL-MCNC: 7.5 MG/DL (ref 8.6–10.3)
CHLORIDE SERPL-SCNC: 104 MMOL/L (ref 98–107)
CO2 SERPL-SCNC: 24 MMOL/L (ref 21–32)
CREAT SERPL-MCNC: 0.63 MG/DL (ref 0.5–1.05)
CRP SERPL-MCNC: 14.82 MG/DL
EGFRCR SERPLBLD CKD-EPI 2021: 90 ML/MIN/1.73M*2
ERYTHROCYTE [DISTWIDTH] IN BLOOD BY AUTOMATED COUNT: 15.8 % (ref 11.5–14.5)
GLUCOSE SERPL-MCNC: 117 MG/DL (ref 74–99)
HCT VFR BLD AUTO: 31 % (ref 36–46)
HGB BLD-MCNC: 10 G/DL (ref 12–16)
MAGNESIUM SERPL-MCNC: 2.06 MG/DL (ref 1.6–2.4)
MCH RBC QN AUTO: 29.1 PG (ref 26–34)
MCHC RBC AUTO-ENTMCNC: 32.3 G/DL (ref 32–36)
MCV RBC AUTO: 90 FL (ref 80–100)
NRBC BLD-RTO: 0 /100 WBCS (ref 0–0)
PHOSPHATE SERPL-MCNC: 3 MG/DL (ref 2.5–4.9)
PLATELET # BLD AUTO: 445 X10*3/UL (ref 150–450)
POTASSIUM SERPL-SCNC: 3.5 MMOL/L (ref 3.5–5.3)
RBC # BLD AUTO: 3.44 X10*6/UL (ref 4–5.2)
SODIUM SERPL-SCNC: 135 MMOL/L (ref 136–145)
WBC # BLD AUTO: 8.2 X10*3/UL (ref 4.4–11.3)

## 2025-07-02 PROCEDURE — 86140 C-REACTIVE PROTEIN: CPT | Performed by: STUDENT IN AN ORGANIZED HEALTH CARE EDUCATION/TRAINING PROGRAM

## 2025-07-02 PROCEDURE — 2500000002 HC RX 250 W HCPCS SELF ADMINISTERED DRUGS (ALT 637 FOR MEDICARE OP, ALT 636 FOR OP/ED): Performed by: STUDENT IN AN ORGANIZED HEALTH CARE EDUCATION/TRAINING PROGRAM

## 2025-07-02 PROCEDURE — 2500000004 HC RX 250 GENERAL PHARMACY W/ HCPCS (ALT 636 FOR OP/ED): Performed by: STUDENT IN AN ORGANIZED HEALTH CARE EDUCATION/TRAINING PROGRAM

## 2025-07-02 PROCEDURE — 99221 1ST HOSP IP/OBS SF/LOW 40: CPT | Performed by: NURSE PRACTITIONER

## 2025-07-02 PROCEDURE — 36415 COLL VENOUS BLD VENIPUNCTURE: CPT

## 2025-07-02 PROCEDURE — 73700 CT LOWER EXTREMITY W/O DYE: CPT | Mod: LEFT SIDE | Performed by: RADIOLOGY

## 2025-07-02 PROCEDURE — 85027 COMPLETE CBC AUTOMATED: CPT

## 2025-07-02 PROCEDURE — 93922 UPR/L XTREMITY ART 2 LEVELS: CPT | Performed by: INTERNAL MEDICINE

## 2025-07-02 PROCEDURE — 83735 ASSAY OF MAGNESIUM: CPT

## 2025-07-02 PROCEDURE — 73700 CT LOWER EXTREMITY W/O DYE: CPT | Mod: LT

## 2025-07-02 PROCEDURE — 2500000001 HC RX 250 WO HCPCS SELF ADMINISTERED DRUGS (ALT 637 FOR MEDICARE OP): Performed by: STUDENT IN AN ORGANIZED HEALTH CARE EDUCATION/TRAINING PROGRAM

## 2025-07-02 PROCEDURE — 93922 UPR/L XTREMITY ART 2 LEVELS: CPT

## 2025-07-02 PROCEDURE — 96372 THER/PROPH/DIAG INJ SC/IM: CPT

## 2025-07-02 PROCEDURE — 2500000004 HC RX 250 GENERAL PHARMACY W/ HCPCS (ALT 636 FOR OP/ED)

## 2025-07-02 PROCEDURE — 1100000001 HC PRIVATE ROOM DAILY

## 2025-07-02 PROCEDURE — 83036 HEMOGLOBIN GLYCOSYLATED A1C: CPT | Mod: STJLAB

## 2025-07-02 PROCEDURE — 99233 SBSQ HOSP IP/OBS HIGH 50: CPT | Performed by: STUDENT IN AN ORGANIZED HEALTH CARE EDUCATION/TRAINING PROGRAM

## 2025-07-02 PROCEDURE — 80069 RENAL FUNCTION PANEL: CPT

## 2025-07-02 RX ORDER — NYSTATIN 100000 [USP'U]/G
1 POWDER TOPICAL 2 TIMES DAILY
Status: DISCONTINUED | OUTPATIENT
Start: 2025-07-02 | End: 2025-07-05 | Stop reason: HOSPADM

## 2025-07-02 RX ADMIN — ASPIRIN 81 MG: 81 TABLET, COATED ORAL at 08:41

## 2025-07-02 RX ADMIN — POLYETHYLENE GLYCOL 3350 17 G: 17 POWDER, FOR SOLUTION ORAL at 08:41

## 2025-07-02 RX ADMIN — CEFAZOLIN SODIUM 2 G: 2 INJECTION, SOLUTION INTRAVENOUS at 14:26

## 2025-07-02 RX ADMIN — ROSUVASTATIN CALCIUM 20 MG: 20 TABLET, FILM COATED ORAL at 20:46

## 2025-07-02 RX ADMIN — CEFAZOLIN SODIUM 2 G: 2 INJECTION, SOLUTION INTRAVENOUS at 22:52

## 2025-07-02 RX ADMIN — ENOXAPARIN SODIUM 60 MG: 60 INJECTION SUBCUTANEOUS at 08:41

## 2025-07-02 RX ADMIN — ENOXAPARIN SODIUM 60 MG: 60 INJECTION SUBCUTANEOUS at 19:40

## 2025-07-02 RX ADMIN — CEFAZOLIN SODIUM 2 G: 2 INJECTION, SOLUTION INTRAVENOUS at 05:43

## 2025-07-02 ASSESSMENT — PAIN - FUNCTIONAL ASSESSMENT: PAIN_FUNCTIONAL_ASSESSMENT: 0-10

## 2025-07-02 ASSESSMENT — COGNITIVE AND FUNCTIONAL STATUS - GENERAL
MOBILITY SCORE: 15
MOVING TO AND FROM BED TO CHAIR: A LITTLE
TURNING FROM BACK TO SIDE WHILE IN FLAT BAD: A LITTLE
TOILETING: A LITTLE
WALKING IN HOSPITAL ROOM: A LOT
PERSONAL GROOMING: A LITTLE
EATING MEALS: A LITTLE
DRESSING REGULAR UPPER BODY CLOTHING: A LITTLE
CLIMB 3 TO 5 STEPS WITH RAILING: A LOT
STANDING UP FROM CHAIR USING ARMS: A LOT
DRESSING REGULAR LOWER BODY CLOTHING: A LITTLE
HELP NEEDED FOR BATHING: A LITTLE
DAILY ACTIVITIY SCORE: 18
MOVING FROM LYING ON BACK TO SITTING ON SIDE OF FLAT BED WITH BEDRAILS: A LITTLE

## 2025-07-02 ASSESSMENT — ACTIVITIES OF DAILY LIVING (ADL): LACK_OF_TRANSPORTATION: NO

## 2025-07-02 ASSESSMENT — PAIN SCALES - GENERAL
PAINLEVEL_OUTOF10: 0 - NO PAIN
PAINLEVEL_OUTOF10: 4

## 2025-07-02 ASSESSMENT — PAIN DESCRIPTION - DESCRIPTORS: DESCRIPTORS: ACHING

## 2025-07-02 NOTE — CARE PLAN
The patient's goals for the shift include      The clinical goals for the shift include pt will not show any new or worsening signs and symptoms of infection

## 2025-07-02 NOTE — CARE PLAN
The patient's goals for the shift include      The clinical goals for the shift include Pt to remain free from pain/ discomfort      Problem: Pain - Adult  Goal: Verbalizes/displays adequate comfort level or baseline comfort level  Outcome: Progressing     Problem: Safety - Adult  Goal: Free from fall injury  Outcome: Progressing     Problem: Discharge Planning  Goal: Discharge to home or other facility with appropriate resources  Outcome: Progressing     Problem: Chronic Conditions and Co-morbidities  Goal: Patient's chronic conditions and co-morbidity symptoms are monitored and maintained or improved  Outcome: Progressing     Problem: Nutrition  Goal: Nutrient intake appropriate for maintaining nutritional needs  Outcome: Progressing     Problem: Fall/Injury  Goal: Not fall by end of shift  Outcome: Progressing  Goal: Be free from injury by end of the shift  Outcome: Progressing  Goal: Verbalize understanding of personal risk factors for fall in the hospital  Outcome: Progressing  Goal: Verbalize understanding of risk factor reduction measures to prevent injury from fall in the home  Outcome: Progressing  Goal: Use assistive devices by end of the shift  Outcome: Progressing  Goal: Pace activities to prevent fatigue by end of the shift  Outcome: Progressing     Problem: Pain  Goal: Takes deep breaths with improved pain control throughout the shift  Outcome: Progressing  Goal: Turns in bed with improved pain control throughout the shift  Outcome: Progressing  Goal: Walks with improved pain control throughout the shift  Outcome: Progressing  Goal: Performs ADL's with improved pain control throughout shift  Outcome: Progressing  Goal: Participates in PT with improved pain control throughout the shift  Outcome: Progressing  Goal: Free from opioid side effects throughout the shift  Outcome: Progressing  Goal: Free from acute confusion related to pain meds throughout the shift  Outcome: Progressing     Problem:  Skin  Goal: Decreased wound size/increased tissue granulation at next dressing change  Outcome: Progressing  Goal: Participates in plan/prevention/treatment measures  Outcome: Progressing  Goal: Prevent/manage excess moisture  Outcome: Progressing  Flowsheets (Taken 7/2/2025 3140)  Prevent/manage excess moisture:   Cleanse incontinence/protect with barrier cream   Monitor for/manage infection if present  Goal: Prevent/minimize sheer/friction injuries  Outcome: Progressing  Goal: Promote/optimize nutrition  Outcome: Progressing  Goal: Promote skin healing  Outcome: Progressing

## 2025-07-02 NOTE — PROGRESS NOTES
"Nutrition Initial Assessment:   Nutrition Assessment    Reason for Assessment: Admission nursing screening    Patient is a 79 y.o. female presenting with worsening wound of left lower extremity concerning for cellulitis.       Nutrition History:  Energy Intake: Good > 75 %  Food and Nutrient History: Pt seen by MNT 8 months ago. She returns with cellulitis of left lower extremity and \"not getting better\". Today, pt reports is the first day she feels she is getting better and she is interested in eating now. She does not want to stay any longer in the hospital and plans to prioritize nutrition. Her weight has stabilized and she is pleased.  Vitamin/Herbal Supplement Use: None. Pt tries to avoid any additional vitamins and herbals that are not necessary.  Food Allergy: Shellfish (Pt avoids all fish.)  Food Intolerance: Shellfish  Pattern of Alcohol Consumption: Never       Anthropometrics:  Height: 167.6 cm (5' 6\")   Weight: 64.6 kg (142 lb 6.7 oz)   BMI (Calculated): 23  IBW/kg (Dietitian Calculated): 58.97 kg  Percent of IBW: 109.55 %                      Weight History:   Wt Readings from Last 10 Encounters:   07/01/25 64.6 kg (142 lb 6.7 oz)   05/28/25 66.7 kg (147 lb)   03/19/25 64.9 kg (143 lb)   01/14/25 61.2 kg (135 lb)   01/06/25 62.6 kg (138 lb)   12/02/24 63 kg (139 lb)   10/23/24 60.8 kg (134 lb)   10/11/24 62.3 kg (137 lb 5.6 oz)   09/25/24 58.5 kg (129 lb)   09/16/24 59 kg (130 lb)       Weight Change %:  Significant Weight Loss: No    Nutrition Focused Physical Exam Findings:    Subcutaneous Fat Loss:   Orbital Fat Pads: Well nourished (slightly bulging fat pads)  Buccal Fat Pads: Well nourished (full, rounded cheeks)  Triceps: Well nourished (ample fat tissue)  Ribs: Well nourished (chest is full, ribs do not show, slight to no protrusion of the iliac crest)  Muscle Wasting:  Temporalis: Well nourished (well-defined muscle)  Pectoralis (Clavicular Region): Well nourished (clavicle not " visible)  Deltoid/Trapezius: Defer  Interosseous: Well nourished (muscle bulges)  Trapezius/Infraspinatus/Supraspinatus (Scapular Region): Defer  Quadriceps: Defer  Gastrocnemius: Defer  Edema:  Edema Location: Per chart: Moderate to severe edema noted to left lower extremity extending up to the thigh  Physical Findings:  Skin: Positive (Per chart: wound of LLE, clear drainage with erythema extending to upper shin.)    Nutrition Significant Labs:  CBC Trend:   Results from last 7 days   Lab Units 07/02/25  0627 07/01/25  1305   WBC AUTO x10*3/uL 8.2 8.8   RBC AUTO x10*6/uL 3.44* 4.03   HEMOGLOBIN g/dL 10.0* 11.8*   HEMATOCRIT % 31.0* 36.5   MCV fL 90 91   PLATELETS AUTO x10*3/uL 445 495*    , BMP Trend:   Results from last 7 days   Lab Units 07/02/25 0627 07/01/25  1305   GLUCOSE mg/dL 117* 103*   CALCIUM mg/dL 7.5* 8.7   SODIUM mmol/L 135* 134*   POTASSIUM mmol/L 3.5 3.8   CO2 mmol/L 24 26   CHLORIDE mmol/L 104 98   BUN mg/dL 18 21   CREATININE mg/dL 0.63 0.69        Nutrition Specific Medications:  Scheduled medications  Scheduled Medications[1]  Continuous medications  Continuous Medications[2]  PRN medications  PRN Medications[3]      I/O:   Last BM Date: 07/01/25;      Dietary Orders (From admission, onward)       Start     Ordered    07/02/25 1015  Adult diet Regular  Diet effective now        Question:  Diet type  Answer:  Regular    07/02/25 1015    07/01/25 1642  May Participate in Room Service  ( ROOM SERVICE MAY PARTICIPATE)  Once        Question:  .  Answer:  Yes    07/01/25 1641                     Estimated Needs:      Method for Estimating Needs: 7433-4121 kcal (25-30 kcal/kg)     Method for Estimating 24 Hour Protein Needs: 65-78g protein (1.0-1.2g/kg)     Method for Estimating 24 Hour Fluid Needs: 1 mL/kcal        Nutrition Diagnosis   Malnutrition Diagnosis  Patient has Malnutrition Diagnosis: No    Nutrition Diagnosis  Patient has Nutrition Diagnosis: Yes  Diagnosis Status (1): New  Nutrition  Diagnosis 1: Increased nutrient needs  Related to (1): wound healing  As Evidenced by (1): compromised skin to left lower extremity.       Nutrition Interventions/Recommendations   Nutrition prescription for oral nutrition    Nutrition Recommendations:  Individualized Nutrition Prescription Provided for : Continue REGULAR diet as ordered.    Nutrition Interventions/Goals:   Meals and Snacks: General healthful diet  Goal: Encouraged pt to consume a protein source with every snack/meal intake.  Vitamin and Mineral Supplement Therapy: Vitamin C supplement therapy, Multivitamin multimineral supplement therapy  Goal: Pt may benefit from daily MVI and vitamin C supplementation for wound healing.      Education Documentation  Nutrition Related Education, taught by Joslyn Segundo RDN, BETTY at 7/2/2025 12:18 PM.  Learner: Patient  Readiness: Acceptance  Method: Explanation  Response: Verbalizes Understanding  Comment: Reviewed the importance of nutrition. Stressed the need to maintain a consistent amount of calories and protein each day to aid in the healing process. Suggested a protein source be included with each intake.              Nutrition Monitoring and Evaluation   Intake / Amount of food: Consumes at least 75% or more of meals/snacks/supplements    Body Weight: Body weight - Maintain stable weight         Skin Finding: Impaired wound healing - Skin to heal    Goal Status: New goal(s) identified    Time Spent (min): 30 minutes              [1] aspirin, 81 mg, oral, Daily  ceFAZolin, 2 g, intravenous, q8h  enoxaparin, 1 mg/kg, subcutaneous, q12h  polyethylene glycol, 17 g, oral, Daily  rosuvastatin, 20 mg, oral, Nightly     [2]    [3] PRN medications: acetaminophen

## 2025-07-02 NOTE — PROGRESS NOTES
"  Internal Medicine Daily Progress Note     Patient Mechelle Alcantara PCP Mj Latif DO    MRN [id number]  Admission Date 7/1/2025      Chief Complaint/Reason for Admission:  Mechelle Alcantara is a 79 y.o. female who presented 7/1/2025 with worsening wound of left lower extremity concerning for cellulitis.     Subjective    Subjective   She reports feeling much better today. She notes decreased pain, redness, and swelling of her Left lower extremity. She states she slept well overnight and has no complaints.      Objective   Vital Signs:  Visit Vitals  /72 (BP Location: Left arm, Patient Position: Lying)   Pulse 70   Temp 36 °C (96.8 °F) (Temporal)   Resp 16   Ht 1.676 m (5' 6\")   Wt 64.6 kg (142 lb 6.7 oz)   SpO2 98%   BMI 22.99 kg/m²   OB Status Postmenopausal   Smoking Status Former   BSA 1.73 m²        Physical Examination:  Physical Exam  Constitutional:       Appearance: Normal appearance.   HENT:      Head: Normocephalic and atraumatic.   Eyes:      Conjunctiva/sclera: Conjunctivae normal.   Cardiovascular:      Rate and Rhythm: Normal rate and regular rhythm.      Heart sounds: Normal heart sounds.   Pulmonary:      Effort: Pulmonary effort is normal.      Breath sounds: Normal breath sounds.   Abdominal:      General: Abdomen is flat.      Palpations: Abdomen is soft.   Musculoskeletal:      Right lower leg: No edema.      Left lower leg: Edema present.      Comments: LLE wound   Skin:     General: Skin is warm and dry.   Neurological:      General: No focal deficit present.      Mental Status: She is alert and oriented to person, place, and time.   Psychiatric:         Mood and Affect: Mood normal.         Behavior: Behavior normal.          Laboratory Data:    Results from last 7 days   Lab Units 07/02/25  0627 07/01/25  1305   WBC AUTO x10*3/uL 8.2 8.8   RBC AUTO x10*6/uL 3.44* 4.03   HEMOGLOBIN g/dL 10.0* 11.8*     Results from last 7 days   Lab Units 07/02/25  0627 07/01/25  1305   SODIUM " mmol/L 135* 134*   POTASSIUM mmol/L 3.5 3.8   CHLORIDE mmol/L 104 98   CO2 mmol/L 24 26   BUN mg/dL 18 21   CREATININE mg/dL 0.63 0.69   CALCIUM mg/dL 7.5* 8.7   PHOSPHORUS mg/dL 3.0  --    MAGNESIUM mg/dL 2.06  --    BILIRUBIN TOTAL mg/dL  --  0.5   ALT U/L  --  16   AST U/L  --  19       Imaging:  Imaging  CT foot left wo IV contrast  Result Date: 7/2/2025  Nonspecific generalized subcutaneous edema about the right foot and ankle.   No acute osseous findings.   Note of soft tissue gas.       MACRO: None   Signed by: Narinder Shay 7/2/2025 8:35 AM Dictation workstation:   [id number]    XR foot left 3+ views  Result Date: 7/1/2025  Soft tissue swelling about the foot. Probable soft tissue wound/ulcer posterior to the calcaneus. Question some subcutaneous air. Recommend CT with contrast for further evaluation.     MACRO: None   Signed by: Idris Ricks 7/1/2025 8:00 PM Dictation workstation:   [id number]      Cardiology, Vascular, and Other Imaging    Interpretation Summary    Preliminary Cardiology Report                 [facility]  [address] [place] [address]      Tel [phone] Fax [phone]                  Preliminary Vascular Lab Report     [facility] ANKLE BRACHIAL INDEX (KATHLEEN) WITHOUT EXERCISE        Patient Name:     SUSIE Clemens Physician:  [id number] Andree Serrano MD  Study Date:       7/2/2025          Ordering Provider:  [id number] TANYA DO  MRN/PID:          [id number]          Fellow:  Accession#:       [id number]      Technologist:       Yarelis Ley MICHAEL  YOB: 1945        Technologist 2:  Gender:           F                 Encounter#:         [id number]  Admission Status: Inpatient         Location Performed: [facility]        Diagnosis/ICD: Peripheral vascular disease, unspecified-I73.9  Indication:    Peripheral vascular disease  CPT Codes:     88270 Peripheral artery KATHLEEN Only        Pertinent History: HTN and  Claudication.        PRELIMINARY CONCLUSIONS:     Right Lower PVR: No evidence of arterial occlusive disease in the right lower extremity at rest. Normal digital perfusion noted. Monophasic flow is noted in the right posterior tibial artery. Multiphasic flow is noted in the right common femoral artery and right dorsalis pedis artery. Dampened PVR waveform at the ankle and PPG tracing of the great toe.  Left Lower PVR: Evidence of moderate arterial occlusive disease in the left lower extremity at rest. Decreased digital perfusion noted. Monophasic flow is noted in the left posterior tibial artery and left dorsalis pedis artery. Multiphasic flow is noted in the left common femoral artery. Dampened PVR waveform at the ankle. Dampened PPG tracing of the great toe.     Imaging & Doppler Findings:     RIGHT Lower PVR                Pressures Ratios  Right Posterior Tibial (Ankle) 126 mmHg  0.87  Right Dorsalis Pedis (Ankle)   134 mmHg  0.92  Right Digit (Great Toe)        149 mmHg  1.03           LEFT Lower PVR                Pressures Ratios  Left Posterior Tibial (Ankle) 46 mmHg   0.32  Left Dorsalis Pedis (Ankle)   75 mmHg   0.52  Left Digit (Great Toe)        39 mmHg   0.27                              Right     Left  Brachial Pressure 142 mmHg 145 mmHg        Study Result    Narrative & Impression   Interpreted By:  Narinder Shay,   STUDY:  CT FOOT LEFT WO IV CONTRAST; ;  7/2/2025 7:41 am      INDICATION:  Signs/Symptoms:X-ray concern for air.          COMPARISON:  Plain film radiographs July 1      ACCESSION NUMBER(S):  [id number]      ORDERING CLINICIAN:  POLA KAMARA      TECHNIQUE:  Multiple thin-section axial images right ankle without contrast.      FINDINGS:  There is no evidence of soft tissue gas. The previous suspected areas  of possible deep soft tissue gas not confirmed by CT.      No discrete focal fluid collections.      Generalized nonspecific diffuse subcutaneous edema about the entirety  of the  visualized foot and ankle.      No definite erosive change or periosteal reaction.      Mild degenerative changes of the midfoot, ankle, and hindfoot.      Small calcaneal spurs.      No evidence of acute fracture.      No bony lesions.      IMPRESSION:  Nonspecific generalized subcutaneous edema about the right foot and  ankle.      No acute osseous findings.      Note of soft tissue gas.       Medications:  Scheduled medications  Scheduled Medications[1]  Continuous medications  Continuous Medications[2]  PRN medications  PRN Medications[3]    Assessment    Assessment & Plan   Ms. Mechelle Alcantara is a 79 y.o. female who presents with worsening wound of the left lower extremity, asymmetrical swelling and crisis of ambulation ISO PAD.     Acute Left CFA Thrombus  Asymmetrical Swelling 2/2 to above   Crisis of ambulation 2/2 to above   Chronic wound of LLE  Medication non-adherence  PAD s/p Redo Left Femoral Endarterectomy (7/2024) w/left femoral to PTA bypass w/reverse greater saphenous vein (7/25/24) c/b left groin seroma s/p I &D (9/12/24) requiring LLE angio w/EKOS lysis catheter (10/10/24)  - LLE venous duplex showing CFA thrombus  - Continue neurovascular checks  - s/p vanc/zosyn in the ED  - Continue cefazolin  - Pulses heard with doppler  - PT/OT  - Wound care following  - CT of L foot notes no soft tissue gas   - KATHLEEN showing markedly decreased pressure ratios on the left side  - Vascular surgery encouraged patient adherence with home aspirin and statin, no intervention at this time  - AM labs with RFP, mag, CBC  - Follow up A1c      Patient seen and discussed with the attending.  Signature: Giselle Manjarrez MD  Date: July 2, 2025  This note has been transcribed using Dragon voice recognition system and there is a possibility of unintentional typing misprints.  Any information found to be copied from previous providers is done in the best interest of the patient to provide accurate, quality, and continuity  of care.         [1] aspirin, 81 mg, oral, Daily  ceFAZolin, 2 g, intravenous, q8h  enoxaparin, 1 mg/kg, subcutaneous, q12h  nystatin, 1 Application, Topical, BID  polyethylene glycol, 17 g, oral, Daily  rosuvastatin, 20 mg, oral, Nightly  [2]    [3] PRN medications: acetaminophen

## 2025-07-02 NOTE — PROGRESS NOTES
Physical Therapy                 Therapy Communication Note    Patient Name: Mechelle Alcantara  MRN: [id number]  Department: [facility] 3 S OBS  Room: 3104/3104-A  Today's Date: 7/2/2025     Discipline: Physical Therapy    Missed Time: Attempt    Comment: PT orders received and chart reviewed. Pt was found to have non occlusive thrombus in left common femoral artery. Pt with strict bedrest orders and vascular consult pending. Will hold PT eval at this time and complete when appropriate.    Completion of this session, clinical decision making, and documentation performed under the supervision/direction of Sharmin Gilbert PT, DPT.   David Marte, SPT

## 2025-07-02 NOTE — CONSULTS
Inpatient consult to Vascular Surgery  Consult performed by: Dulce Angeles, MICHAEL-CNP  Consult ordered by: Misty Solorio DO  Reason for consult: CFA thrombus      History Of Present Illness:    Mechelle Alcantara is a 79 y.o. female [place] [facility] [place] care clinic for worsening LLE pain, erythema, and swelling. CBC notable for thrombocytosis. Elevated ESR & CRP. Vasc US for DVT negative, additional findings include nonocclusive thrombus int he left CFA. W/left fem-tib BPG patent. XR left foot 7/1/25 Soft tissue swelling about the foot. Probable soft tissue wound/ulcer posterior to the calcaneus. Question some subcutaneous air. CT left foot Nonspecific generalized subcutaneous edema about the right foot and  ankle. No acute osseous findings. Note of soft tissue gas. Vascular Surgery consulted.     Patient presents in bed, unaccompanied. Eating fried chicken, french fries, and donuts. Denies abdominal pain and/or back pain. Endorses left calf/pedal pain at rest, stabbing during the night. Pain is not aggravated or alleviated w/dependent positioning or elevation. Endorses bilateral lower extremity paresthesias, weakness, or fatigue. Endorses left pedal edema. Active lesion to distal medial aspect of the LLE. Follows with wound care. Notes erythema and pruritus. Medication nonadherence- her  had CABG c/b CVA recently and she has not been taking her.    Past Medical History:  She has a past medical history of Cellulitis, HLD (hyperlipidemia), HTN (hypertension), Neuropathy, Non-healing wound of left lower extremity, PAD (peripheral artery disease), Postoperative seroma of skin after non-dermatologic procedure, Postoperative wound dehiscence, PVD (peripheral vascular disease), and Varicose veins of both lower extremities.    Past Surgical History:  She has a past surgical history that includes Invasive Vascular Procedure (Left, 3/22/2024); Invasive Vascular Procedure (N/A, 3/22/2024); Invasive  Vascular Procedure (Left, 3/28/2024); Invasive Vascular Procedure (N/A, 3/28/2024); Cardiac catheterization (N/A, 3/28/2024); Invasive Vascular Procedure (N/A, 5/16/2024); Invasive Vascular Procedure (Left, 10/11/2024); Invasive Vascular Procedure (Left, 10/10/2024); and Invasive Vascular Procedure (N/A, 10/10/2024).      Social History:  She reports that she quit smoking about 15 months ago. Her smoking use included cigarettes. She has been exposed to tobacco smoke. She has never used smokeless tobacco. She reports that she does not drink alcohol and does not use drugs.    Family History:  Family History[1]     Last Recorded Vitals:  Vitals:    07/01/25 2006 07/02/25 0000 07/02/25 0400 07/02/25 0800   BP: 156/90 (!) 186/74 168/69 139/62   BP Location: Right arm Right arm Right arm Left arm   Patient Position: Lying Lying Lying Lying   Pulse: 70 65  71   Resp: 16 16  18   Temp: 35.4 °C (95.7 °F) 36.3 °C (97.3 °F)  36.4 °C (97.5 °F)   TempSrc: Temporal Temporal  Temporal   SpO2: 99% 98%  93%   Weight:       Height:         Last I/O:  I/O last 3 completed shifts:  In: 350 (5.4 mL/kg) [IV Piggyback:350]  Out: - (0 mL/kg)   Weight: 64.6 kg     Physical Exam  Vitals reviewed.   Constitutional:       General: She is awake.      Appearance: She is normal weight.   HENT:      Head: Normocephalic.      Nose: Nose normal.      Mouth/Throat:      Mouth: Mucous membranes are moist.   Eyes:      Conjunctiva/sclera: Conjunctivae normal.   Neck:      Vascular: No carotid bruit.   Cardiovascular:      Rate and Rhythm: Normal rate and regular rhythm.      Pulses:           Radial pulses are 2+ on the right side and 2+ on the left side.        Dorsalis pedis pulses are 2+ on the right side and detected w/ Doppler on the left side.        Posterior tibial pulses are detected w/ Doppler on the left side.   Pulmonary:      Effort: Pulmonary effort is normal.      Breath sounds: Normal breath sounds.   Musculoskeletal:      Left lower  leg: Edema present.   Feet:      Right foot:      Skin integrity: No ulcer, blister, skin breakdown, erythema or warmth.      Left foot:      Skin integrity: Ulcer, skin breakdown and erythema present.      Comments: Left distal medial calf wound. See media images.   Neurological:      Mental Status: She is alert and oriented to person, place, and time.   Psychiatric:         Mood and Affect: Mood normal.         Behavior: Behavior is cooperative.       Last Labs:  CBC - 7/2/2025:  6:27 AM  8.2 10.0 445    31.0      CMP - 7/2/2025:  6:27 AM  7.5 7.7 19 --- 0.5   3.0 2.6 16 116      PTT - 1/14/2025: 12:47 PM  1.1   12.0 32     Hemoglobin A1C   Date/Time Value Ref Range Status   04/30/2024 03:09 PM 6.1 (H) see below % Final     LDL Calculated   Date/Time Value Ref Range Status   04/30/2024 03:09 PM 57 <=99 mg/dL Final     Comment:                                 Near   Borderline      AGE      Desirable  Optimal    High     High     Very High     0-19 Y     0 - 109     ---    110-129   >/= 130     ----    20-24 Y     0 - 119     ---    120-159   >/= 160     ----      >24 Y     0 -  99   100-129  130-159   160-189     >/=190       VLDL   Date/Time Value Ref Range Status   04/30/2024 03:09 PM 14 0 - 40 mg/dL Final   05/18/2023 01:53 PM 14 0 - 40 mg/dL Final   09/27/2022 02:41 PM 10 0 - 40 mg/dL Final   11/15/2021 02:26 PM 12 0 - 40 mg/dL Final      Diagnostic Imaging:   VASC US ANKLE BRACHIAL INDEX (KATHLEEN) WITHOUT EXERCISE        Patient Name:     SUSIE Clemens Physician:  [id number] Andree Serrano MD  Study Date:       7/2/2025          Ordering Provider:  [id number] TANYA DO  MRN/PID:          [id number]          Fellow:  Accession#:       [id number]      Technologist:       Yarelis Ley RVT  YOB: 1945        Technologist 2:  Gender:           F                 Encounter#:         [id number]  Admission Status: Inpatient         Location Performed: [facility]         Diagnosis/ICD: Peripheral vascular disease, unspecified-I73.9  Indication:    Peripheral vascular disease  CPT Codes:     20732 Peripheral artery KATHLEEN Only        Pertinent History: HTN and Claudication.        PRELIMINARY CONCLUSIONS:     Right Lower PVR: No evidence of arterial occlusive disease in the right lower extremity at rest. Normal digital perfusion noted. Monophasic flow is noted in the right posterior tibial artery. Multiphasic flow is noted in the right common femoral artery and right dorsalis pedis artery. Dampened PVR waveform at the ankle and PPG tracing of the great toe.  Left Lower PVR: Evidence of moderate arterial occlusive disease in the left lower extremity at rest. Decreased digital perfusion noted. Monophasic flow is noted in the left posterior tibial artery and left dorsalis pedis artery. Multiphasic flow is noted in the left common femoral artery. Dampened PVR waveform at the ankle. Dampened PPG tracing of the great toe.     Imaging & Doppler Findings:     RIGHT Lower PVR                Pressures Ratios  Right Posterior Tibial (Ankle) 126 mmHg  0.87  Right Dorsalis Pedis (Ankle)   134 mmHg  0.92  Right Digit (Great Toe)        149 mmHg  1.03           LEFT Lower PVR                Pressures Ratios  Left Posterior Tibial (Ankle) 46 mmHg   0.32  Left Dorsalis Pedis (Ankle)   75 mmHg   0.52  Left Digit (Great Toe)        39 mmHg   0.27                              Right     Left  Brachial Pressure 142 mmHg 145 mmHg           VASCULAR PRELIMINARY REPORT  completed by Yarelis Ley RVT on 7/2/2025 at 12:06:45 PM    Allergies:  Fish containing products, Shellfish containing products, Gadolinium-containing contrast media, and Iodinated contrast media    Inpatient Medications:  Scheduled Medications[2]  PRN Medications[3]  Continuous Medications[4]  Outpatient Medications:  Current Outpatient Medications   Medication Instructions    aspirin 81 mg, Daily    olmesartan (BENICAR) 20 mg, oral, Daily     rivaroxaban (XARELTO) 2.5 mg, oral, 2 times daily (morning and late afternoon)    rosuvastatin (Crestor) 20 mg tablet TAKE 1 TABLET(20 MG) BY MOUTH DAILY AT BEDTIME      Assessment/Plan   PAD s/p Redo Left Femoral Endarterectomy (7/2024) w/left femoral to PTA bypass w/reverse greater saphenous vein (7/25/24) c/b left groin seroma s/p I &D (9/12/24) requiring LLE angio w/EKOS lysis catheter (10/10/24) c/b LLE cellutlits  Denies abdominal pain and/or back pain. Endorses left calf/pedal pain at rest, stabbing during the night. Pain is not aggravated or alleviated w/dependent positioning or elevation. Endorses bilateral lower extremity paresthesias, weakness, or fatigue. Endorses left pedal edema. Active lesion to distal medial aspect of the LLE. Follows with wound care. Notes erythema and pruritus. Medication nonadherence- her  had CABG c/b CVA recently and she has not been taking her.  +1 pitting LLE edema. +2 palpable bilateral femoral pulses. +2 palpable right DP/PT. Able to doppler signals for left DP/PT. Left medial malleolar ulceration w/surrounding erythema/breakdown.   KATHLEEN 7/2/25 Left Lower PVR: Evidence of moderate arterial occlusive disease in the left lower extremity at rest. Decreased digital perfusion noted. Monophasic flow is noted in the left posterior tibial artery and left dorsalis pedis artery. Multiphasic flow is noted in the left common femoral artery. Dampened PVR waveform at the ankle. Dampened PPG tracing of the great toe.  Encouraged patient adherence Aspirin 81mg every day and Crestor 20mg at bedtime. Per Dr. Hoover, increase Xarelto 5mg BID.   Plan to follow up outpatient with Dr. Hoover 7/9/25.     Discussed plan of care with Dr. Africa Hoover.   I spent 35 minutes in the professional and overall care of this patient.    Dulce Angeles, MICHAEL-CNP  Vascular Surgery  [facility]  [facility]       [1]   Family  History  Problem Relation Name Age of Onset    Heart failure Father      Hypertension Father      Heart disease Sister      Hypertension Sister      Heart disease Brother      Hypertension Brother     [2]   Scheduled medications   Medication Dose Route Frequency    aspirin  81 mg oral Daily    ceFAZolin  2 g intravenous q8h    enoxaparin  1 mg/kg subcutaneous q12h    nystatin  1 Application Topical BID    polyethylene glycol  17 g oral Daily    rosuvastatin  20 mg oral Nightly   [3]   PRN medications   Medication    acetaminophen   [4]   Continuous Medications   Medication Dose Last Rate

## 2025-07-02 NOTE — CONSULTS
Wound Care Consult     Visit Date: 7/2/2025      Patient Name: Mechelle Alcantara         MRN: [id number]             Reason for Consult: Non healing wound left medial ankle.        Wound History: Present on admission     Pertinent Labs:       Assessment: Mepilex removed from around wound, which measures 1.0 x 1.5 x 0.1 cm. Surrounding skin is red and, per pt, itches. Due to drainage and creams being used at home, possible pt has a fungal infection surrounding skin.         Wound Plan: Cleanse wound and surrounding skin with bathing cloths, dry. Apply Monet to wound, cover with small Mepilex every 3 days.  Nystatin powder to surrounding skin twice a day.      Diana Jorge RN  7/2/2025  2:24 PM

## 2025-07-02 NOTE — PROGRESS NOTES
Occupational Therapy                 Therapy Communication Note    Patient Name: Mechelle Alcantara  MRN: [id number]  Department: [facility] 3 S OBS  Room: 3104/3104-A  Today's Date: 7/2/2025     Discipline: Occupational Therapy    Comment: Received orders for OT eval 7/1. Completed chart review, and pt had vascular US completed. Pt was found to have: a non occlusive thrombus in the left common femoral artery.     Evidence of moderate arterial occlusive disease in the left lower extremity at rest. Decreased digital perfusion noted. Monophasic flow is noted in the left posterior tibial artery and left dorsalis pedis artery. Multiphasic flow is noted in the left common femoral artery. Dampened PVR waveform at the ankle. Dampened PPG tracing of the great toe.     Pt with strict bedrest orders and a vascular consult to determine POC. Will hold OT eval at this time and complete as appropriate.

## 2025-07-02 NOTE — PROGRESS NOTES
07/02/25 1107   Discharge Planning   Living Arrangements Spouse/significant other   Support Systems Spouse/significant other   Assistance Needed none   Type of Residence Private residence   Number of Stairs to Enter Residence 4   Number of Stairs Within Residence 0   Home or Post Acute Services None   Expected Discharge Disposition Home   Financial Resource Strain   How hard is it for you to pay for the very basics like food, housing, medical care, and heating? Not hard   Housing Stability   In the last 12 months, was there a time when you were not able to pay the mortgage or rent on time? N   At any time in the past 12 months, were you homeless or living in a shelter (including now)? N   Transportation Needs   In the past 12 months, has lack of transportation kept you from medical appointments or from getting medications? no   In the past 12 months, has lack of transportation kept you from meetings, work, or from getting things needed for daily living? No   Intensity of Service   Intensity of Service >30 min     Spoke to patient at bedside to explain my role in discharge planning. Patient states she lives at home with her . She states they have a house in [place], but it does not have central air conditioning. They also have a mobile home in [place] and that is where they stay in the padgett. PCP is Dr Latif. Patient does not use any DME, but has canes and walkers at home if needed. She feels she effectively manages her health at home and plans to return home when medically ready. Therapy evals pending at this time. CT team to follow for recommendations.

## 2025-07-03 LAB
ALBUMIN SERPL BCP-MCNC: 2.7 G/DL (ref 3.4–5)
ANION GAP SERPL CALC-SCNC: 11 MMOL/L (ref 10–20)
BUN SERPL-MCNC: 16 MG/DL (ref 6–23)
CALCIUM SERPL-MCNC: 7.8 MG/DL (ref 8.6–10.3)
CHLORIDE SERPL-SCNC: 104 MMOL/L (ref 98–107)
CO2 SERPL-SCNC: 25 MMOL/L (ref 21–32)
CREAT SERPL-MCNC: 0.61 MG/DL (ref 0.5–1.05)
CRP SERPL-MCNC: 11.36 MG/DL
EGFRCR SERPLBLD CKD-EPI 2021: >90 ML/MIN/1.73M*2
ERYTHROCYTE [DISTWIDTH] IN BLOOD BY AUTOMATED COUNT: 15.9 % (ref 11.5–14.5)
EST. AVERAGE GLUCOSE BLD GHB EST-MCNC: 131 MG/DL
GLUCOSE SERPL-MCNC: 102 MG/DL (ref 74–99)
HBA1C MFR BLD: 6.2 % (ref ?–5.7)
HCT VFR BLD AUTO: 33.4 % (ref 36–46)
HGB BLD-MCNC: 10.6 G/DL (ref 12–16)
HOLD SPECIMEN: NORMAL
LACTATE SERPL-SCNC: 1 MMOL/L (ref 0.4–2)
MAGNESIUM SERPL-MCNC: 2.25 MG/DL (ref 1.6–2.4)
MCH RBC QN AUTO: 29 PG (ref 26–34)
MCHC RBC AUTO-ENTMCNC: 31.7 G/DL (ref 32–36)
MCV RBC AUTO: 92 FL (ref 80–100)
NRBC BLD-RTO: 0 /100 WBCS (ref 0–0)
PHOSPHATE SERPL-MCNC: 2.7 MG/DL (ref 2.5–4.9)
PLATELET # BLD AUTO: 510 X10*3/UL (ref 150–450)
POTASSIUM SERPL-SCNC: 3.4 MMOL/L (ref 3.5–5.3)
RBC # BLD AUTO: 3.65 X10*6/UL (ref 4–5.2)
SODIUM SERPL-SCNC: 137 MMOL/L (ref 136–145)
WBC # BLD AUTO: 8 X10*3/UL (ref 4.4–11.3)

## 2025-07-03 PROCEDURE — 97165 OT EVAL LOW COMPLEX 30 MIN: CPT | Mod: GO

## 2025-07-03 PROCEDURE — 2500000004 HC RX 250 GENERAL PHARMACY W/ HCPCS (ALT 636 FOR OP/ED): Performed by: STUDENT IN AN ORGANIZED HEALTH CARE EDUCATION/TRAINING PROGRAM

## 2025-07-03 PROCEDURE — 2500000002 HC RX 250 W HCPCS SELF ADMINISTERED DRUGS (ALT 637 FOR MEDICARE OP, ALT 636 FOR OP/ED)

## 2025-07-03 PROCEDURE — 83735 ASSAY OF MAGNESIUM: CPT

## 2025-07-03 PROCEDURE — 86140 C-REACTIVE PROTEIN: CPT

## 2025-07-03 PROCEDURE — 1100000001 HC PRIVATE ROOM DAILY

## 2025-07-03 PROCEDURE — 2500000004 HC RX 250 GENERAL PHARMACY W/ HCPCS (ALT 636 FOR OP/ED)

## 2025-07-03 PROCEDURE — 83605 ASSAY OF LACTIC ACID: CPT

## 2025-07-03 PROCEDURE — 85027 COMPLETE CBC AUTOMATED: CPT | Performed by: STUDENT IN AN ORGANIZED HEALTH CARE EDUCATION/TRAINING PROGRAM

## 2025-07-03 PROCEDURE — 36415 COLL VENOUS BLD VENIPUNCTURE: CPT

## 2025-07-03 PROCEDURE — 97162 PT EVAL MOD COMPLEX 30 MIN: CPT | Mod: GP

## 2025-07-03 PROCEDURE — 2500000001 HC RX 250 WO HCPCS SELF ADMINISTERED DRUGS (ALT 637 FOR MEDICARE OP)

## 2025-07-03 PROCEDURE — 2500000002 HC RX 250 W HCPCS SELF ADMINISTERED DRUGS (ALT 637 FOR MEDICARE OP, ALT 636 FOR OP/ED): Performed by: STUDENT IN AN ORGANIZED HEALTH CARE EDUCATION/TRAINING PROGRAM

## 2025-07-03 PROCEDURE — 80069 RENAL FUNCTION PANEL: CPT | Performed by: STUDENT IN AN ORGANIZED HEALTH CARE EDUCATION/TRAINING PROGRAM

## 2025-07-03 PROCEDURE — 99233 SBSQ HOSP IP/OBS HIGH 50: CPT | Performed by: STUDENT IN AN ORGANIZED HEALTH CARE EDUCATION/TRAINING PROGRAM

## 2025-07-03 PROCEDURE — 2500000001 HC RX 250 WO HCPCS SELF ADMINISTERED DRUGS (ALT 637 FOR MEDICARE OP): Performed by: STUDENT IN AN ORGANIZED HEALTH CARE EDUCATION/TRAINING PROGRAM

## 2025-07-03 RX ORDER — POTASSIUM CHLORIDE 20 MEQ/1
40 TABLET, EXTENDED RELEASE ORAL ONCE
Status: COMPLETED | OUTPATIENT
Start: 2025-07-03 | End: 2025-07-03

## 2025-07-03 RX ORDER — POTASSIUM CHLORIDE 20 MEQ/1
40 TABLET, EXTENDED RELEASE ORAL ONCE
Status: DISCONTINUED | OUTPATIENT
Start: 2025-07-03 | End: 2025-07-03

## 2025-07-03 RX ORDER — VALSARTAN 160 MG/1
160 TABLET ORAL DAILY
Status: DISCONTINUED | OUTPATIENT
Start: 2025-07-03 | End: 2025-07-05 | Stop reason: HOSPADM

## 2025-07-03 RX ADMIN — POTASSIUM CHLORIDE 40 MEQ: 1500 TABLET, EXTENDED RELEASE ORAL at 09:50

## 2025-07-03 RX ADMIN — ENOXAPARIN SODIUM 60 MG: 60 INJECTION SUBCUTANEOUS at 09:06

## 2025-07-03 RX ADMIN — ASPIRIN 81 MG: 81 TABLET, COATED ORAL at 09:06

## 2025-07-03 RX ADMIN — RIVAROXABAN 15 MG: 15 TABLET, FILM COATED ORAL at 22:19

## 2025-07-03 RX ADMIN — ROSUVASTATIN CALCIUM 20 MG: 20 TABLET, FILM COATED ORAL at 22:19

## 2025-07-03 RX ADMIN — VALSARTAN 160 MG: 160 TABLET, FILM COATED ORAL at 13:38

## 2025-07-03 RX ADMIN — CEFAZOLIN SODIUM 2 G: 2 INJECTION, SOLUTION INTRAVENOUS at 05:25

## 2025-07-03 RX ADMIN — NYSTATIN 1 APPLICATION: 100000 POWDER TOPICAL at 09:06

## 2025-07-03 RX ADMIN — NYSTATIN 1 APPLICATION: 100000 POWDER TOPICAL at 22:47

## 2025-07-03 RX ADMIN — CEFAZOLIN SODIUM 2 G: 2 INJECTION, SOLUTION INTRAVENOUS at 13:36

## 2025-07-03 RX ADMIN — CEFAZOLIN SODIUM 2 G: 2 INJECTION, SOLUTION INTRAVENOUS at 22:18

## 2025-07-03 ASSESSMENT — COGNITIVE AND FUNCTIONAL STATUS - GENERAL
STANDING UP FROM CHAIR USING ARMS: A LITTLE
TOILETING: A LITTLE
STANDING UP FROM CHAIR USING ARMS: A LOT
MOVING TO AND FROM BED TO CHAIR: A LOT
TOILETING: A LOT
DAILY ACTIVITIY SCORE: 19
MOVING TO AND FROM BED TO CHAIR: A LITTLE
DRESSING REGULAR UPPER BODY CLOTHING: A LITTLE
DRESSING REGULAR LOWER BODY CLOTHING: A LITTLE
DRESSING REGULAR UPPER BODY CLOTHING: A LITTLE
PERSONAL GROOMING: A LITTLE
CLIMB 3 TO 5 STEPS WITH RAILING: TOTAL
TURNING FROM BACK TO SIDE WHILE IN FLAT BAD: A LITTLE
TURNING FROM BACK TO SIDE WHILE IN FLAT BAD: A LITTLE
STANDING UP FROM CHAIR USING ARMS: A LITTLE
TOILETING: A LITTLE
WALKING IN HOSPITAL ROOM: A LITTLE
PERSONAL GROOMING: A LITTLE
MOBILITY SCORE: 17
HELP NEEDED FOR BATHING: A LOT
PERSONAL GROOMING: A LITTLE
MOVING TO AND FROM BED TO CHAIR: A LITTLE
EATING MEALS: A LITTLE
CLIMB 3 TO 5 STEPS WITH RAILING: A LOT
HELP NEEDED FOR BATHING: A LITTLE
MOBILITY SCORE: 15
DRESSING REGULAR UPPER BODY CLOTHING: A LITTLE
CLIMB 3 TO 5 STEPS WITH RAILING: A LOT
DAILY ACTIVITIY SCORE: 15
HELP NEEDED FOR BATHING: A LITTLE
MOVING FROM LYING ON BACK TO SITTING ON SIDE OF FLAT BED WITH BEDRAILS: A LITTLE
DRESSING REGULAR LOWER BODY CLOTHING: A LOT
MOVING FROM LYING ON BACK TO SITTING ON SIDE OF FLAT BED WITH BEDRAILS: A LITTLE
MOBILITY SCORE: 17
WALKING IN HOSPITAL ROOM: A LOT
WALKING IN HOSPITAL ROOM: A LITTLE
DRESSING REGULAR LOWER BODY CLOTHING: A LITTLE
DAILY ACTIVITIY SCORE: 19

## 2025-07-03 ASSESSMENT — ACTIVITIES OF DAILY LIVING (ADL)
ADL_ASSISTANCE: INDEPENDENT
LACK_OF_TRANSPORTATION: NO
BATHING_ASSISTANCE: MINIMAL

## 2025-07-03 ASSESSMENT — PAIN - FUNCTIONAL ASSESSMENT
PAIN_FUNCTIONAL_ASSESSMENT: 0-10

## 2025-07-03 ASSESSMENT — PAIN SCALES - GENERAL
PAINLEVEL_OUTOF10: 5 - MODERATE PAIN
PAINLEVEL_OUTOF10: 5 - MODERATE PAIN
PAINLEVEL_OUTOF10: 0 - NO PAIN

## 2025-07-03 NOTE — PROGRESS NOTES
07/03/25 1636   Discharge Planning   Living Arrangements Spouse/significant other   Support Systems Spouse/significant other;Family members   Assistance Needed cane, yes  current with [facility].   Type of Residence Private residence   Number of Stairs to Enter Residence 4   Do you have animals or pets at home? No   Home or Post Acute Services Post acute facilities (Rehab/SNF/etc);In home services   Expected Discharge Disposition SNF   Does the patient need discharge transport arranged? Yes   [facility] coordination needed? Yes   Has discharge transport been arranged? No   Financial Resource Strain   How hard is it for you to pay for the very basics like food, housing, medical care, and heating? Not hard   Housing Stability   In the last 12 months, was there a time when you were not able to pay the mortgage or rent on time? N   At any time in the past 12 months, were you homeless or living in a shelter (including now)? N   Transportation Needs   In the past 12 months, has lack of transportation kept you from medical appointments or from getting medications? no   In the past 12 months, has lack of transportation kept you from meetings, work, or from getting things needed for daily living? No   Patient Choice   Provider Choice list and CMS website (https://medicare.gov/care-compare#search) for post-acute Quality and Resource Measure Data were provided and reviewed with: Patient;Family   Stroke Family Assessment   Stroke Family Assessment Needed No   Intensity of Service   Intensity of Service 0-30 min     Met with patient and son at the bedside, confirmed demographics, insurance, and pcp is Dr. Latif. She lives with her  who had bypass surgery last week. They live in [place] but are currently staying at their mobile home in [place]. There is 4 steps to enter the home.   She has not been compliant with her medication due to not feeling well. She complains of neuro pathetic leg and foot pain,  which is keeping her from resting comfortably   Her  states (on speaker phone) that they have [facility].   Pending therapy's recommendation may need to consider skilled vs. Acute rehab. She does not feel she could manage the 3 hour a day therapy . Skilled choice list provided.

## 2025-07-03 NOTE — PROGRESS NOTES
Physical Therapy    Physical Therapy Evaluation    Patient Name: Mechelle Alcantara  MRN: [id number]  Today's Date: 7/3/2025   Time Calculation  Start Time: 0956  Stop Time: 1017  Time Calculation (min): 21 min  3116/3116-A    Assessment/Plan   PT Assessment  PT Assessment Results: Decreased endurance, Decreased strength, Decreased mobility, Impaired balance, Pain  Rehab Prognosis: Good  Barriers to Discharge Home: Caregiver assistance, Physical needs  Caregiver Assistance: Caregiver assistance needed per identified barriers - however, level of patient's required assistance exceeds assistance available at home  Physical Needs: High falls risk due to function or environment, Ambulating household distances limited by function/safety, 24hr mobility assistance needed, 24hr ADL assistance needed  Evaluation/Treatment Tolerance: Patient limited by pain  Medical Staff Made Aware: Yes  End of Session Communication: Bedside nurse  Assessment Comment: Pt presents with impaired mobility and impaired balance. Pt would benefit from therapy for the duration of their stay. Upon discharge pt would benefit from high intensity therapy to improve mobility and function.  End of Session Patient Position: Up in chair, Alarm on  IP OR SWING BED PT PLAN  Inpatient or Swing Bed: Inpatient  PT Plan  Treatment/Interventions: Bed mobility, Transfer training, Gait training, Stair training, Balance training, Strengthening, Endurance training, Range of motion, Therapeutic activity, Therapeutic exercise  PT Plan: Ongoing PT  PT Frequency: 5 times per week  PT Discharge Recommendations: High intensity level of continued care  Equipment Recommended upon Discharge: Wheeled walker  PT Recommended Transfer Status: Assist x2 (min-mod)  PT - OK to Discharge: Yes (To next level of care when cleared by medical team.)    Subjective     Current Problem:  1. Cellulitis of left lower extremity        2. Other specified soft tissue disorders  Lower extremity  venous duplex left      3. S/P femoral-tibial bypass  Vascular US Ankle Brachial Index (KATHLEEN) Without Exercise    Vascular US Ankle Brachial Index (KATHLEEN) Without Exercise      4. PAD (peripheral artery disease)  Vascular US Ankle Brachial Index (KATHLEEN) Without Exercise    Vascular US Ankle Brachial Index (KATHLEEN) Without Exercise        Problem List[1]    General Visit Information:  General: Per EMR, pt presents to the ER for 5 days of left lower leg pain, redness, swelling.  Went to Dr. Hoffman's office earlier today, was directed to the ED for antibiotics.  On xarelto, unsure why. No chest pain, abdominal pain, dyspnea, knee pain, numbness, tingling, or recent travel.   Reason for Referral: Impaired mobility  Referred By: Misty Solorio  Co-Treatment: OT  Co-Treatment Reason: For pt safety  Prior to Session Communication: Bedside nurse  Patient Position Received: Bed, 3 rail up, Alarm on    Home Living:  Home Living  Home Living Comments: Pt resides in 2 different homes, but resides in mobile home with  during the summer. 4 NARA with HR. Pt resides on main level. WIS with chair and gbs.    Prior Level of Function:  Prior Function Per Pt/Caregiver Report  Prior Function Comments: IND in ADLS. Pt ambulated with no device, but used a quad cane in last week for ambulating. Pt reports no falls. Pt's daughter helps with cooking, cleaning, and laundry.    Precautions:  Precautions  Medical Precautions: Fall precautions    Vital Signs:     Objective     Pain:  Pain Assessment  Pain Assessment: 0-10  0-10 (Numeric) Pain Score: 5 - Moderate pain  Pain Type: Acute pain  Pain Location: Leg  Pain Orientation: Left (Pain increased to 9/10 with ambulation)  Pain Interventions: Repositioned    Cognition:  Cognition  Overall Cognitive Status: Within Functional Limits  Orientation Level: Oriented X4    General Assessments:      Activity Tolerance  Endurance: Tolerates less than 10 min exercise, no significant change in vital  signs                 Static Sitting Balance  Static Sitting-Comment/Number of Minutes: Good  Dynamic Sitting Balance  Dynamic Sitting-Comments: Fair +  Static Standing Balance  Static Standing-Comment/Number of Minutes: Fair +  Dynamic Standing Balance  Dynamic Standing-Comments: Fair    Functional Assessments:     Bed Mobility  Bed Mobility: Yes  Bed Mobility 1  Bed Mobility Comments 1: Supine to sit SBA (Increased time to complete)  Transfers  Transfer: Yes  Transfer 1  Trials/Comments 1: Sit to stand min A x 2, stand to sit CGA (PT presents with TTWB, even though they are WBAT. Vcs and tcs to correct posture in standing.)  Ambulation/Gait Training  Ambulation/Gait Training Performed: Yes  Ambulation/Gait Training 1  Comments/Distance (ft) 1: Pt ambulated 3 ft with min-mod A x 2 using FWW. (Pain in L leg increased to 9/10. Pt's foot continues to be TTWB during ambulation due to pain.)          Extremity/Trunk Assessments:        RLE   RLE : Within Functional Limits     LLE: ROM of ankle severely limited. MMT NT 2/2 pain in leg; assume decreased L ankle PROM is d/t pain and resistance to therapist versus muscle tightness considering pt was able to reach neutral ankle position in standing but unable to fully WB     Outcome Measures:     [facility] Basic Mobility  Turning from your back to your side while in a flat bed without using bedrails: None  Moving from lying on your back to sitting on the side of a flat bed without using bedrails: None  Moving to and from bed to chair (including a wheelchair): A lot  Standing up from a chair using your arms (e.g. wheelchair or bedside chair): A lot  To walk in hospital room: A lot  Climbing 3-5 steps with railing: Total  Basic Mobility - Total Score: 15         Goals:  Encounter Problems       Encounter Problems (Active)       PT Problem       Pt will perform all bed mobility tasks with IND  (Progressing)       Start:  07/03/25    Expected End:  07/17/25            Pt will  perform all transfers with Mod I and proper safety mechanics  (Progressing)       Start:  07/03/25    Expected End:  07/17/25            Pt will ambulate 100 ft with Mod I using LRD for improved functional independence  (Progressing)       Start:  07/03/25    Expected End:  07/17/25            Pt will be able to negotiate 4 steps with 1 HR with Mod I  (Progressing)       Start:  07/03/25    Expected End:  07/17/25                 Education Documentation  Body Mechanics, taught by TRISTEN Gtz at 7/3/2025  1:27 PM.  Learner: Patient  Readiness: Acceptance  Method: Explanation  Response: Verbalizes Understanding    Mobility Training, taught by TRISTEN Gtz at 7/3/2025  1:27 PM.  Learner: Patient  Readiness: Acceptance  Method: Explanation  Response: Verbalizes Understanding    Education Comments  No comments found.    Completion of this session, clinical decision making, and documentation performed under the supervision/direction of hSarmin Gilbert PT, DPT.   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

## 2025-07-03 NOTE — PROGRESS NOTES
Met with pt, and spoke with pt's  and dgt on the phone (in pt's room).  Discussed therapy recommendation for continued therapy in an inpatient setting, and also wound care.  All agree that 3 hrs would be difficult.  Reviewed snf list, and they are requesting referrals to [facility].  Referrals built and sent through [facility].

## 2025-07-03 NOTE — CARE PLAN
The patient's goals for the shift include remain pain free    The clinical goals for the shift include pt to remain free from pain/ discomfort throughout shift

## 2025-07-03 NOTE — PROGRESS NOTES
Occupational Therapy    Occupational Therapy    Evaluation    Patient Name: Mechelle Alcantara  MRN: [id number]  Today's Date: 7/3/2025  Time Calculation  Start Time: 0957  Stop Time: 1016  Time Calculation (min): 19 min  3116/3116-A    Pt off bedrest, cleared by nurse for pt to participate in therapy eval, prior to entry to room. Vascular consult has been completed.    Assessment  IP OT Assessment  OT Assessment: Pt is a 79 year old female admitted to hospital, for LLE wound and pain. Pt is functioning below baseline, and is normally very IND with ADLs/transfers/functional mobility using no device. Pt presented with deficits in functional endurance, coordination, impaired strength, balance, pain, impaired ADLs, transfers, and functional mobility. Pt would benefit from skilled intensive OT services, upon discharge at moderate vs high intensity level to improve/maximize overall IND/safety prior to homegoing. Pt is very motivated, to improve functional IND.  Prognosis: Fair  Barriers to Discharge Home: Caregiver assistance, Physical needs  Caregiver Assistance: Caregiver assistance needed per identified barriers - however, level of patient's required assistance exceeds assistance available at home  Physical Needs: Stair navigation into home limited by function/safety, In-home setup navigation limited by function/safety, Ambulating household distances limited by function/safety, 24hr mobility assistance needed, 24hr ADL assistance needed, High falls risk due to function or environment  Evaluation/Treatment Tolerance: Patient limited by fatigue, Patient limited by pain  Medical Staff Made Aware: Yes  End of Session Communication: Bedside nurse  End of Session Patient Position: Up in chair, Alarm on    Plan:  Treatment Interventions: ADL retraining, UE strengthening/ROM, Functional transfer training, Endurance training, Patient/family training, Equipment evaluation/education, Neuromuscular reeducation, Fine motor  "coordination activities, Compensatory technique education  OT Frequency: 5 times per week  OT Discharge Recommendations: Moderate intensity level of continued care  OT Recommended Transfer Status: Assist of 2, Moderate assist  OT - OK to Discharge: Yes (okay to discharge to next level of care when cleared by medical team)    Subjective     Current Problem:  1. Cellulitis of left lower extremity        2. Other specified soft tissue disorders  Lower extremity venous duplex left      3. S/P femoral-tibial bypass  Vascular US Ankle Brachial Index (KATHLEEN) Without Exercise    Vascular US Ankle Brachial Index (KATHLEEN) Without Exercise      4. PAD (peripheral artery disease)  Vascular US Ankle Brachial Index (KATHLEEN) Without Exercise    Vascular US Ankle Brachial Index (KATHLEEN) Without Exercise          General:  General  Reason for Referral: Pt referred to OT for impaired ADL's.  Referred By: Dr. Misty Solorio,   Past Medical History Relevant to Rehab: PMH HTN, HLD, PAD    Per medical record \" Mechelle Alcantara is a 79 y.o. female who presented 7/1/2025 with worsening wound of left lower extremity concerning for cellulitis. \" \"Presented from wound care clinic for worsening LLE pain, erythema, and swelling. CBC notable for thrombocytosis. Elevated ESR & CRP. Vasc US for DVT , additional findings include nonocclusive thrombus in the left CFA. W/left fem-tib BPG patent. XR left foot 7/1/25 Soft tissue swelling about the foot. Probable soft tissue wound/ulcer posterior to the calcaneus. Question some subcutaneous air. CT left foot Nonspecific generalized subcutaneous edema about the right foot and  ankle. No acute osseous findings. Note of soft tissue gas. Vascular Surgery consulted. \"    PMH: Cellulitis, HLD (hyperlipidemia), HTN (hypertension), Neuropathy, Non-healing wound of left lower extremity, PAD (peripheral artery disease), Postoperative seroma of skin after non-dermatologic procedure, Postoperative wound dehiscence, PVD " (peripheral vascular disease), and Varicose veins of both lower extremities.     Past Surgical History:  She has a past surgical history that includes Invasive Vascular Procedure (Left, 3/22/2024); Invasive Vascular Procedure (N/A, 3/22/2024); Invasive Vascular Procedure (Left, 3/28/2024); Invasive Vascular Procedure (N/A, 3/28/2024); Cardiac catheterization (N/A, 3/28/2024); Invasive Vascular Procedure (N/A, 5/16/2024); Invasive Vascular Procedure (Left, 10/11/2024); Invasive Vascular Procedure (Left, 10/10/2024); and Invasive Vascular Procedure (N/A, 10/10/2024).    Family/Caregiver Present: No  Co-Treatment: PT  Co-Treatment Reason: for safety with impaired, ADL's, mobility, and transfers  Prior to Session Communication: Bedside nurse  Patient Position Received: Bed, 3 rail up, Alarm on  Preferred Learning Style: auditory, verbal, visual  General Comment: Nursing cleared pt to participate in therapy eval. Pt agreeable/cooperative to engaging in OT eval. Pt seated upright in recliner, end of session, all needs met/in reach, call light in reach, and chair alarm donned.    Precautions:  Medical Precautions: Fall precautions           Pain:  Pain Assessment  Pain Assessment: 0-10  0-10 (Numeric) Pain Score: 5 - Moderate pain  Pain Type: Acute pain  Pain Location: Leg  Pain Orientation: Left (5/10 pain, at rest, increasing to 9/10 pain with movement)    Objective     Cognition:  Overall Cognitive Status: Within Functional Limits  Attention: Within Functional Limits  Memory: Within Funtional Limits  Problem Solving: Within Functional Limits  Safety/Judgement: Within Functional Limits  Processing Speed: Within funtional limits             Home Living:  Type of Home: Mobile home  Lives With: Spouse  Home Adaptive Equipment: Quad cane  Home Layout: One level  Home Access: Stairs to enter with rails  Entrance Stairs-Rails:  (1 rail to enter)  Entrance Stairs-Number of Steps: 4  Bathroom Shower/Tub: Walk-in shower  Bathroom  Toilet: Standard  Bathroom Equipment: Grab bars in shower, Shower chair with back  Home Living Comments: pt has 2 homes per pt. Pt has 1 home, in [place], and 1 home, in [place] that she and , stay in summers, d/t to having A/C at this residence. Pt would return to mobile home, from hospital, if she were to go back to a residence.     Prior Function:  Level of [place]: Independent with ADLs and functional transfers, Needs assistance with homemaking  Receives Help From: Family  ADL Assistance: Independent  Homemaking Assistance: Needs assistance  Ambulatory Assistance: Independent  Prior Function Comments: pt has recently required more assistance, per pt, with IADL's, such as cooking, cleaning, and laundry d/t to LLE pain. Pt reported she has been using quad cane recently for functional mobiity/transfers, and has been IND with ADL's, with increased difficulty recently. Lives with spouse, who recently had CABG surgery, and children have been assisting with IADLs.           ADL:  Eating Assistance: Stand by  Eating Deficit: Setup  Grooming Assistance: Stand by  Grooming Deficit: Setup (set-up for item retrieval, pt combing her hair, seated in recliner)  Bathing Assistance: Minimal  Bathing Deficit: Increased time to complete , Supervision/safety, Buttocks, Right lower leg including foot, Left lower leg including foot  UE Dressing Assistance: Stand by  UE Dressing Deficit: Setup  LE Dressing Assistance: Moderate  LE Dressing Deficit: Requires assistive device for steadying, Pull up over hips  Toileting Assistance with Device: Moderate  Toileting Deficit: Steadying, Increased time to complete, Verbal cueing, Clothing management up, Clothing management down  ADL Comments: pt required set-up for UB ADL's, and demo'ed ability to reach to sock, for adjusting over heel, when seated. Pt required assist x2 to steady balance, in standing position    Activity Tolerance:  Endurance: Tolerates 10 - 20 min exercise  with multiple rests    Bed Mobility/Transfers:   Bed Mobility  Bed Mobility: Yes  Bed Mobility 1  Bed Mobility 1: Supine to sitting  Level of Assistance 1: Close supervision  Bed Mobility Comments 1: supine>sit, CS for safety with bed mobility to EOB  Transfers  Transfer: Yes  Transfer 1  Transfer From 1: Bed to, Stand to  Transfer to 1: Stand  Technique 1: Sit to stand, Stand to sit  Transfer Device 1: Walker, Gait belt  Transfer Level of Assistance 1: Moderate assistance  Trials/Comments 1: modAx1 for 1st STS from EOB, with VCs for hand placement, safety, and sequencing  Transfers 2  Transfer From 2: Bed to  Transfer to 2: Stand  Technique 2: Sit to stand, Stand to sit  Transfer Device 2: Walker, Gait belt  Transfer Level of Assistance 2: Minimum assistance, Moderate assistance  Trials/Comments 2: modAx1 +minAx1 required to elevate from EOB, for 2nd STS transfer, using RW with VC's for safety, sequencing and hand placement    Ambulation/Gait Training:  Functional Mobility  Functional Mobility Performed: Yes  Functional Mobility 1  Surface 1: Level tile  Device 1: Rolling walker  Functional Mobility Support Devices: Gait belt  Assistance 1: Minimum assistance, Moderate assistance  Comments 1: pt required minAx1+ modAx1 for functional mobility, at min household distance (~3 ft) within her room, using RW, from bed>recliner. Noted pt to demo TTWB with LLE/foot, d/t to increased pain, and inability to tolerate bearing weight on foot, at this time, d/t to pain.    Sitting Balance:  Static Sitting Balance  Static Sitting-Balance Support: Right upper extremity supported, Left upper extremity supported  Static Sitting-Level of Assistance: Close supervision  Static Sitting-Comment/Number of Minutes: fair+  Dynamic Sitting Balance  Dynamic Sitting-Balance Support: Right upper extremity supported, Left upper extremity supported  Dynamic Sitting-Level of Assistance: Close supervision  Dynamic Sitting-Balance: Forward lean,  Reaching for objects  Dynamic Sitting-Comments: fair-    Standing Balance:  Static Standing Balance  Static Standing-Balance Support: Left upper extremity supported, Right upper extremity supported  Static Standing-Level of Assistance: Moderate assistance, Minimum assistance  Static Standing-Comment/Number of Minutes: poor to fair-  Dynamic Standing Balance  Dynamic Standing-Balance Support: Right upper extremity supported, Left upper extremity supported  Dynamic Standing-Comments: poor+    Vision: Vision - Basic Assessment  Current Vision: No visual deficits   and      Sensation:  Light Touch: No apparent deficits  Sharp/Dull: No apparent deficits    Strength:  Strength Comments: WFL, 4/5 for BUE ROM planes/joints grossly           Coordination:  Movements are Fluid and Coordinated: Yes  Coordination Comment: appears to demo [facility]     Hand Function:  Hand Function  Gross Grasp: Functional  Coordination: Functional    Extremities: RUE   RUE : Within Functional Limits and LUE   LUE: Within Functional Limits    Outcome Measures: [facility] Daily Activity  Putting on and taking off regular lower body clothing: A lot  Bathing (including washing, rinsing, drying): A lot  Putting on and taking off regular upper body clothing: A little  Toileting, which includes using toilet, bedpan or urinal: A lot  Taking care of personal grooming such as brushing teeth: A little  Eating Meals: A little  Daily Activity - Total Score: 15                       EDUCATION:  Education  Individual(s) Educated: Patient  Education Provided: Risk and benefits of OT discussed with patient or other, Fall precautons, POC discussed and agreed upon  Risk and Benefits Discussed with Patient/Caregiver/Other: yes  Patient/Caregiver Demonstrated Understanding: yes  Plan of Care Discussed and Agreed Upon: yes  Patient Response to Education: Patient/Caregiver Verbalized Understanding of Information  Education Documentation  Handouts, taught by Magali Cordero OT  at 7/3/2025  1:47 PM.  Learner: Patient  Readiness: Acceptance  Method: Explanation  Response: Verbalizes Understanding    Home Exercise Program, taught by Magali Cordero OT at 7/3/2025  1:47 PM.  Learner: Patient  Readiness: Acceptance  Method: Explanation  Response: Verbalizes Understanding    Body Mechanics, taught by Magali Cordero OT at 7/3/2025  1:47 PM.  Learner: Patient  Readiness: Acceptance  Method: Explanation  Response: Verbalizes Understanding    ADL Training, taught by Magali Cordero OT at 7/3/2025  1:47 PM.  Learner: Patient  Readiness: Acceptance  Method: Explanation  Response: Verbalizes Understanding    Handouts, taught by Mgaali Cordero OT at 7/3/2025 12:20 PM.  Learner: Patient  Readiness: Acceptance  Method: Explanation  Response: Verbalizes Understanding, Needs Reinforcement    Home Exercise Program, taught by Magali Cordero OT at 7/3/2025 12:20 PM.  Learner: Patient  Readiness: Acceptance  Method: Explanation  Response: Verbalizes Understanding, Needs Reinforcement    Body Mechanics, taught by Magali Cordero OT at 7/3/2025 12:20 PM.  Learner: Patient  Readiness: Acceptance  Method: Explanation  Response: Verbalizes Understanding, Needs Reinforcement    ADL Training, taught by Magali Cordero OT at 7/3/2025 12:20 PM.  Learner: Patient  Readiness: Acceptance  Method: Explanation  Response: Verbalizes Understanding, Needs Reinforcement    Education Comments  No comments found.        Goals:   Encounter Problems       Encounter Problems (Active)       OT Goals       Pt will complete toileting and toilet hygiene, with minAx1 or less using RW and BSC prior to discharge from hospital.  (Progressing)       Start:  07/03/25    Expected End:  07/17/25            Pt will complete LBD with minAx1 or less using RW and LH ADL AE PRN prior to discharge from hospital.  (Progressing)       Start:  07/03/25    Expected End:  07/17/25            Pt will complete bathing, with minAx1 or less using RW and LH ADL AE/DME  prior to discharge from, hospital.  (Progressing)       Start:  07/03/25    Expected End:  07/17/25            Pt will completed bed<> chair transfer, using RW with minAx1 or less prior to discharge from hospital.  (Progressing)       Start:  07/03/25    Expected End:  07/17/25

## 2025-07-03 NOTE — PROGRESS NOTES
"  Internal Medicine Daily Progress Note     Patient Mechelle Alcantara PCP Mj Latif DO    MRN [id number]  Admission Date 7/1/2025      Mechelle Alcantara is a 79 y.o. female who presented 7/1/2025 with worsening wound of left lower extremity concerning for cellulitis.     Subjective    Subjective   She reports feeling worse today. She notes increased LLE pain and continued inability to bear weight.  She also reports itching of the LLE.  She states she has not slept well overnight.       Objective   Vital Signs:  Visit Vitals  /71   Pulse 71   Temp 36.7 °C (98.1 °F)   Resp 16   Ht 1.676 m (5' 6\")   Wt 64.6 kg (142 lb 6.7 oz)   SpO2 96%   BMI 22.99 kg/m²   OB Status Postmenopausal   Smoking Status Former   BSA 1.73 m²        Physical Examination:  Physical Exam  Constitutional:       Appearance: Normal appearance.   HENT:      Head: Normocephalic and atraumatic.   Eyes:      Conjunctiva/sclera: Conjunctivae normal.   Cardiovascular:      Rate and Rhythm: Normal rate and regular rhythm.      Heart sounds: Normal heart sounds.   Pulmonary:      Effort: Pulmonary effort is normal.      Breath sounds: Normal breath sounds.   Musculoskeletal:      Right lower leg: No edema.      Comments: LLE wound   Skin:     General: Skin is warm and dry.   Neurological:      General: No focal deficit present.      Mental Status: She is alert and oriented to person, place, and time.   Psychiatric:         Mood and Affect: Mood normal.         Behavior: Behavior normal.          Laboratory Data:    Results from last 7 days   Lab Units 07/03/25  0611 07/02/25  0627 07/01/25  1305   WBC AUTO x10*3/uL 8.0 8.2 8.8   RBC AUTO x10*6/uL 3.65* 3.44* 4.03   HEMOGLOBIN g/dL 10.6* 10.0* 11.8*     Results from last 7 days   Lab Units 07/03/25  0611 07/02/25  0627 07/01/25  1305   SODIUM mmol/L 137 135* 134*   POTASSIUM mmol/L 3.4* 3.5 3.8   CHLORIDE mmol/L 104 104 98   CO2 mmol/L 25 24 26   BUN mg/dL 16 18 21   CREATININE mg/dL 0.61 " 0.63 0.69   CALCIUM mg/dL 7.8* 7.5* 8.7   PHOSPHORUS mg/dL 2.7 3.0  --    MAGNESIUM mg/dL 2.25 2.06  --    BILIRUBIN TOTAL mg/dL  --   --  0.5   ALT U/L  --   --  16   AST U/L  --   --  19       Imaging:  Imaging  CT foot left wo IV contrast  Result Date: 7/2/2025  Nonspecific generalized subcutaneous edema about the right foot and ankle.   No acute osseous findings.   Note of soft tissue gas.       MACRO: None   Signed by: Narinder Shay 7/2/2025 8:35 AM Dictation workstation:   [id number]    XR foot left 3+ views  Result Date: 7/1/2025  Soft tissue swelling about the foot. Probable soft tissue wound/ulcer posterior to the calcaneus. Question some subcutaneous air. Recommend CT with contrast for further evaluation.     MACRO: None   Signed by: Idris Ricks 7/1/2025 8:00 PM Dictation workstation:   [id number]      Cardiology, Vascular, and Other Imaging  Vascular US Ankle Brachial Index (KATHLEEN) Without Exercise  Result Date: 7/3/2025            [facility] [id number] [id number]     Tel [phone] Fax [phone]  Vascular Lab Report  [facility] US ANKLE BRACHIAL INDEX (KATHLEEN) WITHOUT EXERCISE Patient Name:     SUSIE RIDDLE    Reading           [id number] Andree Serrano                                        Physician:        MD Study Date:       7/2/2025             Ordering          [id number] TANYA DO                                        Provider: MRN/PID:          [id number]             Fellow: Accession#:       [id number]         Technologist:     Yarelis Ley RVT Date of           1945 / 79      Technologist 2: Birth/Age:        years Gender:           F                    Encounter#:       [id number] Admission Status: Inpatient            Location          [facility]                                        Performed:  Diagnosis/ICD: Peripheral vascular disease, unspecified-I73.9 Indication:    Peripheral vascular disease CPT Codes:     98365 Peripheral  artery KATHLEEN Only  Pertinent History: HTN and Claudication.  CONCLUSIONS: Right Lower PVR: No evidence of arterial occlusive disease in the right lower extremity at rest. Normal digital perfusion noted. Monophasic flow is noted in the right posterior tibial artery. Multiphasic flow is noted in the right common femoral artery and right dorsalis pedis artery. Dampened PVR waveform at the ankle and PPG tracing of the great toe. Left Lower PVR: Evidence of moderate arterial occlusive disease in the left lower extremity at rest. Decreased digital perfusion noted. Monophasic flow is noted in the left posterior tibial artery and left dorsalis pedis artery. Multiphasic flow is noted in the left common femoral artery. Dampened PVR waveform at the ankle. Dampened PPG tracing of the great toe.  Comparison: Compared with study from 5/20/2025, no significant change.  Imaging & Doppler Findings:  RIGHT Lower PVR                Pressures Ratios Right Posterior Tibial (Ankle) 126 mmHg  0.87 Right Dorsalis Pedis (Ankle)   134 mmHg  0.92 Right Digit (Great Toe)        149 mmHg  1.03   LEFT Lower PVR                Pressures Ratios Left Posterior Tibial (Ankle) 46 mmHg   0.32 Left Dorsalis Pedis (Ankle)   75 mmHg   0.52 Left Digit (Great Toe)        39 mmHg   0.27                      Right     Left Brachial Pressure 142 mmHg 145 mmHg   [id number] Andree Serrano MD Electronically signed by [id number] Andree Serrano MD on 7/3/2025 at 12:40:56 PM  ** Final **          Medications:  Scheduled medications  Scheduled Medications[1]  Continuous medications  Continuous Medications[2]  PRN medications  PRN Medications[3]    Assessment    Assessment & Plan   Ms. Mechelle Alcantara is a 79 y.o. female who presents with worsening wound of the left lower extremity, asymmetrical swelling and crisis of ambulation ISO PAD.     Acute Left CFA Thrombus  Asymmetrical Swelling 2/2 to above   Crisis of ambulation 2/2 to above   Chronic wound of LLE  Medication  non-adherence  PAD s/p Redo Left Femoral Endarterectomy (7/2024) w/left femoral to PTA bypass w/reverse greater saphenous vein (7/25/24) c/b left groin seroma s/p I &D (9/12/24) requiring LLE angio w/EKOS lysis catheter (10/10/24)  - LLE venous duplex showing CFA thrombus  - Restarted home BP meds  - We will begin loading dose of xarelto for treatment of her arterial thrombus  - Continue neurovascular checks  - s/p vanc/zosyn in the ED  - Continue cefazolin  - Pulses heard with doppler  - PT/OT  - Wound care following  - CT of L foot notes no soft tissue gas   - KATHLEEN showing markedly decreased pressure ratios on the left side  - Vascular surgery encouraged patient adherence with home aspirin and statin, no intervention at this time  - AM labs with RFP, mag, CBC        Global Plan of Care  Fluid: PRN  Electrolytes: K>4, Mg >2  Nutrition: Regular  DVT Prophylaxis: AC?  GI Prophylaxis: PPI?  Code Status: Full Code      Patient seen and discussed with the attending.  Signature: Giselle Manjarrez MD  Date: July 3, 2025  This note has been transcribed using Dragon voice recognition system and there is a possibility of unintentional typing misprints.  Any information found to be copied from previous providers is done in the best interest of the patient to provide accurate, quality, and continuity of care.             [1] aspirin, 81 mg, oral, Daily  ceFAZolin, 2 g, intravenous, q8h  nystatin, 1 Application, Topical, BID  polyethylene glycol, 17 g, oral, Daily  rivaroxaban, 15 mg, oral, BID   Followed by  [START ON 7/25/2025] rivaroxaban, 20 mg, oral, Daily with breakfast  rosuvastatin, 20 mg, oral, Nightly  valsartan, 160 mg, oral, Daily  [2]    [3] PRN medications: acetaminophen

## 2025-07-04 LAB
HOLD SPECIMEN: NORMAL
MAGNESIUM SERPL-MCNC: 2.31 MG/DL (ref 1.6–2.4)

## 2025-07-04 PROCEDURE — 2500000001 HC RX 250 WO HCPCS SELF ADMINISTERED DRUGS (ALT 637 FOR MEDICARE OP): Performed by: STUDENT IN AN ORGANIZED HEALTH CARE EDUCATION/TRAINING PROGRAM

## 2025-07-04 PROCEDURE — 2500000002 HC RX 250 W HCPCS SELF ADMINISTERED DRUGS (ALT 637 FOR MEDICARE OP, ALT 636 FOR OP/ED)

## 2025-07-04 PROCEDURE — 36415 COLL VENOUS BLD VENIPUNCTURE: CPT

## 2025-07-04 PROCEDURE — 2500000001 HC RX 250 WO HCPCS SELF ADMINISTERED DRUGS (ALT 637 FOR MEDICARE OP)

## 2025-07-04 PROCEDURE — 2500000002 HC RX 250 W HCPCS SELF ADMINISTERED DRUGS (ALT 637 FOR MEDICARE OP, ALT 636 FOR OP/ED): Performed by: STUDENT IN AN ORGANIZED HEALTH CARE EDUCATION/TRAINING PROGRAM

## 2025-07-04 PROCEDURE — 2500000004 HC RX 250 GENERAL PHARMACY W/ HCPCS (ALT 636 FOR OP/ED)

## 2025-07-04 PROCEDURE — 2500000004 HC RX 250 GENERAL PHARMACY W/ HCPCS (ALT 636 FOR OP/ED): Performed by: STUDENT IN AN ORGANIZED HEALTH CARE EDUCATION/TRAINING PROGRAM

## 2025-07-04 PROCEDURE — 1100000001 HC PRIVATE ROOM DAILY

## 2025-07-04 PROCEDURE — 83735 ASSAY OF MAGNESIUM: CPT

## 2025-07-04 PROCEDURE — 99232 SBSQ HOSP IP/OBS MODERATE 35: CPT

## 2025-07-04 RX ORDER — CEPHALEXIN 500 MG/1
500 CAPSULE ORAL EVERY 6 HOURS SCHEDULED
Status: DISCONTINUED | OUTPATIENT
Start: 2025-07-04 | End: 2025-07-05 | Stop reason: HOSPADM

## 2025-07-04 RX ORDER — RIVAROXABAN 2.5 MG/1
5 TABLET, FILM COATED ORAL
Status: DISCONTINUED | OUTPATIENT
Start: 2025-07-04 | End: 2025-07-05 | Stop reason: HOSPADM

## 2025-07-04 RX ADMIN — VALSARTAN 160 MG: 160 TABLET, FILM COATED ORAL at 08:53

## 2025-07-04 RX ADMIN — ROSUVASTATIN CALCIUM 20 MG: 20 TABLET, FILM COATED ORAL at 21:01

## 2025-07-04 RX ADMIN — CEPHALEXIN 500 MG: 500 CAPSULE ORAL at 17:41

## 2025-07-04 RX ADMIN — NYSTATIN 1 APPLICATION: 100000 POWDER TOPICAL at 08:57

## 2025-07-04 RX ADMIN — CEPHALEXIN 500 MG: 500 CAPSULE ORAL at 23:31

## 2025-07-04 RX ADMIN — RIVAROXABAN 5 MG: 2.5 TABLET, FILM COATED ORAL at 17:41

## 2025-07-04 RX ADMIN — ASPIRIN 81 MG: 81 TABLET, COATED ORAL at 08:53

## 2025-07-04 RX ADMIN — CEFAZOLIN SODIUM 2 G: 2 INJECTION, SOLUTION INTRAVENOUS at 05:23

## 2025-07-04 RX ADMIN — RIVAROXABAN 15 MG: 15 TABLET, FILM COATED ORAL at 08:53

## 2025-07-04 RX ADMIN — CEPHALEXIN 500 MG: 500 CAPSULE ORAL at 12:53

## 2025-07-04 ASSESSMENT — COGNITIVE AND FUNCTIONAL STATUS - GENERAL
DRESSING REGULAR UPPER BODY CLOTHING: A LITTLE
WALKING IN HOSPITAL ROOM: A LITTLE
STANDING UP FROM CHAIR USING ARMS: A LITTLE
EATING MEALS: A LITTLE
HELP NEEDED FOR BATHING: A LITTLE
HELP NEEDED FOR BATHING: A LITTLE
MOBILITY SCORE: 17
TOILETING: A LITTLE
MOVING FROM LYING ON BACK TO SITTING ON SIDE OF FLAT BED WITH BEDRAILS: A LITTLE
PERSONAL GROOMING: A LITTLE
TURNING FROM BACK TO SIDE WHILE IN FLAT BAD: A LITTLE
MOVING TO AND FROM BED TO CHAIR: A LITTLE
CLIMB 3 TO 5 STEPS WITH RAILING: A LITTLE
WALKING IN HOSPITAL ROOM: A LITTLE
TURNING FROM BACK TO SIDE WHILE IN FLAT BAD: A LITTLE
MOVING FROM LYING ON BACK TO SITTING ON SIDE OF FLAT BED WITH BEDRAILS: A LITTLE
DAILY ACTIVITIY SCORE: 18
MOVING TO AND FROM BED TO CHAIR: A LITTLE
DRESSING REGULAR LOWER BODY CLOTHING: A LITTLE
DRESSING REGULAR UPPER BODY CLOTHING: A LITTLE
DRESSING REGULAR LOWER BODY CLOTHING: A LITTLE
MOBILITY SCORE: 18
TOILETING: A LITTLE
DAILY ACTIVITIY SCORE: 19
PERSONAL GROOMING: A LITTLE
STANDING UP FROM CHAIR USING ARMS: A LITTLE
CLIMB 3 TO 5 STEPS WITH RAILING: A LOT

## 2025-07-04 ASSESSMENT — PAIN - FUNCTIONAL ASSESSMENT
PAIN_FUNCTIONAL_ASSESSMENT: 0-10
PAIN_FUNCTIONAL_ASSESSMENT: 0-10

## 2025-07-04 ASSESSMENT — PAIN SCALES - GENERAL
PAINLEVEL_OUTOF10: 0 - NO PAIN
PAINLEVEL_OUTOF10: 0 - NO PAIN

## 2025-07-04 NOTE — PROGRESS NOTES
07/04/25 1422   Intensity of Service   Intensity of Service 0-30 min     1300 Called  to confirm FOC. [facility].  We are to set up transport. Facility notified.  1420 Bed will be available  on 7/5/25 a.m..  notified and patient informed.

## 2025-07-04 NOTE — PROGRESS NOTES
Mechelle Alcantara is a 79 y.o. female on day 2 of admission presenting with Cellulitis of left lower extremity.      Subjective     - No acute overnight events.    -This morning, patient reports improvement of her LLE with pain rated a 5/10 (was initially 10/10 on presentation).  Reports improvement in color of her LLE with lightening of the color and less swelling.        Objective     Last Recorded Vitals  BP (!) 186/76 (BP Location: Left arm, Patient Position: Lying)   Pulse 60   Temp 36.2 °C (97.2 °F) (Temporal)   Resp 14   Wt 64.6 kg (142 lb 6.7 oz)   SpO2 98%   Intake/Output last 3 Shifts:    Intake/Output Summary (Last 24 hours) at 7/4/2025 1335  Last data filed at 7/3/2025 1620  Gross per 24 hour   Intake 480 ml   Output 250 ml   Net 230 ml       Admission Weight  Weight: 65.8 kg (145 lb) (07/01/25 1301)    Daily Weight  07/01/25 : 64.6 kg (142 lb 6.7 oz)    Image Results  Vascular US Ankle Brachial Index (KATHLEEN) Without Exercise              [facility]  [address] [place] [address]      Tel [phone] Fax [phone]       Vascular Lab Report     [facility] US ANKLE BRACHIAL INDEX (KATHLEEN) WITHOUT EXERCISE    Patient Name:     MECHELLE ALCANTARA    Reading           [id number] Andree Serrano                                         Physician:        MD  Study Date:       7/2/2025             Ordering          [id number] TANYA DO                                         Provider:  MRN/PID:          [id number]             Fellow:  Accession#:       [id number]         Technologist:     Yarelis Ley T  Date of           1945 / 79      Technologist 2:  Birth/Age:        years  Gender:           F                    Encounter#:       [id number]  Admission Status: Inpatient            Location          [facility]                                         Performed:       Diagnosis/ICD: Peripheral vascular disease, unspecified-I73.9  Indication:    Peripheral vascular  disease  CPT Codes:     16961 Peripheral artery KATHLEEN Only       Pertinent History: HTN and Claudication.       CONCLUSIONS:  Right Lower PVR: No evidence of arterial occlusive disease in the right lower extremity at rest. Normal digital perfusion noted. Monophasic flow is noted in the right posterior tibial artery. Multiphasic flow is noted in the right common femoral artery and right dorsalis pedis artery. Dampened PVR waveform at the ankle and PPG tracing of the great toe.  Left Lower PVR: Evidence of moderate arterial occlusive disease in the left lower extremity at rest. Decreased digital perfusion noted. Monophasic flow is noted in the left posterior tibial artery and left dorsalis pedis artery. Multiphasic flow is noted in the left common femoral artery. Dampened PVR waveform at the ankle. Dampened PPG tracing of the great toe.     Comparison:  Compared with study from 5/20/2025, no significant change.     Imaging & Doppler Findings:     RIGHT Lower PVR                Pressures Ratios  Right Posterior Tibial (Ankle) 126 mmHg  0.87  Right Dorsalis Pedis (Ankle)   134 mmHg  0.92  Right Digit (Great Toe)        149 mmHg  1.03          LEFT Lower PVR                Pressures Ratios  Left Posterior Tibial (Ankle) 46 mmHg   0.32  Left Dorsalis Pedis (Ankle)   75 mmHg   0.52  Left Digit (Great Toe)        39 mmHg   0.27                             Right     Left  Brachial Pressure 142 mmHg 145 mmHg          [id number] Andree Serrano MD  Electronically signed by [id number] Andree Serrano MD on 7/3/2025 at 12:40:56 PM       ** Final **      Physical Exam  Constitutional:       General: She is not in acute distress.     Appearance: Normal appearance.   HENT:      Head: Normocephalic and atraumatic.   Cardiovascular:      Rate and Rhythm: Normal rate.   Pulmonary:      Effort: Pulmonary effort is normal. No respiratory distress.      Breath sounds: Normal breath sounds. No wheezing.   Abdominal:      General: Abdomen is flat.  There is no distension.      Palpations: Abdomen is soft.      Tenderness: There is no abdominal tenderness. There is no guarding or rebound.   Musculoskeletal:      Comments: Erythema of dorsal aspect of LLE.  No significant tenderness on palpation.   Skin:     General: Skin is warm and dry.   Neurological:      General: No focal deficit present.      Mental Status: She is alert and oriented to person, place, and time.   Psychiatric:         Mood and Affect: Mood normal.         Behavior: Behavior normal.         Relevant Results    Scheduled medications  Scheduled Medications[1]  Continuous medications  Continuous Medications[2]  PRN medications  PRN Medications[3]    Results for orders placed or performed during the hospital encounter of 07/01/25 (from the past 24 hours)   Magnesium   Result Value Ref Range    Magnesium 2.31 1.60 - 2.40 mg/dL   Lavender Top   Result Value Ref Range    Extra Tube Hold for add-ons.      Vascular US Ankle Brachial Index (KATHLEEN) Without Exercise  Result Date: 7/3/2025            [facility] [address] [place] [address]     Tel [phone] Fax [phone]  Vascular Lab Report  [facility] US ANKLE BRACHIAL INDEX (KATHLEEN) WITHOUT EXERCISE Patient Name:     SUSIE RIDDLE    Reading           [id number] Adnree Zach                                        Physician:        MD Study Date:       7/2/2025             Ordering          [id number] TANYA DO                                        Provider: MRN/PID:          [id number]             Fellow: Accession#:       [id number]         Technologist:     Yarelis Ley RVT Date of           1945 / 79      Technologist 2: Birth/Age:        years Gender:           F                    Encounter#:       [id number] Admission Status: Inpatient            Location          [facility]                                        Performed:  Diagnosis/ICD: Peripheral vascular disease, unspecified-I73.9 Indication:     Peripheral vascular disease CPT Codes:     99803 Peripheral artery KATHLEEN Only  Pertinent History: HTN and Claudication.  CONCLUSIONS: Right Lower PVR: No evidence of arterial occlusive disease in the right lower extremity at rest. Normal digital perfusion noted. Monophasic flow is noted in the right posterior tibial artery. Multiphasic flow is noted in the right common femoral artery and right dorsalis pedis artery. Dampened PVR waveform at the ankle and PPG tracing of the great toe. Left Lower PVR: Evidence of moderate arterial occlusive disease in the left lower extremity at rest. Decreased digital perfusion noted. Monophasic flow is noted in the left posterior tibial artery and left dorsalis pedis artery. Multiphasic flow is noted in the left common femoral artery. Dampened PVR waveform at the ankle. Dampened PPG tracing of the great toe.  Comparison: Compared with study from 5/20/2025, no significant change.  Imaging & Doppler Findings:  RIGHT Lower PVR                Pressures Ratios Right Posterior Tibial (Ankle) 126 mmHg  0.87 Right Dorsalis Pedis (Ankle)   134 mmHg  0.92 Right Digit (Great Toe)        149 mmHg  1.03   LEFT Lower PVR                Pressures Ratios Left Posterior Tibial (Ankle) 46 mmHg   0.32 Left Dorsalis Pedis (Ankle)   75 mmHg   0.52 Left Digit (Great Toe)        39 mmHg   0.27                      Right     Left Brachial Pressure 142 mmHg 145 mmHg   [id number] Andree Serrano MD Electronically signed by [id number] Andree Serrano MD on 7/3/2025 at 12:40:56 PM  ** Final **     CT foot left wo IV contrast  Result Date: 7/2/2025  Interpreted By:  Narinder Shay, STUDY: CT FOOT LEFT WO IV CONTRAST; ;  7/2/2025 7:41 am   INDICATION: Signs/Symptoms:X-ray concern for air.     COMPARISON: Plain film radiographs July 1   ACCESSION NUMBER(S): [id number]   ORDERING CLINICIAN: POLA KAMARA   TECHNIQUE: Multiple thin-section axial images right ankle without contrast.   FINDINGS: There is no evidence of soft  tissue gas. The previous suspected areas of possible deep soft tissue gas not confirmed by CT.   No discrete focal fluid collections.   Generalized nonspecific diffuse subcutaneous edema about the entirety of the visualized foot and ankle.   No definite erosive change or periosteal reaction.   Mild degenerative changes of the midfoot, ankle, and hindfoot.   Small calcaneal spurs.   No evidence of acute fracture.   No bony lesions.       Nonspecific generalized subcutaneous edema about the right foot and ankle.   No acute osseous findings.   Note of soft tissue gas.       MACRO: None   Signed by: Narinder Shay 7/2/2025 8:35 AM Dictation workstation:   [id number]    XR foot left 3+ views  Result Date: 7/1/2025  Interpreted By:  Idris Ricks, STUDY: XR FOOT LEFT 3+ VIEWS; ;  7/1/2025 6:20 pm   INDICATION: Signs/Symptoms:decreased range of motion, can't bear weight.   COMPARISON: None.   ACCESSION NUMBER(S): [id number]   ORDERING CLINICIAN: POLA KAMARA   FINDINGS: No fracture or dislocation. No radiopaque foreign body. Soft tissue swelling throughout the foot. Question a soft tissue wound/ulceration posterior to the calcaneus. Question some subcutaneous air along the plantar surface of the foot laterally and superior to the calcaneus. Osteopenia.       Soft tissue swelling about the foot. Probable soft tissue wound/ulcer posterior to the calcaneus. Question some subcutaneous air. Recommend CT with contrast for further evaluation.     MACRO: None   Signed by: Idris Ricks 7/1/2025 8:00 PM Dictation workstation:   [id number]    Lower extremity venous duplex left  Result Date: 7/1/2025            [facility] [address] [place] [address]     Tel [phone] Fax [phone]  Vascular Lab Report  VASC US LOWER EXTREMITY VENOUS DUPLEX LEFT Patient Name:      SUSIE LEMONLIVAN     Reading Physician:  [id number] ERIN Church MD  Study Date:        7/1/2025              Ordering Provider:  [id number] MAYLIN CHAKRABORTY MRN/PID:           [id number]              Fellow: Accession#:        [id number]          Technologist:       Racquel Looney RVT, RDMS Date of Birth/Age: 1945 / 79 years Technologist 2: Gender:            F                     Encounter#:         [id number] Admission Status:  Emergency             Location Performed: [facility]  Diagnosis/ICD: Left leg swelling-M79.89 Indication:    Limb swelling CPT Codes:     42206 Peripheral venous duplex scan for DVT Limited  Pertinent History: LE Edema, LE Bypass graft and PVD.  **CRITICAL RESULT** Critical Result: Left CFA thrombus Notification called to Dr. Chakraborty, Dr. Raines on 7/1/2025 at 3:25:26 PM. Acknowledged critical results notification communicated via secure chat by Racquel Looney RDMS, RVT.  CONCLUSIONS: Right Lower Venous: The right common femoral vein demonstrates normal spontaneous and respirophasic flow. Left Lower Venous: No evidence of acute deep vein thrombus visualized in the left lower extremity. Additional Findings: There is a non occlusive thrombus in the left common femoral artery. The left fem-tib BPG is patent.  Comparison: Compared with study from 3/19/2024, no significant change.No evidence of DVT.  Imaging & Doppler Findings:  Right        Flow CFV   Spontaneous/Phasic  Left                  Compress Thrombus        Flow Distal External Iliac   Yes      None       Pulsatile CFV                     Yes      None   Spontaneous/Phasic PFV                     Yes      None FV Proximal             Yes      None   Spontaneous/Phasic FV Mid                  Yes      None FV Distal               Yes      None Popliteal               Yes      None   Spontaneous/Phasic Peroneal                 Yes      None PTV                     Yes      None  [id number] ERIN Church MD Electronically signed by [id number] ERIN Church MD on 7/1/2025 at 5:49:44 PM  ** Final **        Assessment & Plan  Cellulitis of left lower extremity    Ms. Mechelle Alcantara is a 79 y.o. female who presents with worsening wound of the left lower extremity, asymmetrical swelling and crisis of ambulation ISO PAD.     Acute Left CFA Thrombus  Asymmetrical Swelling 2/2 to above   Crisis of ambulation 2/2 to above   Chronic wound of LLE  Medication non-adherence  PAD s/p Redo Left Femoral Endarterectomy (7/2024) w/left femoral to PTA bypass w/reverse greater saphenous vein (7/25/24) c/b left groin seroma s/p I &D (9/12/24) requiring LLE angio w/EKOS lysis catheter (10/10/24)  - LLE venous duplex showing CFA thrombus  - CT of L foot notes no soft tissue gas   - KATHLEEN showing markedly decreased pressure ratios on the left side  Plan  - s/p loading dose of xarelto for treatment of her arterial thrombus  - Continue Xarelto  - Continue neurovascular checks  - s/p vanc/zosyn in the ED  - Switch cefazolin to oral Keflex  - PT/OT-moderate intensity care-patient requesting referrals to [facility]'s-referrals sent  - Wound care following  - Vascular surgery encouraged patient adherence with home aspirin and statin, no intervention at this time        Global Plan of Care  Fluid: PRN  Electrolytes: K>4, Mg >2  Nutrition: Regular  DVT Prophylaxis: On therapeutic Xarelto  Code Status: Full Code        Lorena Cheng, DO  Internal Medicine, PGY1             [1] aspirin, 81 mg, oral, Daily  cephalexin, 500 mg, oral, q6h FRANSISCO  nystatin, 1 Application, Topical, BID  polyethylene glycol, 17 g, oral, Daily  rivaroxaban, 15 mg, oral, BID   Followed by  [START ON 7/25/2025] rivaroxaban, 20 mg, oral, Daily with breakfast  rosuvastatin, 20 mg, oral, Nightly  valsartan, 160 mg, oral, Daily  [2]    [3] PRN medications:  acetaminophen

## 2025-07-04 NOTE — CARE PLAN
Problem: Pain - Adult  Goal: Verbalizes/displays adequate comfort level or baseline comfort level  Outcome: Progressing     Problem: Safety - Adult  Goal: Free from fall injury  Outcome: Progressing     Problem: Chronic Conditions and Co-morbidities  Goal: Patient's chronic conditions and co-morbidity symptoms are monitored and maintained or improved  Outcome: Progressing     Problem: Nutrition  Goal: Nutrient intake appropriate for maintaining nutritional needs  Outcome: Progressing     Problem: Fall/Injury  Goal: Not fall by end of shift  Outcome: Progressing     Problem: Pain  Goal: Takes deep breaths with improved pain control throughout the shift  Outcome: Progressing   The patient's goals for the shift include      The clinical goals for the shift include pain control, free from falls

## 2025-07-05 VITALS
RESPIRATION RATE: 17 BRPM | HEART RATE: 69 BPM | TEMPERATURE: 96.8 F | SYSTOLIC BLOOD PRESSURE: 163 MMHG | DIASTOLIC BLOOD PRESSURE: 70 MMHG | OXYGEN SATURATION: 96 % | HEIGHT: 66 IN | BODY MASS INDEX: 22.89 KG/M2 | WEIGHT: 142.42 LBS

## 2025-07-05 PROCEDURE — 99239 HOSP IP/OBS DSCHRG MGMT >30: CPT

## 2025-07-05 PROCEDURE — 2500000004 HC RX 250 GENERAL PHARMACY W/ HCPCS (ALT 636 FOR OP/ED)

## 2025-07-05 PROCEDURE — 2500000001 HC RX 250 WO HCPCS SELF ADMINISTERED DRUGS (ALT 637 FOR MEDICARE OP): Performed by: STUDENT IN AN ORGANIZED HEALTH CARE EDUCATION/TRAINING PROGRAM

## 2025-07-05 PROCEDURE — 2500000002 HC RX 250 W HCPCS SELF ADMINISTERED DRUGS (ALT 637 FOR MEDICARE OP, ALT 636 FOR OP/ED)

## 2025-07-05 PROCEDURE — 2500000001 HC RX 250 WO HCPCS SELF ADMINISTERED DRUGS (ALT 637 FOR MEDICARE OP)

## 2025-07-05 RX ORDER — VALSARTAN 160 MG/1
160 TABLET ORAL DAILY
Start: 2025-07-05 | End: 2025-08-04

## 2025-07-05 RX ORDER — CEPHALEXIN 500 MG/1
500 CAPSULE ORAL EVERY 6 HOURS SCHEDULED
Start: 2025-07-05 | End: 2025-07-10

## 2025-07-05 RX ORDER — RIVAROXABAN 2.5 MG/1
5 TABLET, FILM COATED ORAL
Start: 2025-07-05 | End: 2025-08-04

## 2025-07-05 RX ORDER — ROSUVASTATIN CALCIUM 20 MG/1
20 TABLET, COATED ORAL NIGHTLY
Start: 2025-07-05 | End: 2025-08-04

## 2025-07-05 RX ADMIN — VALSARTAN 160 MG: 160 TABLET, FILM COATED ORAL at 09:48

## 2025-07-05 RX ADMIN — CEPHALEXIN 500 MG: 500 CAPSULE ORAL at 12:18

## 2025-07-05 RX ADMIN — ASPIRIN 81 MG: 81 TABLET, COATED ORAL at 09:48

## 2025-07-05 RX ADMIN — RIVAROXABAN 5 MG: 2.5 TABLET, FILM COATED ORAL at 09:49

## 2025-07-05 RX ADMIN — CEPHALEXIN 500 MG: 500 CAPSULE ORAL at 05:42

## 2025-07-05 RX ADMIN — NYSTATIN 1 APPLICATION: 100000 POWDER TOPICAL at 09:49

## 2025-07-05 ASSESSMENT — COGNITIVE AND FUNCTIONAL STATUS - GENERAL
CLIMB 3 TO 5 STEPS WITH RAILING: A LOT
WALKING IN HOSPITAL ROOM: A LITTLE
TOILETING: A LITTLE
DRESSING REGULAR LOWER BODY CLOTHING: A LITTLE
PERSONAL GROOMING: A LITTLE
DAILY ACTIVITIY SCORE: 19
DRESSING REGULAR UPPER BODY CLOTHING: A LITTLE
MOBILITY SCORE: 21
HELP NEEDED FOR BATHING: A LITTLE

## 2025-07-05 ASSESSMENT — PAIN SCALES - GENERAL: PAINLEVEL_OUTOF10: 0 - NO PAIN

## 2025-07-05 ASSESSMENT — PAIN - FUNCTIONAL ASSESSMENT: PAIN_FUNCTIONAL_ASSESSMENT: 0-10

## 2025-07-05 NOTE — DISCHARGE INSTRUCTIONS
Please call your primary care provider and schedule a follow up appointment within 1 week.     Please follow-up with vascular surgery, Dr. Hoover on 7/9/25.       Please take all of your medications as directed.     New medications:    Cephalexin (Keflex) 500 mg capsule: Take 1 capsule (500 mg) by mouth every 6 hours for 5 days.    Valsartan (Diovan) 160 mg tablet: Take 1 tablet (160 mg) by mouth once daily.      Change how you take:    Rivaroxaban (Xarelto) 2.5 mg tablet: Take 2 tablets (5 mg) by mouth 2 times daily (morning and late afternoon).    Rosuvastatin (Crestor) 20 mg tablet: Take 1 tablet (20 mg) by mouth once daily at bedtime.    Discontinued medications:    Olmesartan (Benicar) 20 mg tablet      If you have any concerning or worsening symptoms such as chest pain, shortness of breath, new onset confusion, loss of sensation, or fever >103F, please seek medical attention.     Thank you for allowing us to participate in your health care.    -[facility] Inpatient Medicine Teaching Service.

## 2025-07-05 NOTE — NURSING NOTE
IV discontinued, Discharge paperwork given to Transport. Transport time 1PM. Report called to Sandi at [facility]. Patient aware of transfer.

## 2025-07-05 NOTE — PROGRESS NOTES
07/05/25 0915   Discharge Planning   Living Arrangements Spouse/significant other   Support Systems Spouse/significant other   Type of Residence Private residence   Home or Post Acute Services Post acute facilities (Rehab/SNF/etc)   Type of Post Acute Facility Services Skilled nursing   Expected Discharge Disposition SNF     Requested DSC send DC docs and HENS to [facility]. Transport scheduled for 1300. Facility and family notified.

## 2025-07-05 NOTE — HOSPITAL COURSE
Mechelle Alcantara is a 79 y.o. female w/PMH HTN, HLD, PADs/p Redo Left Femoral Endarterectomy (7/2024) w/left femoral to PTA bypass w/reverse greater saphenous vein (7/25/24) c/b left groin seroma s/p I &D (9/12/24) requiring LLE angio w/EKOS lysis catheter (10/10/24) who presented to the ED from her wound care appointment for worsening of a LLE wound concerning for cellulitis. She received vanc/zosyn in the ED and was transitioned to cefazolin when imaging revealed no evidence of osteomyelitis or soft tissue gas. She will be discharged on PO Keflex to complete a treatment total of 10 days, EOT 7/10/25. During her hospital course there was significant improvement of her LLE erythema, edema, and pain.  Other problems addressed this admission were acute left CFA thrombus, for which she received loading dose of xarelto.  KATHLEEN was done and showed markedly decreased pressure ratios on the left side. She was seen by Vascular surgery, encouraged patient adherence with home aspirin and statin, no intervention at this time. She was also presenting with crisis of ambulation due to pain, for which she received PT/OT and was discharged to SNF for further rehabilitation.

## 2025-07-05 NOTE — DISCHARGE SUMMARY
Discharge Diagnosis  Cellulitis of left lower extremity   Left CFA thrombus        Issues Requiring Follow-Up  Follow up w/primary care provider for continued monitoring of LLE wound.   Follow-up with vascular surgery, Dr. Hoover on 7/9/25 for management of anticoagulation and left CFA thrombus.     Discharge Meds     Medication List      START taking these medications     cephalexin 500 mg capsule; Commonly known as: Keflex; Take 1 capsule   (500 mg) by mouth every 6 hours for 5 days.   valsartan 160 mg tablet; Commonly known as: Diovan; Take 1 tablet (160   mg) by mouth once daily.     CHANGE how you take these medications     rivaroxaban 2.5 mg tablet; Commonly known as: Xarelto; Take 2 tablets (5   mg) by mouth 2 times daily (morning and late afternoon).; What changed:   how much to take   rosuvastatin 20 mg tablet; Commonly known as: Crestor; Take 1 tablet (20   mg) by mouth once daily at bedtime.; What changed: See the new   instructions.     STOP taking these medications     olmesartan 20 mg tablet; Commonly known as: BENIcar     ASK your doctor about these medications     aspirin 81 mg EC tablet       Test Results Pending At Discharge  Pending Labs       Order Current Status    Extra Tubes Preliminary result    Extra Tubes Preliminary result    Lavender Top Preliminary result    Lavender Top Preliminary result            Hospital Course  Mechelle Alcantara is a 79 y.o. female w/PMH HTN, HLD, PADs/p Redo Left Femoral Endarterectomy (7/2024) w/left femoral to PTA bypass w/reverse greater saphenous vein (7/25/24) c/b left groin seroma s/p I &D (9/12/24) requiring LLE angio w/EKOS lysis catheter (10/10/24) who presented to the ED from her wound care appointment for worsening of a LLE wound concerning for cellulitis. She received vanc/zosyn in the ED and was transitioned to cefazolin when imaging revealed no evidence of osteomyelitis or soft tissue gas. She will be discharged on PO Keflex 500mg 4 x daily to  complete a treatment total of 10 days, EOT 7/10/25. During her hospital course there was significant improvement of her LLE erythema, edema, and pain.  Other problems addressed this admission were acute left CFA thrombus, for which she received loading dose of xarelto and will be discharged on 5 mg BID.  KATHLEEN was done and showed markedly decreased pressure ratios on the left side. She was seen by Vascular surgery, encouraged patient adherence with home aspirin and statin, no intervention at this time. She was also presenting with crisis of ambulation due to pain, for which she received PT/OT and was discharged to SNF for further rehabilitation. She has been instructed to follow up with her usual wound care clinic as well as her PCP for ongoing treatment of her LLE wound. She will follow up with vascular surgery for management of her left CFA thrombus, PAD, and anticoagulation.    Pertinent Physical Exam At Time of Discharge  Physical Exam  Constitutional:       General: She is not in acute distress.     Appearance: Normal appearance. She is not ill-appearing, toxic-appearing or diaphoretic.   HENT:      Head: Normocephalic and atraumatic.   Eyes:      Conjunctiva/sclera: Conjunctivae normal.   Cardiovascular:      Rate and Rhythm: Normal rate and regular rhythm.      Heart sounds: Normal heart sounds.   Pulmonary:      Effort: Pulmonary effort is normal.      Breath sounds: Normal breath sounds.   Abdominal:      General: Abdomen is flat.      Palpations: Abdomen is soft.   Musculoskeletal:         General: Normal range of motion.      Right lower leg: No edema.      Left lower leg: No edema.   Skin:     General: Skin is warm and dry.      Comments: LLE wound on medial aspect of lower leg.   Neurological:      General: No focal deficit present.      Mental Status: She is alert and oriented to person, place, and time.   Psychiatric:         Mood and Affect: Mood normal.         Behavior: Behavior normal.          Thought Content: Thought content normal.         Judgment: Judgment normal.         Outpatient Follow-Up  Future Appointments   Date Time Provider Department [place]   7/8/2025 11:15 AM Jesus Alberto Betancur MD [id number] [place]   7/9/2025 11:45 AM Africa Hoover MD [id number] [place]   9/19/2025  2:00 PM Mj Latif,  [id number] [place]         Giselle Manjarrez MD

## 2025-07-05 NOTE — CARE PLAN
Problem: Safety - Adult  Goal: Free from fall injury  Outcome: Progressing  Flowsheets (Taken 7/5/2025 1159)  Free from fall injury: Instruct family/caregiver on patient safety     Problem: Discharge Planning  Goal: Discharge to home or other facility with appropriate resources  Outcome: Progressing  Flowsheets (Taken 7/5/2025 1159)  Discharge to home or other facility with appropriate resources:   Identify barriers to discharge with patient and caregiver   Arrange for needed discharge resources and transportation as appropriate     Problem: Chronic Conditions and Co-morbidities  Goal: Patient's chronic conditions and co-morbidity symptoms are monitored and maintained or improved  Outcome: Progressing  Flowsheets (Taken 7/5/2025 1159)  Care Plan - Patient's Chronic Conditions and Co-Morbidity Symptoms are Monitored and Maintained or Improved:   Monitor and assess patient's chronic conditions and comorbid symptoms for stability, deterioration, or improvement   Collaborate with multidisciplinary team to address chronic and comorbid conditions and prevent exacerbation or deterioration     Problem: Skin  Goal: Decreased wound size/increased tissue granulation at next dressing change  Outcome: Progressing  Flowsheets (Taken 7/5/2025 1159)  Decreased wound size/increased tissue granulation at next dressing change:   Promote sleep for wound healing   Protective dressings over bony prominences  Goal: Participates in plan/prevention/treatment measures  Outcome: Progressing  Flowsheets (Taken 7/5/2025 1159)  Participates in plan/prevention/treatment measures:   Elevate heels   Discuss with provider PT/OT consult  Goal: Prevent/manage excess moisture  Outcome: Progressing  Flowsheets (Taken 7/5/2025 1159)  Prevent/manage excess moisture:   Cleanse incontinence/protect with barrier cream   Follow provider orders for dressing changes   Monitor for/manage infection if present  Goal: Prevent/minimize sheer/friction  injuries  Outcome: Progressing  Flowsheets (Taken 7/5/2025 1159)  Prevent/minimize sheer/friction injuries:   HOB 30 degrees or less   Increase activity/out of bed for meals   Turn/reposition every 2 hours/use positioning/transfer devices  Goal: Promote/optimize nutrition  Outcome: Progressing  Flowsheets (Taken 7/5/2025 1159)  Promote/optimize nutrition:   Assist with feeding   Monitor/record intake including meals   Offer water/supplements/favorite foods  Goal: Promote skin healing  Outcome: Progressing  Flowsheets (Taken 7/5/2025 1159)  Promote skin healing:   Assess skin/pad under line(s)/device(s)   Ensure correct size (line/device) and apply per  instructions   Turn/reposition every 2 hours/use positioning/transfer devices   The patient's goals for the shift include      The clinical goals for the shift include Remain free freom infection process

## 2025-07-05 NOTE — CARE PLAN
The patient's goals for the shift include  sleep    The clinical goals for the shift include Infection will resolve with antibiotic regimen      Problem: Infection related to problem list condition  Goal: Infection will resolve through treatment  7/4/2025 2248 by Alton Virgen RN  Outcome: Progressing  7/4/2025 2248 by Alton Virgen RN  Outcome: Progressing     Problem: Fall/Injury  Goal: Not fall by end of shift  7/4/2025 2248 by Alton Virgen RN  Outcome: Progressing  7/4/2025 2248 by Alton Virgen RN  Outcome: Progressing     Problem: Fall/Injury  Goal: Be free from injury by end of the shift  7/4/2025 2248 by Alton Virgen RN  Outcome: Progressing  7/4/2025 2248 by Alton Virgen RN  Outcome: Progressing

## 2025-07-07 ENCOUNTER — TELEPHONE (OUTPATIENT)
Dept: CARDIOLOGY | Facility: CLINIC | Age: 80
End: 2025-07-07
Payer: MEDICARE

## 2025-07-07 LAB
HOLD SPECIMEN: NORMAL
HOLD SPECIMEN: NORMAL

## 2025-07-07 NOTE — TELEPHONE ENCOUNTER
Mechelle's  called saying that the reason the appointment was cancelled for this Wednesday 7/9 is because she is currently at [facility] and just wanted Dr. Hoover to know

## 2025-07-09 ENCOUNTER — APPOINTMENT (OUTPATIENT)
Dept: VASCULAR SURGERY | Facility: CLINIC | Age: 80
End: 2025-07-09
Payer: MEDICARE

## 2025-07-10 ENCOUNTER — NURSING HOME VISIT (OUTPATIENT)
Dept: POST ACUTE CARE | Facility: EXTERNAL LOCATION | Age: 80
End: 2025-07-10
Payer: MEDICARE

## 2025-07-10 DIAGNOSIS — E78.5 HYPERLIPIDEMIA, UNSPECIFIED HYPERLIPIDEMIA TYPE: ICD-10-CM

## 2025-07-10 DIAGNOSIS — L03.116 LEFT LEG CELLULITIS: Primary | ICD-10-CM

## 2025-07-10 DIAGNOSIS — R53.81 PHYSICAL DECONDITIONING: ICD-10-CM

## 2025-07-10 DIAGNOSIS — S81.802A NON-HEALING WOUND OF LEFT LOWER EXTREMITY: ICD-10-CM

## 2025-07-10 DIAGNOSIS — I73.9 PERIPHERAL ARTERY DISEASE: ICD-10-CM

## 2025-07-10 DIAGNOSIS — I82.412 ACUTE DEEP VEIN THROMBOSIS (DVT) OF FEMORAL VEIN OF LEFT LOWER EXTREMITY: ICD-10-CM

## 2025-07-10 DIAGNOSIS — I10 ESSENTIAL HYPERTENSION: ICD-10-CM

## 2025-07-10 DIAGNOSIS — I25.10 CORONARY ARTERY DISEASE INVOLVING NATIVE HEART, UNSPECIFIED VESSEL OR LESION TYPE, UNSPECIFIED WHETHER ANGINA PRESENT: ICD-10-CM

## 2025-07-10 PROCEDURE — 99310 SBSQ NF CARE HIGH MDM 45: CPT | Performed by: NURSE PRACTITIONER

## 2025-07-10 NOTE — LETTER
Patient: Mechelle Alcantara  : 1945    Encounter Date: 07/10/2025    Name: Mechelle Alcantara  YOB: 1945    INITIAL NURSE PRACTITIONER FOLLOW UP VISIT:   [facility]  - 25: Cellulitis of left lower extremity, Left CFA thrombus    HPI:  Mechelle Alcantara is a 79 y.o. female with a complex vascular history including prior left femoral endarterectomy and bypass, was admitted from her wound care appointment on 25 for worsening left lower extremity cellulitis. Imaging ruled out osteomyelitis, and she was treated with IV cefazolin before transitioning to oral cephalexin to complete a 10-day course.   During her admission, she was also found to have an acute nonocclusive thrombus in the left common femoral artery (CFA), for which she was started on rivaroxaban 5 mg BID. The left leg erythema, swelling, and pain improved significantly. She was evaluated by vascular surgery, advised to continue aspirin and statin therapy, and no procedural intervention was planned at this time. Her antihypertensive was changed from olmesartan to valsartan. She received PT/OT for impaired ambulation and was discharged to [facility] for continued rehab.   The patient is seated upright on the edge of the bed resting comfortably with left leg elevated, and without signs of distress.   C/o mild discomfort. Son is at the bedside.   Nursing staff reports no new concerns.    REVIEW OF SYSTEMS:  Review of systems are negative except where noted in HPI.    OBJECTIVE:  /59    Physical Exam  Vitals reviewed.   Constitutional:       Appearance: Normal appearance. She is normal weight.   HENT:      Head: Normocephalic.      Right Ear: External ear normal.      Left Ear: External ear normal.      Nose: Nose normal.      Mouth/Throat:      Mouth: Mucous membranes are moist.     Eyes:      Extraocular Movements: Extraocular movements intact.      Conjunctiva/sclera: Conjunctivae normal.      Pupils:  Pupils are equal, round, and reactive to light.       Cardiovascular:      Rate and Rhythm: Normal rate and regular rhythm.      Pulses: Normal pulses.      Heart sounds: Normal heart sounds.   Pulmonary:      Effort: Pulmonary effort is normal.      Breath sounds: Normal breath sounds.   Abdominal:      General: Bowel sounds are normal. There is no distension.      Palpations: Abdomen is soft.      Tenderness: There is no abdominal tenderness.   Genitourinary:     Comments: Not examined    Musculoskeletal:         General: Tenderness present. Normal range of motion.      Cervical back: Normal range of motion and neck supple.      Right lower leg: No edema.      Left lower leg: Edema present.      Comments: Generalized weakness     Skin:     General: Skin is warm and dry.      Capillary Refill: Capillary refill takes less than 2 seconds.      Comments: Dressing to left lower foot/ankle clean and dry     Neurological:      General: No focal deficit present.      Mental Status: She is alert and oriented to person, place, and time. Mental status is at baseline.     Psychiatric:         Mood and Affect: Mood normal.          DISCHARGE PLANNING:  Patient will be discharge home when goals are met.   Discharge planner to communicate ongoing updates regarding discharge plan.     RECENT HOSPITALIZATION RECORDS REVIEWED:   All laboratories, diagnostic tests, progress notes, consultation notes, and discharge notes available reviewed from recent hospitalization.     LABORATORIES OR DIAGNOSTIC TESTS DONE/REVIEWED IN FACILITY:  7/7/25  CMP-COMPREHENSIVE METABOLIC PNL DEEPIKA  GLUCOSE 72 mg/dL  GLUCOSE, FASTING 65-99 mg/dL  GLUCOSE, NON-FASTING  mg/dL  SODIUM 137 136-145 mEq/L  POTASSIUM 4.2 3.5-5.3 mEq/L  CHLORIDE 102  mEq/L  CARBON DIOXIDE (CO2) 24 21-33 mEq/L  BUN (UREA NITROGEN) 12 7-25 mg/dL  CREATININE 0.5~ L 0.6-1.3 mg/dL  BUN/CREATININE RATIO 24~ H 6-22  GFR-AFRICAN AMERICAN 144 >60 mL/min/1.73 m2  GFR-NON-AFRICAN  AMERICAN 119 >60 mL/min/1.73 m2   Stage of CKD eGFR (mL/min/1.73 square meters)   Stage 1 >/= 90 or > 90   Stage 2 60 - 89   Stage 3 30 - 59   Stage 4 15 - 29   Stage 5 </= 14 or < 15  ** GFR is reliable for adults 17 to 69 years with stable kidney   function.  CALCIUM 7.9~ L 8.4-10.2 mg/dL  PROTEIN, TOTAL 5.7~ L 6.0-8.3 g/dL  ALBUMIN 2.6~ L 3.5-5.5 g/dL  A/G RATIO 0.8~ L 1.0-2.3  ALKALINE PHOS 105  IU/L  AST (SGOT) 18 4-40 IU/L  ALT (SGPT) 8 4-55 IU/L  BILIRUBIN, TOTAL 0.3 0.2-1.2 mg/dL  CBC W/DIFF DEEPIKA  WBC 6.3 4.5-10.8 K/cmm  RBC 3.63~ L 3.90-5.40 M/cmm  HEMOGLOBIN 10.9~ L 12.0-16.0 g/dL  HEMATOCRIT 32.9~ L 36.0-48.0 %  MCV 90.7 80.0-100.0 fL  MCH 30.1 26.0-35.0 pg  MCHC 33.2 31.0-36.5 g/dL  RDW 16.7~ H 11.0-16.0 %  PLATELET 637~ H 150-450 K/cmm  MPV 7.0 6.5-12.0 fL  ANISOCYTOSIS 1+~ ABN NEGATIVE  NEUTROPHILS 66.5 40.0-80.0 %  LYMPHS 19.6 13.0-48.0 %  MONOCYTES 8.9 2.0-12.0 %  EOS 4.4 0.0-8.0 %  BASO 0.6 0.0-2.0 %  NEUTS (ABSOLUTE) 4.20 1.50-7.60 K/uL  LYMPHS (ABSOLUTE) 1.20 0.90-5.50 K/uL  MONOCYTES (ABSOLUTE) 0.60 0.15-1.10 K/uL  EOS (ABSOLUTE) 0.30 0.00-0.80 K/uL  NUCLEATED RBC 0.2 <1.0 NRBC/100 WBC    RX Allergies[1]    MEDICATIONS:  The medication list from the recent hospitalization have been reviewed and compared to the After Visit Summary (AVS), the “Gordon Cain” (if available), and relevant hospital progress notes or consults that may not have been included in the AVS/Gordon Cain. Medications have been reconciled and updated in the facility’s electronic medical record (EMR), with adjustments made as clinically indicated.  Medication reconciliation is an ongoing process that will be completed throughout the patient’s stay. Medications are reviewed regularly, and further adjustments if any will be made as clinically appropriate to ensure optimal care and safety.    Please Note:  The medication list in the hospital’s EMR may not always correspond to or accurately reflect the current regimen documented in  the facility’s records. The nursing facility’s medication list should be considered the most accurate and up-to-date source for the patient’s active medications while here in the facility.     Current Medication List at Facility:  Valsartan 160 mg - Take 1 tablet by mouth once daily  Aspirin 81 mg - Take 1 tablet by mouth once daily  Rivaroxaban 5 mg - Take 2 tablets (2.5 mg each) by mouth twice daily (morning and late afternoon)  Rosuvastatin 20 mg - Take 1 tablet by mouth at bedtime  Cephalexin 500 mg - Take 1 capsule by mouth every 6 hours for 5 days (completing a 10-day course total)    Assessment/Plan   Problem List Items Addressed This Visit       Essential hypertension    Hyperlipemia    Peripheral artery disease    Left leg cellulitis - Primary    Non-healing wound of left lower extremity     Other Visit Diagnoses         Acute deep vein thrombosis (DVT) of femoral vein of left lower extremity          Physical deconditioning          Coronary artery disease involving native heart, unspecified vessel or lesion type, unspecified whether angina present                PLAN:   Recent nursing evaluation and notes were reviewed.      Cellulitis:  Cephalexin 500 mg - Take 1 capsule by mouth every 6 hours for 5 days (completing a 10-day course total)  Cont wound care  Wound team to see on Monday    WOUNDS:  Wound care per nursing  Left foot/ankle  See Facility notes for measurements/assessment of wound.  Referral to wound clinic or wound team here at the facility to follow if appropriate.    DVT:  Rivaroxaban 5 mg - Take 2 tablets (2.5 mg each) by mouth twice daily (morning and late afternoon)    Hypertension:  Continue to monitor and trend BP within the facility.  Current BP readings:   Several readings in the 160s-170s systolic range  Diastolic generally ranges from 65 to 89  Patient continues to demonstrate elevated and fluctuating blood pressure readings during the SNF stay, with systolic pressures ranging from  146 to 218 mmHg and occasional diastolic elevations. Average estimated BP is approximately 166/80 mmHg, consistent with stage 2 hypertension. Notably, one reading reached hypertensive urgency level (218/94 on 7/9). It is important that blood pressure be monitored consistently after administration of antihypertensive medications to assess treatment response and guide further titration. This helps determine if current therapy is effective or if adjustments are needed to achieve target control.  Continue medications:  Valsartan 160 mg - Take 1 tablet by mouth once daily  Labs will be obtained periodically to guide ongoing management and adjust medications as needed.  Labs reviewed from 7/10/25  The antihypertensive regimen will be reviewed regularly and adjusted based on effectiveness, side effects, and lab trends.  Avoidance of nephrotoxic agents is recommended to preserve renal function.  The patient's blood pressure is being managed in alignment with current evidence-based guidelines including the latest ACC/AHA and JNC 8 recommendations.   Management is tailored to individual factors such as age, comorbid conditions, and overall cardiovascular risk.  Lifestyle interventions, including dietary sodium reduction, regular physical activity, weight management, and moderation of alcohol intake are recommended in conjunction with pharmacologic therapy.  If cognitively appropriate, ongoing discussions with Mechelle Alcantara will focus on blood pressure trends, medication compliance, and lifestyle strategies to support long-term control.    CAD/HLD:  Cont ASA and statin    Weakness and physical deconditioning:   The patient exhibits generalized weakness and physical deconditioning.   Physical therapy, occupational therapy, and speech therapy are actively evaluating and treating the patient to improve strength, functional mobility, endurance, and overall conditioning.   Therapies are focused on achieving maximum functional  potential while prioritizing safety during all interventions.  Please refer to the initial therapy evaluations for detailed information regarding the patient's initial evaluation.     Skin Integrity:  Nursing to monitor skin integrity as patient is at risk for pressure injuries.  Turn and reposition Q 2 hours or more.  Air mattress and when up in chair cushion reducing device.  Dietician to evaluate and recommend.  Nutritional supplement to be implemented if needed.  Please monitor skin integrity and other pressure areas.    ADVANCED CARE PLANNING: Discussion done with the patient and family if available regarding code status, diagnosis, and the prognosis of the patient.     CODE STATUS: Full code    Any decline or change in the patient’s condition needs to be communicated to the physician or myself.   Discussion with nursing staff regarding ongoing care and management has been conducted.   Communication about the patient’s status, overall condition, changes to the plan (medications or treatments), and any family questions has been completed when family was present.   If family is not available, communication will occur in person or via phone as needed.   We will continue the plan and medications noted above and will follow the patient at this facility.    *Please note that nursing facility notes, facility EMR, outside laboratory agency, and  EMR do not interface.     Completion of the note was done through Dragon voice recognition technology and may include unintended or grammatical errors which may not have been recognized when finalizing the note.     Time:   A total of 45 minutes or more was spent in evaluation and management of the patient, including time with the patient and family (if present).   Over 50% of this time was dedicated to counseling and coordination of care.  This includes time spent:  Reviewing vital signs, nursing/therapy reports, hospital records, labs, imaging, and consults.  Conducting a  face-to-face assessment, including history, physical exam, and patient education.  Discussing the diagnosis, treatment options, and current medications prescribed.  Reviewing and updating the plan of care with the patient, family if available, nursing staff, and interdisciplinary team.  Documenting in the electronic medical record.     QUENTIN Miller       Electronically Signed By: QUENTIN Miller   7/18/25  9:52 AM       [1]  Allergies  Allergen Reactions   • Fish Containing Products Anaphylaxis   • Shellfish Containing Products Anaphylaxis   • Gadolinium-Containing Contrast Media Hives   • Iodinated Contrast Media Hives

## 2025-07-14 ENCOUNTER — NURSING HOME VISIT (OUTPATIENT)
Dept: POST ACUTE CARE | Facility: EXTERNAL LOCATION | Age: 80
End: 2025-07-14
Payer: MEDICARE

## 2025-07-14 DIAGNOSIS — I73.9 PERIPHERAL ARTERY DISEASE: ICD-10-CM

## 2025-07-14 DIAGNOSIS — L03.116 LEFT LEG CELLULITIS: ICD-10-CM

## 2025-07-14 DIAGNOSIS — E78.5 HYPERLIPIDEMIA, UNSPECIFIED HYPERLIPIDEMIA TYPE: ICD-10-CM

## 2025-07-14 DIAGNOSIS — R53.81 PHYSICAL DECONDITIONING: ICD-10-CM

## 2025-07-14 DIAGNOSIS — I10 ESSENTIAL HYPERTENSION: ICD-10-CM

## 2025-07-14 DIAGNOSIS — I82.412 ACUTE DEEP VEIN THROMBOSIS (DVT) OF FEMORAL VEIN OF LEFT LOWER EXTREMITY: Primary | ICD-10-CM

## 2025-07-14 DIAGNOSIS — I25.10 CORONARY ARTERY DISEASE INVOLVING NATIVE HEART, UNSPECIFIED VESSEL OR LESION TYPE, UNSPECIFIED WHETHER ANGINA PRESENT: ICD-10-CM

## 2025-07-14 DIAGNOSIS — S81.802A NON-HEALING WOUND OF LEFT LOWER EXTREMITY: ICD-10-CM

## 2025-07-14 PROCEDURE — 99309 SBSQ NF CARE MODERATE MDM 30: CPT | Performed by: NURSE PRACTITIONER

## 2025-07-14 NOTE — LETTER
Patient: Mechelle Alcantara  : 1945    Encounter Date: 2025    Name: Mechelle Alcantara  YOB: 1945    FOLLOW UP VISIT: [facility]  - 25: Cellulitis of left lower extremity, Left CFA thrombus     SUBJECTIVE:  The patient is seated upright in the bed resting comfortably, and without signs of distress.   Patient is concerned about left leg posterior knee and believes she has a blood clot. She c/o mild discomfort behind the knee.   Wound team is to see foot/ankle today.  Nursing staff reports no new concerns.    REVIEW OF SYSTEMS:   All review of systems are negative unless otherwise stated above under subjective.    LABS REVIEWED AT FACILITY:  25  CMP-COMPREHENSIVE METABOLIC PNL DEEPIKA  GLUCOSE 74 mg/dL  GLUCOSE, FASTING 65-99 mg/dL  GLUCOSE, NON-FASTING  mg/dL  SODIUM 142 136-145 mEq/L  POTASSIUM 4.0 3.5-5.3 mEq/L  CHLORIDE 104  mEq/L  CARBON DIOXIDE (CO2) 25 21-33 mEq/L  BUN (UREA NITROGEN) 24 7-25 mg/dL  CREATININE 0.4~ L 0.6-1.3 mg/dL  BUN/CREATININE RATIO 60~ H 6-22  GFR-AFRICAN AMERICAN 186 >60 mL/min/1.73 m2  GFR-NON-AFRICAN AMERICAN 154 >60 mL/min/1.73 m2   Stage of CKD eGFR (mL/min/1.73 square meters)   Stage 1 >/= 90 or > 90   Stage 2 60 - 89   Stage 3 30 - 59   Stage 4 15 - 29   Stage 5 </= 14 or < 15  ** GFR is reliable for adults 17 to 69 years with stable kidney   function.  CALCIUM 8.4 8.4-10.2 mg/dL  PROTEIN, TOTAL 6.0 6.0-8.3 g/dL  ALBUMIN 2.8~ L 3.5-5.5 g/dL  A/G RATIO 0.9~ L 1.0-2.3  ALKALINE PHOS 140~ H  IU/L  AST (SGOT) 14 4-40 IU/L  ALT (SGPT) 7 4-55 IU/L  BILIRUBIN, TOTAL 0.4 0.2-1.2 mg/dL  WBC 4.9  H/H 11.4/33.9  Plt 561    MEDICATIONS:  On the initial visit, the patient’s medication list from the recent hospitalization were reviewed and compared to the After Visit Summary (AVS), the “Gordon Cain” (if available), and relevant hospital progress notes or consults that may not have been included in the AVS/Gordon Cain.   Medications were  reconciled and updated in the facility’s electronic medical record (EMR), with adjustments made as clinically indicated.    Please Note:  Medication reconciliation is an ongoing process throughout the patient’s stay.   Medications are reviewed regularly, and further adjustments will be made as clinically appropriate to ensure optimal care and safety.  The medication list in the hospital’s EMR may not always correspond to or accurately reflect the current regimen documented in the facility’s records.   The nursing facility’s medication list should be considered the most accurate and up-to-date source for the patient’s active medications while here in the facility.     OBJECTIVE:  /74   Physical Exam  Vitals reviewed.   Constitutional:       Appearance: Normal appearance. She is normal weight.   HENT:      Head: Normocephalic.      Mouth: Mucous membranes are moist.   Eyes:      Extraocular Movements: Extraocular movements intact.      Conjunctiva/sclera: Conjunctivae normal.      Pupils: Pupils are equal, round, and reactive to light.   Cardiovascular:      Rate and Rhythm: Normal rate and regular rhythm.      Pulses: Normal pulses.      Heart sounds: Normal heart sounds.   Pulmonary:      Effort: Pulmonary effort is normal.      Breath sounds: Normal breath sounds.   Abdominal:      General: Bowel sounds are normal. There is no distension.      Palpations: Abdomen is soft.      Tenderness: There is no abdominal tenderness.   Genitourinary:     Comments: Not examined  Musculoskeletal:         General: Tenderness present. Normal range of motion.      Cervical back: Normal range of motion and neck supple.      Right lower leg: No edema.      Left lower leg: Edema present.      Comments: Generalized weakness   Skin:     General: Skin is warm and dry.      Capillary Refill: Capillary refill takes less than 2 seconds.      Comments: Dressing to left lower foot/ankle clean and dry   Neurological:      General: No  focal deficit present.      Mental Status: She is alert and oriented to person, place, and time. Mental status is at baseline.   Psychiatric:         Mood and Affect: Mood normal.     Assessment/Plan  Problem List Items Addressed This Visit       Essential hypertension    Hyperlipemia    Peripheral artery disease    Left leg cellulitis    Non-healing wound of left lower extremity     Other Visit Diagnoses         Acute deep vein thrombosis (DVT) of femoral vein of left lower extremity    -  Primary      Physical deconditioning          Coronary artery disease involving native heart, unspecified vessel or lesion type, unspecified whether angina present                PLAN:  Pt has been seen for follow up visit.  Recent nursing evaluation and notes were reviewed.     Cellulitis:  Cephalexin 500 mg - Take 1 capsule by mouth every 6 hours for 5 days (completing a 10-day course total)  Cont wound care  Wound team to see later today     WOUNDS:  Wound care per nursing  Left foot/ankle  See Facility notes for measurements/assessment of wound.  Referral to wound clinic or wound team here at the facility to follow if appropriate.     DVT:  Patient concerned about DVT posterior left knee - Venous US ordered  Rivaroxaban 5 mg - Take 2 tablets (2.5 mg each) by mouth twice daily (morning and late afternoon)     Hypertension:  Continue to monitor and trend BP within the facility.  Current BP readings: 160/70s  Continue medications:  Valsartan 160 mg - Take 1 tablet by mouth once daily  Labs will be obtained periodically to guide ongoing management and adjust medications as needed.  Labs reviewed today  The antihypertensive regimen will be reviewed regularly and adjusted based on effectiveness, side effects, and lab trends.  Avoidance of nephrotoxic agents is recommended to preserve renal function.     CAD/HLD:  Cont ASA and statin    Weakness and Physical Deconditioning:  Patient is currently receiving PT/OT/ST as ordered.  Therapy documentation is housed in a separate EMR system that does not interface with the provider record. For the progress of Mechelle Alcantara please, refer to facility therapy notes for updates regarding progress, goals, equipment usage, and session metrics. Communication with the therapy team is ongoing to support interdisciplinary care.    Skin integrity:  Nursing to monitor skin integrity as patient is at risk for pressure injuries.  Turn and reposition Q 2 hours or more.  Air mattress and when up in chair cushion reducing device.  Dietician to evaluate and recommend.  Nutritional supplement to be implemented if needed.  Please monitor skin integrity and other pressure areas.    Living will related issues reviewed: Code status: Full code    Any decline or change in the patient’s condition needs to be communicated to the physician or myself.   Discussion with nursing staff regarding ongoing care and management has been conducted.   Communication about the patient’s status, overall condition, changes to the plan (medications or treatments), and any family questions has been completed when family was present.   If family is not available, communication will occur in person or via phone as needed.   We will continue the plan and medications noted above and will follow the patient at this facility.    Discharge planning:   Patient to be discharged home when goals are met.   Ongoing discussion with patient and family if available regarding discharge per discharge planner at facility.     *Please note that nursing facility notes, nursing EMR, outside laboratory agency, and  EMR do not interface.     Completion of the note was done through Dragon voice recognition technology and may include unintended or grammatical errors which may not have been recognized when finalizing the note.     Time:    QUENTIN Miller      Electronically Signed By: QUENTIN Miller   7/19/25 12:40 AM

## 2025-07-18 VITALS — SYSTOLIC BLOOD PRESSURE: 153 MMHG | DIASTOLIC BLOOD PRESSURE: 59 MMHG

## 2025-07-18 NOTE — PROGRESS NOTES
Name: Mechelle Alcantara  YOB: 1945    INITIAL NURSE PRACTITIONER FOLLOW UP VISIT:   [facility] 7/1 - 7/5/25: Cellulitis of left lower extremity, Left CFA thrombus    HPI:  Mechelle Alcantara is a 79 y.o. female with a complex vascular history including prior left femoral endarterectomy and bypass, was admitted from her wound care appointment on 7/1/25 for worsening left lower extremity cellulitis. Imaging ruled out osteomyelitis, and she was treated with IV cefazolin before transitioning to oral cephalexin to complete a 10-day course.   During her admission, she was also found to have an acute nonocclusive thrombus in the left common femoral artery (CFA), for which she was started on rivaroxaban 5 mg BID. The left leg erythema, swelling, and pain improved significantly. She was evaluated by vascular surgery, advised to continue aspirin and statin therapy, and no procedural intervention was planned at this time. Her antihypertensive was changed from olmesartan to valsartan. She received PT/OT for impaired ambulation and was discharged to [facility] for continued rehab.   The patient is seated upright on the edge of the bed resting comfortably with left leg elevated, and without signs of distress.   C/o mild discomfort. Son is at the bedside.   Nursing staff reports no new concerns.    REVIEW OF SYSTEMS:  Review of systems are negative except where noted in HPI.    OBJECTIVE:  /59    Physical Exam  Vitals reviewed.   Constitutional:       Appearance: Normal appearance. She is normal weight.   HENT:      Head: Normocephalic.      Right Ear: External ear normal.      Left Ear: External ear normal.      Nose: Nose normal.      Mouth/Throat:      Mouth: Mucous membranes are moist.     Eyes:      Extraocular Movements: Extraocular movements intact.      Conjunctiva/sclera: Conjunctivae normal.      Pupils: Pupils are equal, round, and reactive to light.       Cardiovascular:      Rate  and Rhythm: Normal rate and regular rhythm.      Pulses: Normal pulses.      Heart sounds: Normal heart sounds.   Pulmonary:      Effort: Pulmonary effort is normal.      Breath sounds: Normal breath sounds.   Abdominal:      General: Bowel sounds are normal. There is no distension.      Palpations: Abdomen is soft.      Tenderness: There is no abdominal tenderness.   Genitourinary:     Comments: Not examined    Musculoskeletal:         General: Tenderness present. Normal range of motion.      Cervical back: Normal range of motion and neck supple.      Right lower leg: No edema.      Left lower leg: Edema present.      Comments: Generalized weakness     Skin:     General: Skin is warm and dry.      Capillary Refill: Capillary refill takes less than 2 seconds.      Comments: Dressing to left lower foot/ankle clean and dry     Neurological:      General: No focal deficit present.      Mental Status: She is alert and oriented to person, place, and time. Mental status is at baseline.     Psychiatric:         Mood and Affect: Mood normal.          DISCHARGE PLANNING:  Patient will be discharge home when goals are met.   Discharge planner to communicate ongoing updates regarding discharge plan.     RECENT HOSPITALIZATION RECORDS REVIEWED:   All laboratories, diagnostic tests, progress notes, consultation notes, and discharge notes available reviewed from recent hospitalization.     LABORATORIES OR DIAGNOSTIC TESTS DONE/REVIEWED IN FACILITY:  7/7/25  CMP-COMPREHENSIVE METABOLIC PNL DEEPIKA  GLUCOSE 72 mg/dL  GLUCOSE, FASTING 65-99 mg/dL  GLUCOSE, NON-FASTING  mg/dL  SODIUM 137 136-145 mEq/L  POTASSIUM 4.2 3.5-5.3 mEq/L  CHLORIDE 102  mEq/L  CARBON DIOXIDE (CO2) 24 21-33 mEq/L  BUN (UREA NITROGEN) 12 7-25 mg/dL  CREATININE 0.5~ L 0.6-1.3 mg/dL  BUN/CREATININE RATIO 24~ H 6-22  GFR-AFRICAN AMERICAN 144 >60 mL/min/1.73 m2  GFR-NON-AFRICAN AMERICAN 119 >60 mL/min/1.73 m2   Stage of CKD eGFR (mL/min/1.73 square  meters)   Stage 1 >/= 90 or > 90   Stage 2 60 - 89   Stage 3 30 - 59   Stage 4 15 - 29   Stage 5 </= 14 or < 15  ** GFR is reliable for adults 17 to 69 years with stable kidney   function.  CALCIUM 7.9~ L 8.4-10.2 mg/dL  PROTEIN, TOTAL 5.7~ L 6.0-8.3 g/dL  ALBUMIN 2.6~ L 3.5-5.5 g/dL  A/G RATIO 0.8~ L 1.0-2.3  ALKALINE PHOS 105  IU/L  AST (SGOT) 18 4-40 IU/L  ALT (SGPT) 8 4-55 IU/L  BILIRUBIN, TOTAL 0.3 0.2-1.2 mg/dL  CBC W/DIFF DEEPIKA  WBC 6.3 4.5-10.8 K/cmm  RBC 3.63~ L 3.90-5.40 M/cmm  HEMOGLOBIN 10.9~ L 12.0-16.0 g/dL  HEMATOCRIT 32.9~ L 36.0-48.0 %  MCV 90.7 80.0-100.0 fL  MCH 30.1 26.0-35.0 pg  MCHC 33.2 31.0-36.5 g/dL  RDW 16.7~ H 11.0-16.0 %  PLATELET 637~ H 150-450 K/cmm  MPV 7.0 6.5-12.0 fL  ANISOCYTOSIS 1+~ ABN NEGATIVE  NEUTROPHILS 66.5 40.0-80.0 %  LYMPHS 19.6 13.0-48.0 %  MONOCYTES 8.9 2.0-12.0 %  EOS 4.4 0.0-8.0 %  BASO 0.6 0.0-2.0 %  NEUTS (ABSOLUTE) 4.20 1.50-7.60 K/uL  LYMPHS (ABSOLUTE) 1.20 0.90-5.50 K/uL  MONOCYTES (ABSOLUTE) 0.60 0.15-1.10 K/uL  EOS (ABSOLUTE) 0.30 0.00-0.80 K/uL  NUCLEATED RBC 0.2 <1.0 NRBC/100 WBC    RX Allergies[1]    MEDICATIONS:  The medication list from the recent hospitalization have been reviewed and compared to the After Visit Summary (AVS), the “Gordon Cain” (if available), and relevant hospital progress notes or consults that may not have been included in the AVS/Gordon Cain. Medications have been reconciled and updated in the facility’s electronic medical record (EMR), with adjustments made as clinically indicated.  Medication reconciliation is an ongoing process that will be completed throughout the patient’s stay. Medications are reviewed regularly, and further adjustments if any will be made as clinically appropriate to ensure optimal care and safety.    Please Note:  The medication list in the hospital’s EMR may not always correspond to or accurately reflect the current regimen documented in the facility’s records. The nursing facility’s medication list should  be considered the most accurate and up-to-date source for the patient’s active medications while here in the facility.     Current Medication List at Facility:  Valsartan 160 mg - Take 1 tablet by mouth once daily  Aspirin 81 mg - Take 1 tablet by mouth once daily  Rivaroxaban 5 mg - Take 2 tablets (2.5 mg each) by mouth twice daily (morning and late afternoon)  Rosuvastatin 20 mg - Take 1 tablet by mouth at bedtime  Cephalexin 500 mg - Take 1 capsule by mouth every 6 hours for 5 days (completing a 10-day course total)    Assessment/Plan    Problem List Items Addressed This Visit       Essential hypertension    Hyperlipemia    Peripheral artery disease    Left leg cellulitis - Primary    Non-healing wound of left lower extremity     Other Visit Diagnoses         Acute deep vein thrombosis (DVT) of femoral vein of left lower extremity          Physical deconditioning          Coronary artery disease involving native heart, unspecified vessel or lesion type, unspecified whether angina present                PLAN:   Recent nursing evaluation and notes were reviewed.      Cellulitis:  Cephalexin 500 mg - Take 1 capsule by mouth every 6 hours for 5 days (completing a 10-day course total)  Cont wound care  Wound team to see on Monday    WOUNDS:  Wound care per nursing  Left foot/ankle  See Facility notes for measurements/assessment of wound.  Referral to wound clinic or wound team here at the facility to follow if appropriate.    DVT:  Rivaroxaban 5 mg - Take 2 tablets (2.5 mg each) by mouth twice daily (morning and late afternoon)    Hypertension:  Continue to monitor and trend BP within the facility.  Current BP readings:   Several readings in the 160s-170s systolic range  Diastolic generally ranges from 65 to 89  Patient continues to demonstrate elevated and fluctuating blood pressure readings during the SNF stay, with systolic pressures ranging from 146 to 218 mmHg and occasional diastolic elevations. Average  estimated BP is approximately 166/80 mmHg, consistent with stage 2 hypertension. Notably, one reading reached hypertensive urgency level (218/94 on 7/9). It is important that blood pressure be monitored consistently after administration of antihypertensive medications to assess treatment response and guide further titration. This helps determine if current therapy is effective or if adjustments are needed to achieve target control.  Continue medications:  Valsartan 160 mg - Take 1 tablet by mouth once daily  Labs will be obtained periodically to guide ongoing management and adjust medications as needed.  Labs reviewed from 7/10/25  The antihypertensive regimen will be reviewed regularly and adjusted based on effectiveness, side effects, and lab trends.  Avoidance of nephrotoxic agents is recommended to preserve renal function.  The patient's blood pressure is being managed in alignment with current evidence-based guidelines including the latest ACC/AHA and JNC 8 recommendations.   Management is tailored to individual factors such as age, comorbid conditions, and overall cardiovascular risk.  Lifestyle interventions, including dietary sodium reduction, regular physical activity, weight management, and moderation of alcohol intake are recommended in conjunction with pharmacologic therapy.  If cognitively appropriate, ongoing discussions with Mechelle Alcantara will focus on blood pressure trends, medication compliance, and lifestyle strategies to support long-term control.    CAD/HLD:  Cont ASA and statin    Weakness and physical deconditioning:   The patient exhibits generalized weakness and physical deconditioning.   Physical therapy, occupational therapy, and speech therapy are actively evaluating and treating the patient to improve strength, functional mobility, endurance, and overall conditioning.   Therapies are focused on achieving maximum functional potential while prioritizing safety during all  interventions.  Please refer to the initial therapy evaluations for detailed information regarding the patient's initial evaluation.     Skin Integrity:  Nursing to monitor skin integrity as patient is at risk for pressure injuries.  Turn and reposition Q 2 hours or more.  Air mattress and when up in chair cushion reducing device.  Dietician to evaluate and recommend.  Nutritional supplement to be implemented if needed.  Please monitor skin integrity and other pressure areas.    ADVANCED CARE PLANNING: Discussion done with the patient and family if available regarding code status, diagnosis, and the prognosis of the patient.     CODE STATUS: Full code    Any decline or change in the patient’s condition needs to be communicated to the physician or myself.   Discussion with nursing staff regarding ongoing care and management has been conducted.   Communication about the patient’s status, overall condition, changes to the plan (medications or treatments), and any family questions has been completed when family was present.   If family is not available, communication will occur in person or via phone as needed.   We will continue the plan and medications noted above and will follow the patient at this facility.    *Please note that nursing facility notes, facility EMR, outside laboratory agency, and  EMR do not interface.     Completion of the note was done through Dragon voice recognition technology and may include unintended or grammatical errors which may not have been recognized when finalizing the note.     Time:   A total of 45 minutes or more was spent in evaluation and management of the patient, including time with the patient and family (if present).   Over 50% of this time was dedicated to counseling and coordination of care.  This includes time spent:  Reviewing vital signs, nursing/therapy reports, hospital records, labs, imaging, and consults.  Conducting a face-to-face assessment, including history,  physical exam, and patient education.  Discussing the diagnosis, treatment options, and current medications prescribed.  Reviewing and updating the plan of care with the patient, family if available, nursing staff, and interdisciplinary team.  Documenting in the electronic medical record.     MICHAEL Miller-CNP        [1]   Allergies  Allergen Reactions    Fish Containing Products Anaphylaxis    Shellfish Containing Products Anaphylaxis    Gadolinium-Containing Contrast Media Hives    Iodinated Contrast Media Hives

## 2025-07-19 VITALS — DIASTOLIC BLOOD PRESSURE: 74 MMHG | SYSTOLIC BLOOD PRESSURE: 163 MMHG

## 2025-07-19 NOTE — PROGRESS NOTES
Name: Mechelle Alcantara  YOB: 1945    FOLLOW UP VISIT: [facility] 7/1 - 7/5/25: Cellulitis of left lower extremity, Left CFA thrombus     SUBJECTIVE:  The patient is seated upright in the bed resting comfortably, and without signs of distress.   Patient is concerned about left leg posterior knee and believes she has a blood clot. She c/o mild discomfort behind the knee.   Wound team is to see foot/ankle today.  Nursing staff reports no new concerns.    REVIEW OF SYSTEMS:   All review of systems are negative unless otherwise stated above under subjective.    LABS REVIEWED AT FACILITY:  7/14/25  CMP-COMPREHENSIVE METABOLIC PNL DEEPIKA  GLUCOSE 74 mg/dL  GLUCOSE, FASTING 65-99 mg/dL  GLUCOSE, NON-FASTING  mg/dL  SODIUM 142 136-145 mEq/L  POTASSIUM 4.0 3.5-5.3 mEq/L  CHLORIDE 104  mEq/L  CARBON DIOXIDE (CO2) 25 21-33 mEq/L  BUN (UREA NITROGEN) 24 7-25 mg/dL  CREATININE 0.4~ L 0.6-1.3 mg/dL  BUN/CREATININE RATIO 60~ H 6-22  GFR-AFRICAN AMERICAN 186 >60 mL/min/1.73 m2  GFR-NON-AFRICAN AMERICAN 154 >60 mL/min/1.73 m2   Stage of CKD eGFR (mL/min/1.73 square meters)   Stage 1 >/= 90 or > 90   Stage 2 60 - 89   Stage 3 30 - 59   Stage 4 15 - 29   Stage 5 </= 14 or < 15  ** GFR is reliable for adults 17 to 69 years with stable kidney   function.  CALCIUM 8.4 8.4-10.2 mg/dL  PROTEIN, TOTAL 6.0 6.0-8.3 g/dL  ALBUMIN 2.8~ L 3.5-5.5 g/dL  A/G RATIO 0.9~ L 1.0-2.3  ALKALINE PHOS 140~ H  IU/L  AST (SGOT) 14 4-40 IU/L  ALT (SGPT) 7 4-55 IU/L  BILIRUBIN, TOTAL 0.4 0.2-1.2 mg/dL  WBC 4.9  H/H 11.4/33.9  Plt 561    MEDICATIONS:  On the initial visit, the patient’s medication list from the recent hospitalization were reviewed and compared to the After Visit Summary (AVS), the “Gordon Cain” (if available), and relevant hospital progress notes or consults that may not have been included in the AVS/Gordon Cain.   Medications were reconciled and updated in the facility’s electronic medical record (EMR), with  adjustments made as clinically indicated.    Please Note:  Medication reconciliation is an ongoing process throughout the patient’s stay.   Medications are reviewed regularly, and further adjustments will be made as clinically appropriate to ensure optimal care and safety.  The medication list in the hospital’s EMR may not always correspond to or accurately reflect the current regimen documented in the facility’s records.   The nursing facility’s medication list should be considered the most accurate and up-to-date source for the patient’s active medications while here in the facility.     OBJECTIVE:  /74   Physical Exam  Vitals reviewed.   Constitutional:       Appearance: Normal appearance. She is normal weight.   HENT:      Head: Normocephalic.      Mouth: Mucous membranes are moist.   Eyes:      Extraocular Movements: Extraocular movements intact.      Conjunctiva/sclera: Conjunctivae normal.      Pupils: Pupils are equal, round, and reactive to light.   Cardiovascular:      Rate and Rhythm: Normal rate and regular rhythm.      Pulses: Normal pulses.      Heart sounds: Normal heart sounds.   Pulmonary:      Effort: Pulmonary effort is normal.      Breath sounds: Normal breath sounds.   Abdominal:      General: Bowel sounds are normal. There is no distension.      Palpations: Abdomen is soft.      Tenderness: There is no abdominal tenderness.   Genitourinary:     Comments: Not examined  Musculoskeletal:         General: Tenderness present. Normal range of motion.      Cervical back: Normal range of motion and neck supple.      Right lower leg: No edema.      Left lower leg: Edema present.      Comments: Generalized weakness   Skin:     General: Skin is warm and dry.      Capillary Refill: Capillary refill takes less than 2 seconds.      Comments: Dressing to left lower foot/ankle clean and dry   Neurological:      General: No focal deficit present.      Mental Status: She is alert and oriented to person,  place, and time. Mental status is at baseline.   Psychiatric:         Mood and Affect: Mood normal.     Assessment/Plan   Problem List Items Addressed This Visit       Essential hypertension    Hyperlipemia    Peripheral artery disease    Left leg cellulitis    Non-healing wound of left lower extremity     Other Visit Diagnoses         Acute deep vein thrombosis (DVT) of femoral vein of left lower extremity    -  Primary      Physical deconditioning          Coronary artery disease involving native heart, unspecified vessel or lesion type, unspecified whether angina present                PLAN:  Pt has been seen for follow up visit.  Recent nursing evaluation and notes were reviewed.     Cellulitis:  Cephalexin 500 mg - Take 1 capsule by mouth every 6 hours for 5 days (completing a 10-day course total)  Cont wound care  Wound team to see later today     WOUNDS:  Wound care per nursing  Left foot/ankle  See Facility notes for measurements/assessment of wound.  Referral to wound clinic or wound team here at the facility to follow if appropriate.     DVT:  Patient concerned about DVT posterior left knee - Venous US ordered  Rivaroxaban 5 mg - Take 2 tablets (2.5 mg each) by mouth twice daily (morning and late afternoon)     Hypertension:  Continue to monitor and trend BP within the facility.  Current BP readings: 160/70s  Continue medications:  Valsartan 160 mg - Take 1 tablet by mouth once daily  Labs will be obtained periodically to guide ongoing management and adjust medications as needed.  Labs reviewed today  The antihypertensive regimen will be reviewed regularly and adjusted based on effectiveness, side effects, and lab trends.  Avoidance of nephrotoxic agents is recommended to preserve renal function.     CAD/HLD:  Cont ASA and statin    Weakness and Physical Deconditioning:  Patient is currently receiving PT/OT/ST as ordered. Therapy documentation is housed in a separate EMR system that does not interface with  the provider record. For the progress of Mechelle Alcantara please, refer to facility therapy notes for updates regarding progress, goals, equipment usage, and session metrics. Communication with the therapy team is ongoing to support interdisciplinary care.    Skin integrity:  Nursing to monitor skin integrity as patient is at risk for pressure injuries.  Turn and reposition Q 2 hours or more.  Air mattress and when up in chair cushion reducing device.  Dietician to evaluate and recommend.  Nutritional supplement to be implemented if needed.  Please monitor skin integrity and other pressure areas.    Living will related issues reviewed: Code status: Full code    Any decline or change in the patient’s condition needs to be communicated to the physician or myself.   Discussion with nursing staff regarding ongoing care and management has been conducted.   Communication about the patient’s status, overall condition, changes to the plan (medications or treatments), and any family questions has been completed when family was present.   If family is not available, communication will occur in person or via phone as needed.   We will continue the plan and medications noted above and will follow the patient at this facility.    Discharge planning:   Patient to be discharged home when goals are met.   Ongoing discussion with patient and family if available regarding discharge per discharge planner at facility.     *Please note that nursing facility notes, nursing EMR, outside laboratory agency, and  EMR do not interface.     Completion of the note was done through Dragon voice recognition technology and may include unintended or grammatical errors which may not have been recognized when finalizing the note.     Time:    Zulema Miguel, MICHAEL-CNP

## 2025-07-21 ENCOUNTER — PATIENT OUTREACH (OUTPATIENT)
Dept: PRIMARY CARE | Facility: CLINIC | Age: 80
End: 2025-07-21
Payer: MEDICARE

## 2025-07-21 NOTE — PROGRESS NOTES
Discharge Facility: [facility]  Discharge Diagnosis: Cellulitis of LLE, left CFA thrombus  Admission Date: 7/5/2025  Discharge Date: 7/19/2025    Pt was admitted to [facility] 7/1-7/5/2025.    PCP Appointment Date:  -Unable to schedule d/t no contact; task sent to office clerical pool to assist with follow up    Hospital Encounter and Summary Linked: Yes    ED to Hosp-Admission (Discharged) with Anne Marie Ascencio MD; Jeramie Fernando DO; Dillon Raines DO (07/01/2025)     Unable to reach patient x2 attempts, voicemail left with call back number.

## 2025-07-22 RX ORDER — ATORVASTATIN CALCIUM 40 MG/1
40 TABLET, FILM COATED ORAL DAILY
COMMUNITY

## 2025-07-22 NOTE — PROGRESS NOTES
Pt  Rommel returned phone call. Rommel confirmed pt with 2 identifiers. Pt was admitted to [facility] 7/1-7/5/2025 for cellulitis of left lower extremity.   Pt was then admitted to [facility] 7/5-7/19/2025. Limited information available via careport documentation and physician notes from facility.    Went through pt medication list with Rommel and discussed prescriptions. Rommel denies questions or issues regarding medication and reports pt has all of her medication at home.    Rommel reports concern that pt has wound on her leg and was supposed to be receiving home care. He states they have not been contacted yet and he is unsure of which agency she is supposed to be set up with.    Rommel denies any other questions, needs, or concerns at this time. He was encouraged to schedule pt PCP follow up appt to further go through information. Rommel declines at this time. He is encouraged to call if needs change.  Rommel given this RN contact information if any questions or needs arise.    -Pt next PCP appt 9/19/2025 1400    1123am; called and spoke with Leida at [facility] who confirms pt is active with home care. Per Leida pt was supposed to have first visit yesterday. She states she is going to contact nurse     1128am; called and notified Rommel pt is active with [facility] in [place]. Gave Rommel contact information [phone]. Rommel states they are calling on other line and ended call.

## 2025-08-08 ENCOUNTER — TELEPHONE (OUTPATIENT)
Dept: PRIMARY CARE | Facility: CLINIC | Age: 80
End: 2025-08-08
Payer: MEDICARE

## 2025-08-11 ENCOUNTER — ANCILLARY PROCEDURE (OUTPATIENT)
Dept: URGENT CARE | Age: 80
End: 2025-08-11
Payer: MEDICARE

## 2025-08-11 ENCOUNTER — TELEPHONE (OUTPATIENT)
Dept: PRIMARY CARE | Facility: CLINIC | Age: 80
End: 2025-08-11
Payer: MEDICARE

## 2025-08-11 ENCOUNTER — OFFICE VISIT (OUTPATIENT)
Dept: URGENT CARE | Age: 80
End: 2025-08-11
Payer: MEDICARE

## 2025-08-11 VITALS
RESPIRATION RATE: 16 BRPM | HEART RATE: 79 BPM | DIASTOLIC BLOOD PRESSURE: 61 MMHG | SYSTOLIC BLOOD PRESSURE: 151 MMHG | HEIGHT: 66 IN | TEMPERATURE: 98.9 F | WEIGHT: 135 LBS | BODY MASS INDEX: 21.69 KG/M2

## 2025-08-11 DIAGNOSIS — L03.116 LEFT LEG CELLULITIS: Primary | ICD-10-CM

## 2025-08-11 DIAGNOSIS — M25.572 ACUTE LEFT ANKLE PAIN: ICD-10-CM

## 2025-08-11 DIAGNOSIS — R52 PAIN: ICD-10-CM

## 2025-08-11 PROCEDURE — 99203 OFFICE O/P NEW LOW 30 MIN: CPT | Performed by: NURSE PRACTITIONER

## 2025-08-11 PROCEDURE — 1125F AMNT PAIN NOTED PAIN PRSNT: CPT | Performed by: NURSE PRACTITIONER

## 2025-08-11 PROCEDURE — 1160F RVW MEDS BY RX/DR IN RCRD: CPT | Performed by: NURSE PRACTITIONER

## 2025-08-11 PROCEDURE — 1159F MED LIST DOCD IN RCRD: CPT | Performed by: NURSE PRACTITIONER

## 2025-08-11 PROCEDURE — 73610 X-RAY EXAM OF ANKLE: CPT | Mod: LEFT SIDE | Performed by: NURSE PRACTITIONER

## 2025-08-11 PROCEDURE — 3078F DIAST BP <80 MM HG: CPT | Performed by: NURSE PRACTITIONER

## 2025-08-11 PROCEDURE — 3077F SYST BP >= 140 MM HG: CPT | Performed by: NURSE PRACTITIONER

## 2025-08-11 RX ORDER — LOSARTAN POTASSIUM 100 MG/1
100 TABLET ORAL DAILY
Status: ON HOLD | COMMUNITY

## 2025-08-11 RX ORDER — CEPHALEXIN 500 MG/1
500 CAPSULE ORAL EVERY 6 HOURS
Qty: 28 CAPSULE | Refills: 0 | Status: ON HOLD | OUTPATIENT
Start: 2025-08-11 | End: 2025-08-18

## 2025-08-11 ASSESSMENT — ENCOUNTER SYMPTOMS
WOUND: 1
JOINT SWELLING: 1
CONSTITUTIONAL NEGATIVE: 1
COLOR CHANGE: 1
RESPIRATORY NEGATIVE: 1
CARDIOVASCULAR NEGATIVE: 1

## 2025-08-11 ASSESSMENT — PAIN SCALES - GENERAL: PAINLEVEL_OUTOF10: 2

## 2025-08-12 ENCOUNTER — TELEPHONE (OUTPATIENT)
Dept: PRIMARY CARE | Facility: CLINIC | Age: 80
End: 2025-08-12
Payer: MEDICARE

## 2025-08-14 ENCOUNTER — APPOINTMENT (OUTPATIENT)
Dept: CARDIOLOGY | Facility: HOSPITAL | Age: 80
DRG: 492 | End: 2025-08-14
Payer: MEDICARE

## 2025-08-14 ENCOUNTER — OFFICE VISIT (OUTPATIENT)
Dept: PRIMARY CARE | Facility: CLINIC | Age: 80
End: 2025-08-14
Payer: MEDICARE

## 2025-08-14 ENCOUNTER — TELEPHONE (OUTPATIENT)
Dept: PRIMARY CARE | Facility: CLINIC | Age: 80
End: 2025-08-14
Payer: MEDICARE

## 2025-08-14 ENCOUNTER — APPOINTMENT (OUTPATIENT)
Dept: RADIOLOGY | Facility: HOSPITAL | Age: 80
DRG: 492 | End: 2025-08-14
Payer: MEDICARE

## 2025-08-14 ENCOUNTER — HOSPITAL ENCOUNTER (INPATIENT)
Facility: HOSPITAL | Age: 80
DRG: 492 | End: 2025-08-14
Attending: EMERGENCY MEDICINE | Admitting: STUDENT IN AN ORGANIZED HEALTH CARE EDUCATION/TRAINING PROGRAM
Payer: MEDICARE

## 2025-08-14 VITALS
TEMPERATURE: 98 F | SYSTOLIC BLOOD PRESSURE: 140 MMHG | RESPIRATION RATE: 14 BRPM | WEIGHT: 132 LBS | HEIGHT: 63 IN | OXYGEN SATURATION: 98 % | DIASTOLIC BLOOD PRESSURE: 60 MMHG | BODY MASS INDEX: 23.39 KG/M2 | HEART RATE: 80 BPM

## 2025-08-14 DIAGNOSIS — M86.272 SUBACUTE OSTEOMYELITIS OF LEFT ANKLE: ICD-10-CM

## 2025-08-14 DIAGNOSIS — L02.91 ABSCESS: ICD-10-CM

## 2025-08-14 DIAGNOSIS — R73.9 ELEVATED BLOOD SUGAR: ICD-10-CM

## 2025-08-14 DIAGNOSIS — I10 ESSENTIAL HYPERTENSION: ICD-10-CM

## 2025-08-14 DIAGNOSIS — M65.10 INFECTION OF TENDON SHEATH: ICD-10-CM

## 2025-08-14 DIAGNOSIS — M71.072 ABSCESS OF BURSA OF LEFT FOOT: ICD-10-CM

## 2025-08-14 DIAGNOSIS — I70.245 ATHSCL NATIVE ARTERIES OF LEFT LEG W ULCERATION OTH PRT FOOT (MULTI): Primary | ICD-10-CM

## 2025-08-14 DIAGNOSIS — E46 PROTEIN-CALORIE MALNUTRITION, UNSPECIFIED SEVERITY (MULTI): ICD-10-CM

## 2025-08-14 DIAGNOSIS — I73.9 PAD (PERIPHERAL ARTERY DISEASE): ICD-10-CM

## 2025-08-14 DIAGNOSIS — D75.839 THROMBOCYTOSIS: ICD-10-CM

## 2025-08-14 DIAGNOSIS — Z48.1 DELAYED POSTOPERATIVE WOUND CLOSURE: ICD-10-CM

## 2025-08-14 DIAGNOSIS — S91.002D OPEN WOUND OF LEFT ANKLE, SUBSEQUENT ENCOUNTER: ICD-10-CM

## 2025-08-14 DIAGNOSIS — S86.312D TEAR OF PERONEAL TENDON, LEFT, SUBSEQUENT ENCOUNTER: ICD-10-CM

## 2025-08-14 DIAGNOSIS — L03.116 CELLULITIS OF LEFT ANKLE: Primary | ICD-10-CM

## 2025-08-14 DIAGNOSIS — L03.116 CELLULITIS OF LEFT LOWER EXTREMITY: ICD-10-CM

## 2025-08-14 LAB
ALBUMIN SERPL BCP-MCNC: 2.8 G/DL (ref 3.4–5)
ALBUMIN SERPL BCP-MCNC: 3.2 G/DL (ref 3.4–5)
ALP SERPL-CCNC: 116 U/L (ref 33–136)
ALT SERPL W P-5'-P-CCNC: 31 U/L (ref 7–45)
ANION GAP SERPL CALC-SCNC: 11 MMOL/L (ref 10–20)
ANION GAP SERPL CALC-SCNC: 13 MMOL/L (ref 10–20)
AST SERPL W P-5'-P-CCNC: 50 U/L (ref 9–39)
BASOPHILS # BLD AUTO: 0.04 X10*3/UL (ref 0–0.1)
BASOPHILS NFR BLD AUTO: 0.4 %
BILIRUB SERPL-MCNC: 0.5 MG/DL (ref 0–1.2)
BUN SERPL-MCNC: 10 MG/DL (ref 6–23)
BUN SERPL-MCNC: 11 MG/DL (ref 6–23)
CALCIUM SERPL-MCNC: 7.7 MG/DL (ref 8.6–10.3)
CALCIUM SERPL-MCNC: 8.4 MG/DL (ref 8.6–10.3)
CHLORIDE SERPL-SCNC: 102 MMOL/L (ref 98–107)
CHLORIDE SERPL-SCNC: 98 MMOL/L (ref 98–107)
CO2 SERPL-SCNC: 27 MMOL/L (ref 21–32)
CO2 SERPL-SCNC: 27 MMOL/L (ref 21–32)
CREAT SERPL-MCNC: 0.54 MG/DL (ref 0.5–1.05)
CREAT SERPL-MCNC: 0.64 MG/DL (ref 0.5–1.05)
CRP SERPL-MCNC: 19.35 MG/DL
EGFRCR SERPLBLD CKD-EPI 2021: 90 ML/MIN/1.73M*2
EGFRCR SERPLBLD CKD-EPI 2021: >90 ML/MIN/1.73M*2
EOSINOPHIL # BLD AUTO: 0.13 X10*3/UL (ref 0–0.4)
EOSINOPHIL NFR BLD AUTO: 1.4 %
ERYTHROCYTE [DISTWIDTH] IN BLOOD BY AUTOMATED COUNT: 15.1 % (ref 11.5–14.5)
ERYTHROCYTE [SEDIMENTATION RATE] IN BLOOD BY WESTERGREN METHOD: 72 MM/H (ref 0–30)
GLUCOSE BLD MANUAL STRIP-MCNC: 98 MG/DL (ref 74–99)
GLUCOSE SERPL-MCNC: 93 MG/DL (ref 74–99)
GLUCOSE SERPL-MCNC: 97 MG/DL (ref 74–99)
HCT VFR BLD AUTO: 36.1 % (ref 36–46)
HGB BLD-MCNC: 11.6 G/DL (ref 12–16)
IMM GRANULOCYTES # BLD AUTO: 0.04 X10*3/UL (ref 0–0.5)
IMM GRANULOCYTES NFR BLD AUTO: 0.4 % (ref 0–0.9)
LYMPHOCYTES # BLD AUTO: 1.09 X10*3/UL (ref 0.8–3)
LYMPHOCYTES NFR BLD AUTO: 11.8 %
MAGNESIUM SERPL-MCNC: 1.85 MG/DL (ref 1.6–2.4)
MCH RBC QN AUTO: 29.2 PG (ref 26–34)
MCHC RBC AUTO-ENTMCNC: 32.1 G/DL (ref 32–36)
MCV RBC AUTO: 91 FL (ref 80–100)
MONOCYTES # BLD AUTO: 0.54 X10*3/UL (ref 0.05–0.8)
MONOCYTES NFR BLD AUTO: 5.8 %
NEUTROPHILS # BLD AUTO: 7.42 X10*3/UL (ref 1.6–5.5)
NEUTROPHILS NFR BLD AUTO: 80.2 %
NRBC BLD-RTO: 0 /100 WBCS (ref 0–0)
PHOSPHATE SERPL-MCNC: 2.4 MG/DL (ref 2.5–4.9)
PLATELET # BLD AUTO: 474 X10*3/UL (ref 150–450)
POTASSIUM SERPL-SCNC: 2.8 MMOL/L (ref 3.5–5.3)
POTASSIUM SERPL-SCNC: 3.9 MMOL/L (ref 3.5–5.3)
PROT SERPL-MCNC: 7.8 G/DL (ref 6.4–8.2)
RBC # BLD AUTO: 3.97 X10*6/UL (ref 4–5.2)
SODIUM SERPL-SCNC: 135 MMOL/L (ref 136–145)
SODIUM SERPL-SCNC: 136 MMOL/L (ref 136–145)
URATE SERPL-MCNC: 2.7 MG/DL (ref 2.3–6.7)
WBC # BLD AUTO: 9.3 X10*3/UL (ref 4.4–11.3)

## 2025-08-14 PROCEDURE — 84550 ASSAY OF BLOOD/URIC ACID: CPT

## 2025-08-14 PROCEDURE — 83735 ASSAY OF MAGNESIUM: CPT | Performed by: EMERGENCY MEDICINE

## 2025-08-14 PROCEDURE — 3077F SYST BP >= 140 MM HG: CPT | Performed by: INTERNAL MEDICINE

## 2025-08-14 PROCEDURE — 96366 THER/PROPH/DIAG IV INF ADDON: CPT

## 2025-08-14 PROCEDURE — 1160F RVW MEDS BY RX/DR IN RCRD: CPT | Performed by: INTERNAL MEDICINE

## 2025-08-14 PROCEDURE — 87077 CULTURE AEROBIC IDENTIFY: CPT | Mod: STJLAB

## 2025-08-14 PROCEDURE — 36415 COLL VENOUS BLD VENIPUNCTURE: CPT

## 2025-08-14 PROCEDURE — 73610 X-RAY EXAM OF ANKLE: CPT | Mod: LT

## 2025-08-14 PROCEDURE — 93005 ELECTROCARDIOGRAM TRACING: CPT

## 2025-08-14 PROCEDURE — 80053 COMPREHEN METABOLIC PANEL: CPT

## 2025-08-14 PROCEDURE — 2500000001 HC RX 250 WO HCPCS SELF ADMINISTERED DRUGS (ALT 637 FOR MEDICARE OP)

## 2025-08-14 PROCEDURE — 1159F MED LIST DOCD IN RCRD: CPT | Performed by: INTERNAL MEDICINE

## 2025-08-14 PROCEDURE — 80069 RENAL FUNCTION PANEL: CPT | Mod: CCI

## 2025-08-14 PROCEDURE — 2500000002 HC RX 250 W HCPCS SELF ADMINISTERED DRUGS (ALT 637 FOR MEDICARE OP, ALT 636 FOR OP/ED)

## 2025-08-14 PROCEDURE — 96365 THER/PROPH/DIAG IV INF INIT: CPT

## 2025-08-14 PROCEDURE — 99212 OFFICE O/P EST SF 10 MIN: CPT | Performed by: INTERNAL MEDICINE

## 2025-08-14 PROCEDURE — 2500000004 HC RX 250 GENERAL PHARMACY W/ HCPCS (ALT 636 FOR OP/ED)

## 2025-08-14 PROCEDURE — 3078F DIAST BP <80 MM HG: CPT | Performed by: INTERNAL MEDICINE

## 2025-08-14 PROCEDURE — 86140 C-REACTIVE PROTEIN: CPT

## 2025-08-14 PROCEDURE — 99285 EMERGENCY DEPT VISIT HI MDM: CPT

## 2025-08-14 PROCEDURE — 73610 X-RAY EXAM OF ANKLE: CPT | Mod: LEFT SIDE | Performed by: STUDENT IN AN ORGANIZED HEALTH CARE EDUCATION/TRAINING PROGRAM

## 2025-08-14 PROCEDURE — 96368 THER/DIAG CONCURRENT INF: CPT

## 2025-08-14 PROCEDURE — 99285 EMERGENCY DEPT VISIT HI MDM: CPT | Mod: 25 | Performed by: EMERGENCY MEDICINE

## 2025-08-14 PROCEDURE — 1100000001 HC PRIVATE ROOM DAILY

## 2025-08-14 PROCEDURE — 82947 ASSAY GLUCOSE BLOOD QUANT: CPT

## 2025-08-14 PROCEDURE — 85652 RBC SED RATE AUTOMATED: CPT

## 2025-08-14 PROCEDURE — 85025 COMPLETE CBC W/AUTO DIFF WBC: CPT

## 2025-08-14 RX ORDER — LOSARTAN POTASSIUM 100 MG/1
100 TABLET ORAL DAILY
Status: DISCONTINUED | OUTPATIENT
Start: 2025-08-15 | End: 2025-08-22 | Stop reason: HOSPADM

## 2025-08-14 RX ORDER — OXYCODONE HYDROCHLORIDE 5 MG/1
5 TABLET ORAL EVERY 6 HOURS PRN
Status: DISCONTINUED | OUTPATIENT
Start: 2025-08-14 | End: 2025-08-22 | Stop reason: HOSPADM

## 2025-08-14 RX ORDER — ASPIRIN 81 MG/1
81 TABLET ORAL NIGHTLY
Status: DISCONTINUED | OUTPATIENT
Start: 2025-08-14 | End: 2025-08-22 | Stop reason: HOSPADM

## 2025-08-14 RX ORDER — ACETAMINOPHEN 325 MG/1
650 TABLET ORAL EVERY 8 HOURS
Status: DISCONTINUED | OUTPATIENT
Start: 2025-08-14 | End: 2025-08-22 | Stop reason: HOSPADM

## 2025-08-14 RX ORDER — VANCOMYCIN/0.9 % SOD CHLORIDE 1.5G/250ML
25 PLASTIC BAG, INJECTION (ML) INTRAVENOUS ONCE
Status: COMPLETED | OUTPATIENT
Start: 2025-08-14 | End: 2025-08-14

## 2025-08-14 RX ORDER — VANCOMYCIN HYDROCHLORIDE 750 MG/150ML
750 INJECTION, SOLUTION INTRAVENOUS EVERY 12 HOURS
Status: DISCONTINUED | OUTPATIENT
Start: 2025-08-15 | End: 2025-08-22 | Stop reason: HOSPADM

## 2025-08-14 RX ORDER — POTASSIUM CHLORIDE 20 MEQ/1
40 TABLET, EXTENDED RELEASE ORAL ONCE
Status: COMPLETED | OUTPATIENT
Start: 2025-08-14 | End: 2025-08-14

## 2025-08-14 RX ORDER — VANCOMYCIN HYDROCHLORIDE 1 G/20ML
INJECTION, POWDER, LYOPHILIZED, FOR SOLUTION INTRAVENOUS DAILY PRN
Status: DISCONTINUED | OUTPATIENT
Start: 2025-08-14 | End: 2025-08-22 | Stop reason: HOSPADM

## 2025-08-14 RX ORDER — ATORVASTATIN CALCIUM 40 MG/1
40 TABLET, FILM COATED ORAL DAILY
Status: DISCONTINUED | OUTPATIENT
Start: 2025-08-15 | End: 2025-08-22 | Stop reason: HOSPADM

## 2025-08-14 RX ORDER — RIVAROXABAN 2.5 MG/1
5 TABLET, FILM COATED ORAL
Status: DISCONTINUED | OUTPATIENT
Start: 2025-08-15 | End: 2025-08-22 | Stop reason: HOSPADM

## 2025-08-14 RX ORDER — POTASSIUM CHLORIDE 14.9 MG/ML
20 INJECTION INTRAVENOUS
Status: COMPLETED | OUTPATIENT
Start: 2025-08-14 | End: 2025-08-15

## 2025-08-14 RX ADMIN — POTASSIUM CHLORIDE 20 MEQ: 14.9 INJECTION, SOLUTION INTRAVENOUS at 20:25

## 2025-08-14 RX ADMIN — Medication 1500 MG: at 20:25

## 2025-08-14 RX ADMIN — POTASSIUM CHLORIDE EXTENDED-RELEASE 40 MEQ: 1500 TABLET ORAL at 20:25

## 2025-08-14 RX ADMIN — POTASSIUM CHLORIDE 20 MEQ: 14.9 INJECTION, SOLUTION INTRAVENOUS at 22:23

## 2025-08-14 RX ADMIN — ASPIRIN 81 MG: 81 TABLET, COATED ORAL at 23:27

## 2025-08-14 SDOH — ECONOMIC STABILITY: HOUSING INSECURITY: IN THE PAST 12 MONTHS, HOW MANY TIMES HAVE YOU MOVED WHERE YOU WERE LIVING?: 0

## 2025-08-14 SDOH — ECONOMIC STABILITY: FOOD INSECURITY: WITHIN THE PAST 12 MONTHS, THE FOOD YOU BOUGHT JUST DIDN'T LAST AND YOU DIDN'T HAVE MONEY TO GET MORE.: NEVER TRUE

## 2025-08-14 SDOH — SOCIAL STABILITY: SOCIAL INSECURITY: HAVE YOU HAD ANY THOUGHTS OF HARMING ANYONE ELSE?: NO

## 2025-08-14 SDOH — ECONOMIC STABILITY: INCOME INSECURITY: IN THE PAST 12 MONTHS HAS THE ELECTRIC, GAS, OIL, OR WATER COMPANY THREATENED TO SHUT OFF SERVICES IN YOUR HOME?: NO

## 2025-08-14 SDOH — SOCIAL STABILITY: SOCIAL INSECURITY: WITHIN THE LAST YEAR, HAVE YOU BEEN HUMILIATED OR EMOTIONALLY ABUSED IN OTHER WAYS BY YOUR PARTNER OR EX-PARTNER?: NO

## 2025-08-14 SDOH — SOCIAL STABILITY: SOCIAL INSECURITY: WITHIN THE LAST YEAR, HAVE YOU BEEN AFRAID OF YOUR PARTNER OR EX-PARTNER?: NO

## 2025-08-14 SDOH — SOCIAL STABILITY: SOCIAL INSECURITY: DOES ANYONE TRY TO KEEP YOU FROM HAVING/CONTACTING OTHER FRIENDS OR DOING THINGS OUTSIDE YOUR HOME?: NO

## 2025-08-14 SDOH — SOCIAL STABILITY: SOCIAL INSECURITY: ABUSE: ADULT

## 2025-08-14 SDOH — ECONOMIC STABILITY: HOUSING INSECURITY: IN THE LAST 12 MONTHS, WAS THERE A TIME WHEN YOU WERE NOT ABLE TO PAY THE MORTGAGE OR RENT ON TIME?: NO

## 2025-08-14 SDOH — ECONOMIC STABILITY: FOOD INSECURITY: WITHIN THE PAST 12 MONTHS, YOU WORRIED THAT YOUR FOOD WOULD RUN OUT BEFORE YOU GOT THE MONEY TO BUY MORE.: NEVER TRUE

## 2025-08-14 SDOH — ECONOMIC STABILITY: HOUSING INSECURITY: AT ANY TIME IN THE PAST 12 MONTHS, WERE YOU HOMELESS OR LIVING IN A SHELTER (INCLUDING NOW)?: NO

## 2025-08-14 SDOH — SOCIAL STABILITY: SOCIAL INSECURITY: ARE THERE ANY APPARENT SIGNS OF INJURIES/BEHAVIORS THAT COULD BE RELATED TO ABUSE/NEGLECT?: NO

## 2025-08-14 SDOH — ECONOMIC STABILITY: TRANSPORTATION INSECURITY: IN THE PAST 12 MONTHS, HAS LACK OF TRANSPORTATION KEPT YOU FROM MEDICAL APPOINTMENTS OR FROM GETTING MEDICATIONS?: NO

## 2025-08-14 SDOH — SOCIAL STABILITY: SOCIAL INSECURITY: ARE YOU OR HAVE YOU BEEN THREATENED OR ABUSED PHYSICALLY, EMOTIONALLY, OR SEXUALLY BY ANYONE?: NO

## 2025-08-14 SDOH — SOCIAL STABILITY: SOCIAL INSECURITY: HAVE YOU HAD THOUGHTS OF HARMING ANYONE ELSE?: NO

## 2025-08-14 SDOH — ECONOMIC STABILITY: FOOD INSECURITY: HOW HARD IS IT FOR YOU TO PAY FOR THE VERY BASICS LIKE FOOD, HOUSING, MEDICAL CARE, AND HEATING?: NOT HARD AT ALL

## 2025-08-14 SDOH — SOCIAL STABILITY: SOCIAL INSECURITY: HAS ANYONE EVER THREATENED TO HURT YOUR FAMILY OR YOUR PETS?: NO

## 2025-08-14 SDOH — SOCIAL STABILITY: SOCIAL INSECURITY: DO YOU FEEL UNSAFE GOING BACK TO THE PLACE WHERE YOU ARE LIVING?: NO

## 2025-08-14 SDOH — SOCIAL STABILITY: SOCIAL INSECURITY: WERE YOU ABLE TO COMPLETE ALL THE BEHAVIORAL HEALTH SCREENINGS?: YES

## 2025-08-14 SDOH — SOCIAL STABILITY: SOCIAL INSECURITY: DO YOU FEEL ANYONE HAS EXPLOITED OR TAKEN ADVANTAGE OF YOU FINANCIALLY OR OF YOUR PERSONAL PROPERTY?: NO

## 2025-08-14 ASSESSMENT — COGNITIVE AND FUNCTIONAL STATUS - GENERAL
DAILY ACTIVITIY SCORE: 22
TOILETING: A LITTLE
HELP NEEDED FOR BATHING: A LITTLE
MOVING TO AND FROM BED TO CHAIR: A LITTLE
MOBILITY SCORE: 18
PATIENT BASELINE BEDBOUND: NO
CLIMB 3 TO 5 STEPS WITH RAILING: A LOT
TURNING FROM BACK TO SIDE WHILE IN FLAT BAD: A LITTLE
WALKING IN HOSPITAL ROOM: A LITTLE
STANDING UP FROM CHAIR USING ARMS: A LITTLE

## 2025-08-14 ASSESSMENT — ENCOUNTER SYMPTOMS
WOUND: 1
COLOR CHANGE: 1
CHILLS: 0
CARDIOVASCULAR NEGATIVE: 1
GASTROINTESTINAL NEGATIVE: 1
RESPIRATORY NEGATIVE: 1
ROS SKIN COMMENTS: LEFT LOWER EXT
JOINT SWELLING: 1
FEVER: 0

## 2025-08-14 ASSESSMENT — PATIENT HEALTH QUESTIONNAIRE - PHQ9
2. FEELING DOWN, DEPRESSED OR HOPELESS: NOT AT ALL
2. FEELING DOWN, DEPRESSED OR HOPELESS: NOT AT ALL
SUM OF ALL RESPONSES TO PHQ9 QUESTIONS 1 & 2: 0
1. LITTLE INTEREST OR PLEASURE IN DOING THINGS: NOT AT ALL
1. LITTLE INTEREST OR PLEASURE IN DOING THINGS: NOT AT ALL
SUM OF ALL RESPONSES TO PHQ9 QUESTIONS 1 AND 2: 0

## 2025-08-14 ASSESSMENT — ACTIVITIES OF DAILY LIVING (ADL)
FEEDING YOURSELF: INDEPENDENT
DRESSING YOURSELF: INDEPENDENT
GROOMING: INDEPENDENT
ASSISTIVE_DEVICE: WALKER;CANE
TOILETING: NEEDS ASSISTANCE
ADEQUATE_TO_COMPLETE_ADL: YES
LACK_OF_TRANSPORTATION: NO
WALKS IN HOME: NEEDS ASSISTANCE
HEARING - LEFT EAR: FUNCTIONAL
HEARING - RIGHT EAR: FUNCTIONAL
PATIENT'S MEMORY ADEQUATE TO SAFELY COMPLETE DAILY ACTIVITIES?: YES
LACK_OF_TRANSPORTATION: NO
BATHING: NEEDS ASSISTANCE
JUDGMENT_ADEQUATE_SAFELY_COMPLETE_DAILY_ACTIVITIES: YES

## 2025-08-14 ASSESSMENT — LIFESTYLE VARIABLES
EVER HAD A DRINK FIRST THING IN THE MORNING TO STEADY YOUR NERVES TO GET RID OF A HANGOVER: NO
AUDIT-C TOTAL SCORE: 0
HAVE YOU EVER FELT YOU SHOULD CUT DOWN ON YOUR DRINKING: NO
TOTAL SCORE: 0
HOW OFTEN DO YOU HAVE 6 OR MORE DRINKS ON ONE OCCASION: NEVER
HOW OFTEN DO YOU HAVE A DRINK CONTAINING ALCOHOL: NEVER
HOW MANY STANDARD DRINKS CONTAINING ALCOHOL DO YOU HAVE ON A TYPICAL DAY: PATIENT DOES NOT DRINK
SKIP TO QUESTIONS 9-10: 1
EVER FELT BAD OR GUILTY ABOUT YOUR DRINKING: NO
HAVE PEOPLE ANNOYED YOU BY CRITICIZING YOUR DRINKING: NO
AUDIT-C TOTAL SCORE: 0

## 2025-08-14 ASSESSMENT — PAIN - FUNCTIONAL ASSESSMENT: PAIN_FUNCTIONAL_ASSESSMENT: 0-10

## 2025-08-14 ASSESSMENT — PAIN DESCRIPTION - ORIENTATION: ORIENTATION: LEFT

## 2025-08-14 ASSESSMENT — PAIN SCALES - GENERAL
PAINLEVEL_OUTOF10: 2
PAINLEVEL_OUTOF10: 0 - NO PAIN
PAINLEVEL_OUTOF10: 2

## 2025-08-14 ASSESSMENT — PAIN DESCRIPTION - LOCATION: LOCATION: ANKLE

## 2025-08-14 ASSESSMENT — PAIN DESCRIPTION - PROGRESSION: CLINICAL_PROGRESSION: NOT CHANGED

## 2025-08-15 ENCOUNTER — APPOINTMENT (OUTPATIENT)
Dept: RADIOLOGY | Facility: HOSPITAL | Age: 80
DRG: 492 | End: 2025-08-15
Payer: MEDICARE

## 2025-08-15 LAB
ALBUMIN SERPL BCP-MCNC: 2.5 G/DL (ref 3.4–5)
ANION GAP SERPL CALC-SCNC: 12 MMOL/L (ref 10–20)
BUN SERPL-MCNC: 10 MG/DL (ref 6–23)
CALCIUM SERPL-MCNC: 7.6 MG/DL (ref 8.6–10.3)
CHLORIDE SERPL-SCNC: 106 MMOL/L (ref 98–107)
CO2 SERPL-SCNC: 23 MMOL/L (ref 21–32)
CREAT SERPL-MCNC: 0.54 MG/DL (ref 0.5–1.05)
EGFRCR SERPLBLD CKD-EPI 2021: >90 ML/MIN/1.73M*2
ERYTHROCYTE [DISTWIDTH] IN BLOOD BY AUTOMATED COUNT: 15.5 % (ref 11.5–14.5)
GLUCOSE SERPL-MCNC: 93 MG/DL (ref 74–99)
HCT VFR BLD AUTO: 30.1 % (ref 36–46)
HGB BLD-MCNC: 9.4 G/DL (ref 12–16)
MAGNESIUM SERPL-MCNC: 1.76 MG/DL (ref 1.6–2.4)
MCH RBC QN AUTO: 28.7 PG (ref 26–34)
MCHC RBC AUTO-ENTMCNC: 31.2 G/DL (ref 32–36)
MCV RBC AUTO: 92 FL (ref 80–100)
NRBC BLD-RTO: 0 /100 WBCS (ref 0–0)
PHOSPHATE SERPL-MCNC: 2.7 MG/DL (ref 2.5–4.9)
PLATELET # BLD AUTO: 433 X10*3/UL (ref 150–450)
POTASSIUM SERPL-SCNC: 3.7 MMOL/L (ref 3.5–5.3)
RBC # BLD AUTO: 3.27 X10*6/UL (ref 4–5.2)
SODIUM SERPL-SCNC: 137 MMOL/L (ref 136–145)
WBC # BLD AUTO: 7.5 X10*3/UL (ref 4.4–11.3)

## 2025-08-15 PROCEDURE — 99222 1ST HOSP IP/OBS MODERATE 55: CPT | Performed by: PODIATRIST

## 2025-08-15 PROCEDURE — 2500000001 HC RX 250 WO HCPCS SELF ADMINISTERED DRUGS (ALT 637 FOR MEDICARE OP)

## 2025-08-15 PROCEDURE — 83735 ASSAY OF MAGNESIUM: CPT

## 2025-08-15 PROCEDURE — 1100000001 HC PRIVATE ROOM DAILY

## 2025-08-15 PROCEDURE — 80069 RENAL FUNCTION PANEL: CPT

## 2025-08-15 PROCEDURE — 2500000004 HC RX 250 GENERAL PHARMACY W/ HCPCS (ALT 636 FOR OP/ED)

## 2025-08-15 PROCEDURE — 73721 MRI JNT OF LWR EXTRE W/O DYE: CPT | Mod: LEFT SIDE | Performed by: RADIOLOGY

## 2025-08-15 PROCEDURE — 36415 COLL VENOUS BLD VENIPUNCTURE: CPT

## 2025-08-15 PROCEDURE — 2500000002 HC RX 250 W HCPCS SELF ADMINISTERED DRUGS (ALT 637 FOR MEDICARE OP, ALT 636 FOR OP/ED)

## 2025-08-15 PROCEDURE — 73721 MRI JNT OF LWR EXTRE W/O DYE: CPT | Mod: LT

## 2025-08-15 PROCEDURE — 85027 COMPLETE CBC AUTOMATED: CPT

## 2025-08-15 PROCEDURE — 99223 1ST HOSP IP/OBS HIGH 75: CPT

## 2025-08-15 RX ORDER — L. ACIDOPHILUS/L.BULGARICUS 1MM CELL
1 TABLET ORAL 2 TIMES DAILY
Status: DISCONTINUED | OUTPATIENT
Start: 2025-08-15 | End: 2025-08-22 | Stop reason: HOSPADM

## 2025-08-15 RX ORDER — POTASSIUM CHLORIDE 20 MEQ/1
40 TABLET, EXTENDED RELEASE ORAL ONCE
Status: COMPLETED | OUTPATIENT
Start: 2025-08-15 | End: 2025-08-15

## 2025-08-15 RX ORDER — MAGNESIUM SULFATE HEPTAHYDRATE 40 MG/ML
2 INJECTION, SOLUTION INTRAVENOUS ONCE
Status: COMPLETED | OUTPATIENT
Start: 2025-08-15 | End: 2025-08-15

## 2025-08-15 RX ADMIN — ATORVASTATIN CALCIUM 40 MG: 40 TABLET, FILM COATED ORAL at 07:53

## 2025-08-15 RX ADMIN — LOSARTAN POTASSIUM 100 MG: 100 TABLET, FILM COATED ORAL at 07:53

## 2025-08-15 RX ADMIN — RIVAROXABAN 5 MG: 2.5 TABLET, FILM COATED ORAL at 17:18

## 2025-08-15 RX ADMIN — RIVAROXABAN 5 MG: 2.5 TABLET, FILM COATED ORAL at 07:53

## 2025-08-15 RX ADMIN — VANCOMYCIN HYDROCHLORIDE 750 MG: 750 INJECTION, SOLUTION INTRAVENOUS at 20:19

## 2025-08-15 RX ADMIN — POTASSIUM CHLORIDE EXTENDED-RELEASE 40 MEQ: 1500 TABLET ORAL at 09:29

## 2025-08-15 RX ADMIN — ASPIRIN 81 MG: 81 TABLET, COATED ORAL at 20:19

## 2025-08-15 RX ADMIN — VANCOMYCIN HYDROCHLORIDE 750 MG: 750 INJECTION, SOLUTION INTRAVENOUS at 08:00

## 2025-08-15 RX ADMIN — MAGNESIUM SULFATE HEPTAHYDRATE 2 G: 40 INJECTION, SOLUTION INTRAVENOUS at 09:29

## 2025-08-15 RX ADMIN — Medication 1 TABLET: at 20:19

## 2025-08-15 ASSESSMENT — COGNITIVE AND FUNCTIONAL STATUS - GENERAL
WALKING IN HOSPITAL ROOM: A LITTLE
TOILETING: A LITTLE
TURNING FROM BACK TO SIDE WHILE IN FLAT BAD: A LITTLE
HELP NEEDED FOR BATHING: A LITTLE
MOBILITY SCORE: 18
STANDING UP FROM CHAIR USING ARMS: A LITTLE
DAILY ACTIVITIY SCORE: 22
MOVING TO AND FROM BED TO CHAIR: A LITTLE
CLIMB 3 TO 5 STEPS WITH RAILING: A LOT

## 2025-08-15 ASSESSMENT — PAIN SCALES - GENERAL
PAINLEVEL_OUTOF10: 8
PAINLEVEL_OUTOF10: 0 - NO PAIN

## 2025-08-15 ASSESSMENT — ACTIVITIES OF DAILY LIVING (ADL): LACK_OF_TRANSPORTATION: NO

## 2025-08-15 ASSESSMENT — PAIN - FUNCTIONAL ASSESSMENT
PAIN_FUNCTIONAL_ASSESSMENT: 0-10
PAIN_FUNCTIONAL_ASSESSMENT: 0-10

## 2025-08-16 PROBLEM — M86.272: Status: ACTIVE | Noted: 2025-08-14

## 2025-08-16 PROBLEM — M65.10: Status: ACTIVE | Noted: 2025-08-14

## 2025-08-16 PROBLEM — M71.072 ABSCESS OF BURSA OF LEFT FOOT: Status: ACTIVE | Noted: 2025-08-14

## 2025-08-16 LAB
ALBUMIN SERPL BCP-MCNC: 2.4 G/DL (ref 3.4–5)
ANION GAP SERPL CALC-SCNC: 10 MMOL/L (ref 10–20)
ATRIAL RATE: 72 BPM
BUN SERPL-MCNC: 10 MG/DL (ref 6–23)
CALCIUM SERPL-MCNC: 7.6 MG/DL (ref 8.6–10.3)
CHLORIDE SERPL-SCNC: 105 MMOL/L (ref 98–107)
CO2 SERPL-SCNC: 25 MMOL/L (ref 21–32)
CREAT SERPL-MCNC: 0.53 MG/DL (ref 0.5–1.05)
EGFRCR SERPLBLD CKD-EPI 2021: >90 ML/MIN/1.73M*2
ERYTHROCYTE [DISTWIDTH] IN BLOOD BY AUTOMATED COUNT: 15.6 % (ref 11.5–14.5)
GLUCOSE SERPL-MCNC: 92 MG/DL (ref 74–99)
HCT VFR BLD AUTO: 31.3 % (ref 36–46)
HGB BLD-MCNC: 9.9 G/DL (ref 12–16)
MAGNESIUM SERPL-MCNC: 2 MG/DL (ref 1.6–2.4)
MCH RBC QN AUTO: 29.1 PG (ref 26–34)
MCHC RBC AUTO-ENTMCNC: 31.6 G/DL (ref 32–36)
MCV RBC AUTO: 92 FL (ref 80–100)
NRBC BLD-RTO: 0 /100 WBCS (ref 0–0)
P AXIS: 73 DEGREES
P OFFSET: 201 MS
P ONSET: 158 MS
PHOSPHATE SERPL-MCNC: 3.3 MG/DL (ref 2.5–4.9)
PLATELET # BLD AUTO: 463 X10*3/UL (ref 150–450)
POTASSIUM SERPL-SCNC: 3.3 MMOL/L (ref 3.5–5.3)
PR INTERVAL: 140 MS
Q ONSET: 228 MS
QRS COUNT: 12 BEATS
QRS DURATION: 62 MS
QT INTERVAL: 390 MS
QTC CALCULATION(BAZETT): 427 MS
QTC FREDERICIA: 414 MS
R AXIS: -39 DEGREES
RBC # BLD AUTO: 3.4 X10*6/UL (ref 4–5.2)
SODIUM SERPL-SCNC: 137 MMOL/L (ref 136–145)
T AXIS: 27 DEGREES
T OFFSET: 423 MS
VANCOMYCIN SERPL-MCNC: 14.4 UG/ML (ref 5–20)
VENTRICULAR RATE: 72 BPM
WBC # BLD AUTO: 8.3 X10*3/UL (ref 4.4–11.3)

## 2025-08-16 PROCEDURE — 2500000004 HC RX 250 GENERAL PHARMACY W/ HCPCS (ALT 636 FOR OP/ED)

## 2025-08-16 PROCEDURE — 2500000002 HC RX 250 W HCPCS SELF ADMINISTERED DRUGS (ALT 637 FOR MEDICARE OP, ALT 636 FOR OP/ED)

## 2025-08-16 PROCEDURE — 80069 RENAL FUNCTION PANEL: CPT

## 2025-08-16 PROCEDURE — 80202 ASSAY OF VANCOMYCIN: CPT

## 2025-08-16 PROCEDURE — 87493 C DIFF AMPLIFIED PROBE: CPT | Mod: STJLAB

## 2025-08-16 PROCEDURE — 99233 SBSQ HOSP IP/OBS HIGH 50: CPT

## 2025-08-16 PROCEDURE — 2500000004 HC RX 250 GENERAL PHARMACY W/ HCPCS (ALT 636 FOR OP/ED): Performed by: PODIATRIST

## 2025-08-16 PROCEDURE — 83735 ASSAY OF MAGNESIUM: CPT

## 2025-08-16 PROCEDURE — 1100000001 HC PRIVATE ROOM DAILY

## 2025-08-16 PROCEDURE — 2500000001 HC RX 250 WO HCPCS SELF ADMINISTERED DRUGS (ALT 637 FOR MEDICARE OP)

## 2025-08-16 PROCEDURE — 20605 DRAIN/INJ JOINT/BURSA W/O US: CPT | Performed by: PODIATRIST

## 2025-08-16 PROCEDURE — 85027 COMPLETE CBC AUTOMATED: CPT

## 2025-08-16 PROCEDURE — 36415 COLL VENOUS BLD VENIPUNCTURE: CPT

## 2025-08-16 PROCEDURE — 0S9G3ZZ DRAINAGE OF LEFT ANKLE JOINT, PERCUTANEOUS APPROACH: ICD-10-PCS | Performed by: PODIATRIST

## 2025-08-16 RX ORDER — AMLODIPINE BESYLATE 5 MG/1
5 TABLET ORAL DAILY
Status: DISCONTINUED | OUTPATIENT
Start: 2025-08-16 | End: 2025-08-22 | Stop reason: HOSPADM

## 2025-08-16 RX ORDER — POTASSIUM CHLORIDE 20 MEQ/1
40 TABLET, EXTENDED RELEASE ORAL ONCE
Status: COMPLETED | OUTPATIENT
Start: 2025-08-16 | End: 2025-08-16

## 2025-08-16 RX ORDER — LIDOCAINE HYDROCHLORIDE 10 MG/ML
5 INJECTION, SOLUTION EPIDURAL; INFILTRATION; INTRACAUDAL; PERINEURAL ONCE
Status: COMPLETED | OUTPATIENT
Start: 2025-08-16 | End: 2025-08-16

## 2025-08-16 RX ADMIN — Medication 1 TABLET: at 09:05

## 2025-08-16 RX ADMIN — Medication 1 TABLET: at 20:19

## 2025-08-16 RX ADMIN — AMLODIPINE BESYLATE 5 MG: 5 TABLET ORAL at 09:06

## 2025-08-16 RX ADMIN — VANCOMYCIN HYDROCHLORIDE 750 MG: 750 INJECTION, SOLUTION INTRAVENOUS at 09:05

## 2025-08-16 RX ADMIN — LIDOCAINE HYDROCHLORIDE 50 MG: 10 INJECTION, SOLUTION EPIDURAL; INFILTRATION; INTRACAUDAL; PERINEURAL at 10:03

## 2025-08-16 RX ADMIN — POTASSIUM CHLORIDE EXTENDED-RELEASE 40 MEQ: 1500 TABLET ORAL at 09:05

## 2025-08-16 RX ADMIN — LOSARTAN POTASSIUM 100 MG: 100 TABLET, FILM COATED ORAL at 09:04

## 2025-08-16 RX ADMIN — ASPIRIN 81 MG: 81 TABLET, COATED ORAL at 20:24

## 2025-08-16 RX ADMIN — ATORVASTATIN CALCIUM 40 MG: 40 TABLET, FILM COATED ORAL at 09:04

## 2025-08-16 RX ADMIN — RIVAROXABAN 5 MG: 2.5 TABLET, FILM COATED ORAL at 09:06

## 2025-08-16 RX ADMIN — VANCOMYCIN HYDROCHLORIDE 750 MG: 750 INJECTION, SOLUTION INTRAVENOUS at 20:20

## 2025-08-16 ASSESSMENT — COGNITIVE AND FUNCTIONAL STATUS - GENERAL
MOVING TO AND FROM BED TO CHAIR: A LITTLE
TURNING FROM BACK TO SIDE WHILE IN FLAT BAD: A LITTLE
DAILY ACTIVITIY SCORE: 22
TOILETING: A LITTLE
WALKING IN HOSPITAL ROOM: A LITTLE
HELP NEEDED FOR BATHING: A LITTLE
MOBILITY SCORE: 18
CLIMB 3 TO 5 STEPS WITH RAILING: A LOT
DAILY ACTIVITIY SCORE: 22
TURNING FROM BACK TO SIDE WHILE IN FLAT BAD: A LITTLE
TOILETING: A LITTLE
WALKING IN HOSPITAL ROOM: A LITTLE
MOBILITY SCORE: 18
HELP NEEDED FOR BATHING: A LITTLE
MOVING TO AND FROM BED TO CHAIR: A LITTLE
CLIMB 3 TO 5 STEPS WITH RAILING: A LOT
STANDING UP FROM CHAIR USING ARMS: A LITTLE
STANDING UP FROM CHAIR USING ARMS: A LITTLE

## 2025-08-16 ASSESSMENT — PAIN SCALES - GENERAL
PAINLEVEL_OUTOF10: 0 - NO PAIN
PAINLEVEL_OUTOF10: 0 - NO PAIN

## 2025-08-16 ASSESSMENT — PAIN - FUNCTIONAL ASSESSMENT: PAIN_FUNCTIONAL_ASSESSMENT: 0-10

## 2025-08-17 ENCOUNTER — APPOINTMENT (OUTPATIENT)
Dept: RADIOLOGY | Facility: HOSPITAL | Age: 80
DRG: 492 | End: 2025-08-17
Payer: MEDICARE

## 2025-08-17 ENCOUNTER — ANESTHESIA (OUTPATIENT)
Dept: OPERATING ROOM | Facility: HOSPITAL | Age: 80
End: 2025-08-17
Payer: MEDICARE

## 2025-08-17 ENCOUNTER — ANESTHESIA EVENT (OUTPATIENT)
Dept: OPERATING ROOM | Facility: HOSPITAL | Age: 80
End: 2025-08-17
Payer: MEDICARE

## 2025-08-17 VITALS
RESPIRATION RATE: 18 BRPM | OXYGEN SATURATION: 96 % | HEIGHT: 66 IN | HEART RATE: 68 BPM | WEIGHT: 129.63 LBS | TEMPERATURE: 97 F | DIASTOLIC BLOOD PRESSURE: 59 MMHG | BODY MASS INDEX: 20.83 KG/M2 | SYSTOLIC BLOOD PRESSURE: 156 MMHG

## 2025-08-17 LAB
ALBUMIN SERPL BCP-MCNC: 2.6 G/DL (ref 3.4–5)
ANION GAP SERPL CALC-SCNC: 9 MMOL/L (ref 10–20)
BACTERIA SPEC CULT: ABNORMAL
BACTERIA SPEC CULT: ABNORMAL
BUN SERPL-MCNC: 8 MG/DL (ref 6–23)
C DIF TOX TCDA+TCDB STL QL NAA+PROBE: NOT DETECTED
CALCIUM SERPL-MCNC: 7.8 MG/DL (ref 8.6–10.3)
CHLORIDE SERPL-SCNC: 103 MMOL/L (ref 98–107)
CO2 SERPL-SCNC: 26 MMOL/L (ref 21–32)
CREAT SERPL-MCNC: 0.49 MG/DL (ref 0.5–1.05)
EGFRCR SERPLBLD CKD-EPI 2021: >90 ML/MIN/1.73M*2
ERYTHROCYTE [DISTWIDTH] IN BLOOD BY AUTOMATED COUNT: 15.3 % (ref 11.5–14.5)
GLUCOSE SERPL-MCNC: 89 MG/DL (ref 74–99)
GRAM STN SPEC: ABNORMAL
GRAM STN SPEC: ABNORMAL
HCT VFR BLD AUTO: 32.8 % (ref 36–46)
HGB BLD-MCNC: 10.6 G/DL (ref 12–16)
MAGNESIUM SERPL-MCNC: 1.82 MG/DL (ref 1.6–2.4)
MCH RBC QN AUTO: 29.2 PG (ref 26–34)
MCHC RBC AUTO-ENTMCNC: 32.3 G/DL (ref 32–36)
MCV RBC AUTO: 90 FL (ref 80–100)
NRBC BLD-RTO: 0 /100 WBCS (ref 0–0)
PHOSPHATE SERPL-MCNC: 3.4 MG/DL (ref 2.5–4.9)
PLATELET # BLD AUTO: 519 X10*3/UL (ref 150–450)
POTASSIUM SERPL-SCNC: 3.2 MMOL/L (ref 3.5–5.3)
RBC # BLD AUTO: 3.63 X10*6/UL (ref 4–5.2)
SODIUM SERPL-SCNC: 135 MMOL/L (ref 136–145)
WBC # BLD AUTO: 8.4 X10*3/UL (ref 4.4–11.3)

## 2025-08-17 PROCEDURE — 2500000001 HC RX 250 WO HCPCS SELF ADMINISTERED DRUGS (ALT 637 FOR MEDICARE OP)

## 2025-08-17 PROCEDURE — 3700000002 HC GENERAL ANESTHESIA TIME - EACH INCREMENTAL 1 MINUTE: Performed by: PODIATRIST

## 2025-08-17 PROCEDURE — 3700000001 HC GENERAL ANESTHESIA TIME - INITIAL BASE CHARGE: Performed by: PODIATRIST

## 2025-08-17 PROCEDURE — 99233 SBSQ HOSP IP/OBS HIGH 50: CPT

## 2025-08-17 PROCEDURE — 73610 X-RAY EXAM OF ANKLE: CPT | Mod: LEFT SIDE | Performed by: RADIOLOGY

## 2025-08-17 PROCEDURE — 85027 COMPLETE CBC AUTOMATED: CPT

## 2025-08-17 PROCEDURE — 3600000003 HC OR TIME - INITIAL BASE CHARGE - PROCEDURE LEVEL THREE: Performed by: PODIATRIST

## 2025-08-17 PROCEDURE — 2500000004 HC RX 250 GENERAL PHARMACY W/ HCPCS (ALT 636 FOR OP/ED): Performed by: ANESTHESIOLOGIST ASSISTANT

## 2025-08-17 PROCEDURE — 2500000002 HC RX 250 W HCPCS SELF ADMINISTERED DRUGS (ALT 637 FOR MEDICARE OP, ALT 636 FOR OP/ED)

## 2025-08-17 PROCEDURE — 2500000004 HC RX 250 GENERAL PHARMACY W/ HCPCS (ALT 636 FOR OP/ED): Performed by: PODIATRIST

## 2025-08-17 PROCEDURE — 2720000007 HC OR 272 NO HCPCS: Performed by: PODIATRIST

## 2025-08-17 PROCEDURE — 27680 RELEASE OF LOWER LEG TENDON: CPT | Performed by: PODIATRIST

## 2025-08-17 PROCEDURE — 7100000002 HC RECOVERY ROOM TIME - EACH INCREMENTAL 1 MINUTE: Performed by: PODIATRIST

## 2025-08-17 PROCEDURE — 80069 RENAL FUNCTION PANEL: CPT

## 2025-08-17 PROCEDURE — 2500000001 HC RX 250 WO HCPCS SELF ADMINISTERED DRUGS (ALT 637 FOR MEDICARE OP): Performed by: STUDENT IN AN ORGANIZED HEALTH CARE EDUCATION/TRAINING PROGRAM

## 2025-08-17 PROCEDURE — A28005 PR DEEP INCIS FOOT BONE INFECTN: Performed by: STUDENT IN AN ORGANIZED HEALTH CARE EDUCATION/TRAINING PROGRAM

## 2025-08-17 PROCEDURE — 0LNT0ZZ RELEASE LEFT ANKLE TENDON, OPEN APPROACH: ICD-10-PCS | Performed by: PODIATRIST

## 2025-08-17 PROCEDURE — 87205 SMEAR GRAM STAIN: CPT | Mod: STJLAB | Performed by: PODIATRIST

## 2025-08-17 PROCEDURE — 0752T DGTZ GLS MCRSCP SLD LVL III: CPT | Mod: TC,STJLAB,WESLAB | Performed by: PODIATRIST

## 2025-08-17 PROCEDURE — 2500000004 HC RX 250 GENERAL PHARMACY W/ HCPCS (ALT 636 FOR OP/ED): Performed by: STUDENT IN AN ORGANIZED HEALTH CARE EDUCATION/TRAINING PROGRAM

## 2025-08-17 PROCEDURE — 36415 COLL VENOUS BLD VENIPUNCTURE: CPT

## 2025-08-17 PROCEDURE — 83735 ASSAY OF MAGNESIUM: CPT

## 2025-08-17 PROCEDURE — 1100000001 HC PRIVATE ROOM DAILY

## 2025-08-17 PROCEDURE — 99100 ANES PT EXTEME AGE<1 YR&>70: CPT | Performed by: STUDENT IN AN ORGANIZED HEALTH CARE EDUCATION/TRAINING PROGRAM

## 2025-08-17 PROCEDURE — 0QBK0ZZ EXCISION OF LEFT FIBULA, OPEN APPROACH: ICD-10-PCS | Performed by: PODIATRIST

## 2025-08-17 PROCEDURE — A28005 PR DEEP INCIS FOOT BONE INFECTN: Performed by: ANESTHESIOLOGIST ASSISTANT

## 2025-08-17 PROCEDURE — 2500000004 HC RX 250 GENERAL PHARMACY W/ HCPCS (ALT 636 FOR OP/ED)

## 2025-08-17 PROCEDURE — 3600000008 HC OR TIME - EACH INCREMENTAL 1 MINUTE - PROCEDURE LEVEL THREE: Performed by: PODIATRIST

## 2025-08-17 PROCEDURE — 28005 TREAT FOOT BONE LESION: CPT | Performed by: PODIATRIST

## 2025-08-17 PROCEDURE — 73610 X-RAY EXAM OF ANKLE: CPT | Mod: LT

## 2025-08-17 PROCEDURE — 2780000003 HC OR 278 NO HCPCS: Performed by: PODIATRIST

## 2025-08-17 PROCEDURE — 2500000004 HC RX 250 GENERAL PHARMACY W/ HCPCS (ALT 636 FOR OP/ED): Mod: JZ | Performed by: STUDENT IN AN ORGANIZED HEALTH CARE EDUCATION/TRAINING PROGRAM

## 2025-08-17 PROCEDURE — 7100000001 HC RECOVERY ROOM TIME - INITIAL BASE CHARGE: Performed by: PODIATRIST

## 2025-08-17 RX ORDER — KETOROLAC TROMETHAMINE 30 MG/ML
INJECTION, SOLUTION INTRAMUSCULAR; INTRAVENOUS AS NEEDED
Status: DISCONTINUED | OUTPATIENT
Start: 2025-08-17 | End: 2025-08-17

## 2025-08-17 RX ORDER — HYDROMORPHONE HYDROCHLORIDE 0.2 MG/ML
0.2 INJECTION INTRAMUSCULAR; INTRAVENOUS; SUBCUTANEOUS EVERY 5 MIN PRN
Status: DISCONTINUED | OUTPATIENT
Start: 2025-08-17 | End: 2025-08-17 | Stop reason: HOSPADM

## 2025-08-17 RX ORDER — LOPERAMIDE HYDROCHLORIDE 2 MG/1
2 CAPSULE ORAL EVERY 8 HOURS PRN
Status: DISCONTINUED | OUTPATIENT
Start: 2025-08-17 | End: 2025-08-22 | Stop reason: HOSPADM

## 2025-08-17 RX ORDER — SODIUM CHLORIDE, SODIUM LACTATE, POTASSIUM CHLORIDE, CALCIUM CHLORIDE 600; 310; 30; 20 MG/100ML; MG/100ML; MG/100ML; MG/100ML
INJECTION, SOLUTION INTRAVENOUS CONTINUOUS PRN
Status: DISCONTINUED | OUTPATIENT
Start: 2025-08-17 | End: 2025-08-17

## 2025-08-17 RX ORDER — HYDROMORPHONE HYDROCHLORIDE 1 MG/ML
INJECTION, SOLUTION INTRAMUSCULAR; INTRAVENOUS; SUBCUTANEOUS AS NEEDED
Status: DISCONTINUED | OUTPATIENT
Start: 2025-08-17 | End: 2025-08-17

## 2025-08-17 RX ORDER — ACETAMINOPHEN 325 MG/1
650 TABLET ORAL EVERY 4 HOURS PRN
Status: DISCONTINUED | OUTPATIENT
Start: 2025-08-17 | End: 2025-08-17 | Stop reason: HOSPADM

## 2025-08-17 RX ORDER — OXYCODONE HYDROCHLORIDE 10 MG/1
10 TABLET ORAL EVERY 4 HOURS PRN
Status: DISCONTINUED | OUTPATIENT
Start: 2025-08-17 | End: 2025-08-17 | Stop reason: HOSPADM

## 2025-08-17 RX ORDER — LIDOCAINE HYDROCHLORIDE 10 MG/ML
0.1 INJECTION, SOLUTION INFILTRATION; PERINEURAL ONCE
Status: DISCONTINUED | OUTPATIENT
Start: 2025-08-17 | End: 2025-08-17 | Stop reason: HOSPADM

## 2025-08-17 RX ORDER — ONDANSETRON HYDROCHLORIDE 2 MG/ML
INJECTION, SOLUTION INTRAVENOUS AS NEEDED
Status: DISCONTINUED | OUTPATIENT
Start: 2025-08-17 | End: 2025-08-17

## 2025-08-17 RX ORDER — LABETALOL HYDROCHLORIDE 5 MG/ML
5 INJECTION, SOLUTION INTRAVENOUS ONCE AS NEEDED
Status: DISCONTINUED | OUTPATIENT
Start: 2025-08-17 | End: 2025-08-17 | Stop reason: HOSPADM

## 2025-08-17 RX ORDER — HYDRALAZINE HYDROCHLORIDE 20 MG/ML
10 INJECTION INTRAMUSCULAR; INTRAVENOUS ONCE
Status: DISCONTINUED | OUTPATIENT
Start: 2025-08-17 | End: 2025-08-17 | Stop reason: HOSPADM

## 2025-08-17 RX ORDER — LIDOCAINE HYDROCHLORIDE 10 MG/ML
INJECTION, SOLUTION INFILTRATION; PERINEURAL AS NEEDED
Status: DISCONTINUED | OUTPATIENT
Start: 2025-08-17 | End: 2025-08-17 | Stop reason: HOSPADM

## 2025-08-17 RX ORDER — LOPERAMIDE HYDROCHLORIDE 2 MG/1
2 CAPSULE ORAL 4 TIMES DAILY PRN
Status: DISCONTINUED | OUTPATIENT
Start: 2025-08-17 | End: 2025-08-17

## 2025-08-17 RX ORDER — PROPOFOL 10 MG/ML
INJECTION, EMULSION INTRAVENOUS AS NEEDED
Status: DISCONTINUED | OUTPATIENT
Start: 2025-08-17 | End: 2025-08-17

## 2025-08-17 RX ORDER — ONDANSETRON HYDROCHLORIDE 2 MG/ML
4 INJECTION, SOLUTION INTRAVENOUS ONCE
Status: COMPLETED | OUTPATIENT
Start: 2025-08-17 | End: 2025-08-17

## 2025-08-17 RX ORDER — ALBUTEROL SULFATE 0.83 MG/ML
2.5 SOLUTION RESPIRATORY (INHALATION) ONCE AS NEEDED
Status: DISCONTINUED | OUTPATIENT
Start: 2025-08-17 | End: 2025-08-17 | Stop reason: HOSPADM

## 2025-08-17 RX ORDER — OXYCODONE HYDROCHLORIDE 5 MG/1
5 TABLET ORAL EVERY 4 HOURS PRN
Status: DISCONTINUED | OUTPATIENT
Start: 2025-08-17 | End: 2025-08-17 | Stop reason: HOSPADM

## 2025-08-17 RX ORDER — POTASSIUM CHLORIDE 20 MEQ/1
40 TABLET, EXTENDED RELEASE ORAL DAILY
Status: DISCONTINUED | OUTPATIENT
Start: 2025-08-17 | End: 2025-08-19

## 2025-08-17 RX ORDER — FENTANYL CITRATE 50 UG/ML
INJECTION, SOLUTION INTRAMUSCULAR; INTRAVENOUS AS NEEDED
Status: DISCONTINUED | OUTPATIENT
Start: 2025-08-17 | End: 2025-08-17

## 2025-08-17 RX ORDER — PHENYLEPHRINE HYDROCHLORIDE 10 MG/ML
INJECTION INTRAVENOUS AS NEEDED
Status: DISCONTINUED | OUTPATIENT
Start: 2025-08-17 | End: 2025-08-17

## 2025-08-17 RX ORDER — LIDOCAINE HYDROCHLORIDE 20 MG/ML
INJECTION, SOLUTION INFILTRATION; PERINEURAL AS NEEDED
Status: DISCONTINUED | OUTPATIENT
Start: 2025-08-17 | End: 2025-08-17

## 2025-08-17 RX ADMIN — FENTANYL CITRATE 50 MCG: 50 INJECTION, SOLUTION INTRAMUSCULAR; INTRAVENOUS at 08:33

## 2025-08-17 RX ADMIN — PROPOFOL 100 MG: 10 INJECTION, EMULSION INTRAVENOUS at 08:34

## 2025-08-17 RX ADMIN — ONDANSETRON 4 MG: 2 INJECTION, SOLUTION INTRAMUSCULAR; INTRAVENOUS at 09:02

## 2025-08-17 RX ADMIN — ONDANSETRON 4 MG: 2 INJECTION, SOLUTION INTRAMUSCULAR; INTRAVENOUS at 10:34

## 2025-08-17 RX ADMIN — HYDROMORPHONE HYDROCHLORIDE 0.2 MG: 0.2 INJECTION, SOLUTION INTRAMUSCULAR; INTRAVENOUS; SUBCUTANEOUS at 09:32

## 2025-08-17 RX ADMIN — Medication 1 TABLET: at 12:26

## 2025-08-17 RX ADMIN — RIVAROXABAN 5 MG: 2.5 TABLET, FILM COATED ORAL at 16:01

## 2025-08-17 RX ADMIN — SODIUM CHLORIDE, POTASSIUM CHLORIDE, SODIUM LACTATE AND CALCIUM CHLORIDE: 600; 310; 30; 20 INJECTION, SOLUTION INTRAVENOUS at 08:30

## 2025-08-17 RX ADMIN — VANCOMYCIN HYDROCHLORIDE 750 MG: 750 INJECTION, SOLUTION INTRAVENOUS at 12:27

## 2025-08-17 RX ADMIN — HYDROMORPHONE HYDROCHLORIDE 0.25 MG: 1 INJECTION, SOLUTION INTRAMUSCULAR; INTRAVENOUS; SUBCUTANEOUS at 09:01

## 2025-08-17 RX ADMIN — ACETAMINOPHEN 650 MG: 325 TABLET ORAL at 23:28

## 2025-08-17 RX ADMIN — HYDROMORPHONE HYDROCHLORIDE 0.2 MG: 0.2 INJECTION, SOLUTION INTRAMUSCULAR; INTRAVENOUS; SUBCUTANEOUS at 10:28

## 2025-08-17 RX ADMIN — HYDROMORPHONE HYDROCHLORIDE 0.2 MG: 0.2 INJECTION, SOLUTION INTRAMUSCULAR; INTRAVENOUS; SUBCUTANEOUS at 09:53

## 2025-08-17 RX ADMIN — FENTANYL CITRATE 50 MCG: 50 INJECTION, SOLUTION INTRAMUSCULAR; INTRAVENOUS at 08:48

## 2025-08-17 RX ADMIN — LOPERAMIDE HYDROCHLORIDE 2 MG: 2 CAPSULE ORAL at 12:26

## 2025-08-17 RX ADMIN — LOSARTAN POTASSIUM 100 MG: 100 TABLET, FILM COATED ORAL at 12:26

## 2025-08-17 RX ADMIN — ASPIRIN 81 MG: 81 TABLET, COATED ORAL at 20:24

## 2025-08-17 RX ADMIN — AMLODIPINE BESYLATE 5 MG: 5 TABLET ORAL at 12:26

## 2025-08-17 RX ADMIN — LIDOCAINE HYDROCHLORIDE 100 MG: 20 INJECTION, SOLUTION INFILTRATION; PERINEURAL at 08:33

## 2025-08-17 RX ADMIN — ATORVASTATIN CALCIUM 40 MG: 40 TABLET, FILM COATED ORAL at 12:26

## 2025-08-17 RX ADMIN — PHENYLEPHRINE HYDROCHLORIDE 80 MCG: 10 INJECTION INTRAVENOUS at 08:55

## 2025-08-17 RX ADMIN — KETOROLAC TROMETHAMINE 30 MG: 30 INJECTION, SOLUTION INTRAMUSCULAR; INTRAVENOUS at 09:02

## 2025-08-17 RX ADMIN — VANCOMYCIN HYDROCHLORIDE 750 MG: 750 INJECTION, SOLUTION INTRAVENOUS at 20:17

## 2025-08-17 RX ADMIN — PROPOFOL 50 MG: 10 INJECTION, EMULSION INTRAVENOUS at 08:48

## 2025-08-17 RX ADMIN — POTASSIUM CHLORIDE EXTENDED-RELEASE 40 MEQ: 1500 TABLET ORAL at 12:26

## 2025-08-17 RX ADMIN — Medication 1 TABLET: at 20:24

## 2025-08-17 ASSESSMENT — PAIN SCALES - GENERAL
PAINLEVEL_OUTOF10: 0 - NO PAIN
PAINLEVEL_OUTOF10: 6
PAINLEVEL_OUTOF10: 5 - MODERATE PAIN
PAINLEVEL_OUTOF10: 3
PAINLEVEL_OUTOF10: 3
PAINLEVEL_OUTOF10: 5 - MODERATE PAIN
PAINLEVEL_OUTOF10: 4
PAINLEVEL_OUTOF10: 6
PAINLEVEL_OUTOF10: 3

## 2025-08-17 ASSESSMENT — PAIN DESCRIPTION - DESCRIPTORS
DESCRIPTORS: DISCOMFORT

## 2025-08-17 ASSESSMENT — PAIN - FUNCTIONAL ASSESSMENT
PAIN_FUNCTIONAL_ASSESSMENT: 0-10

## 2025-08-17 ASSESSMENT — COGNITIVE AND FUNCTIONAL STATUS - GENERAL
HELP NEEDED FOR BATHING: A LITTLE
DAILY ACTIVITIY SCORE: 22
CLIMB 3 TO 5 STEPS WITH RAILING: A LOT
TOILETING: A LITTLE
MOVING TO AND FROM BED TO CHAIR: A LITTLE
HELP NEEDED FOR BATHING: A LITTLE
WALKING IN HOSPITAL ROOM: A LITTLE
CLIMB 3 TO 5 STEPS WITH RAILING: A LOT
STANDING UP FROM CHAIR USING ARMS: A LITTLE
MOVING TO AND FROM BED TO CHAIR: A LITTLE
TURNING FROM BACK TO SIDE WHILE IN FLAT BAD: A LITTLE
MOBILITY SCORE: 18
DAILY ACTIVITIY SCORE: 22
TOILETING: A LITTLE
TURNING FROM BACK TO SIDE WHILE IN FLAT BAD: A LITTLE
MOBILITY SCORE: 18
STANDING UP FROM CHAIR USING ARMS: A LITTLE
WALKING IN HOSPITAL ROOM: A LITTLE

## 2025-08-17 ASSESSMENT — PAIN DESCRIPTION - ORIENTATION
ORIENTATION: LEFT
ORIENTATION: LEFT;LOWER
ORIENTATION: LEFT

## 2025-08-17 ASSESSMENT — PAIN DESCRIPTION - LOCATION
LOCATION: ANKLE

## 2025-08-18 PROBLEM — S91.002A OPEN WOUND OF LEFT ANKLE: Status: ACTIVE | Noted: 2025-08-14

## 2025-08-18 PROBLEM — Z48.1 DELAYED POSTOPERATIVE WOUND CLOSURE: Status: ACTIVE | Noted: 2025-08-14

## 2025-08-18 LAB
ALBUMIN SERPL BCP-MCNC: 2.5 G/DL (ref 3.4–5)
ANION GAP SERPL CALC-SCNC: 10 MMOL/L (ref 10–20)
BUN SERPL-MCNC: 9 MG/DL (ref 6–23)
CALCIUM SERPL-MCNC: 7.8 MG/DL (ref 8.6–10.3)
CHLORIDE SERPL-SCNC: 101 MMOL/L (ref 98–107)
CO2 SERPL-SCNC: 26 MMOL/L (ref 21–32)
CREAT SERPL-MCNC: 0.58 MG/DL (ref 0.5–1.05)
EGFRCR SERPLBLD CKD-EPI 2021: >90 ML/MIN/1.73M*2
ERYTHROCYTE [DISTWIDTH] IN BLOOD BY AUTOMATED COUNT: 15.3 % (ref 11.5–14.5)
GLUCOSE SERPL-MCNC: 89 MG/DL (ref 74–99)
HCT VFR BLD AUTO: 31.3 % (ref 36–46)
HGB BLD-MCNC: 9.9 G/DL (ref 12–16)
MAGNESIUM SERPL-MCNC: 1.88 MG/DL (ref 1.6–2.4)
MCH RBC QN AUTO: 29 PG (ref 26–34)
MCHC RBC AUTO-ENTMCNC: 31.6 G/DL (ref 32–36)
MCV RBC AUTO: 92 FL (ref 80–100)
NRBC BLD-RTO: 0 /100 WBCS (ref 0–0)
PHOSPHATE SERPL-MCNC: 3.4 MG/DL (ref 2.5–4.9)
PLATELET # BLD AUTO: 517 X10*3/UL (ref 150–450)
POTASSIUM SERPL-SCNC: 3.1 MMOL/L (ref 3.5–5.3)
RBC # BLD AUTO: 3.41 X10*6/UL (ref 4–5.2)
SODIUM SERPL-SCNC: 134 MMOL/L (ref 136–145)
VANCOMYCIN SERPL-MCNC: 15.5 UG/ML (ref 5–20)
WBC # BLD AUTO: 7.1 X10*3/UL (ref 4.4–11.3)

## 2025-08-18 PROCEDURE — 2500000002 HC RX 250 W HCPCS SELF ADMINISTERED DRUGS (ALT 637 FOR MEDICARE OP, ALT 636 FOR OP/ED)

## 2025-08-18 PROCEDURE — 2500000001 HC RX 250 WO HCPCS SELF ADMINISTERED DRUGS (ALT 637 FOR MEDICARE OP)

## 2025-08-18 PROCEDURE — 80069 RENAL FUNCTION PANEL: CPT

## 2025-08-18 PROCEDURE — 80202 ASSAY OF VANCOMYCIN: CPT

## 2025-08-18 PROCEDURE — 2500000001 HC RX 250 WO HCPCS SELF ADMINISTERED DRUGS (ALT 637 FOR MEDICARE OP): Performed by: STUDENT IN AN ORGANIZED HEALTH CARE EDUCATION/TRAINING PROGRAM

## 2025-08-18 PROCEDURE — 1100000001 HC PRIVATE ROOM DAILY

## 2025-08-18 PROCEDURE — 83735 ASSAY OF MAGNESIUM: CPT

## 2025-08-18 PROCEDURE — 97161 PT EVAL LOW COMPLEX 20 MIN: CPT | Mod: GP

## 2025-08-18 PROCEDURE — 2500000004 HC RX 250 GENERAL PHARMACY W/ HCPCS (ALT 636 FOR OP/ED)

## 2025-08-18 PROCEDURE — 99233 SBSQ HOSP IP/OBS HIGH 50: CPT

## 2025-08-18 PROCEDURE — 85027 COMPLETE CBC AUTOMATED: CPT

## 2025-08-18 PROCEDURE — 97165 OT EVAL LOW COMPLEX 30 MIN: CPT | Mod: GO

## 2025-08-18 PROCEDURE — 36415 COLL VENOUS BLD VENIPUNCTURE: CPT

## 2025-08-18 RX ADMIN — OXYCODONE HYDROCHLORIDE 5 MG: 5 TABLET ORAL at 11:59

## 2025-08-18 RX ADMIN — VANCOMYCIN HYDROCHLORIDE 750 MG: 750 INJECTION, SOLUTION INTRAVENOUS at 20:03

## 2025-08-18 RX ADMIN — POTASSIUM CHLORIDE EXTENDED-RELEASE 40 MEQ: 1500 TABLET ORAL at 08:31

## 2025-08-18 RX ADMIN — ATORVASTATIN CALCIUM 40 MG: 40 TABLET, FILM COATED ORAL at 08:31

## 2025-08-18 RX ADMIN — RIVAROXABAN 5 MG: 2.5 TABLET, FILM COATED ORAL at 08:31

## 2025-08-18 RX ADMIN — RIVAROXABAN 5 MG: 2.5 TABLET, FILM COATED ORAL at 18:28

## 2025-08-18 RX ADMIN — ASPIRIN 81 MG: 81 TABLET, COATED ORAL at 20:03

## 2025-08-18 RX ADMIN — LOSARTAN POTASSIUM 100 MG: 100 TABLET, FILM COATED ORAL at 08:31

## 2025-08-18 RX ADMIN — OXYCODONE HYDROCHLORIDE 5 MG: 5 TABLET ORAL at 00:31

## 2025-08-18 RX ADMIN — Medication 1 TABLET: at 20:03

## 2025-08-18 RX ADMIN — AMLODIPINE BESYLATE 5 MG: 5 TABLET ORAL at 08:31

## 2025-08-18 RX ADMIN — Medication 1 TABLET: at 08:31

## 2025-08-18 RX ADMIN — VANCOMYCIN HYDROCHLORIDE 750 MG: 750 INJECTION, SOLUTION INTRAVENOUS at 08:31

## 2025-08-18 ASSESSMENT — PAIN - FUNCTIONAL ASSESSMENT
PAIN_FUNCTIONAL_ASSESSMENT: 0-10

## 2025-08-18 ASSESSMENT — COGNITIVE AND FUNCTIONAL STATUS - GENERAL
WALKING IN HOSPITAL ROOM: A LOT
HELP NEEDED FOR BATHING: A LITTLE
MOVING TO AND FROM BED TO CHAIR: A LITTLE
TURNING FROM BACK TO SIDE WHILE IN FLAT BAD: A LITTLE
MOVING FROM LYING ON BACK TO SITTING ON SIDE OF FLAT BED WITH BEDRAILS: A LITTLE
HELP NEEDED FOR BATHING: A LOT
DRESSING REGULAR LOWER BODY CLOTHING: A LITTLE
HELP NEEDED FOR BATHING: A LITTLE
PERSONAL GROOMING: A LITTLE
MOBILITY SCORE: 15
DAILY ACTIVITIY SCORE: 22
TOILETING: A LITTLE
EATING MEALS: A LITTLE
CLIMB 3 TO 5 STEPS WITH RAILING: A LOT
TURNING FROM BACK TO SIDE WHILE IN FLAT BAD: A LITTLE
CLIMB 3 TO 5 STEPS WITH RAILING: A LOT
STANDING UP FROM CHAIR USING ARMS: A LITTLE
MOVING TO AND FROM BED TO CHAIR: A LITTLE
DAILY ACTIVITIY SCORE: 17
TOILETING: A LOT
DRESSING REGULAR LOWER BODY CLOTHING: A LOT
CLIMB 3 TO 5 STEPS WITH RAILING: TOTAL
DRESSING REGULAR UPPER BODY CLOTHING: A LITTLE
WALKING IN HOSPITAL ROOM: A LITTLE
STANDING UP FROM CHAIR USING ARMS: A LITTLE
PERSONAL GROOMING: A LITTLE
TURNING FROM BACK TO SIDE WHILE IN FLAT BAD: A LITTLE
TOILETING: A LITTLE
WALKING IN HOSPITAL ROOM: A LITTLE
MOBILITY SCORE: 18
MOVING TO AND FROM BED TO CHAIR: A LITTLE
DAILY ACTIVITIY SCORE: 18
MOBILITY SCORE: 18
STANDING UP FROM CHAIR USING ARMS: A LITTLE

## 2025-08-18 ASSESSMENT — PAIN SCALES - GENERAL
PAINLEVEL_OUTOF10: 10 - WORST POSSIBLE PAIN
PAINLEVEL_OUTOF10: 4
PAINLEVEL_OUTOF10: 0 - NO PAIN
PAINLEVEL_OUTOF10: 8
PAINLEVEL_OUTOF10: 8
PAINLEVEL_OUTOF10: 3

## 2025-08-18 ASSESSMENT — ACTIVITIES OF DAILY LIVING (ADL)
ADL_ASSISTANCE: INDEPENDENT
ADL_ASSISTANCE: INDEPENDENT

## 2025-08-18 ASSESSMENT — PAIN DESCRIPTION - LOCATION: LOCATION: ANKLE

## 2025-08-19 LAB
ALBUMIN SERPL BCP-MCNC: 2.4 G/DL (ref 3.4–5)
ANION GAP SERPL CALC-SCNC: 12 MMOL/L (ref 10–20)
BACTERIA SPEC CULT: NORMAL
BUN SERPL-MCNC: 9 MG/DL (ref 6–23)
CALCIUM SERPL-MCNC: 7.7 MG/DL (ref 8.6–10.3)
CHLORIDE SERPL-SCNC: 103 MMOL/L (ref 98–107)
CO2 SERPL-SCNC: 25 MMOL/L (ref 21–32)
CREAT SERPL-MCNC: 0.57 MG/DL (ref 0.5–1.05)
EGFRCR SERPLBLD CKD-EPI 2021: >90 ML/MIN/1.73M*2
ERYTHROCYTE [DISTWIDTH] IN BLOOD BY AUTOMATED COUNT: 15.2 % (ref 11.5–14.5)
GLUCOSE SERPL-MCNC: 89 MG/DL (ref 74–99)
GRAM STN SPEC: NORMAL
HCT VFR BLD AUTO: 29.9 % (ref 36–46)
HGB BLD-MCNC: 9.6 G/DL (ref 12–16)
MAGNESIUM SERPL-MCNC: 1.68 MG/DL (ref 1.6–2.4)
MCH RBC QN AUTO: 29.5 PG (ref 26–34)
MCHC RBC AUTO-ENTMCNC: 32.1 G/DL (ref 32–36)
MCV RBC AUTO: 92 FL (ref 80–100)
NRBC BLD-RTO: 0 /100 WBCS (ref 0–0)
PHOSPHATE SERPL-MCNC: 3.1 MG/DL (ref 2.5–4.9)
PLATELET # BLD AUTO: 504 X10*3/UL (ref 150–450)
POTASSIUM SERPL-SCNC: 3.5 MMOL/L (ref 3.5–5.3)
RBC # BLD AUTO: 3.25 X10*6/UL (ref 4–5.2)
SODIUM SERPL-SCNC: 136 MMOL/L (ref 136–145)
WBC # BLD AUTO: 7 X10*3/UL (ref 4.4–11.3)

## 2025-08-19 PROCEDURE — 2500000004 HC RX 250 GENERAL PHARMACY W/ HCPCS (ALT 636 FOR OP/ED)

## 2025-08-19 PROCEDURE — 80069 RENAL FUNCTION PANEL: CPT

## 2025-08-19 PROCEDURE — 83735 ASSAY OF MAGNESIUM: CPT

## 2025-08-19 PROCEDURE — 2500000001 HC RX 250 WO HCPCS SELF ADMINISTERED DRUGS (ALT 637 FOR MEDICARE OP)

## 2025-08-19 PROCEDURE — 99233 SBSQ HOSP IP/OBS HIGH 50: CPT

## 2025-08-19 PROCEDURE — 2500000001 HC RX 250 WO HCPCS SELF ADMINISTERED DRUGS (ALT 637 FOR MEDICARE OP): Performed by: STUDENT IN AN ORGANIZED HEALTH CARE EDUCATION/TRAINING PROGRAM

## 2025-08-19 PROCEDURE — 97110 THERAPEUTIC EXERCISES: CPT | Mod: GP

## 2025-08-19 PROCEDURE — 1100000001 HC PRIVATE ROOM DAILY

## 2025-08-19 PROCEDURE — 97530 THERAPEUTIC ACTIVITIES: CPT | Mod: GP

## 2025-08-19 PROCEDURE — 36415 COLL VENOUS BLD VENIPUNCTURE: CPT

## 2025-08-19 PROCEDURE — 85027 COMPLETE CBC AUTOMATED: CPT

## 2025-08-19 RX ORDER — POTASSIUM CHLORIDE 1.5 G/1.58G
40 POWDER, FOR SOLUTION ORAL 2 TIMES DAILY
Status: DISCONTINUED | OUTPATIENT
Start: 2025-08-19 | End: 2025-08-19

## 2025-08-19 RX ORDER — POTASSIUM CHLORIDE 1.5 G/1.58G
40 POWDER, FOR SOLUTION ORAL DAILY
Status: DISCONTINUED | OUTPATIENT
Start: 2025-08-19 | End: 2025-08-22 | Stop reason: HOSPADM

## 2025-08-19 RX ADMIN — RIVAROXABAN 5 MG: 2.5 TABLET, FILM COATED ORAL at 16:59

## 2025-08-19 RX ADMIN — POTASSIUM CHLORIDE 40 MEQ: 1.5 POWDER, FOR SOLUTION ORAL at 08:43

## 2025-08-19 RX ADMIN — RIVAROXABAN 5 MG: 2.5 TABLET, FILM COATED ORAL at 08:44

## 2025-08-19 RX ADMIN — ATORVASTATIN CALCIUM 40 MG: 40 TABLET, FILM COATED ORAL at 08:44

## 2025-08-19 RX ADMIN — OXYCODONE HYDROCHLORIDE 5 MG: 5 TABLET ORAL at 03:44

## 2025-08-19 RX ADMIN — AMLODIPINE BESYLATE 5 MG: 5 TABLET ORAL at 08:44

## 2025-08-19 RX ADMIN — LOSARTAN POTASSIUM 100 MG: 100 TABLET, FILM COATED ORAL at 08:44

## 2025-08-19 RX ADMIN — Medication 1 TABLET: at 08:44

## 2025-08-19 RX ADMIN — Medication 1 TABLET: at 21:13

## 2025-08-19 RX ADMIN — VANCOMYCIN HYDROCHLORIDE 750 MG: 750 INJECTION, SOLUTION INTRAVENOUS at 21:13

## 2025-08-19 RX ADMIN — VANCOMYCIN HYDROCHLORIDE 750 MG: 750 INJECTION, SOLUTION INTRAVENOUS at 08:44

## 2025-08-19 RX ADMIN — ASPIRIN 81 MG: 81 TABLET, COATED ORAL at 21:13

## 2025-08-19 ASSESSMENT — COGNITIVE AND FUNCTIONAL STATUS - GENERAL
MOBILITY SCORE: 18
CLIMB 3 TO 5 STEPS WITH RAILING: A LOT
STANDING UP FROM CHAIR USING ARMS: A LITTLE
MOBILITY SCORE: 18
TURNING FROM BACK TO SIDE WHILE IN FLAT BAD: A LITTLE
DAILY ACTIVITIY SCORE: 22
CLIMB 3 TO 5 STEPS WITH RAILING: TOTAL
WALKING IN HOSPITAL ROOM: A LITTLE
MOVING TO AND FROM BED TO CHAIR: A LITTLE
DAILY ACTIVITIY SCORE: 22
MOVING TO AND FROM BED TO CHAIR: A LITTLE
HELP NEEDED FOR BATHING: A LITTLE
WALKING IN HOSPITAL ROOM: A LOT
CLIMB 3 TO 5 STEPS WITH RAILING: A LOT
STANDING UP FROM CHAIR USING ARMS: A LITTLE
MOBILITY SCORE: 16
MOVING TO AND FROM BED TO CHAIR: A LOT
STANDING UP FROM CHAIR USING ARMS: A LITTLE
WALKING IN HOSPITAL ROOM: A LITTLE
HELP NEEDED FOR BATHING: A LITTLE
TOILETING: A LITTLE
TOILETING: A LITTLE
TURNING FROM BACK TO SIDE WHILE IN FLAT BAD: A LITTLE

## 2025-08-19 ASSESSMENT — PAIN SCALES - GENERAL
PAINLEVEL_OUTOF10: 2
PAINLEVEL_OUTOF10: 3
PAINLEVEL_OUTOF10: 0 - NO PAIN
PAINLEVEL_OUTOF10: 5 - MODERATE PAIN
PAINLEVEL_OUTOF10: 2

## 2025-08-19 ASSESSMENT — PAIN - FUNCTIONAL ASSESSMENT
PAIN_FUNCTIONAL_ASSESSMENT: 0-10

## 2025-08-19 ASSESSMENT — PAIN DESCRIPTION - ORIENTATION: ORIENTATION: RIGHT

## 2025-08-19 ASSESSMENT — PAIN DESCRIPTION - LOCATION: LOCATION: ANKLE

## 2025-08-20 PROCEDURE — 97116 GAIT TRAINING THERAPY: CPT | Mod: GP,CQ

## 2025-08-20 PROCEDURE — 2500000004 HC RX 250 GENERAL PHARMACY W/ HCPCS (ALT 636 FOR OP/ED)

## 2025-08-20 PROCEDURE — 2500000001 HC RX 250 WO HCPCS SELF ADMINISTERED DRUGS (ALT 637 FOR MEDICARE OP)

## 2025-08-20 PROCEDURE — 97530 THERAPEUTIC ACTIVITIES: CPT | Mod: GP,CQ

## 2025-08-20 PROCEDURE — 1100000001 HC PRIVATE ROOM DAILY

## 2025-08-20 PROCEDURE — 97535 SELF CARE MNGMENT TRAINING: CPT | Mod: GO,CO

## 2025-08-20 PROCEDURE — 97110 THERAPEUTIC EXERCISES: CPT | Mod: GO,CO

## 2025-08-20 PROCEDURE — 97110 THERAPEUTIC EXERCISES: CPT | Mod: GP,CQ

## 2025-08-20 PROCEDURE — 99232 SBSQ HOSP IP/OBS MODERATE 35: CPT

## 2025-08-20 RX ADMIN — AMLODIPINE BESYLATE 5 MG: 5 TABLET ORAL at 09:16

## 2025-08-20 RX ADMIN — VANCOMYCIN HYDROCHLORIDE 750 MG: 750 INJECTION, SOLUTION INTRAVENOUS at 20:38

## 2025-08-20 RX ADMIN — ATORVASTATIN CALCIUM 40 MG: 40 TABLET, FILM COATED ORAL at 09:16

## 2025-08-20 RX ADMIN — LOSARTAN POTASSIUM 100 MG: 100 TABLET, FILM COATED ORAL at 09:16

## 2025-08-20 RX ADMIN — ASPIRIN 81 MG: 81 TABLET, COATED ORAL at 20:38

## 2025-08-20 RX ADMIN — Medication 1 TABLET: at 09:16

## 2025-08-20 RX ADMIN — VANCOMYCIN HYDROCHLORIDE 750 MG: 750 INJECTION, SOLUTION INTRAVENOUS at 09:16

## 2025-08-20 RX ADMIN — POTASSIUM CHLORIDE 40 MEQ: 1.5 POWDER, FOR SOLUTION ORAL at 09:16

## 2025-08-20 RX ADMIN — Medication 1 TABLET: at 20:38

## 2025-08-20 ASSESSMENT — COGNITIVE AND FUNCTIONAL STATUS - GENERAL
TOILETING: A LITTLE
CLIMB 3 TO 5 STEPS WITH RAILING: A LOT
DRESSING REGULAR UPPER BODY CLOTHING: A LITTLE
WALKING IN HOSPITAL ROOM: A LITTLE
PERSONAL GROOMING: A LITTLE
DAILY ACTIVITIY SCORE: 17
HELP NEEDED FOR BATHING: A LITTLE
STANDING UP FROM CHAIR USING ARMS: A LITTLE
DAILY ACTIVITIY SCORE: 22
MOBILITY SCORE: 16
STANDING UP FROM CHAIR USING ARMS: A LITTLE
TURNING FROM BACK TO SIDE WHILE IN FLAT BAD: A LITTLE
WALKING IN HOSPITAL ROOM: A LITTLE
MOVING TO AND FROM BED TO CHAIR: A LITTLE
TOILETING: A LITTLE
TURNING FROM BACK TO SIDE WHILE IN FLAT BAD: A LITTLE
MOVING TO AND FROM BED TO CHAIR: A LITTLE
CLIMB 3 TO 5 STEPS WITH RAILING: TOTAL
HELP NEEDED FOR BATHING: A LOT
DRESSING REGULAR LOWER BODY CLOTHING: A LOT
MOBILITY SCORE: 18
MOVING FROM LYING ON BACK TO SITTING ON SIDE OF FLAT BED WITH BEDRAILS: A LITTLE

## 2025-08-20 ASSESSMENT — PAIN SCALES - GENERAL
PAINLEVEL_OUTOF10: 0 - NO PAIN
PAINLEVEL_OUTOF10: 5 - MODERATE PAIN
PAINLEVEL_OUTOF10: 5 - MODERATE PAIN

## 2025-08-20 ASSESSMENT — ACTIVITIES OF DAILY LIVING (ADL): HOME_MANAGEMENT_TIME_ENTRY: 12

## 2025-08-20 ASSESSMENT — PAIN - FUNCTIONAL ASSESSMENT
PAIN_FUNCTIONAL_ASSESSMENT: 0-10
PAIN_FUNCTIONAL_ASSESSMENT: 0-10

## 2025-08-21 ENCOUNTER — ANESTHESIA (OUTPATIENT)
Dept: OPERATING ROOM | Facility: HOSPITAL | Age: 80
End: 2025-08-21
Payer: MEDICARE

## 2025-08-21 ENCOUNTER — APPOINTMENT (OUTPATIENT)
Dept: RADIOLOGY | Facility: HOSPITAL | Age: 80
DRG: 492 | End: 2025-08-21
Payer: MEDICARE

## 2025-08-21 ENCOUNTER — ANESTHESIA EVENT (OUTPATIENT)
Dept: OPERATING ROOM | Facility: HOSPITAL | Age: 80
End: 2025-08-21
Payer: MEDICARE

## 2025-08-21 LAB
ALBUMIN SERPL BCP-MCNC: 2.7 G/DL (ref 3.4–5)
ANION GAP SERPL CALC-SCNC: 12 MMOL/L (ref 10–20)
BUN SERPL-MCNC: 14 MG/DL (ref 6–23)
CALCIUM SERPL-MCNC: 8.3 MG/DL (ref 8.6–10.3)
CHLORIDE SERPL-SCNC: 104 MMOL/L (ref 98–107)
CO2 SERPL-SCNC: 24 MMOL/L (ref 21–32)
CREAT SERPL-MCNC: 0.53 MG/DL (ref 0.5–1.05)
EGFRCR SERPLBLD CKD-EPI 2021: >90 ML/MIN/1.73M*2
ERYTHROCYTE [DISTWIDTH] IN BLOOD BY AUTOMATED COUNT: 15.8 % (ref 11.5–14.5)
GLUCOSE SERPL-MCNC: 93 MG/DL (ref 74–99)
HCT VFR BLD AUTO: 33 % (ref 36–46)
HGB BLD-MCNC: 10.2 G/DL (ref 12–16)
MAGNESIUM SERPL-MCNC: 1.86 MG/DL (ref 1.6–2.4)
MCH RBC QN AUTO: 29.3 PG (ref 26–34)
MCHC RBC AUTO-ENTMCNC: 30.9 G/DL (ref 32–36)
MCV RBC AUTO: 95 FL (ref 80–100)
NRBC BLD-RTO: 0 /100 WBCS (ref 0–0)
PHOSPHATE SERPL-MCNC: 3.5 MG/DL (ref 2.5–4.9)
PLATELET # BLD AUTO: 553 X10*3/UL (ref 150–450)
POTASSIUM SERPL-SCNC: 3.7 MMOL/L (ref 3.5–5.3)
RBC # BLD AUTO: 3.48 X10*6/UL (ref 4–5.2)
SODIUM SERPL-SCNC: 136 MMOL/L (ref 136–145)
WBC # BLD AUTO: 8.5 X10*3/UL (ref 4.4–11.3)

## 2025-08-21 PROCEDURE — 83735 ASSAY OF MAGNESIUM: CPT

## 2025-08-21 PROCEDURE — 85027 COMPLETE CBC AUTOMATED: CPT

## 2025-08-21 PROCEDURE — 36415 COLL VENOUS BLD VENIPUNCTURE: CPT

## 2025-08-21 PROCEDURE — 2500000004 HC RX 250 GENERAL PHARMACY W/ HCPCS (ALT 636 FOR OP/ED)

## 2025-08-21 PROCEDURE — 2500000004 HC RX 250 GENERAL PHARMACY W/ HCPCS (ALT 636 FOR OP/ED): Performed by: PODIATRIST

## 2025-08-21 PROCEDURE — 2720000007 HC OR 272 NO HCPCS: Performed by: PODIATRIST

## 2025-08-21 PROCEDURE — 87081 CULTURE SCREEN ONLY: CPT | Mod: STJLAB | Performed by: STUDENT IN AN ORGANIZED HEALTH CARE EDUCATION/TRAINING PROGRAM

## 2025-08-21 PROCEDURE — 2500000001 HC RX 250 WO HCPCS SELF ADMINISTERED DRUGS (ALT 637 FOR MEDICARE OP)

## 2025-08-21 PROCEDURE — 36573 INSJ PICC RS&I 5 YR+: CPT

## 2025-08-21 PROCEDURE — 1100000001 HC PRIVATE ROOM DAILY

## 2025-08-21 PROCEDURE — 2500000004 HC RX 250 GENERAL PHARMACY W/ HCPCS (ALT 636 FOR OP/ED): Performed by: ANESTHESIOLOGY

## 2025-08-21 PROCEDURE — 3700000002 HC GENERAL ANESTHESIA TIME - EACH INCREMENTAL 1 MINUTE: Performed by: PODIATRIST

## 2025-08-21 PROCEDURE — 7100000001 HC RECOVERY ROOM TIME - INITIAL BASE CHARGE: Performed by: PODIATRIST

## 2025-08-21 PROCEDURE — C1751 CATH, INF, PER/CENT/MIDLINE: HCPCS

## 2025-08-21 PROCEDURE — 99233 SBSQ HOSP IP/OBS HIGH 50: CPT

## 2025-08-21 PROCEDURE — 13160 SEC CLSR SURG WND/DEHSN XTN: CPT | Performed by: PODIATRIST

## 2025-08-21 PROCEDURE — 3600000002 HC OR TIME - INITIAL BASE CHARGE - PROCEDURE LEVEL TWO: Performed by: PODIATRIST

## 2025-08-21 PROCEDURE — 3600000007 HC OR TIME - EACH INCREMENTAL 1 MINUTE - PROCEDURE LEVEL TWO: Performed by: PODIATRIST

## 2025-08-21 PROCEDURE — 2780000003 HC OR 278 NO HCPCS

## 2025-08-21 PROCEDURE — 6360000001 HSPC INJECTABLE MED: Performed by: PODIATRIST

## 2025-08-21 PROCEDURE — 3700000001 HC GENERAL ANESTHESIA TIME - INITIAL BASE CHARGE: Performed by: PODIATRIST

## 2025-08-21 PROCEDURE — 97535 SELF CARE MNGMENT TRAINING: CPT | Mod: GO,CO

## 2025-08-21 PROCEDURE — 7100000002 HC RECOVERY ROOM TIME - EACH INCREMENTAL 1 MINUTE: Performed by: PODIATRIST

## 2025-08-21 PROCEDURE — 0LQT3ZZ REPAIR LEFT ANKLE TENDON, PERCUTANEOUS APPROACH: ICD-10-PCS | Performed by: PODIATRIST

## 2025-08-21 PROCEDURE — 27659 REPAIR OF LEG TENDON EACH: CPT | Performed by: PODIATRIST

## 2025-08-21 PROCEDURE — 80069 RENAL FUNCTION PANEL: CPT

## 2025-08-21 DEVICE — IMPLANTABLE DEVICE: Type: IMPLANTABLE DEVICE | Site: ANKLE | Status: FUNCTIONAL

## 2025-08-21 RX ORDER — LANOLIN ALCOHOL/MO/W.PET/CERES
800 CREAM (GRAM) TOPICAL ONCE
Status: COMPLETED | OUTPATIENT
Start: 2025-08-21 | End: 2025-08-21

## 2025-08-21 RX ORDER — ALBUTEROL SULFATE 0.83 MG/ML
2.5 SOLUTION RESPIRATORY (INHALATION) ONCE AS NEEDED
Status: DISCONTINUED | OUTPATIENT
Start: 2025-08-21 | End: 2025-08-21 | Stop reason: HOSPADM

## 2025-08-21 RX ORDER — FAMOTIDINE 10 MG/ML
20 INJECTION, SOLUTION INTRAVENOUS ONCE
Status: DISCONTINUED | OUTPATIENT
Start: 2025-08-21 | End: 2025-08-21 | Stop reason: HOSPADM

## 2025-08-21 RX ORDER — OXYCODONE AND ACETAMINOPHEN 5; 325 MG/1; MG/1
1 TABLET ORAL EVERY 4 HOURS PRN
Status: DISCONTINUED | OUTPATIENT
Start: 2025-08-21 | End: 2025-08-21 | Stop reason: HOSPADM

## 2025-08-21 RX ORDER — HYDROMORPHONE HYDROCHLORIDE 0.2 MG/ML
0.2 INJECTION INTRAMUSCULAR; INTRAVENOUS; SUBCUTANEOUS EVERY 5 MIN PRN
Status: DISCONTINUED | OUTPATIENT
Start: 2025-08-21 | End: 2025-08-21 | Stop reason: HOSPADM

## 2025-08-21 RX ORDER — LIDOCAINE HYDROCHLORIDE 10 MG/ML
0.1 INJECTION, SOLUTION INFILTRATION; PERINEURAL ONCE
Status: DISCONTINUED | OUTPATIENT
Start: 2025-08-21 | End: 2025-08-21 | Stop reason: HOSPADM

## 2025-08-21 RX ORDER — SODIUM CHLORIDE, SODIUM LACTATE, POTASSIUM CHLORIDE, CALCIUM CHLORIDE 600; 310; 30; 20 MG/100ML; MG/100ML; MG/100ML; MG/100ML
100 INJECTION, SOLUTION INTRAVENOUS CONTINUOUS
Status: DISCONTINUED | OUTPATIENT
Start: 2025-08-21 | End: 2025-08-21 | Stop reason: HOSPADM

## 2025-08-21 RX ORDER — OXYCODONE HYDROCHLORIDE 5 MG/1
5 TABLET ORAL EVERY 4 HOURS PRN
Status: DISCONTINUED | OUTPATIENT
Start: 2025-08-21 | End: 2025-08-21 | Stop reason: HOSPADM

## 2025-08-21 RX ORDER — PHENYLEPHRINE HCL IN 0.9% NACL 1 MG/10 ML
SYRINGE (ML) INTRAVENOUS AS NEEDED
Status: DISCONTINUED | OUTPATIENT
Start: 2025-08-21 | End: 2025-08-21

## 2025-08-21 RX ORDER — FENTANYL CITRATE 50 UG/ML
INJECTION, SOLUTION INTRAMUSCULAR; INTRAVENOUS AS NEEDED
Status: DISCONTINUED | OUTPATIENT
Start: 2025-08-21 | End: 2025-08-21

## 2025-08-21 RX ORDER — DIPHENHYDRAMINE HYDROCHLORIDE 50 MG/ML
12.5 INJECTION, SOLUTION INTRAMUSCULAR; INTRAVENOUS ONCE AS NEEDED
Status: DISCONTINUED | OUTPATIENT
Start: 2025-08-21 | End: 2025-08-21 | Stop reason: HOSPADM

## 2025-08-21 RX ORDER — PROPOFOL 10 MG/ML
INJECTION, EMULSION INTRAVENOUS AS NEEDED
Status: DISCONTINUED | OUTPATIENT
Start: 2025-08-21 | End: 2025-08-21

## 2025-08-21 RX ORDER — LABETALOL HYDROCHLORIDE 5 MG/ML
5 INJECTION, SOLUTION INTRAVENOUS ONCE AS NEEDED
Status: DISCONTINUED | OUTPATIENT
Start: 2025-08-21 | End: 2025-08-21 | Stop reason: HOSPADM

## 2025-08-21 RX ORDER — ONDANSETRON HYDROCHLORIDE 2 MG/ML
4 INJECTION, SOLUTION INTRAVENOUS ONCE AS NEEDED
Status: DISCONTINUED | OUTPATIENT
Start: 2025-08-21 | End: 2025-08-21 | Stop reason: HOSPADM

## 2025-08-21 RX ORDER — METOCLOPRAMIDE HYDROCHLORIDE 5 MG/ML
10 INJECTION INTRAMUSCULAR; INTRAVENOUS ONCE AS NEEDED
Status: DISCONTINUED | OUTPATIENT
Start: 2025-08-21 | End: 2025-08-21 | Stop reason: HOSPADM

## 2025-08-21 RX ORDER — BUPIVACAINE HYDROCHLORIDE 5 MG/ML
INJECTION, SOLUTION PERINEURAL AS NEEDED
Status: DISCONTINUED | OUTPATIENT
Start: 2025-08-21 | End: 2025-08-21 | Stop reason: HOSPADM

## 2025-08-21 RX ORDER — LIDOCAINE HYDROCHLORIDE 20 MG/ML
INJECTION, SOLUTION INFILTRATION; PERINEURAL AS NEEDED
Status: DISCONTINUED | OUTPATIENT
Start: 2025-08-21 | End: 2025-08-21

## 2025-08-21 RX ORDER — ONDANSETRON HYDROCHLORIDE 2 MG/ML
INJECTION, SOLUTION INTRAVENOUS AS NEEDED
Status: DISCONTINUED | OUTPATIENT
Start: 2025-08-21 | End: 2025-08-21

## 2025-08-21 RX ORDER — LIDOCAINE HYDROCHLORIDE 10 MG/ML
INJECTION, SOLUTION INFILTRATION; PERINEURAL AS NEEDED
Status: DISCONTINUED | OUTPATIENT
Start: 2025-08-21 | End: 2025-08-21 | Stop reason: HOSPADM

## 2025-08-21 RX ORDER — LIDOCAINE HYDROCHLORIDE 10 MG/ML
5 INJECTION, SOLUTION EPIDURAL; INFILTRATION; INTRACAUDAL; PERINEURAL ONCE
Status: DISCONTINUED | OUTPATIENT
Start: 2025-08-21 | End: 2025-08-22 | Stop reason: HOSPADM

## 2025-08-21 RX ORDER — HYDRALAZINE HYDROCHLORIDE 20 MG/ML
5 INJECTION INTRAMUSCULAR; INTRAVENOUS EVERY 30 MIN PRN
Status: DISCONTINUED | OUTPATIENT
Start: 2025-08-21 | End: 2025-08-21 | Stop reason: HOSPADM

## 2025-08-21 RX ORDER — ACETAMINOPHEN 325 MG/1
975 TABLET ORAL ONCE
Status: DISCONTINUED | OUTPATIENT
Start: 2025-08-21 | End: 2025-08-21 | Stop reason: HOSPADM

## 2025-08-21 RX ADMIN — DEXAMETHASONE SODIUM PHOSPHATE 4 MG: 4 INJECTION, SOLUTION INTRAMUSCULAR; INTRAVENOUS at 14:21

## 2025-08-21 RX ADMIN — PROPOFOL 100 MG: 10 INJECTION, EMULSION INTRAVENOUS at 14:04

## 2025-08-21 RX ADMIN — LIDOCAINE HYDROCHLORIDE 60 MG: 20 INJECTION, SOLUTION INFILTRATION; PERINEURAL at 14:04

## 2025-08-21 RX ADMIN — Medication 100 MCG: at 14:34

## 2025-08-21 RX ADMIN — FENTANYL CITRATE 50 MCG: 50 INJECTION, SOLUTION INTRAMUSCULAR; INTRAVENOUS at 14:12

## 2025-08-21 RX ADMIN — FENTANYL CITRATE 25 MCG: 50 INJECTION, SOLUTION INTRAMUSCULAR; INTRAVENOUS at 14:18

## 2025-08-21 RX ADMIN — Medication 1 TABLET: at 21:07

## 2025-08-21 RX ADMIN — PROPOFOL 30 MG: 10 INJECTION, EMULSION INTRAVENOUS at 14:15

## 2025-08-21 RX ADMIN — VANCOMYCIN HYDROCHLORIDE 750 MG: 750 INJECTION, SOLUTION INTRAVENOUS at 08:33

## 2025-08-21 RX ADMIN — ATORVASTATIN CALCIUM 40 MG: 40 TABLET, FILM COATED ORAL at 08:33

## 2025-08-21 RX ADMIN — AMLODIPINE BESYLATE 5 MG: 5 TABLET ORAL at 08:33

## 2025-08-21 RX ADMIN — ONDANSETRON 4 MG: 2 INJECTION, SOLUTION INTRAMUSCULAR; INTRAVENOUS at 14:41

## 2025-08-21 RX ADMIN — ASPIRIN 81 MG: 81 TABLET, COATED ORAL at 21:07

## 2025-08-21 RX ADMIN — LOSARTAN POTASSIUM 100 MG: 100 TABLET, FILM COATED ORAL at 08:33

## 2025-08-21 RX ADMIN — FENTANYL CITRATE 25 MCG: 50 INJECTION, SOLUTION INTRAMUSCULAR; INTRAVENOUS at 14:04

## 2025-08-21 RX ADMIN — Medication 2 TABLET: at 08:33

## 2025-08-21 RX ADMIN — VANCOMYCIN HYDROCHLORIDE 750 MG: 750 INJECTION, SOLUTION INTRAVENOUS at 21:07

## 2025-08-21 RX ADMIN — Medication 1 TABLET: at 08:33

## 2025-08-21 RX ADMIN — SODIUM CHLORIDE, SODIUM LACTATE, POTASSIUM CHLORIDE, AND CALCIUM CHLORIDE: .6; .31; .03; .02 INJECTION, SOLUTION INTRAVENOUS at 13:59

## 2025-08-21 RX ADMIN — OXYCODONE HYDROCHLORIDE 5 MG: 5 TABLET ORAL at 21:07

## 2025-08-21 SDOH — HEALTH STABILITY: MENTAL HEALTH: CURRENT SMOKER: 0

## 2025-08-21 ASSESSMENT — PAIN SCALES - GENERAL
PAINLEVEL_OUTOF10: 0 - NO PAIN
PAINLEVEL_OUTOF10: 8
PAINLEVEL_OUTOF10: 0 - NO PAIN
PAINLEVEL_OUTOF10: 6
PAINLEVEL_OUTOF10: 0 - NO PAIN

## 2025-08-21 ASSESSMENT — PAIN - FUNCTIONAL ASSESSMENT
PAIN_FUNCTIONAL_ASSESSMENT: 0-10

## 2025-08-21 ASSESSMENT — COGNITIVE AND FUNCTIONAL STATUS - GENERAL
PERSONAL GROOMING: A LITTLE
MOVING TO AND FROM BED TO CHAIR: A LITTLE
HELP NEEDED FOR BATHING: A LITTLE
TURNING FROM BACK TO SIDE WHILE IN FLAT BAD: A LITTLE
MOBILITY SCORE: 18
DAILY ACTIVITIY SCORE: 20
DRESSING REGULAR LOWER BODY CLOTHING: A LOT
TOILETING: A LITTLE
DRESSING REGULAR UPPER BODY CLOTHING: A LITTLE
WALKING IN HOSPITAL ROOM: A LITTLE
DRESSING REGULAR UPPER BODY CLOTHING: A LITTLE
TOILETING: A LITTLE
CLIMB 3 TO 5 STEPS WITH RAILING: A LOT
HELP NEEDED FOR BATHING: A LITTLE
WALKING IN HOSPITAL ROOM: A LITTLE
MOBILITY SCORE: 18
DRESSING REGULAR LOWER BODY CLOTHING: A LITTLE
DRESSING REGULAR LOWER BODY CLOTHING: A LITTLE
TURNING FROM BACK TO SIDE WHILE IN FLAT BAD: A LITTLE
STANDING UP FROM CHAIR USING ARMS: A LITTLE
DRESSING REGULAR UPPER BODY CLOTHING: A LITTLE
TOILETING: A LITTLE
STANDING UP FROM CHAIR USING ARMS: A LITTLE
DAILY ACTIVITIY SCORE: 17
CLIMB 3 TO 5 STEPS WITH RAILING: A LOT
DAILY ACTIVITIY SCORE: 20
MOVING TO AND FROM BED TO CHAIR: A LITTLE
HELP NEEDED FOR BATHING: A LOT

## 2025-08-21 ASSESSMENT — ACTIVITIES OF DAILY LIVING (ADL)
BATHING_LEVEL_OF_ASSISTANCE: MODERATE ASSISTANCE
HOME_MANAGEMENT_TIME_ENTRY: 24

## 2025-08-21 ASSESSMENT — PAIN DESCRIPTION - LOCATION: LOCATION: LEG

## 2025-08-21 ASSESSMENT — PAIN DESCRIPTION - ORIENTATION: ORIENTATION: LEFT

## 2025-08-22 VITALS
DIASTOLIC BLOOD PRESSURE: 83 MMHG | WEIGHT: 129.63 LBS | TEMPERATURE: 97.3 F | SYSTOLIC BLOOD PRESSURE: 170 MMHG | HEIGHT: 66 IN | BODY MASS INDEX: 20.83 KG/M2 | OXYGEN SATURATION: 97 % | RESPIRATION RATE: 16 BRPM | HEART RATE: 70 BPM

## 2025-08-22 DIAGNOSIS — L03.116 CELLULITIS OF LEFT ANKLE: ICD-10-CM

## 2025-08-22 PROBLEM — D64.9 ANEMIA: Status: RESOLVED | Noted: 2022-05-11 | Resolved: 2025-08-22

## 2025-08-22 PROBLEM — G62.9 POLYNEUROPATHY: Status: RESOLVED | Noted: 2025-07-05 | Resolved: 2025-08-22

## 2025-08-22 PROBLEM — I74.3 THROMBOSIS OF ARTERIES OF LOWER EXTREMITY: Status: RESOLVED | Noted: 2025-07-05 | Resolved: 2025-08-22

## 2025-08-22 PROBLEM — R26.9 ABNORMAL GAIT: Status: RESOLVED | Noted: 2025-07-07 | Resolved: 2025-08-22

## 2025-08-22 PROBLEM — M62.81 MUSCLE WEAKNESS: Status: RESOLVED | Noted: 2025-07-07 | Resolved: 2025-08-22

## 2025-08-22 LAB
ALBUMIN SERPL BCP-MCNC: 2.8 G/DL (ref 3.4–5)
ANION GAP SERPL CALC-SCNC: 10 MMOL/L (ref 10–20)
BASOPHILS # BLD AUTO: 0.01 X10*3/UL (ref 0–0.1)
BASOPHILS NFR BLD AUTO: 0.1 %
BUN SERPL-MCNC: 18 MG/DL (ref 6–23)
CALCIUM SERPL-MCNC: 8.5 MG/DL (ref 8.6–10.3)
CHLORIDE SERPL-SCNC: 103 MMOL/L (ref 98–107)
CO2 SERPL-SCNC: 26 MMOL/L (ref 21–32)
CREAT SERPL-MCNC: 0.57 MG/DL (ref 0.5–1.05)
EGFRCR SERPLBLD CKD-EPI 2021: >90 ML/MIN/1.73M*2
EOSINOPHIL # BLD AUTO: 0 X10*3/UL (ref 0–0.4)
EOSINOPHIL NFR BLD AUTO: 0 %
ERYTHROCYTE [DISTWIDTH] IN BLOOD BY AUTOMATED COUNT: 15.5 % (ref 11.5–14.5)
GLUCOSE SERPL-MCNC: 116 MG/DL (ref 74–99)
HCT VFR BLD AUTO: 32 % (ref 36–46)
HGB BLD-MCNC: 10.3 G/DL (ref 12–16)
IMM GRANULOCYTES # BLD AUTO: 0.05 X10*3/UL (ref 0–0.5)
IMM GRANULOCYTES NFR BLD AUTO: 0.6 % (ref 0–0.9)
LYMPHOCYTES # BLD AUTO: 0.76 X10*3/UL (ref 0.8–3)
LYMPHOCYTES NFR BLD AUTO: 8.6 %
MAGNESIUM SERPL-MCNC: 1.86 MG/DL (ref 1.6–2.4)
MCH RBC QN AUTO: 29.3 PG (ref 26–34)
MCHC RBC AUTO-ENTMCNC: 32.2 G/DL (ref 32–36)
MCV RBC AUTO: 91 FL (ref 80–100)
MONOCYTES # BLD AUTO: 0.35 X10*3/UL (ref 0.05–0.8)
MONOCYTES NFR BLD AUTO: 3.9 %
NEUTROPHILS # BLD AUTO: 7.71 X10*3/UL (ref 1.6–5.5)
NEUTROPHILS NFR BLD AUTO: 86.8 %
NRBC BLD-RTO: 0 /100 WBCS (ref 0–0)
PHOSPHATE SERPL-MCNC: 3.7 MG/DL (ref 2.5–4.9)
PLATELET # BLD AUTO: 563 X10*3/UL (ref 150–450)
POTASSIUM SERPL-SCNC: 3.9 MMOL/L (ref 3.5–5.3)
RBC # BLD AUTO: 3.51 X10*6/UL (ref 4–5.2)
SODIUM SERPL-SCNC: 135 MMOL/L (ref 136–145)
WBC # BLD AUTO: 8.9 X10*3/UL (ref 4.4–11.3)

## 2025-08-22 PROCEDURE — 2500000001 HC RX 250 WO HCPCS SELF ADMINISTERED DRUGS (ALT 637 FOR MEDICARE OP)

## 2025-08-22 PROCEDURE — 97535 SELF CARE MNGMENT TRAINING: CPT | Mod: GO,CO

## 2025-08-22 PROCEDURE — 97116 GAIT TRAINING THERAPY: CPT | Mod: GP,CQ

## 2025-08-22 PROCEDURE — 83735 ASSAY OF MAGNESIUM: CPT

## 2025-08-22 PROCEDURE — 85025 COMPLETE CBC W/AUTO DIFF WBC: CPT

## 2025-08-22 PROCEDURE — 36415 COLL VENOUS BLD VENIPUNCTURE: CPT

## 2025-08-22 PROCEDURE — 80069 RENAL FUNCTION PANEL: CPT

## 2025-08-22 PROCEDURE — 2500000004 HC RX 250 GENERAL PHARMACY W/ HCPCS (ALT 636 FOR OP/ED)

## 2025-08-22 PROCEDURE — 99239 HOSP IP/OBS DSCHRG MGMT >30: CPT

## 2025-08-22 RX ORDER — AMLODIPINE BESYLATE 5 MG/1
5 TABLET ORAL DAILY
Start: 2025-08-23 | End: 2026-08-23

## 2025-08-22 RX ORDER — OXYCODONE HYDROCHLORIDE 5 MG/1
5 TABLET ORAL EVERY 6 HOURS PRN
Start: 2025-08-22 | End: 2025-08-29

## 2025-08-22 RX ADMIN — ATORVASTATIN CALCIUM 40 MG: 40 TABLET, FILM COATED ORAL at 08:12

## 2025-08-22 RX ADMIN — VANCOMYCIN HYDROCHLORIDE 750 MG: 750 INJECTION, SOLUTION INTRAVENOUS at 08:12

## 2025-08-22 RX ADMIN — POTASSIUM CHLORIDE 40 MEQ: 1.5 POWDER, FOR SOLUTION ORAL at 08:12

## 2025-08-22 RX ADMIN — AMLODIPINE BESYLATE 5 MG: 5 TABLET ORAL at 08:12

## 2025-08-22 RX ADMIN — Medication 1 TABLET: at 08:12

## 2025-08-22 RX ADMIN — LOSARTAN POTASSIUM 100 MG: 100 TABLET, FILM COATED ORAL at 08:12

## 2025-08-22 ASSESSMENT — PAIN - FUNCTIONAL ASSESSMENT
PAIN_FUNCTIONAL_ASSESSMENT: 0-10
PAIN_FUNCTIONAL_ASSESSMENT: 0-10

## 2025-08-22 ASSESSMENT — COGNITIVE AND FUNCTIONAL STATUS - GENERAL
DRESSING REGULAR UPPER BODY CLOTHING: A LITTLE
TOILETING: A LOT
STANDING UP FROM CHAIR USING ARMS: A LITTLE
MOVING TO AND FROM BED TO CHAIR: A LITTLE
TOILETING: A LITTLE
CLIMB 3 TO 5 STEPS WITH RAILING: A LOT
STANDING UP FROM CHAIR USING ARMS: A LITTLE
MOVING FROM LYING ON BACK TO SITTING ON SIDE OF FLAT BED WITH BEDRAILS: A LITTLE
WALKING IN HOSPITAL ROOM: A LITTLE
MOBILITY SCORE: 18
HELP NEEDED FOR BATHING: A LITTLE
DRESSING REGULAR LOWER BODY CLOTHING: A LITTLE
DRESSING REGULAR UPPER BODY CLOTHING: A LITTLE
WALKING IN HOSPITAL ROOM: A LITTLE
TURNING FROM BACK TO SIDE WHILE IN FLAT BAD: A LITTLE
DAILY ACTIVITIY SCORE: 17
TURNING FROM BACK TO SIDE WHILE IN FLAT BAD: A LITTLE
CLIMB 3 TO 5 STEPS WITH RAILING: TOTAL
MOBILITY SCORE: 16
DRESSING REGULAR LOWER BODY CLOTHING: A LOT
MOVING TO AND FROM BED TO CHAIR: A LITTLE
HELP NEEDED FOR BATHING: A LOT
DAILY ACTIVITIY SCORE: 20

## 2025-08-22 ASSESSMENT — ACTIVITIES OF DAILY LIVING (ADL): HOME_MANAGEMENT_TIME_ENTRY: 16

## 2025-08-22 ASSESSMENT — PAIN SCALES - GENERAL
PAINLEVEL_OUTOF10: 0 - NO PAIN
PAINLEVEL_OUTOF10: 5 - MODERATE PAIN

## 2025-08-22 ASSESSMENT — PAIN DESCRIPTION - DESCRIPTORS: DESCRIPTORS: ACHING;DISCOMFORT;SHARP

## 2025-08-23 ENCOUNTER — NURSING HOME VISIT (OUTPATIENT)
Dept: POST ACUTE CARE | Facility: EXTERNAL LOCATION | Age: 80
End: 2025-08-23
Payer: MEDICARE

## 2025-08-23 DIAGNOSIS — Z98.890 S/P FEMORAL-TIBIAL BYPASS: ICD-10-CM

## 2025-08-23 DIAGNOSIS — E46 PROTEIN-CALORIE MALNUTRITION, UNSPECIFIED SEVERITY (MULTI): ICD-10-CM

## 2025-08-23 DIAGNOSIS — S81.802A NON-HEALING WOUND OF LEFT LOWER EXTREMITY: ICD-10-CM

## 2025-08-23 DIAGNOSIS — M86.272 SUBACUTE OSTEOMYELITIS OF LEFT ANKLE: Primary | ICD-10-CM

## 2025-08-23 DIAGNOSIS — S91.002D OPEN WOUND OF LEFT ANKLE, SUBSEQUENT ENCOUNTER: ICD-10-CM

## 2025-08-23 DIAGNOSIS — M71.072 ABSCESS OF BURSA OF LEFT FOOT: ICD-10-CM

## 2025-08-23 DIAGNOSIS — I73.9 PAD (PERIPHERAL ARTERY DISEASE): ICD-10-CM

## 2025-08-23 DIAGNOSIS — M79.89 SWELLING OF LOWER EXTREMITY: ICD-10-CM

## 2025-08-23 LAB — STAPHYLOCOCCUS SPEC CULT: ABNORMAL

## 2025-08-23 PROCEDURE — 99306 1ST NF CARE HIGH MDM 50: CPT | Performed by: INTERNAL MEDICINE

## 2025-08-24 VITALS
OXYGEN SATURATION: 97 % | HEART RATE: 88 BPM | RESPIRATION RATE: 14 BRPM | TEMPERATURE: 98.7 F | SYSTOLIC BLOOD PRESSURE: 120 MMHG | DIASTOLIC BLOOD PRESSURE: 84 MMHG

## 2025-08-24 LAB
LABORATORY COMMENT REPORT: NORMAL
PATH REPORT.FINAL DX SPEC: NORMAL
PATH REPORT.GROSS SPEC: NORMAL
PATH REPORT.RELEVANT HX SPEC: NORMAL
PATH REPORT.TOTAL CANCER: NORMAL

## 2025-08-25 ENCOUNTER — NURSING HOME VISIT (OUTPATIENT)
Dept: POST ACUTE CARE | Facility: EXTERNAL LOCATION | Age: 80
End: 2025-08-25
Payer: MEDICARE

## 2025-08-25 DIAGNOSIS — I82.412 ACUTE DEEP VEIN THROMBOSIS (DVT) OF FEMORAL VEIN OF LEFT LOWER EXTREMITY: ICD-10-CM

## 2025-08-25 DIAGNOSIS — E78.5 HYPERLIPIDEMIA, UNSPECIFIED HYPERLIPIDEMIA TYPE: ICD-10-CM

## 2025-08-25 DIAGNOSIS — M71.072 ABSCESS OF BURSA OF LEFT FOOT: ICD-10-CM

## 2025-08-25 DIAGNOSIS — Z98.890 S/P FEMORAL-TIBIAL BYPASS: ICD-10-CM

## 2025-08-25 DIAGNOSIS — I10 ESSENTIAL HYPERTENSION: ICD-10-CM

## 2025-08-25 DIAGNOSIS — R53.81 PHYSICAL DECONDITIONING: ICD-10-CM

## 2025-08-25 DIAGNOSIS — S81.802A NON-HEALING WOUND OF LEFT LOWER EXTREMITY: ICD-10-CM

## 2025-08-25 DIAGNOSIS — M86.272 SUBACUTE OSTEOMYELITIS OF LEFT ANKLE: Primary | ICD-10-CM

## 2025-08-25 DIAGNOSIS — I73.9 PAD (PERIPHERAL ARTERY DISEASE): ICD-10-CM

## 2025-08-25 DIAGNOSIS — I25.10 CORONARY ARTERY DISEASE INVOLVING NATIVE HEART, UNSPECIFIED VESSEL OR LESION TYPE, UNSPECIFIED WHETHER ANGINA PRESENT: ICD-10-CM

## 2025-08-25 PROCEDURE — 99310 SBSQ NF CARE HIGH MDM 45: CPT | Performed by: NURSE PRACTITIONER

## 2025-08-27 VITALS — SYSTOLIC BLOOD PRESSURE: 156 MMHG | HEART RATE: 65 BPM | DIASTOLIC BLOOD PRESSURE: 88 MMHG

## 2025-08-29 ENCOUNTER — APPOINTMENT (OUTPATIENT)
Dept: PODIATRY | Facility: CLINIC | Age: 80
End: 2025-08-29
Payer: MEDICARE

## 2025-08-29 DIAGNOSIS — Z98.890 POST-OPERATIVE STATE: Primary | ICD-10-CM

## 2025-08-29 DIAGNOSIS — L03.116 CELLULITIS OF LEFT ANKLE: ICD-10-CM

## 2025-08-29 PROCEDURE — 1159F MED LIST DOCD IN RCRD: CPT | Performed by: PODIATRIST

## 2025-08-29 PROCEDURE — 1160F RVW MEDS BY RX/DR IN RCRD: CPT | Performed by: PODIATRIST

## 2025-08-29 PROCEDURE — 99024 POSTOP FOLLOW-UP VISIT: CPT | Performed by: PODIATRIST

## 2025-08-29 PROCEDURE — 1111F DSCHRG MED/CURRENT MED MERGE: CPT | Performed by: PODIATRIST

## 2025-08-29 PROCEDURE — 1036F TOBACCO NON-USER: CPT | Performed by: PODIATRIST

## 2025-09-01 ENCOUNTER — ANESTHESIA EVENT (OUTPATIENT)
Dept: OPERATING ROOM | Facility: HOSPITAL | Age: 80
End: 2025-09-01
Payer: MEDICARE

## 2025-09-01 ENCOUNTER — HOSPITAL ENCOUNTER (INPATIENT)
Facility: HOSPITAL | Age: 80
Discharge: NURSING, REHAB OR LONG-TERM CARE | End: 2025-09-01
Attending: EMERGENCY MEDICINE | Admitting: SURGERY
Payer: MEDICARE

## 2025-09-01 ENCOUNTER — ANESTHESIA (OUTPATIENT)
Dept: OPERATING ROOM | Facility: HOSPITAL | Age: 80
End: 2025-09-01
Payer: MEDICARE

## 2025-09-01 DIAGNOSIS — K40.30 STRANGULATED INGUINAL HERNIA: Primary | ICD-10-CM

## 2025-09-01 DIAGNOSIS — M86.272 SUBACUTE OSTEOMYELITIS OF LEFT ANKLE: ICD-10-CM

## 2025-09-01 DIAGNOSIS — K40.30 UNILATERAL INGUINAL HERNIA WITH OBSTRUCTION AND WITHOUT GANGRENE, RECURRENCE NOT SPECIFIED: ICD-10-CM

## 2025-09-01 DIAGNOSIS — L03.116 LEFT LEG CELLULITIS: ICD-10-CM

## 2025-09-01 LAB
ALBUMIN SERPL BCP-MCNC: 4.1 G/DL (ref 3.4–5)
ALP SERPL-CCNC: 139 U/L (ref 33–136)
ALT SERPL W P-5'-P-CCNC: 11 U/L (ref 7–45)
ANION GAP SERPL CALC-SCNC: 18 MMOL/L (ref 10–20)
ANION GAP SERPL CALC-SCNC: 22 MMOL/L (ref 10–20)
AST SERPL W P-5'-P-CCNC: 14 U/L (ref 9–39)
BASOPHILS # BLD MANUAL: 0 X10*3/UL (ref 0–0.1)
BASOPHILS NFR BLD MANUAL: 0 %
BILIRUB SERPL-MCNC: 0.6 MG/DL (ref 0–1.2)
BUN SERPL-MCNC: 60 MG/DL (ref 6–23)
BUN SERPL-MCNC: 67 MG/DL (ref 6–23)
CALCIUM SERPL-MCNC: 8.1 MG/DL (ref 8.6–10.3)
CALCIUM SERPL-MCNC: 9.7 MG/DL (ref 8.6–10.3)
CHLORIDE SERPL-SCNC: 82 MMOL/L (ref 98–107)
CHLORIDE SERPL-SCNC: 92 MMOL/L (ref 98–107)
CO2 SERPL-SCNC: 27 MMOL/L (ref 21–32)
CO2 SERPL-SCNC: 33 MMOL/L (ref 21–32)
CREAT SERPL-MCNC: 2.51 MG/DL (ref 0.5–1.05)
CREAT SERPL-MCNC: 3.22 MG/DL (ref 0.5–1.05)
EGFRCR SERPLBLD CKD-EPI 2021: 14 ML/MIN/1.73M*2
EGFRCR SERPLBLD CKD-EPI 2021: 19 ML/MIN/1.73M*2
EOSINOPHIL # BLD MANUAL: 0 X10*3/UL (ref 0–0.4)
EOSINOPHIL NFR BLD MANUAL: 0 %
ERYTHROCYTE [DISTWIDTH] IN BLOOD BY AUTOMATED COUNT: 15.9 % (ref 11.5–14.5)
ERYTHROCYTE [DISTWIDTH] IN BLOOD BY AUTOMATED COUNT: 16 % (ref 11.5–14.5)
GLUCOSE SERPL-MCNC: 148 MG/DL (ref 74–99)
GLUCOSE SERPL-MCNC: 170 MG/DL (ref 74–99)
HCT VFR BLD AUTO: 37 % (ref 36–46)
HCT VFR BLD AUTO: 39.8 % (ref 36–46)
HGB BLD-MCNC: 11.5 G/DL (ref 12–16)
HGB BLD-MCNC: 13.2 G/DL (ref 12–16)
IMM GRANULOCYTES # BLD AUTO: 0.01 X10*3/UL (ref 0–0.5)
IMM GRANULOCYTES NFR BLD AUTO: 0.1 % (ref 0–0.9)
INR PPP: 1.2 (ref 0.9–1.1)
LACTATE SERPL-SCNC: 4.9 MMOL/L (ref 0.4–2)
LIPASE SERPL-CCNC: 15 U/L (ref 9–82)
LYMPHOCYTES # BLD MANUAL: 0.98 X10*3/UL (ref 0.8–3)
LYMPHOCYTES NFR BLD MANUAL: 13 %
MAGNESIUM SERPL-MCNC: 1.98 MG/DL (ref 1.6–2.4)
MAGNESIUM SERPL-MCNC: 2.65 MG/DL (ref 1.6–2.4)
MCH RBC QN AUTO: 29.2 PG (ref 26–34)
MCH RBC QN AUTO: 29.7 PG (ref 26–34)
MCHC RBC AUTO-ENTMCNC: 31.1 G/DL (ref 32–36)
MCHC RBC AUTO-ENTMCNC: 33.2 G/DL (ref 32–36)
MCV RBC AUTO: 89 FL (ref 80–100)
MCV RBC AUTO: 94 FL (ref 80–100)
METAMYELOCYTES # BLD MANUAL: 0.08 X10*3/UL
METAMYELOCYTES NFR BLD MANUAL: 1 %
MONOCYTES # BLD MANUAL: 0.9 X10*3/UL (ref 0.05–0.8)
MONOCYTES NFR BLD MANUAL: 12 %
NEUTROPHILS # BLD MANUAL: 5.55 X10*3/UL (ref 1.6–5.5)
NEUTS BAND # BLD MANUAL: 2.1 X10*3/UL (ref 0–0.5)
NEUTS BAND NFR BLD MANUAL: 28 %
NEUTS SEG # BLD MANUAL: 3.45 X10*3/UL (ref 1.6–5)
NEUTS SEG NFR BLD MANUAL: 46 %
NRBC BLD-RTO: 0 /100 WBCS (ref 0–0)
NRBC BLD-RTO: 0 /100 WBCS (ref 0–0)
PHOSPHATE SERPL-MCNC: 4.6 MG/DL (ref 2.5–4.9)
PLATELET # BLD AUTO: 443 X10*3/UL (ref 150–450)
PLATELET # BLD AUTO: 548 X10*3/UL (ref 150–450)
PLATELET CLUMP BLD QL SMEAR: PRESENT
POTASSIUM SERPL-SCNC: 3.3 MMOL/L (ref 3.5–5.3)
POTASSIUM SERPL-SCNC: 3.5 MMOL/L (ref 3.5–5.3)
PROT SERPL-MCNC: 8.9 G/DL (ref 6.4–8.2)
PROTHROMBIN TIME: 13 SECONDS (ref 9.8–12.4)
RBC # BLD AUTO: 3.94 X10*6/UL (ref 4–5.2)
RBC # BLD AUTO: 4.45 X10*6/UL (ref 4–5.2)
RBC MORPH BLD: ABNORMAL
SODIUM SERPL-SCNC: 133 MMOL/L (ref 136–145)
SODIUM SERPL-SCNC: 134 MMOL/L (ref 136–145)
STOMATOCYTES BLD QL SMEAR: ABNORMAL
TOTAL CELLS COUNTED BLD: 100
WBC # BLD AUTO: 2.7 X10*3/UL (ref 4.4–11.3)
WBC # BLD AUTO: 7.5 X10*3/UL (ref 4.4–11.3)

## 2025-09-01 PROCEDURE — 2500000004 HC RX 250 GENERAL PHARMACY W/ HCPCS (ALT 636 FOR OP/ED)

## 2025-09-01 PROCEDURE — 74018 RADEX ABDOMEN 1 VIEW: CPT | Performed by: RADIOLOGY

## 2025-09-01 PROCEDURE — 2500000004 HC RX 250 GENERAL PHARMACY W/ HCPCS (ALT 636 FOR OP/ED): Performed by: EMERGENCY MEDICINE

## 2025-09-01 PROCEDURE — 99223 1ST HOSP IP/OBS HIGH 75: CPT | Performed by: SURGERY

## 2025-09-01 PROCEDURE — 85027 COMPLETE CBC AUTOMATED: CPT | Performed by: SURGERY

## 2025-09-01 PROCEDURE — 85027 COMPLETE CBC AUTOMATED: CPT

## 2025-09-01 PROCEDURE — 2720000007 HC OR 272 NO HCPCS: Performed by: SURGERY

## 2025-09-01 PROCEDURE — 3600000003 HC OR TIME - INITIAL BASE CHARGE - PROCEDURE LEVEL THREE: Performed by: SURGERY

## 2025-09-01 PROCEDURE — 83735 ASSAY OF MAGNESIUM: CPT | Performed by: EMERGENCY MEDICINE

## 2025-09-01 PROCEDURE — 7100000001 HC RECOVERY ROOM TIME - INITIAL BASE CHARGE: Performed by: SURGERY

## 2025-09-01 PROCEDURE — 44160 REMOVAL OF COLON: CPT | Performed by: SURGERY

## 2025-09-01 PROCEDURE — 7100000002 HC RECOVERY ROOM TIME - EACH INCREMENTAL 1 MINUTE: Performed by: SURGERY

## 2025-09-01 PROCEDURE — 1200000002 HC GENERAL ROOM WITH TELEMETRY DAILY

## 2025-09-01 PROCEDURE — 74176 CT ABD & PELVIS W/O CONTRAST: CPT | Performed by: STUDENT IN AN ORGANIZED HEALTH CARE EDUCATION/TRAINING PROGRAM

## 2025-09-01 PROCEDURE — 36415 COLL VENOUS BLD VENIPUNCTURE: CPT | Performed by: SURGERY

## 2025-09-01 PROCEDURE — 2500000004 HC RX 250 GENERAL PHARMACY W/ HCPCS (ALT 636 FOR OP/ED): Mod: JZ | Performed by: ANESTHESIOLOGY

## 2025-09-01 PROCEDURE — 2500000005 HC RX 250 GENERAL PHARMACY W/O HCPCS: Performed by: EMERGENCY MEDICINE

## 2025-09-01 PROCEDURE — 85007 BL SMEAR W/DIFF WBC COUNT: CPT

## 2025-09-01 PROCEDURE — 83735 ASSAY OF MAGNESIUM: CPT | Performed by: SURGERY

## 2025-09-01 PROCEDURE — 99285 EMERGENCY DEPT VISIT HI MDM: CPT | Mod: 25 | Performed by: EMERGENCY MEDICINE

## 2025-09-01 PROCEDURE — 80053 COMPREHEN METABOLIC PANEL: CPT

## 2025-09-01 PROCEDURE — 99291 CRITICAL CARE FIRST HOUR: CPT | Mod: 25 | Performed by: EMERGENCY MEDICINE

## 2025-09-01 PROCEDURE — 2500000005 HC RX 250 GENERAL PHARMACY W/O HCPCS: Performed by: ANESTHESIOLOGIST ASSISTANT

## 2025-09-01 PROCEDURE — 99100 ANES PT EXTEME AGE<1 YR&>70: CPT | Performed by: ANESTHESIOLOGY

## 2025-09-01 PROCEDURE — 99140 ANES COMP EMERGENCY COND: CPT | Performed by: ANESTHESIOLOGY

## 2025-09-01 PROCEDURE — 3700000002 HC GENERAL ANESTHESIA TIME - EACH INCREMENTAL 1 MINUTE: Performed by: SURGERY

## 2025-09-01 PROCEDURE — 85610 PROTHROMBIN TIME: CPT

## 2025-09-01 PROCEDURE — 3600000008 HC OR TIME - EACH INCREMENTAL 1 MINUTE - PROCEDURE LEVEL THREE: Performed by: SURGERY

## 2025-09-01 PROCEDURE — A49525 PR REPAIR SLIDING INGUINAL HERNIA: Performed by: ANESTHESIOLOGIST ASSISTANT

## 2025-09-01 PROCEDURE — 83605 ASSAY OF LACTIC ACID: CPT | Performed by: SURGERY

## 2025-09-01 PROCEDURE — 49507 PRP I/HERN INIT BLOCK >5 YR: CPT | Performed by: SURGERY

## 2025-09-01 PROCEDURE — 80048 BASIC METABOLIC PNL TOTAL CA: CPT | Mod: CCI | Performed by: SURGERY

## 2025-09-01 PROCEDURE — A49525 PR REPAIR SLIDING INGUINAL HERNIA: Performed by: ANESTHESIOLOGY

## 2025-09-01 PROCEDURE — 6360000002 HC RX 636 FACTOR: Mod: JZ | Performed by: EMERGENCY MEDICINE

## 2025-09-01 PROCEDURE — 2500000005 HC RX 250 GENERAL PHARMACY W/O HCPCS: Performed by: ANESTHESIOLOGY

## 2025-09-01 PROCEDURE — 2500000004 HC RX 250 GENERAL PHARMACY W/ HCPCS (ALT 636 FOR OP/ED): Performed by: ANESTHESIOLOGIST ASSISTANT

## 2025-09-01 PROCEDURE — 3700000001 HC GENERAL ANESTHESIA TIME - INITIAL BASE CHARGE: Performed by: SURGERY

## 2025-09-01 PROCEDURE — 2500000004 HC RX 250 GENERAL PHARMACY W/ HCPCS (ALT 636 FOR OP/ED): Performed by: SURGERY

## 2025-09-01 PROCEDURE — 84100 ASSAY OF PHOSPHORUS: CPT | Performed by: SURGERY

## 2025-09-01 PROCEDURE — 83690 ASSAY OF LIPASE: CPT

## 2025-09-01 RX ORDER — PANTOPRAZOLE SODIUM 40 MG/10ML
40 INJECTION, POWDER, LYOPHILIZED, FOR SOLUTION INTRAVENOUS
Status: DISCONTINUED | OUTPATIENT
Start: 2025-09-02 | End: 2025-09-04

## 2025-09-01 RX ORDER — SODIUM CHLORIDE, SODIUM LACTATE, POTASSIUM CHLORIDE, CALCIUM CHLORIDE 600; 310; 30; 20 MG/100ML; MG/100ML; MG/100ML; MG/100ML
100 INJECTION, SOLUTION INTRAVENOUS CONTINUOUS
Status: DISCONTINUED | OUTPATIENT
Start: 2025-09-01 | End: 2025-09-02

## 2025-09-01 RX ORDER — LABETALOL HYDROCHLORIDE 5 MG/ML
5 INJECTION, SOLUTION INTRAVENOUS ONCE AS NEEDED
Status: DISCONTINUED | OUTPATIENT
Start: 2025-09-01 | End: 2025-09-01 | Stop reason: HOSPADM

## 2025-09-01 RX ORDER — SODIUM CHLORIDE, SODIUM LACTATE, POTASSIUM CHLORIDE, CALCIUM CHLORIDE 600; 310; 30; 20 MG/100ML; MG/100ML; MG/100ML; MG/100ML
125 INJECTION, SOLUTION INTRAVENOUS CONTINUOUS
Status: DISCONTINUED | OUTPATIENT
Start: 2025-09-01 | End: 2025-09-03

## 2025-09-01 RX ORDER — LIDOCAINE HYDROCHLORIDE 20 MG/ML
INJECTION, SOLUTION EPIDURAL; INFILTRATION; INTRACAUDAL; PERINEURAL AS NEEDED
Status: DISCONTINUED | OUTPATIENT
Start: 2025-09-01 | End: 2025-09-01

## 2025-09-01 RX ORDER — ONDANSETRON 4 MG/1
4 TABLET, FILM COATED ORAL EVERY 6 HOURS PRN
Status: ON HOLD | COMMUNITY
End: 2025-09-01 | Stop reason: ALTCHOICE

## 2025-09-01 RX ORDER — MIDAZOLAM HYDROCHLORIDE 1 MG/ML
1 INJECTION, SOLUTION INTRAMUSCULAR; INTRAVENOUS ONCE AS NEEDED
Status: DISCONTINUED | OUTPATIENT
Start: 2025-09-01 | End: 2025-09-01 | Stop reason: HOSPADM

## 2025-09-01 RX ORDER — FENTANYL CITRATE 50 UG/ML
INJECTION, SOLUTION INTRAMUSCULAR; INTRAVENOUS AS NEEDED
Status: DISCONTINUED | OUTPATIENT
Start: 2025-09-01 | End: 2025-09-01

## 2025-09-01 RX ORDER — DEXAMETHASONE SODIUM PHOSPHATE 10 MG/ML
INJECTION INTRAMUSCULAR; INTRAVENOUS AS NEEDED
Status: DISCONTINUED | OUTPATIENT
Start: 2025-09-01 | End: 2025-09-01

## 2025-09-01 RX ORDER — FENTANYL CITRATE 50 UG/ML
12.5 INJECTION, SOLUTION INTRAMUSCULAR; INTRAVENOUS EVERY 5 MIN PRN
Status: DISCONTINUED | OUTPATIENT
Start: 2025-09-01 | End: 2025-09-01 | Stop reason: HOSPADM

## 2025-09-01 RX ORDER — HYDROMORPHONE HYDROCHLORIDE 1 MG/ML
1 INJECTION, SOLUTION INTRAMUSCULAR; INTRAVENOUS; SUBCUTANEOUS EVERY 5 MIN PRN
Status: DISCONTINUED | OUTPATIENT
Start: 2025-09-01 | End: 2025-09-01 | Stop reason: HOSPADM

## 2025-09-01 RX ORDER — LIDOCAINE HYDROCHLORIDE 10 MG/ML
0.1 INJECTION, SOLUTION INFILTRATION; PERINEURAL ONCE
Status: DISCONTINUED | OUTPATIENT
Start: 2025-09-01 | End: 2025-09-01 | Stop reason: HOSPADM

## 2025-09-01 RX ORDER — ALBUTEROL SULFATE 0.83 MG/ML
2.5 SOLUTION RESPIRATORY (INHALATION) ONCE AS NEEDED
Status: DISCONTINUED | OUTPATIENT
Start: 2025-09-01 | End: 2025-09-01 | Stop reason: HOSPADM

## 2025-09-01 RX ORDER — HEPARIN SODIUM 5000 [USP'U]/ML
5000 INJECTION, SOLUTION INTRAVENOUS; SUBCUTANEOUS EVERY 8 HOURS
Status: DISCONTINUED | OUTPATIENT
Start: 2025-09-01 | End: 2025-09-04

## 2025-09-01 RX ORDER — PROPOFOL 10 MG/ML
INJECTION, EMULSION INTRAVENOUS AS NEEDED
Status: DISCONTINUED | OUTPATIENT
Start: 2025-09-01 | End: 2025-09-01

## 2025-09-01 RX ORDER — HYDROMORPHONE HCL/0.9% NACL/PF 15 MG/30ML
PATIENT CONTROLLED ANALGESIA SYRINGE INTRAVENOUS CONTINUOUS
Refills: 0 | Status: DISCONTINUED | OUTPATIENT
Start: 2025-09-01 | End: 2025-09-01

## 2025-09-01 RX ORDER — ONDANSETRON HYDROCHLORIDE 2 MG/ML
4 INJECTION, SOLUTION INTRAVENOUS ONCE
Status: COMPLETED | OUTPATIENT
Start: 2025-09-01 | End: 2025-09-01

## 2025-09-01 RX ORDER — HYDROMORPHONE HYDROCHLORIDE 1 MG/ML
INJECTION, SOLUTION INTRAMUSCULAR; INTRAVENOUS; SUBCUTANEOUS AS NEEDED
Status: DISCONTINUED | OUTPATIENT
Start: 2025-09-01 | End: 2025-09-01

## 2025-09-01 RX ORDER — AMLODIPINE BESYLATE 5 MG/1
5 TABLET ORAL DAILY
Status: DISPENSED | OUTPATIENT
Start: 2025-09-02

## 2025-09-01 RX ORDER — LIDOCAINE 560 MG/1
1 PATCH PERCUTANEOUS; TOPICAL; TRANSDERMAL DAILY
Status: DISPENSED | OUTPATIENT
Start: 2025-09-02

## 2025-09-01 RX ORDER — PANTOPRAZOLE SODIUM 40 MG/1
40 TABLET, DELAYED RELEASE ORAL
Status: DISCONTINUED | OUTPATIENT
Start: 2025-09-02 | End: 2025-09-02

## 2025-09-01 RX ORDER — RIVAROXABAN 2.5 MG/1
5 TABLET, FILM COATED ORAL
Status: DISPENSED | OUTPATIENT
Start: 2025-09-02

## 2025-09-01 RX ORDER — HYDROMORPHONE HYDROCHLORIDE 0.2 MG/ML
0.2 INJECTION INTRAMUSCULAR; INTRAVENOUS; SUBCUTANEOUS EVERY 2 HOUR PRN
Status: DISCONTINUED | OUTPATIENT
Start: 2025-09-01 | End: 2025-09-04

## 2025-09-01 RX ORDER — ONDANSETRON HYDROCHLORIDE 2 MG/ML
4 INJECTION, SOLUTION INTRAVENOUS ONCE AS NEEDED
Status: COMPLETED | OUTPATIENT
Start: 2025-09-01 | End: 2025-09-01

## 2025-09-01 RX ORDER — OXYCODONE HYDROCHLORIDE 5 MG/1
5 TABLET ORAL EVERY 4 HOURS PRN
Status: DISCONTINUED | OUTPATIENT
Start: 2025-09-01 | End: 2025-09-01

## 2025-09-01 RX ORDER — CETIRIZINE HYDROCHLORIDE 10 MG/1
10 TABLET ORAL DAILY
Status: DISCONTINUED | OUTPATIENT
Start: 2025-09-01 | End: 2025-09-01

## 2025-09-01 RX ORDER — POTASSIUM CHLORIDE 14.9 MG/ML
20 INJECTION INTRAVENOUS
Status: DISPENSED | OUTPATIENT
Start: 2025-09-01 | End: 2025-09-01

## 2025-09-01 RX ORDER — ASPIRIN 81 MG/1
81 TABLET ORAL NIGHTLY
Status: DISPENSED | OUTPATIENT
Start: 2025-09-01

## 2025-09-01 RX ORDER — ROCURONIUM BROMIDE 50 MG/5 ML
SYRINGE (ML) INTRAVENOUS AS NEEDED
Status: DISCONTINUED | OUTPATIENT
Start: 2025-09-01 | End: 2025-09-01

## 2025-09-01 RX ORDER — MORPHINE SULFATE 4 MG/ML
4 INJECTION, SOLUTION INTRAMUSCULAR; INTRAVENOUS ONCE
Status: COMPLETED | OUTPATIENT
Start: 2025-09-01 | End: 2025-09-01

## 2025-09-01 RX ORDER — ETOMIDATE 2 MG/ML
INJECTION INTRAVENOUS AS NEEDED
Status: DISCONTINUED | OUTPATIENT
Start: 2025-09-01 | End: 2025-09-01

## 2025-09-01 RX ORDER — LOSARTAN POTASSIUM 100 MG/1
100 TABLET ORAL DAILY
Status: DISPENSED | OUTPATIENT
Start: 2025-09-02

## 2025-09-01 RX ORDER — MEPERIDINE HYDROCHLORIDE 50 MG/ML
12.5 INJECTION INTRAMUSCULAR; INTRAVENOUS; SUBCUTANEOUS EVERY 10 MIN PRN
Status: DISCONTINUED | OUTPATIENT
Start: 2025-09-01 | End: 2025-09-01 | Stop reason: HOSPADM

## 2025-09-01 RX ORDER — SUCCINYLCHOLINE CHLORIDE 20 MG/ML INJECTION SOLUTION
SOLUTION AS NEEDED
Status: DISCONTINUED | OUTPATIENT
Start: 2025-09-01 | End: 2025-09-01

## 2025-09-01 RX ORDER — DIPHENHYDRAMINE HYDROCHLORIDE 50 MG/ML
12.5 INJECTION, SOLUTION INTRAMUSCULAR; INTRAVENOUS ONCE AS NEEDED
Status: DISCONTINUED | OUTPATIENT
Start: 2025-09-01 | End: 2025-09-01 | Stop reason: HOSPADM

## 2025-09-01 RX ORDER — NALOXONE HYDROCHLORIDE 0.4 MG/ML
0.2 INJECTION, SOLUTION INTRAMUSCULAR; INTRAVENOUS; SUBCUTANEOUS AS NEEDED
Status: ACTIVE | OUTPATIENT
Start: 2025-09-01

## 2025-09-01 RX ORDER — ONDANSETRON HYDROCHLORIDE 2 MG/ML
INJECTION, SOLUTION INTRAVENOUS AS NEEDED
Status: DISCONTINUED | OUTPATIENT
Start: 2025-09-01 | End: 2025-09-01

## 2025-09-01 RX ORDER — SODIUM CHLORIDE, SODIUM LACTATE, POTASSIUM CHLORIDE, CALCIUM CHLORIDE 600; 310; 30; 20 MG/100ML; MG/100ML; MG/100ML; MG/100ML
INJECTION, SOLUTION INTRAVENOUS CONTINUOUS PRN
Status: DISCONTINUED | OUTPATIENT
Start: 2025-09-01 | End: 2025-09-01

## 2025-09-01 RX ORDER — ACETAMINOPHEN 325 MG/1
975 TABLET ORAL ONCE
Status: DISCONTINUED | OUTPATIENT
Start: 2025-09-01 | End: 2025-09-01 | Stop reason: HOSPADM

## 2025-09-01 RX ORDER — HYDRALAZINE HYDROCHLORIDE 20 MG/ML
5 INJECTION INTRAMUSCULAR; INTRAVENOUS EVERY 30 MIN PRN
Status: DISCONTINUED | OUTPATIENT
Start: 2025-09-01 | End: 2025-09-01 | Stop reason: HOSPADM

## 2025-09-01 RX ADMIN — SODIUM CHLORIDE, SODIUM LACTATE, POTASSIUM CHLORIDE, AND CALCIUM CHLORIDE 1000 ML: .6; .31; .03; .02 INJECTION, SOLUTION INTRAVENOUS at 18:12

## 2025-09-01 RX ADMIN — Medication 50 MG: at 19:16

## 2025-09-01 RX ADMIN — ETOMIDATE 20 MG: 20 INJECTION, SOLUTION INTRAVENOUS at 19:00

## 2025-09-01 RX ADMIN — MORPHINE SULFATE 4 MG: 4 INJECTION, SOLUTION INTRAMUSCULAR; INTRAVENOUS at 15:45

## 2025-09-01 RX ADMIN — ONDANSETRON 4 MG: 2 INJECTION, SOLUTION INTRAMUSCULAR; INTRAVENOUS at 21:49

## 2025-09-01 RX ADMIN — Medication: at 22:35

## 2025-09-01 RX ADMIN — DEXAMETHASONE SODIUM PHOSPHATE 4 MG: 10 INJECTION INTRAMUSCULAR; INTRAVENOUS at 19:01

## 2025-09-01 RX ADMIN — TAZOBACTAM SODIUM AND PIPERACILLIN SODIUM 3.38 G: 375; 3 INJECTION, SOLUTION INTRAVENOUS at 19:15

## 2025-09-01 RX ADMIN — Medication 3 DOSE: at 23:00

## 2025-09-01 RX ADMIN — HYDROMORPHONE HYDROCHLORIDE 1 MG: 1 INJECTION, SOLUTION INTRAMUSCULAR; INTRAVENOUS; SUBCUTANEOUS at 22:31

## 2025-09-01 RX ADMIN — SODIUM CHLORIDE, SODIUM LACTATE, POTASSIUM CHLORIDE, AND CALCIUM CHLORIDE 1000 ML: .6; .31; .03; .02 INJECTION, SOLUTION INTRAVENOUS at 17:56

## 2025-09-01 RX ADMIN — LIDOCAINE HYDROCHLORIDE 100 MG: 20 INJECTION, SOLUTION EPIDURAL; INFILTRATION; INTRACAUDAL; PERINEURAL at 19:00

## 2025-09-01 RX ADMIN — ONDANSETRON 4 MG: 2 INJECTION, SOLUTION INTRAMUSCULAR; INTRAVENOUS at 15:44

## 2025-09-01 RX ADMIN — SODIUM CHLORIDE, POTASSIUM CHLORIDE, SODIUM LACTATE AND CALCIUM CHLORIDE: 600; 310; 30; 20 INJECTION, SOLUTION INTRAVENOUS at 19:43

## 2025-09-01 RX ADMIN — HYDROMORPHONE HYDROCHLORIDE 1 MG: 1 INJECTION, SOLUTION INTRAMUSCULAR; INTRAVENOUS; SUBCUTANEOUS at 21:48

## 2025-09-01 RX ADMIN — ONDANSETRON 4 MG: 2 INJECTION, SOLUTION INTRAMUSCULAR; INTRAVENOUS at 20:44

## 2025-09-01 RX ADMIN — SODIUM CHLORIDE, POTASSIUM CHLORIDE, SODIUM LACTATE AND CALCIUM CHLORIDE: 600; 310; 30; 20 INJECTION, SOLUTION INTRAVENOUS at 20:29

## 2025-09-01 RX ADMIN — HYDROMORPHONE HYDROCHLORIDE 0.5 MG: 1 INJECTION, SOLUTION INTRAMUSCULAR; INTRAVENOUS; SUBCUTANEOUS at 21:26

## 2025-09-01 RX ADMIN — SODIUM CHLORIDE, SODIUM LACTATE, POTASSIUM CHLORIDE, AND CALCIUM CHLORIDE 1000 ML: .6; .31; .03; .02 INJECTION, SOLUTION INTRAVENOUS at 15:45

## 2025-09-01 RX ADMIN — FENTANYL CITRATE 100 MCG: 50 INJECTION, SOLUTION INTRAMUSCULAR; INTRAVENOUS at 19:00

## 2025-09-01 RX ADMIN — Medication 200 MG: at 19:00

## 2025-09-01 RX ADMIN — PROPOFOL 50 MG: 10 INJECTION, EMULSION INTRAVENOUS at 20:15

## 2025-09-01 RX ADMIN — SODIUM CHLORIDE, POTASSIUM CHLORIDE, SODIUM LACTATE AND CALCIUM CHLORIDE 1000 ML: 600; 310; 30; 20 INJECTION, SOLUTION INTRAVENOUS at 22:30

## 2025-09-01 RX ADMIN — Medication 2131 UNITS: at 17:46

## 2025-09-01 RX ADMIN — PROPOFOL 50 MG: 10 INJECTION, EMULSION INTRAVENOUS at 19:28

## 2025-09-01 RX ADMIN — HEPARIN SODIUM 5000 UNITS: 5000 INJECTION, SOLUTION INTRAVENOUS; SUBCUTANEOUS at 23:52

## 2025-09-01 RX ADMIN — PROPOFOL 50 MG: 10 INJECTION, EMULSION INTRAVENOUS at 19:23

## 2025-09-01 RX ADMIN — SODIUM CHLORIDE, SODIUM LACTATE, POTASSIUM CHLORIDE, AND CALCIUM CHLORIDE 100 ML/HR: .6; .31; .03; .02 INJECTION, SOLUTION INTRAVENOUS at 23:00

## 2025-09-01 RX ADMIN — HYDROMORPHONE HYDROCHLORIDE 1 MG: 1 INJECTION, SOLUTION INTRAMUSCULAR; INTRAVENOUS; SUBCUTANEOUS at 20:40

## 2025-09-01 RX ADMIN — POTASSIUM CHLORIDE 20 MEQ: 14.9 INJECTION, SOLUTION INTRAVENOUS at 17:56

## 2025-09-01 RX ADMIN — SUGAMMADEX 200 MG: 100 INJECTION, SOLUTION INTRAVENOUS at 20:54

## 2025-09-01 RX ADMIN — SODIUM CHLORIDE, POTASSIUM CHLORIDE, SODIUM LACTATE AND CALCIUM CHLORIDE: 600; 310; 30; 20 INJECTION, SOLUTION INTRAVENOUS at 19:00

## 2025-09-01 SDOH — ECONOMIC STABILITY: FOOD INSECURITY: HOW HARD IS IT FOR YOU TO PAY FOR THE VERY BASICS LIKE FOOD, HOUSING, MEDICAL CARE, AND HEATING?: NOT HARD AT ALL

## 2025-09-01 SDOH — SOCIAL STABILITY: SOCIAL INSECURITY: WITHIN THE LAST YEAR, HAVE YOU BEEN HUMILIATED OR EMOTIONALLY ABUSED IN OTHER WAYS BY YOUR PARTNER OR EX-PARTNER?: NO

## 2025-09-01 SDOH — ECONOMIC STABILITY: HOUSING INSECURITY: IN THE PAST 12 MONTHS, HOW MANY TIMES HAVE YOU MOVED WHERE YOU WERE LIVING?: 0

## 2025-09-01 SDOH — HEALTH STABILITY: PHYSICAL HEALTH: ON AVERAGE, HOW MANY DAYS PER WEEK DO YOU ENGAGE IN MODERATE TO STRENUOUS EXERCISE (LIKE A BRISK WALK)?: 0 DAYS

## 2025-09-01 SDOH — SOCIAL STABILITY: SOCIAL NETWORK: HOW OFTEN DO YOU GET TOGETHER WITH FRIENDS OR RELATIVES?: MORE THAN THREE TIMES A WEEK

## 2025-09-01 SDOH — ECONOMIC STABILITY: FOOD INSECURITY: WITHIN THE PAST 12 MONTHS, YOU WORRIED THAT YOUR FOOD WOULD RUN OUT BEFORE YOU GOT THE MONEY TO BUY MORE.: NEVER TRUE

## 2025-09-01 SDOH — SOCIAL STABILITY: SOCIAL INSECURITY: DOES ANYONE TRY TO KEEP YOU FROM HAVING/CONTACTING OTHER FRIENDS OR DOING THINGS OUTSIDE YOUR HOME?: NO

## 2025-09-01 SDOH — HEALTH STABILITY: PHYSICAL HEALTH: ON AVERAGE, HOW MANY MINUTES DO YOU ENGAGE IN EXERCISE AT THIS LEVEL?: 0 MIN

## 2025-09-01 SDOH — SOCIAL STABILITY: SOCIAL INSECURITY: DO YOU FEEL UNSAFE GOING BACK TO THE PLACE WHERE YOU ARE LIVING?: NO

## 2025-09-01 SDOH — SOCIAL STABILITY: SOCIAL INSECURITY: WITHIN THE LAST YEAR, HAVE YOU BEEN AFRAID OF YOUR PARTNER OR EX-PARTNER?: NO

## 2025-09-01 SDOH — ECONOMIC STABILITY: FOOD INSECURITY: WITHIN THE PAST 12 MONTHS, THE FOOD YOU BOUGHT JUST DIDN'T LAST AND YOU DIDN'T HAVE MONEY TO GET MORE.: NEVER TRUE

## 2025-09-01 SDOH — SOCIAL STABILITY: SOCIAL INSECURITY: HAVE YOU HAD ANY THOUGHTS OF HARMING ANYONE ELSE?: NO

## 2025-09-01 SDOH — SOCIAL STABILITY: SOCIAL NETWORK: HOW OFTEN DO YOU ATTEND CHURCH OR RELIGIOUS SERVICES?: MORE THAN 4 TIMES PER YEAR

## 2025-09-01 SDOH — ECONOMIC STABILITY: HOUSING INSECURITY: AT ANY TIME IN THE PAST 12 MONTHS, WERE YOU HOMELESS OR LIVING IN A SHELTER (INCLUDING NOW)?: NO

## 2025-09-01 SDOH — SOCIAL STABILITY: SOCIAL NETWORK
DO YOU BELONG TO ANY CLUBS OR ORGANIZATIONS SUCH AS CHURCH GROUPS, UNIONS, FRATERNAL OR ATHLETIC GROUPS, OR SCHOOL GROUPS?: NO

## 2025-09-01 SDOH — SOCIAL STABILITY: SOCIAL INSECURITY: WERE YOU ABLE TO COMPLETE ALL THE BEHAVIORAL HEALTH SCREENINGS?: YES

## 2025-09-01 SDOH — SOCIAL STABILITY: SOCIAL INSECURITY: ARE YOU MARRIED, WIDOWED, DIVORCED, SEPARATED, NEVER MARRIED, OR LIVING WITH A PARTNER?: MARRIED

## 2025-09-01 SDOH — SOCIAL STABILITY: SOCIAL INSECURITY: HAS ANYONE EVER THREATENED TO HURT YOUR FAMILY OR YOUR PETS?: NO

## 2025-09-01 SDOH — SOCIAL STABILITY: SOCIAL NETWORK: HOW OFTEN DO YOU ATTEND MEETINGS OF THE CLUBS OR ORGANIZATIONS YOU BELONG TO?: NEVER

## 2025-09-01 SDOH — SOCIAL STABILITY: SOCIAL INSECURITY: DO YOU FEEL ANYONE HAS EXPLOITED OR TAKEN ADVANTAGE OF YOU FINANCIALLY OR OF YOUR PERSONAL PROPERTY?: NO

## 2025-09-01 SDOH — ECONOMIC STABILITY: HOUSING INSECURITY: IN THE LAST 12 MONTHS, WAS THERE A TIME WHEN YOU WERE NOT ABLE TO PAY THE MORTGAGE OR RENT ON TIME?: NO

## 2025-09-01 SDOH — ECONOMIC STABILITY: INCOME INSECURITY: IN THE PAST 12 MONTHS HAS THE ELECTRIC, GAS, OIL, OR WATER COMPANY THREATENED TO SHUT OFF SERVICES IN YOUR HOME?: NO

## 2025-09-01 SDOH — SOCIAL STABILITY: SOCIAL INSECURITY: ARE THERE ANY APPARENT SIGNS OF INJURIES/BEHAVIORS THAT COULD BE RELATED TO ABUSE/NEGLECT?: NO

## 2025-09-01 SDOH — SOCIAL STABILITY: SOCIAL INSECURITY: HAVE YOU HAD THOUGHTS OF HARMING ANYONE ELSE?: NO

## 2025-09-01 SDOH — SOCIAL STABILITY: SOCIAL INSECURITY: ABUSE: ADULT

## 2025-09-01 SDOH — SOCIAL STABILITY: SOCIAL INSECURITY: ARE YOU OR HAVE YOU BEEN THREATENED OR ABUSED PHYSICALLY, EMOTIONALLY, OR SEXUALLY BY ANYONE?: NO

## 2025-09-01 SDOH — ECONOMIC STABILITY: TRANSPORTATION INSECURITY: IN THE PAST 12 MONTHS, HAS LACK OF TRANSPORTATION KEPT YOU FROM MEDICAL APPOINTMENTS OR FROM GETTING MEDICATIONS?: NO

## 2025-09-01 ASSESSMENT — COGNITIVE AND FUNCTIONAL STATUS - GENERAL
PERSONAL GROOMING: A LITTLE
MOBILITY SCORE: 14
MOVING TO AND FROM BED TO CHAIR: A LOT
PATIENT BASELINE BEDBOUND: NO
DRESSING REGULAR UPPER BODY CLOTHING: A LITTLE
TOILETING: A LITTLE
MOVING FROM LYING ON BACK TO SITTING ON SIDE OF FLAT BED WITH BEDRAILS: A LITTLE
DRESSING REGULAR LOWER BODY CLOTHING: A LOT
TURNING FROM BACK TO SIDE WHILE IN FLAT BAD: A LITTLE
DAILY ACTIVITIY SCORE: 17
EATING MEALS: A LITTLE
HELP NEEDED FOR BATHING: A LITTLE
WALKING IN HOSPITAL ROOM: A LOT
STANDING UP FROM CHAIR USING ARMS: A LOT
CLIMB 3 TO 5 STEPS WITH RAILING: A LOT

## 2025-09-01 ASSESSMENT — PAIN SCALES - GENERAL
PAINLEVEL_OUTOF10: 8
PAINLEVEL_OUTOF10: 4
PAINLEVEL_OUTOF10: 9
PAINLEVEL_OUTOF10: 3
PAINLEVEL_OUTOF10: 4
PAINLEVEL_OUTOF10: 9
PAINLEVEL_OUTOF10: 4

## 2025-09-01 ASSESSMENT — LIFESTYLE VARIABLES
SUBSTANCE_ABUSE_PAST_12_MONTHS: NO
HOW OFTEN DO YOU HAVE A DRINK CONTAINING ALCOHOL: NEVER
AUDIT-C TOTAL SCORE: 0
HOW OFTEN DO YOU HAVE 6 OR MORE DRINKS ON ONE OCCASION: NEVER
AUDIT-C TOTAL SCORE: 0
SKIP TO QUESTIONS 9-10: 1
HOW MANY STANDARD DRINKS CONTAINING ALCOHOL DO YOU HAVE ON A TYPICAL DAY: PATIENT DOES NOT DRINK
PRESCIPTION_ABUSE_PAST_12_MONTHS: NO

## 2025-09-01 ASSESSMENT — ACTIVITIES OF DAILY LIVING (ADL)
PATIENT'S MEMORY ADEQUATE TO SAFELY COMPLETE DAILY ACTIVITIES?: YES
LACK_OF_TRANSPORTATION: NO
JUDGMENT_ADEQUATE_SAFELY_COMPLETE_DAILY_ACTIVITIES: YES
BATHING: NEEDS ASSISTANCE
HEARING - RIGHT EAR: FUNCTIONAL
ASSISTIVE_DEVICE: EYEGLASSES;WALKER
HEARING - LEFT EAR: FUNCTIONAL
DRESSING YOURSELF: NEEDS ASSISTANCE
GROOMING: NEEDS ASSISTANCE
ADEQUATE_TO_COMPLETE_ADL: YES
WALKS IN HOME: NEEDS ASSISTANCE
FEEDING YOURSELF: INDEPENDENT
LACK_OF_TRANSPORTATION: NO
TOILETING: NEEDS ASSISTANCE

## 2025-09-01 ASSESSMENT — PAIN DESCRIPTION - LOCATION
LOCATION: ABDOMEN
LOCATION: ABDOMEN

## 2025-09-01 ASSESSMENT — PAIN DESCRIPTION - DESCRIPTORS
DESCRIPTORS: DISCOMFORT
DESCRIPTORS: ACHING
DESCRIPTORS: CRAMPING

## 2025-09-01 ASSESSMENT — PAIN - FUNCTIONAL ASSESSMENT
PAIN_FUNCTIONAL_ASSESSMENT: 0-10

## 2025-09-01 ASSESSMENT — PAIN DESCRIPTION - PAIN TYPE: TYPE: ACUTE PAIN

## 2025-09-02 ENCOUNTER — APPOINTMENT (OUTPATIENT)
Dept: VASCULAR SURGERY | Facility: CLINIC | Age: 80
End: 2025-09-02
Payer: MEDICARE

## 2025-09-02 LAB
ANION GAP SERPL CALC-SCNC: 11 MMOL/L (ref 10–20)
APPEARANCE UR: CLEAR
BILIRUB UR STRIP.AUTO-MCNC: NEGATIVE MG/DL
BUN SERPL-MCNC: 45 MG/DL (ref 6–23)
CALCIUM SERPL-MCNC: 7.9 MG/DL (ref 8.6–10.3)
CHLORIDE SERPL-SCNC: 97 MMOL/L (ref 98–107)
CO2 SERPL-SCNC: 29 MMOL/L (ref 21–32)
COLOR UR: ABNORMAL
CREAT SERPL-MCNC: 1.66 MG/DL (ref 0.5–1.05)
EGFRCR SERPLBLD CKD-EPI 2021: 31 ML/MIN/1.73M*2
ERYTHROCYTE [DISTWIDTH] IN BLOOD BY AUTOMATED COUNT: 15.9 % (ref 11.5–14.5)
GLUCOSE SERPL-MCNC: 120 MG/DL (ref 74–99)
GLUCOSE UR STRIP.AUTO-MCNC: NORMAL MG/DL
HCT VFR BLD AUTO: 30.4 % (ref 36–46)
HGB BLD-MCNC: 9.7 G/DL (ref 12–16)
HOLD SPECIMEN: NORMAL
HOLD SPECIMEN: NORMAL
KETONES UR STRIP.AUTO-MCNC: NEGATIVE MG/DL
LACTATE SERPL-SCNC: 1.1 MMOL/L (ref 0.4–2)
LACTATE SERPL-SCNC: 3.9 MMOL/L (ref 0.4–2)
LEUKOCYTE ESTERASE UR QL STRIP.AUTO: NEGATIVE
MAGNESIUM SERPL-MCNC: 1.66 MG/DL (ref 1.6–2.4)
MCH RBC QN AUTO: 28.9 PG (ref 26–34)
MCHC RBC AUTO-ENTMCNC: 31.9 G/DL (ref 32–36)
MCV RBC AUTO: 91 FL (ref 80–100)
MUCOUS THREADS #/AREA URNS AUTO: NORMAL /LPF
NITRITE UR QL STRIP.AUTO: NEGATIVE
NRBC BLD-RTO: 0 /100 WBCS (ref 0–0)
PH UR STRIP.AUTO: 6 [PH]
PHOSPHATE SERPL-MCNC: 3.1 MG/DL (ref 2.5–4.9)
PLATELET # BLD AUTO: 387 X10*3/UL (ref 150–450)
POTASSIUM SERPL-SCNC: 3.4 MMOL/L (ref 3.5–5.3)
PROT UR STRIP.AUTO-MCNC: ABNORMAL MG/DL
RBC # BLD AUTO: 3.36 X10*6/UL (ref 4–5.2)
RBC # UR STRIP.AUTO: ABNORMAL MG/DL
RBC #/AREA URNS AUTO: NORMAL /HPF
SODIUM SERPL-SCNC: 134 MMOL/L (ref 136–145)
SP GR UR STRIP.AUTO: 1.02
UROBILINOGEN UR STRIP.AUTO-MCNC: NORMAL MG/DL
VANCOMYCIN SERPL-MCNC: 20.9 UG/ML (ref 5–20)
WBC # BLD AUTO: 6.1 X10*3/UL (ref 4.4–11.3)
WBC #/AREA URNS AUTO: NORMAL /HPF

## 2025-09-02 PROCEDURE — 2500000004 HC RX 250 GENERAL PHARMACY W/ HCPCS (ALT 636 FOR OP/ED): Performed by: SURGERY

## 2025-09-02 PROCEDURE — 36415 COLL VENOUS BLD VENIPUNCTURE: CPT | Performed by: SURGERY

## 2025-09-02 PROCEDURE — 80048 BASIC METABOLIC PNL TOTAL CA: CPT | Performed by: SURGERY

## 2025-09-02 PROCEDURE — 97112 NEUROMUSCULAR REEDUCATION: CPT | Mod: GO

## 2025-09-02 PROCEDURE — 1200000002 HC GENERAL ROOM WITH TELEMETRY DAILY

## 2025-09-02 PROCEDURE — 2500000004 HC RX 250 GENERAL PHARMACY W/ HCPCS (ALT 636 FOR OP/ED)

## 2025-09-02 PROCEDURE — 97162 PT EVAL MOD COMPLEX 30 MIN: CPT | Mod: GP

## 2025-09-02 PROCEDURE — 2500000005 HC RX 250 GENERAL PHARMACY W/O HCPCS: Performed by: SURGERY

## 2025-09-02 PROCEDURE — 83605 ASSAY OF LACTIC ACID: CPT | Performed by: SURGERY

## 2025-09-02 PROCEDURE — 84100 ASSAY OF PHOSPHORUS: CPT | Performed by: SURGERY

## 2025-09-02 PROCEDURE — 80202 ASSAY OF VANCOMYCIN: CPT

## 2025-09-02 PROCEDURE — 99024 POSTOP FOLLOW-UP VISIT: CPT | Performed by: PODIATRIST

## 2025-09-02 PROCEDURE — 81001 URINALYSIS AUTO W/SCOPE: CPT

## 2025-09-02 PROCEDURE — 99024 POSTOP FOLLOW-UP VISIT: CPT

## 2025-09-02 PROCEDURE — 97165 OT EVAL LOW COMPLEX 30 MIN: CPT | Mod: GO

## 2025-09-02 PROCEDURE — 97530 THERAPEUTIC ACTIVITIES: CPT | Mod: GP

## 2025-09-02 PROCEDURE — 85027 COMPLETE CBC AUTOMATED: CPT | Performed by: SURGERY

## 2025-09-02 PROCEDURE — 83735 ASSAY OF MAGNESIUM: CPT | Performed by: SURGERY

## 2025-09-02 PROCEDURE — 87081 CULTURE SCREEN ONLY: CPT | Mod: STJLAB

## 2025-09-02 RX ORDER — SODIUM CHLORIDE 0.9 % (FLUSH) 0.9 %
10 SYRINGE (ML) INJECTION EVERY 8 HOURS SCHEDULED
Status: ACTIVE | OUTPATIENT
Start: 2025-09-02

## 2025-09-02 RX ORDER — VANCOMYCIN HYDROCHLORIDE 1 G/20ML
INJECTION, POWDER, LYOPHILIZED, FOR SOLUTION INTRAVENOUS DAILY PRN
Status: DISCONTINUED | OUTPATIENT
Start: 2025-09-02 | End: 2025-09-04

## 2025-09-02 RX ORDER — SODIUM CHLORIDE 0.9 % (FLUSH) 0.9 %
10 SYRINGE (ML) INJECTION AS NEEDED
Status: ACTIVE | OUTPATIENT
Start: 2025-09-02

## 2025-09-02 RX ADMIN — Medication 10 ML: at 21:31

## 2025-09-02 RX ADMIN — Medication 10 ML: at 14:26

## 2025-09-02 RX ADMIN — LIDOCAINE 4% 1 PATCH: 40 PATCH TOPICAL at 09:00

## 2025-09-02 RX ADMIN — PANTOPRAZOLE SODIUM 40 MG: 40 INJECTION, POWDER, FOR SOLUTION INTRAVENOUS at 06:07

## 2025-09-02 RX ADMIN — HEPARIN SODIUM 5000 UNITS: 5000 INJECTION, SOLUTION INTRAVENOUS; SUBCUTANEOUS at 14:25

## 2025-09-02 RX ADMIN — HEPARIN SODIUM 5000 UNITS: 5000 INJECTION, SOLUTION INTRAVENOUS; SUBCUTANEOUS at 06:06

## 2025-09-02 RX ADMIN — HYDROMORPHONE HYDROCHLORIDE 0.2 MG: 0.2 INJECTION, SOLUTION INTRAMUSCULAR; INTRAVENOUS; SUBCUTANEOUS at 01:24

## 2025-09-02 RX ADMIN — HYDROMORPHONE HYDROCHLORIDE 0.2 MG: 0.2 INJECTION, SOLUTION INTRAMUSCULAR; INTRAVENOUS; SUBCUTANEOUS at 11:43

## 2025-09-02 RX ADMIN — SODIUM CHLORIDE, SODIUM LACTATE, POTASSIUM CHLORIDE, AND CALCIUM CHLORIDE 125 ML/HR: .6; .31; .03; .02 INJECTION, SOLUTION INTRAVENOUS at 17:58

## 2025-09-02 RX ADMIN — SODIUM CHLORIDE, SODIUM LACTATE, POTASSIUM CHLORIDE, AND CALCIUM CHLORIDE 1000 ML: .6; .31; .03; .02 INJECTION, SOLUTION INTRAVENOUS at 00:18

## 2025-09-02 RX ADMIN — HYDROMORPHONE HYDROCHLORIDE 0.4 MG: 1 INJECTION, SOLUTION INTRAMUSCULAR; INTRAVENOUS; SUBCUTANEOUS at 15:57

## 2025-09-02 RX ADMIN — PIPERACILLIN SODIUM AND TAZOBACTAM SODIUM 2.25 G: 2; .25 INJECTION, SOLUTION INTRAVENOUS at 11:43

## 2025-09-02 RX ADMIN — SODIUM CHLORIDE, SODIUM LACTATE, POTASSIUM CHLORIDE, AND CALCIUM CHLORIDE 125 ML/HR: .6; .31; .03; .02 INJECTION, SOLUTION INTRAVENOUS at 01:20

## 2025-09-02 RX ADMIN — SODIUM CHLORIDE, SODIUM LACTATE, POTASSIUM CHLORIDE, AND CALCIUM CHLORIDE 125 ML/HR: .6; .31; .03; .02 INJECTION, SOLUTION INTRAVENOUS at 09:59

## 2025-09-02 RX ADMIN — PIPERACILLIN SODIUM AND TAZOBACTAM SODIUM 3.38 G: 3; .375 INJECTION, SOLUTION INTRAVENOUS at 20:19

## 2025-09-02 RX ADMIN — PIPERACILLIN SODIUM AND TAZOBACTAM SODIUM 2.25 G: 2; .25 INJECTION, SOLUTION INTRAVENOUS at 04:12

## 2025-09-02 RX ADMIN — HEPARIN SODIUM 5000 UNITS: 5000 INJECTION, SOLUTION INTRAVENOUS; SUBCUTANEOUS at 21:30

## 2025-09-02 ASSESSMENT — COGNITIVE AND FUNCTIONAL STATUS - GENERAL
MOVING FROM LYING ON BACK TO SITTING ON SIDE OF FLAT BED WITH BEDRAILS: A LITTLE
TOILETING: A LOT
DRESSING REGULAR UPPER BODY CLOTHING: A LOT
HELP NEEDED FOR BATHING: A LOT
DRESSING REGULAR LOWER BODY CLOTHING: A LOT
TURNING FROM BACK TO SIDE WHILE IN FLAT BAD: A LOT
DAILY ACTIVITIY SCORE: 14
HELP NEEDED FOR BATHING: A LOT
PERSONAL GROOMING: A LOT
EATING MEALS: A LITTLE
CLIMB 3 TO 5 STEPS WITH RAILING: A LOT
MOVING TO AND FROM BED TO CHAIR: A LOT
WALKING IN HOSPITAL ROOM: A LOT
MOVING FROM LYING ON BACK TO SITTING ON SIDE OF FLAT BED WITH BEDRAILS: A LOT
MOVING TO AND FROM BED TO CHAIR: A LOT
TURNING FROM BACK TO SIDE WHILE IN FLAT BAD: A LITTLE
DRESSING REGULAR UPPER BODY CLOTHING: A LOT
MOBILITY SCORE: 12
DAILY ACTIVITIY SCORE: 14
MOBILITY SCORE: 14
DRESSING REGULAR LOWER BODY CLOTHING: A LOT
STANDING UP FROM CHAIR USING ARMS: A LOT
STANDING UP FROM CHAIR USING ARMS: A LOT
CLIMB 3 TO 5 STEPS WITH RAILING: A LOT
PERSONAL GROOMING: A LITTLE
WALKING IN HOSPITAL ROOM: A LOT
TOILETING: A LOT

## 2025-09-02 ASSESSMENT — PAIN SCALES - GENERAL
PAINLEVEL_OUTOF10: 6
PAINLEVEL_OUTOF10: 6
PAINLEVEL_OUTOF10: 5 - MODERATE PAIN
PAINLEVEL_OUTOF10: 6
PAINLEVEL_OUTOF10: 0 - NO PAIN
PAINLEVEL_OUTOF10: 6
PAINLEVEL_OUTOF10: 0 - NO PAIN
PAINLEVEL_OUTOF10: 3
PAINLEVEL_OUTOF10: 7

## 2025-09-02 ASSESSMENT — PAIN - FUNCTIONAL ASSESSMENT
PAIN_FUNCTIONAL_ASSESSMENT: 0-10

## 2025-09-02 ASSESSMENT — PAIN DESCRIPTION - DESCRIPTORS
DESCRIPTORS: ACHING;DISCOMFORT
DESCRIPTORS: DISCOMFORT
DESCRIPTORS: DISCOMFORT

## 2025-09-02 ASSESSMENT — PAIN DESCRIPTION - ORIENTATION: ORIENTATION: MID

## 2025-09-02 ASSESSMENT — PAIN DESCRIPTION - LOCATION
LOCATION: ABDOMEN

## 2025-09-02 ASSESSMENT — ACTIVITIES OF DAILY LIVING (ADL): BATHING_ASSISTANCE: MAXIMAL

## 2025-09-03 LAB
ANION GAP SERPL CALC-SCNC: 10 MMOL/L (ref 10–20)
BUN SERPL-MCNC: 32 MG/DL (ref 6–23)
CALCIUM SERPL-MCNC: 7.8 MG/DL (ref 8.6–10.3)
CHLORIDE SERPL-SCNC: 99 MMOL/L (ref 98–107)
CO2 SERPL-SCNC: 31 MMOL/L (ref 21–32)
CREAT SERPL-MCNC: 1.03 MG/DL (ref 0.5–1.05)
EGFRCR SERPLBLD CKD-EPI 2021: 55 ML/MIN/1.73M*2
ERYTHROCYTE [DISTWIDTH] IN BLOOD BY AUTOMATED COUNT: 15.9 % (ref 11.5–14.5)
GLUCOSE SERPL-MCNC: 76 MG/DL (ref 74–99)
HCT VFR BLD AUTO: 29.6 % (ref 36–46)
HGB BLD-MCNC: 9.5 G/DL (ref 12–16)
MAGNESIUM SERPL-MCNC: 1.85 MG/DL (ref 1.6–2.4)
MCH RBC QN AUTO: 29.5 PG (ref 26–34)
MCHC RBC AUTO-ENTMCNC: 32.1 G/DL (ref 32–36)
MCV RBC AUTO: 92 FL (ref 80–100)
NRBC BLD-RTO: 0 /100 WBCS (ref 0–0)
PHOSPHATE SERPL-MCNC: 2.4 MG/DL (ref 2.5–4.9)
PLATELET # BLD AUTO: 349 X10*3/UL (ref 150–450)
POTASSIUM SERPL-SCNC: 3 MMOL/L (ref 3.5–5.3)
RBC # BLD AUTO: 3.22 X10*6/UL (ref 4–5.2)
SODIUM SERPL-SCNC: 137 MMOL/L (ref 136–145)
VANCOMYCIN SERPL-MCNC: 11.6 UG/ML (ref 5–20)
WBC # BLD AUTO: 6.1 X10*3/UL (ref 4.4–11.3)

## 2025-09-03 PROCEDURE — 1200000002 HC GENERAL ROOM WITH TELEMETRY DAILY

## 2025-09-03 PROCEDURE — 99221 1ST HOSP IP/OBS SF/LOW 40: CPT | Performed by: NURSE PRACTITIONER

## 2025-09-03 PROCEDURE — 84100 ASSAY OF PHOSPHORUS: CPT | Performed by: SURGERY

## 2025-09-03 PROCEDURE — 2500000004 HC RX 250 GENERAL PHARMACY W/ HCPCS (ALT 636 FOR OP/ED)

## 2025-09-03 PROCEDURE — 2500000005 HC RX 250 GENERAL PHARMACY W/O HCPCS

## 2025-09-03 PROCEDURE — 80202 ASSAY OF VANCOMYCIN: CPT

## 2025-09-03 PROCEDURE — 82374 ASSAY BLOOD CARBON DIOXIDE: CPT | Performed by: SURGERY

## 2025-09-03 PROCEDURE — 2500000004 HC RX 250 GENERAL PHARMACY W/ HCPCS (ALT 636 FOR OP/ED): Performed by: NURSE PRACTITIONER

## 2025-09-03 PROCEDURE — 36415 COLL VENOUS BLD VENIPUNCTURE: CPT | Performed by: SURGERY

## 2025-09-03 PROCEDURE — 2500000004 HC RX 250 GENERAL PHARMACY W/ HCPCS (ALT 636 FOR OP/ED): Mod: JW | Performed by: SURGERY

## 2025-09-03 PROCEDURE — 85027 COMPLETE CBC AUTOMATED: CPT | Performed by: SURGERY

## 2025-09-03 PROCEDURE — 83735 ASSAY OF MAGNESIUM: CPT | Performed by: SURGERY

## 2025-09-03 RX ORDER — VANCOMYCIN HYDROCHLORIDE 1 G/200ML
1000 INJECTION, SOLUTION INTRAVENOUS ONCE
Status: COMPLETED | OUTPATIENT
Start: 2025-09-03 | End: 2025-09-03

## 2025-09-03 RX ORDER — METOPROLOL TARTRATE 1 MG/ML
5 INJECTION, SOLUTION INTRAVENOUS ONCE
Status: COMPLETED | OUTPATIENT
Start: 2025-09-03 | End: 2025-09-03

## 2025-09-03 RX ORDER — DEXTROSE MONOHYDRATE, SODIUM CHLORIDE, AND POTASSIUM CHLORIDE 50; 1.49; 9 G/1000ML; G/1000ML; G/1000ML
100 INJECTION, SOLUTION INTRAVENOUS CONTINUOUS
Status: DISPENSED | OUTPATIENT
Start: 2025-09-03 | End: 2025-09-04

## 2025-09-03 RX ORDER — POTASSIUM CHLORIDE 14.9 MG/ML
20 INJECTION INTRAVENOUS
Status: COMPLETED | OUTPATIENT
Start: 2025-09-03 | End: 2025-09-03

## 2025-09-03 RX ORDER — SODIUM CHLORIDE AND POTASSIUM CHLORIDE 150; 900 MG/100ML; MG/100ML
100 INJECTION, SOLUTION INTRAVENOUS CONTINUOUS
Status: DISCONTINUED | OUTPATIENT
Start: 2025-09-03 | End: 2025-09-03

## 2025-09-03 RX ADMIN — VANCOMYCIN HYDROCHLORIDE 1000 MG: 1 INJECTION, SOLUTION INTRAVENOUS at 12:36

## 2025-09-03 RX ADMIN — PANTOPRAZOLE SODIUM 40 MG: 40 INJECTION, POWDER, FOR SOLUTION INTRAVENOUS at 06:00

## 2025-09-03 RX ADMIN — Medication 10 ML: at 21:44

## 2025-09-03 RX ADMIN — HEPARIN SODIUM 5000 UNITS: 5000 INJECTION, SOLUTION INTRAVENOUS; SUBCUTANEOUS at 15:21

## 2025-09-03 RX ADMIN — DEXTROSE, SODIUM CHLORIDE, AND POTASSIUM CHLORIDE 100 ML/HR: 5; .9; .15 INJECTION INTRAVENOUS at 16:49

## 2025-09-03 RX ADMIN — HYDROMORPHONE HYDROCHLORIDE 0.2 MG: 0.2 INJECTION, SOLUTION INTRAMUSCULAR; INTRAVENOUS; SUBCUTANEOUS at 14:07

## 2025-09-03 RX ADMIN — PIPERACILLIN SODIUM AND TAZOBACTAM SODIUM 3.38 G: 3; .375 INJECTION, SOLUTION INTRAVENOUS at 12:37

## 2025-09-03 RX ADMIN — PIPERACILLIN SODIUM AND TAZOBACTAM SODIUM 3.38 G: 3; .375 INJECTION, SOLUTION INTRAVENOUS at 02:46

## 2025-09-03 RX ADMIN — HEPARIN SODIUM 5000 UNITS: 5000 INJECTION, SOLUTION INTRAVENOUS; SUBCUTANEOUS at 05:53

## 2025-09-03 RX ADMIN — METOPROLOL TARTRATE 5 MG: 1 INJECTION, SOLUTION INTRAVENOUS at 21:15

## 2025-09-03 RX ADMIN — HYDROMORPHONE HYDROCHLORIDE 0.4 MG: 1 INJECTION, SOLUTION INTRAMUSCULAR; INTRAVENOUS; SUBCUTANEOUS at 07:45

## 2025-09-03 RX ADMIN — ASCORBIC ACID, VITAMIN A PALMITATE, CHOLECALCIFEROL, THIAMINE HYDROCHLORIDE, RIBOFLAVIN-5 PHOSPHATE SODIUM, PYRIDOXINE HYDROCHLORIDE, NIACINAMIDE, DEXPANTHENOL, ALPHA-TOCOPHEROL ACETATE, VITAMIN K1, FOLIC ACID, BIOTIN, CYANOCOBALAMIN: 200; 3300; 200; 6; 3.6; 6; 40; 15; 10; 150; 600; 60; 5 INJECTION, SOLUTION INTRAVENOUS at 20:18

## 2025-09-03 RX ADMIN — PIPERACILLIN SODIUM AND TAZOBACTAM SODIUM 3.38 G: 3; .375 INJECTION, SOLUTION INTRAVENOUS at 18:50

## 2025-09-03 RX ADMIN — POTASSIUM CHLORIDE 20 MEQ: 14.9 INJECTION, SOLUTION INTRAVENOUS at 12:36

## 2025-09-03 RX ADMIN — HEPARIN SODIUM 5000 UNITS: 5000 INJECTION, SOLUTION INTRAVENOUS; SUBCUTANEOUS at 21:44

## 2025-09-03 RX ADMIN — SODIUM CHLORIDE AND POTASSIUM CHLORIDE 100 ML/HR: .9; .15 SOLUTION INTRAVENOUS at 11:03

## 2025-09-03 RX ADMIN — Medication 10 ML: at 15:22

## 2025-09-03 RX ADMIN — Medication 10 ML: at 05:53

## 2025-09-03 RX ADMIN — POTASSIUM CHLORIDE 20 MEQ: 14.9 INJECTION, SOLUTION INTRAVENOUS at 10:02

## 2025-09-03 ASSESSMENT — COGNITIVE AND FUNCTIONAL STATUS - GENERAL
CLIMB 3 TO 5 STEPS WITH RAILING: TOTAL
TOILETING: A LOT
DRESSING REGULAR LOWER BODY CLOTHING: A LOT
STANDING UP FROM CHAIR USING ARMS: A LOT
DRESSING REGULAR UPPER BODY CLOTHING: A LOT
HELP NEEDED FOR BATHING: A LOT
MOVING TO AND FROM BED TO CHAIR: A LOT
HELP NEEDED FOR BATHING: A LITTLE
HELP NEEDED FOR BATHING: A LOT
MOVING TO AND FROM BED TO CHAIR: A LOT
PERSONAL GROOMING: A LITTLE
CLIMB 3 TO 5 STEPS WITH RAILING: TOTAL
PERSONAL GROOMING: A LITTLE
MOVING FROM LYING ON BACK TO SITTING ON SIDE OF FLAT BED WITH BEDRAILS: A LITTLE
DAILY ACTIVITIY SCORE: 15
DAILY ACTIVITIY SCORE: 15
MOVING FROM LYING ON BACK TO SITTING ON SIDE OF FLAT BED WITH BEDRAILS: A LITTLE
STANDING UP FROM CHAIR USING ARMS: A LOT
TOILETING: A LOT
WALKING IN HOSPITAL ROOM: A LOT
DRESSING REGULAR UPPER BODY CLOTHING: A LOT
TURNING FROM BACK TO SIDE WHILE IN FLAT BAD: A LITTLE
MOBILITY SCORE: 13
TURNING FROM BACK TO SIDE WHILE IN FLAT BAD: A LITTLE
DRESSING REGULAR LOWER BODY CLOTHING: A LOT

## 2025-09-03 ASSESSMENT — PAIN SCALES - GENERAL
PAINLEVEL_OUTOF10: 4
PAINLEVEL_OUTOF10: 0 - NO PAIN
PAINLEVEL_OUTOF10: 8
PAINLEVEL_OUTOF10: 6
PAINLEVEL_OUTOF10: 0 - NO PAIN

## 2025-09-03 ASSESSMENT — PAIN - FUNCTIONAL ASSESSMENT
PAIN_FUNCTIONAL_ASSESSMENT: 0-10

## 2025-09-03 ASSESSMENT — PAIN DESCRIPTION - LOCATION
LOCATION: BACK
LOCATION: ABDOMEN

## 2025-09-04 LAB
ANION GAP SERPL CALC-SCNC: 10 MMOL/L (ref 10–20)
BUN SERPL-MCNC: 21 MG/DL (ref 6–23)
CALCIUM SERPL-MCNC: 7.7 MG/DL (ref 8.6–10.3)
CHLORIDE SERPL-SCNC: 103 MMOL/L (ref 98–107)
CO2 SERPL-SCNC: 29 MMOL/L (ref 21–32)
CREAT SERPL-MCNC: 0.77 MG/DL (ref 0.5–1.05)
EGFRCR SERPLBLD CKD-EPI 2021: 79 ML/MIN/1.73M*2
ERYTHROCYTE [DISTWIDTH] IN BLOOD BY AUTOMATED COUNT: 15.6 % (ref 11.5–14.5)
GLUCOSE SERPL-MCNC: 194 MG/DL (ref 74–99)
HCT VFR BLD AUTO: 31 % (ref 36–46)
HGB BLD-MCNC: 9.8 G/DL (ref 12–16)
MAGNESIUM SERPL-MCNC: 1.79 MG/DL (ref 1.6–2.4)
MCH RBC QN AUTO: 29.8 PG (ref 26–34)
MCHC RBC AUTO-ENTMCNC: 31.6 G/DL (ref 32–36)
MCV RBC AUTO: 94 FL (ref 80–100)
NRBC BLD-RTO: 0 /100 WBCS (ref 0–0)
PHOSPHATE SERPL-MCNC: 1.2 MG/DL (ref 2.5–4.9)
PLATELET # BLD AUTO: 341 X10*3/UL (ref 150–450)
POTASSIUM SERPL-SCNC: 3.1 MMOL/L (ref 3.5–5.3)
RBC # BLD AUTO: 3.29 X10*6/UL (ref 4–5.2)
SODIUM SERPL-SCNC: 139 MMOL/L (ref 136–145)
STAPHYLOCOCCUS SPEC CULT: NORMAL
VANCOMYCIN SERPL-MCNC: 12.1 UG/ML (ref 5–20)
WBC # BLD AUTO: 10.2 X10*3/UL (ref 4.4–11.3)

## 2025-09-04 PROCEDURE — 2500000004 HC RX 250 GENERAL PHARMACY W/ HCPCS (ALT 636 FOR OP/ED): Performed by: SURGERY

## 2025-09-04 PROCEDURE — 85027 COMPLETE CBC AUTOMATED: CPT | Performed by: SURGERY

## 2025-09-04 PROCEDURE — 36415 COLL VENOUS BLD VENIPUNCTURE: CPT | Performed by: SURGERY

## 2025-09-04 PROCEDURE — 2500000005 HC RX 250 GENERAL PHARMACY W/O HCPCS: Performed by: SURGERY

## 2025-09-04 PROCEDURE — 2500000004 HC RX 250 GENERAL PHARMACY W/ HCPCS (ALT 636 FOR OP/ED)

## 2025-09-04 PROCEDURE — 2500000001 HC RX 250 WO HCPCS SELF ADMINISTERED DRUGS (ALT 637 FOR MEDICARE OP): Performed by: SURGERY

## 2025-09-04 PROCEDURE — 80202 ASSAY OF VANCOMYCIN: CPT

## 2025-09-04 PROCEDURE — 83735 ASSAY OF MAGNESIUM: CPT | Performed by: SURGERY

## 2025-09-04 PROCEDURE — 71045 X-RAY EXAM CHEST 1 VIEW: CPT | Performed by: RADIOLOGY

## 2025-09-04 PROCEDURE — 84100 ASSAY OF PHOSPHORUS: CPT | Performed by: SURGERY

## 2025-09-04 PROCEDURE — 80048 BASIC METABOLIC PNL TOTAL CA: CPT | Performed by: SURGERY

## 2025-09-04 PROCEDURE — 1200000002 HC GENERAL ROOM WITH TELEMETRY DAILY

## 2025-09-04 PROCEDURE — 2500000004 HC RX 250 GENERAL PHARMACY W/ HCPCS (ALT 636 FOR OP/ED): Performed by: PHYSICIAN ASSISTANT

## 2025-09-04 PROCEDURE — 99231 SBSQ HOSP IP/OBS SF/LOW 25: CPT | Performed by: PHYSICIAN ASSISTANT

## 2025-09-04 RX ORDER — HYDRALAZINE HYDROCHLORIDE 20 MG/ML
10 INJECTION INTRAMUSCULAR; INTRAVENOUS ONCE
Status: COMPLETED | OUTPATIENT
Start: 2025-09-04 | End: 2025-09-04

## 2025-09-04 RX ORDER — ADHESIVE BANDAGE
30 BANDAGE TOPICAL ONCE
Status: COMPLETED | OUTPATIENT
Start: 2025-09-04 | End: 2025-09-04

## 2025-09-04 RX ORDER — ACETAMINOPHEN 325 MG/1
975 TABLET ORAL EVERY 8 HOURS
Status: DISPENSED | OUTPATIENT
Start: 2025-09-04

## 2025-09-04 RX ORDER — OXYCODONE HYDROCHLORIDE 5 MG/1
5 TABLET ORAL EVERY 6 HOURS PRN
Status: DISPENSED | OUTPATIENT
Start: 2025-09-04

## 2025-09-04 RX ORDER — IPRATROPIUM BROMIDE AND ALBUTEROL SULFATE 2.5; .5 MG/3ML; MG/3ML
3 SOLUTION RESPIRATORY (INHALATION)
Status: DISCONTINUED | OUTPATIENT
Start: 2025-09-04 | End: 2025-09-05

## 2025-09-04 RX ORDER — POTASSIUM CHLORIDE 14.9 MG/ML
20 INJECTION INTRAVENOUS
Status: COMPLETED | OUTPATIENT
Start: 2025-09-04 | End: 2025-09-04

## 2025-09-04 RX ORDER — VANCOMYCIN HYDROCHLORIDE 1.25 G/250ML
1250 INJECTION, SOLUTION INTRAVITREAL EVERY 24 HOURS
Status: DISCONTINUED | OUTPATIENT
Start: 2025-09-04 | End: 2025-09-04

## 2025-09-04 RX ADMIN — DEXTROSE, SODIUM CHLORIDE, AND POTASSIUM CHLORIDE 100 ML/HR: 5; .9; .15 INJECTION INTRAVENOUS at 04:28

## 2025-09-04 RX ADMIN — AMLODIPINE BESYLATE 5 MG: 5 TABLET ORAL at 13:59

## 2025-09-04 RX ADMIN — Medication 1 DOSE: at 21:00

## 2025-09-04 RX ADMIN — Medication 10 ML: at 06:05

## 2025-09-04 RX ADMIN — PIPERACILLIN SODIUM AND TAZOBACTAM SODIUM 3.38 G: 3; .375 INJECTION, SOLUTION INTRAVENOUS at 18:47

## 2025-09-04 RX ADMIN — PIPERACILLIN SODIUM AND TAZOBACTAM SODIUM 3.38 G: 3; .375 INJECTION, SOLUTION INTRAVENOUS at 03:38

## 2025-09-04 RX ADMIN — POTASSIUM PHOSPHATE, MONOBASIC POTASSIUM PHOSPHATE, DIBASIC INJECTION, 30 MMOL: 236; 224 SOLUTION, CONCENTRATE INTRAVENOUS at 15:25

## 2025-09-04 RX ADMIN — ACETAMINOPHEN 975 MG: 325 TABLET ORAL at 18:46

## 2025-09-04 RX ADMIN — LOSARTAN POTASSIUM 100 MG: 100 TABLET, FILM COATED ORAL at 13:59

## 2025-09-04 RX ADMIN — POTASSIUM CHLORIDE 20 MEQ: 14.9 INJECTION, SOLUTION INTRAVENOUS at 12:43

## 2025-09-04 RX ADMIN — HYDRALAZINE HYDROCHLORIDE 10 MG: 20 INJECTION INTRAMUSCULAR; INTRAVENOUS at 00:19

## 2025-09-04 RX ADMIN — RIVAROXABAN 5 MG: 2.5 TABLET, FILM COATED ORAL at 17:00

## 2025-09-04 RX ADMIN — PANTOPRAZOLE SODIUM 40 MG: 40 INJECTION, POWDER, FOR SOLUTION INTRAVENOUS at 06:04

## 2025-09-04 RX ADMIN — HYDRALAZINE HYDROCHLORIDE 10 MG: 20 INJECTION INTRAMUSCULAR; INTRAVENOUS at 23:22

## 2025-09-04 RX ADMIN — Medication 10 ML: at 21:24

## 2025-09-04 RX ADMIN — OXYCODONE HYDROCHLORIDE 5 MG: 5 TABLET ORAL at 20:40

## 2025-09-04 RX ADMIN — PIPERACILLIN SODIUM AND TAZOBACTAM SODIUM 3.38 G: 3; .375 INJECTION, SOLUTION INTRAVENOUS at 10:39

## 2025-09-04 RX ADMIN — HYDROMORPHONE HYDROCHLORIDE 0.2 MG: 0.2 INJECTION, SOLUTION INTRAMUSCULAR; INTRAVENOUS; SUBCUTANEOUS at 09:24

## 2025-09-04 RX ADMIN — ASPIRIN 81 MG: 81 TABLET, COATED ORAL at 20:38

## 2025-09-04 RX ADMIN — HEPARIN SODIUM 5000 UNITS: 5000 INJECTION, SOLUTION INTRAVENOUS; SUBCUTANEOUS at 06:05

## 2025-09-04 RX ADMIN — MAGNESIUM HYDROXIDE 30 ML: 400 SUSPENSION ORAL at 13:59

## 2025-09-04 RX ADMIN — POTASSIUM CHLORIDE 20 MEQ: 14.9 INJECTION, SOLUTION INTRAVENOUS at 10:39

## 2025-09-04 RX ADMIN — HYDRALAZINE HYDROCHLORIDE 10 MG: 20 INJECTION INTRAMUSCULAR; INTRAVENOUS at 01:21

## 2025-09-04 ASSESSMENT — COGNITIVE AND FUNCTIONAL STATUS - GENERAL
DRESSING REGULAR UPPER BODY CLOTHING: A LITTLE
MOBILITY SCORE: 13
HELP NEEDED FOR BATHING: A LOT
STANDING UP FROM CHAIR USING ARMS: A LOT
DRESSING REGULAR LOWER BODY CLOTHING: A LOT
MOVING FROM LYING ON BACK TO SITTING ON SIDE OF FLAT BED WITH BEDRAILS: A LITTLE
MOVING TO AND FROM BED TO CHAIR: A LOT
WALKING IN HOSPITAL ROOM: A LOT
CLIMB 3 TO 5 STEPS WITH RAILING: TOTAL
MOVING TO AND FROM BED TO CHAIR: A LOT
MOBILITY SCORE: 13
MOVING FROM LYING ON BACK TO SITTING ON SIDE OF FLAT BED WITH BEDRAILS: A LITTLE
CLIMB 3 TO 5 STEPS WITH RAILING: TOTAL
STANDING UP FROM CHAIR USING ARMS: A LOT
DAILY ACTIVITIY SCORE: 17
WALKING IN HOSPITAL ROOM: A LOT
TOILETING: A LOT
HELP NEEDED FOR BATHING: A LOT
TOILETING: A LOT
TURNING FROM BACK TO SIDE WHILE IN FLAT BAD: A LITTLE
DRESSING REGULAR UPPER BODY CLOTHING: A LOT
PERSONAL GROOMING: A LITTLE
PERSONAL GROOMING: A LITTLE
DAILY ACTIVITIY SCORE: 15
DRESSING REGULAR LOWER BODY CLOTHING: A LITTLE
TURNING FROM BACK TO SIDE WHILE IN FLAT BAD: A LITTLE

## 2025-09-04 ASSESSMENT — PAIN SCALES - GENERAL
PAINLEVEL_OUTOF10: 6
PAINLEVEL_OUTOF10: 4
PAINLEVEL_OUTOF10: 5 - MODERATE PAIN
PAINLEVEL_OUTOF10: 0 - NO PAIN

## 2025-09-04 ASSESSMENT — PAIN - FUNCTIONAL ASSESSMENT
PAIN_FUNCTIONAL_ASSESSMENT: 0-10

## 2025-09-04 ASSESSMENT — PAIN DESCRIPTION - ORIENTATION: ORIENTATION: MID

## 2025-09-04 ASSESSMENT — PAIN DESCRIPTION - LOCATION
LOCATION: ABDOMEN
LOCATION: ABDOMEN

## 2025-09-05 LAB
ANION GAP SERPL CALC-SCNC: 12 MMOL/L (ref 10–20)
BUN SERPL-MCNC: 19 MG/DL (ref 6–23)
CALCIUM SERPL-MCNC: 7.4 MG/DL (ref 8.6–10.3)
CHLORIDE SERPL-SCNC: 102 MMOL/L (ref 98–107)
CO2 SERPL-SCNC: 26 MMOL/L (ref 21–32)
CREAT SERPL-MCNC: 0.82 MG/DL (ref 0.5–1.05)
EGFRCR SERPLBLD CKD-EPI 2021: 73 ML/MIN/1.73M*2
ERYTHROCYTE [DISTWIDTH] IN BLOOD BY AUTOMATED COUNT: 16 % (ref 11.5–14.5)
GLUCOSE BLD MANUAL STRIP-MCNC: 108 MG/DL (ref 74–99)
GLUCOSE SERPL-MCNC: 103 MG/DL (ref 74–99)
HCT VFR BLD AUTO: 31.8 % (ref 36–46)
HGB BLD-MCNC: 10.2 G/DL (ref 12–16)
MAGNESIUM SERPL-MCNC: 1.66 MG/DL (ref 1.6–2.4)
MCH RBC QN AUTO: 29.4 PG (ref 26–34)
MCHC RBC AUTO-ENTMCNC: 32.1 G/DL (ref 32–36)
MCV RBC AUTO: 92 FL (ref 80–100)
NRBC BLD-RTO: 0 /100 WBCS (ref 0–0)
PHOSPHATE SERPL-MCNC: 2.3 MG/DL (ref 2.5–4.9)
PLATELET # BLD AUTO: 376 X10*3/UL (ref 150–450)
POTASSIUM SERPL-SCNC: 3.2 MMOL/L (ref 3.5–5.3)
RBC # BLD AUTO: 3.47 X10*6/UL (ref 4–5.2)
SODIUM SERPL-SCNC: 137 MMOL/L (ref 136–145)
WBC # BLD AUTO: 11.4 X10*3/UL (ref 4.4–11.3)

## 2025-09-05 PROCEDURE — 2500000005 HC RX 250 GENERAL PHARMACY W/O HCPCS: Performed by: SURGERY

## 2025-09-05 PROCEDURE — 36415 COLL VENOUS BLD VENIPUNCTURE: CPT | Performed by: SURGERY

## 2025-09-05 PROCEDURE — 82947 ASSAY GLUCOSE BLOOD QUANT: CPT

## 2025-09-05 PROCEDURE — 97535 SELF CARE MNGMENT TRAINING: CPT | Mod: GO,CO

## 2025-09-05 PROCEDURE — 80048 BASIC METABOLIC PNL TOTAL CA: CPT | Performed by: SURGERY

## 2025-09-05 PROCEDURE — 2500000005 HC RX 250 GENERAL PHARMACY W/O HCPCS: Performed by: PHYSICIAN ASSISTANT

## 2025-09-05 PROCEDURE — 85027 COMPLETE CBC AUTOMATED: CPT | Performed by: SURGERY

## 2025-09-05 PROCEDURE — 84100 ASSAY OF PHOSPHORUS: CPT | Performed by: SURGERY

## 2025-09-05 PROCEDURE — 97530 THERAPEUTIC ACTIVITIES: CPT | Mod: GP,CQ

## 2025-09-05 PROCEDURE — 2500000001 HC RX 250 WO HCPCS SELF ADMINISTERED DRUGS (ALT 637 FOR MEDICARE OP): Performed by: SURGERY

## 2025-09-05 PROCEDURE — 87493 C DIFF AMPLIFIED PROBE: CPT | Mod: STJLAB | Performed by: PHYSICIAN ASSISTANT

## 2025-09-05 PROCEDURE — 83735 ASSAY OF MAGNESIUM: CPT | Performed by: SURGERY

## 2025-09-05 PROCEDURE — 2500000004 HC RX 250 GENERAL PHARMACY W/ HCPCS (ALT 636 FOR OP/ED)

## 2025-09-05 PROCEDURE — 97116 GAIT TRAINING THERAPY: CPT | Mod: GP,CQ

## 2025-09-05 PROCEDURE — 1100000001 HC PRIVATE ROOM DAILY

## 2025-09-05 PROCEDURE — 2500000004 HC RX 250 GENERAL PHARMACY W/ HCPCS (ALT 636 FOR OP/ED): Performed by: PHYSICIAN ASSISTANT

## 2025-09-05 RX ORDER — AMOXICILLIN AND CLAVULANATE POTASSIUM 875; 125 MG/1; MG/1
875 TABLET, FILM COATED ORAL 2 TIMES DAILY
Qty: 20 TABLET | Refills: 0 | Status: SHIPPED | OUTPATIENT
Start: 2025-09-05 | End: 2025-09-15

## 2025-09-05 RX ORDER — IPRATROPIUM BROMIDE AND ALBUTEROL SULFATE 2.5; .5 MG/3ML; MG/3ML
3 SOLUTION RESPIRATORY (INHALATION) 3 TIMES DAILY PRN
Status: ACTIVE | OUTPATIENT
Start: 2025-09-05

## 2025-09-05 RX ADMIN — PIPERACILLIN SODIUM AND TAZOBACTAM SODIUM 3.38 G: 3; .375 INJECTION, SOLUTION INTRAVENOUS at 20:31

## 2025-09-05 RX ADMIN — RIVAROXABAN 5 MG: 2.5 TABLET, FILM COATED ORAL at 17:41

## 2025-09-05 RX ADMIN — Medication 10 ML: at 06:25

## 2025-09-05 RX ADMIN — Medication 10 ML: at 15:26

## 2025-09-05 RX ADMIN — RIVAROXABAN 5 MG: 2.5 TABLET, FILM COATED ORAL at 09:09

## 2025-09-05 RX ADMIN — Medication 10 ML: at 21:17

## 2025-09-05 RX ADMIN — ASPIRIN 81 MG: 81 TABLET, COATED ORAL at 20:31

## 2025-09-05 RX ADMIN — AMLODIPINE BESYLATE 5 MG: 5 TABLET ORAL at 09:08

## 2025-09-05 RX ADMIN — LOSARTAN POTASSIUM 100 MG: 100 TABLET, FILM COATED ORAL at 09:08

## 2025-09-05 RX ADMIN — LIDOCAINE 4% 1 PATCH: 40 PATCH TOPICAL at 09:09

## 2025-09-05 RX ADMIN — POTASSIUM PHOSPHATE, MONOBASIC POTASSIUM PHOSPHATE, DIBASIC INJECTION, 30 MMOL: 236; 224 SOLUTION, CONCENTRATE INTRAVENOUS at 11:49

## 2025-09-05 RX ADMIN — Medication: at 22:00

## 2025-09-05 RX ADMIN — Medication: at 08:00

## 2025-09-05 RX ADMIN — PIPERACILLIN SODIUM AND TAZOBACTAM SODIUM 3.38 G: 3; .375 INJECTION, SOLUTION INTRAVENOUS at 03:36

## 2025-09-05 RX ADMIN — PIPERACILLIN SODIUM AND TAZOBACTAM SODIUM 3.38 G: 3; .375 INJECTION, SOLUTION INTRAVENOUS at 10:30

## 2025-09-05 ASSESSMENT — COGNITIVE AND FUNCTIONAL STATUS - GENERAL
MOVING FROM LYING ON BACK TO SITTING ON SIDE OF FLAT BED WITH BEDRAILS: A LOT
MOBILITY SCORE: 15
TURNING FROM BACK TO SIDE WHILE IN FLAT BAD: A LITTLE
WALKING IN HOSPITAL ROOM: A LITTLE
CLIMB 3 TO 5 STEPS WITH RAILING: A LOT
MOVING TO AND FROM BED TO CHAIR: A LITTLE
DRESSING REGULAR LOWER BODY CLOTHING: A LOT
DAILY ACTIVITIY SCORE: 16
MOVING TO AND FROM BED TO CHAIR: A LITTLE
HELP NEEDED FOR BATHING: A LOT
MOBILITY SCORE: 19
PERSONAL GROOMING: A LITTLE
TOILETING: A LITTLE
TOILETING: A LOT
TURNING FROM BACK TO SIDE WHILE IN FLAT BAD: A LOT
CLIMB 3 TO 5 STEPS WITH RAILING: A LOT
STANDING UP FROM CHAIR USING ARMS: A LITTLE
DRESSING REGULAR UPPER BODY CLOTHING: A LITTLE
WALKING IN HOSPITAL ROOM: A LITTLE
DAILY ACTIVITIY SCORE: 23

## 2025-09-05 ASSESSMENT — PAIN - FUNCTIONAL ASSESSMENT
PAIN_FUNCTIONAL_ASSESSMENT: 0-10
PAIN_FUNCTIONAL_ASSESSMENT: 0-10

## 2025-09-05 ASSESSMENT — PAIN SCALES - GENERAL
PAINLEVEL_OUTOF10: 0 - NO PAIN
PAINLEVEL_OUTOF10: 2
PAINLEVEL_OUTOF10: 2

## 2025-09-05 ASSESSMENT — PAIN DESCRIPTION - DESCRIPTORS
DESCRIPTORS: DISCOMFORT
DESCRIPTORS: DISCOMFORT

## 2025-09-05 ASSESSMENT — ACTIVITIES OF DAILY LIVING (ADL)
HOME_MANAGEMENT_TIME_ENTRY: 15
HOME_MANAGEMENT_TIME_ENTRY: 15

## 2025-09-06 LAB
ANION GAP SERPL CALC-SCNC: 10 MMOL/L (ref 10–20)
BUN SERPL-MCNC: 13 MG/DL (ref 6–23)
C DIF TOX TCDA+TCDB STL QL NAA+PROBE: NOT DETECTED
CALCIUM SERPL-MCNC: 7.2 MG/DL (ref 8.6–10.3)
CHLORIDE SERPL-SCNC: 105 MMOL/L (ref 98–107)
CO2 SERPL-SCNC: 25 MMOL/L (ref 21–32)
CREAT SERPL-MCNC: 0.65 MG/DL (ref 0.5–1.05)
EGFRCR SERPLBLD CKD-EPI 2021: 90 ML/MIN/1.73M*2
ERYTHROCYTE [DISTWIDTH] IN BLOOD BY AUTOMATED COUNT: 15.9 % (ref 11.5–14.5)
GLUCOSE SERPL-MCNC: 86 MG/DL (ref 74–99)
HCT VFR BLD AUTO: 28.5 % (ref 36–46)
HGB BLD-MCNC: 9 G/DL (ref 12–16)
MAGNESIUM SERPL-MCNC: 1.64 MG/DL (ref 1.6–2.4)
MCH RBC QN AUTO: 28.8 PG (ref 26–34)
MCHC RBC AUTO-ENTMCNC: 31.6 G/DL (ref 32–36)
MCV RBC AUTO: 91 FL (ref 80–100)
NRBC BLD-RTO: 0 /100 WBCS (ref 0–0)
PHOSPHATE SERPL-MCNC: 2.5 MG/DL (ref 2.5–4.9)
PLATELET # BLD AUTO: 371 X10*3/UL (ref 150–450)
POTASSIUM SERPL-SCNC: 2.8 MMOL/L (ref 3.5–5.3)
RBC # BLD AUTO: 3.13 X10*6/UL (ref 4–5.2)
SODIUM SERPL-SCNC: 137 MMOL/L (ref 136–145)
WBC # BLD AUTO: 11.2 X10*3/UL (ref 4.4–11.3)

## 2025-09-06 PROCEDURE — 80048 BASIC METABOLIC PNL TOTAL CA: CPT | Performed by: SURGERY

## 2025-09-06 PROCEDURE — 1100000001 HC PRIVATE ROOM DAILY

## 2025-09-06 PROCEDURE — 36415 COLL VENOUS BLD VENIPUNCTURE: CPT | Performed by: SURGERY

## 2025-09-06 PROCEDURE — 2500000004 HC RX 250 GENERAL PHARMACY W/ HCPCS (ALT 636 FOR OP/ED)

## 2025-09-06 PROCEDURE — 84100 ASSAY OF PHOSPHORUS: CPT | Performed by: SURGERY

## 2025-09-06 PROCEDURE — 83735 ASSAY OF MAGNESIUM: CPT | Performed by: SURGERY

## 2025-09-06 PROCEDURE — 2500000001 HC RX 250 WO HCPCS SELF ADMINISTERED DRUGS (ALT 637 FOR MEDICARE OP)

## 2025-09-06 PROCEDURE — 2500000002 HC RX 250 W HCPCS SELF ADMINISTERED DRUGS (ALT 637 FOR MEDICARE OP, ALT 636 FOR OP/ED)

## 2025-09-06 PROCEDURE — 85027 COMPLETE CBC AUTOMATED: CPT | Performed by: SURGERY

## 2025-09-06 PROCEDURE — 99024 POSTOP FOLLOW-UP VISIT: CPT

## 2025-09-06 PROCEDURE — 2500000001 HC RX 250 WO HCPCS SELF ADMINISTERED DRUGS (ALT 637 FOR MEDICARE OP): Performed by: SURGERY

## 2025-09-06 RX ORDER — POTASSIUM CHLORIDE 20 MEQ/1
40 TABLET, EXTENDED RELEASE ORAL ONCE
Status: COMPLETED | OUTPATIENT
Start: 2025-09-06 | End: 2025-09-06

## 2025-09-06 RX ORDER — AMOXICILLIN AND CLAVULANATE POTASSIUM 875; 125 MG/1; MG/1
1 TABLET, FILM COATED ORAL EVERY 12 HOURS SCHEDULED
Status: DISPENSED | OUTPATIENT
Start: 2025-09-06 | End: 2025-09-10

## 2025-09-06 RX ORDER — AMLODIPINE BESYLATE 5 MG/1
5 TABLET ORAL ONCE
Status: COMPLETED | OUTPATIENT
Start: 2025-09-06 | End: 2025-09-06

## 2025-09-06 RX ORDER — POTASSIUM CHLORIDE 14.9 MG/ML
20 INJECTION INTRAVENOUS
Status: DISCONTINUED | OUTPATIENT
Start: 2025-09-06 | End: 2025-09-06

## 2025-09-06 RX ORDER — HYDRALAZINE HYDROCHLORIDE 20 MG/ML
10 INJECTION INTRAMUSCULAR; INTRAVENOUS ONCE
Status: COMPLETED | OUTPATIENT
Start: 2025-09-06 | End: 2025-09-06

## 2025-09-06 RX ADMIN — RIVAROXABAN 5 MG: 2.5 TABLET, FILM COATED ORAL at 18:14

## 2025-09-06 RX ADMIN — AMOXICILLIN AND CLAVULANATE POTASSIUM 1 TABLET: 875; 125 TABLET, FILM COATED ORAL at 14:57

## 2025-09-06 RX ADMIN — ASPIRIN 81 MG: 81 TABLET, COATED ORAL at 20:14

## 2025-09-06 RX ADMIN — POTASSIUM CHLORIDE 20 MEQ: 14.9 INJECTION, SOLUTION INTRAVENOUS at 10:58

## 2025-09-06 RX ADMIN — OXYCODONE HYDROCHLORIDE 5 MG: 5 TABLET ORAL at 20:14

## 2025-09-06 RX ADMIN — AMLODIPINE BESYLATE 5 MG: 5 TABLET ORAL at 00:28

## 2025-09-06 RX ADMIN — HYDRALAZINE HYDROCHLORIDE 10 MG: 20 INJECTION INTRAMUSCULAR; INTRAVENOUS at 04:33

## 2025-09-06 RX ADMIN — AMOXICILLIN AND CLAVULANATE POTASSIUM 1 TABLET: 875; 125 TABLET, FILM COATED ORAL at 20:14

## 2025-09-06 RX ADMIN — Medication 10 ML: at 21:57

## 2025-09-06 RX ADMIN — RIVAROXABAN 5 MG: 2.5 TABLET, FILM COATED ORAL at 09:29

## 2025-09-06 RX ADMIN — LOSARTAN POTASSIUM 100 MG: 100 TABLET, FILM COATED ORAL at 09:29

## 2025-09-06 RX ADMIN — AMLODIPINE BESYLATE 5 MG: 5 TABLET ORAL at 09:29

## 2025-09-06 RX ADMIN — PSYLLIUM HUSK 1 PACKET: 3.4 POWDER ORAL at 11:11

## 2025-09-06 RX ADMIN — PSYLLIUM HUSK 1 PACKET: 3.4 POWDER ORAL at 14:57

## 2025-09-06 RX ADMIN — PSYLLIUM HUSK 1 PACKET: 3.4 POWDER ORAL at 23:57

## 2025-09-06 RX ADMIN — OXYCODONE HYDROCHLORIDE 5 MG: 5 TABLET ORAL at 09:29

## 2025-09-06 RX ADMIN — Medication 10 ML: at 15:03

## 2025-09-06 RX ADMIN — POTASSIUM CHLORIDE EXTENDED-RELEASE 40 MEQ: 1500 TABLET ORAL at 14:57

## 2025-09-06 RX ADMIN — PIPERACILLIN SODIUM AND TAZOBACTAM SODIUM 3.38 G: 3; .375 INJECTION, SOLUTION INTRAVENOUS at 04:31

## 2025-09-06 RX ADMIN — POTASSIUM CHLORIDE EXTENDED-RELEASE 40 MEQ: 1500 TABLET ORAL at 18:13

## 2025-09-06 ASSESSMENT — PAIN DESCRIPTION - DESCRIPTORS
DESCRIPTORS: SHARP
DESCRIPTORS: DULL

## 2025-09-06 ASSESSMENT — COGNITIVE AND FUNCTIONAL STATUS - GENERAL
WALKING IN HOSPITAL ROOM: A LITTLE
MOBILITY SCORE: 18
CLIMB 3 TO 5 STEPS WITH RAILING: A LITTLE
TURNING FROM BACK TO SIDE WHILE IN FLAT BAD: A LITTLE
TOILETING: A LITTLE
MOVING TO AND FROM BED TO CHAIR: A LITTLE
STANDING UP FROM CHAIR USING ARMS: A LITTLE
MOVING FROM LYING ON BACK TO SITTING ON SIDE OF FLAT BED WITH BEDRAILS: A LITTLE

## 2025-09-06 ASSESSMENT — PAIN SCALES - GENERAL
PAINLEVEL_OUTOF10: 3
PAINLEVEL_OUTOF10: 7
PAINLEVEL_OUTOF10: 5 - MODERATE PAIN
PAINLEVEL_OUTOF10: 0 - NO PAIN
PAINLEVEL_OUTOF10: 4

## 2025-09-06 ASSESSMENT — PAIN - FUNCTIONAL ASSESSMENT
PAIN_FUNCTIONAL_ASSESSMENT: 0-10

## 2025-09-06 ASSESSMENT — PAIN DESCRIPTION - LOCATION: LOCATION: ABDOMEN

## 2025-09-07 VITALS
HEIGHT: 66 IN | SYSTOLIC BLOOD PRESSURE: 161 MMHG | HEART RATE: 88 BPM | WEIGHT: 134 LBS | TEMPERATURE: 97.7 F | DIASTOLIC BLOOD PRESSURE: 68 MMHG | BODY MASS INDEX: 21.53 KG/M2 | RESPIRATION RATE: 15 BRPM | OXYGEN SATURATION: 97 %

## 2025-09-07 LAB
ANION GAP SERPL CALC-SCNC: 10 MMOL/L (ref 10–20)
BUN SERPL-MCNC: 11 MG/DL (ref 6–23)
CALCIUM SERPL-MCNC: 7.7 MG/DL (ref 8.6–10.3)
CHLORIDE SERPL-SCNC: 105 MMOL/L (ref 98–107)
CO2 SERPL-SCNC: 24 MMOL/L (ref 21–32)
CREAT SERPL-MCNC: 0.67 MG/DL (ref 0.5–1.05)
EGFRCR SERPLBLD CKD-EPI 2021: 89 ML/MIN/1.73M*2
ERYTHROCYTE [DISTWIDTH] IN BLOOD BY AUTOMATED COUNT: 15.9 % (ref 11.5–14.5)
GLUCOSE SERPL-MCNC: 95 MG/DL (ref 74–99)
HCT VFR BLD AUTO: 29.7 % (ref 36–46)
HGB BLD-MCNC: 9.5 G/DL (ref 12–16)
MAGNESIUM SERPL-MCNC: 1.78 MG/DL (ref 1.6–2.4)
MCH RBC QN AUTO: 29.2 PG (ref 26–34)
MCHC RBC AUTO-ENTMCNC: 32 G/DL (ref 32–36)
MCV RBC AUTO: 91 FL (ref 80–100)
NRBC BLD-RTO: 0 /100 WBCS (ref 0–0)
PHOSPHATE SERPL-MCNC: 2.4 MG/DL (ref 2.5–4.9)
PLATELET # BLD AUTO: 396 X10*3/UL (ref 150–450)
POTASSIUM SERPL-SCNC: 3.9 MMOL/L (ref 3.5–5.3)
RBC # BLD AUTO: 3.25 X10*6/UL (ref 4–5.2)
SODIUM SERPL-SCNC: 135 MMOL/L (ref 136–145)
WBC # BLD AUTO: 9.9 X10*3/UL (ref 4.4–11.3)

## 2025-09-07 PROCEDURE — 84100 ASSAY OF PHOSPHORUS: CPT | Performed by: SURGERY

## 2025-09-07 PROCEDURE — 97530 THERAPEUTIC ACTIVITIES: CPT | Mod: GP | Performed by: PHYSICAL THERAPIST

## 2025-09-07 PROCEDURE — 2500000001 HC RX 250 WO HCPCS SELF ADMINISTERED DRUGS (ALT 637 FOR MEDICARE OP)

## 2025-09-07 PROCEDURE — 80048 BASIC METABOLIC PNL TOTAL CA: CPT | Performed by: SURGERY

## 2025-09-07 PROCEDURE — 97116 GAIT TRAINING THERAPY: CPT | Mod: GP | Performed by: PHYSICAL THERAPIST

## 2025-09-07 PROCEDURE — 1100000001 HC PRIVATE ROOM DAILY

## 2025-09-07 PROCEDURE — 36415 COLL VENOUS BLD VENIPUNCTURE: CPT | Performed by: SURGERY

## 2025-09-07 PROCEDURE — 2500000001 HC RX 250 WO HCPCS SELF ADMINISTERED DRUGS (ALT 637 FOR MEDICARE OP): Performed by: SURGERY

## 2025-09-07 PROCEDURE — 83735 ASSAY OF MAGNESIUM: CPT | Performed by: SURGERY

## 2025-09-07 PROCEDURE — 85027 COMPLETE CBC AUTOMATED: CPT | Performed by: SURGERY

## 2025-09-07 RX ADMIN — AMOXICILLIN AND CLAVULANATE POTASSIUM 1 TABLET: 875; 125 TABLET, FILM COATED ORAL at 09:22

## 2025-09-07 RX ADMIN — RIVAROXABAN 5 MG: 2.5 TABLET, FILM COATED ORAL at 20:24

## 2025-09-07 RX ADMIN — PSYLLIUM HUSK 1 PACKET: 3.4 POWDER ORAL at 20:24

## 2025-09-07 RX ADMIN — LOSARTAN POTASSIUM 100 MG: 100 TABLET, FILM COATED ORAL at 09:22

## 2025-09-07 RX ADMIN — RIVAROXABAN 5 MG: 2.5 TABLET, FILM COATED ORAL at 09:23

## 2025-09-07 RX ADMIN — PSYLLIUM HUSK 1 PACKET: 3.4 POWDER ORAL at 15:08

## 2025-09-07 RX ADMIN — PSYLLIUM HUSK 1 PACKET: 3.4 POWDER ORAL at 09:22

## 2025-09-07 RX ADMIN — ASPIRIN 81 MG: 81 TABLET, COATED ORAL at 20:24

## 2025-09-07 RX ADMIN — Medication 10 ML: at 05:47

## 2025-09-07 RX ADMIN — AMLODIPINE BESYLATE 5 MG: 5 TABLET ORAL at 09:23

## 2025-09-07 RX ADMIN — AMOXICILLIN AND CLAVULANATE POTASSIUM 1 TABLET: 875; 125 TABLET, FILM COATED ORAL at 20:24

## 2025-09-07 ASSESSMENT — COGNITIVE AND FUNCTIONAL STATUS - GENERAL
MOVING FROM LYING ON BACK TO SITTING ON SIDE OF FLAT BED WITH BEDRAILS: A LITTLE
WALKING IN HOSPITAL ROOM: A LITTLE
STANDING UP FROM CHAIR USING ARMS: A LITTLE
WALKING IN HOSPITAL ROOM: A LITTLE
TOILETING: A LITTLE
TOILETING: A LITTLE
DRESSING REGULAR LOWER BODY CLOTHING: A LITTLE
TURNING FROM BACK TO SIDE WHILE IN FLAT BAD: A LITTLE
MOVING TO AND FROM BED TO CHAIR: A LITTLE
TOILETING: A LITTLE
CLIMB 3 TO 5 STEPS WITH RAILING: A LITTLE
MOVING FROM LYING ON BACK TO SITTING ON SIDE OF FLAT BED WITH BEDRAILS: A LITTLE
TURNING FROM BACK TO SIDE WHILE IN FLAT BAD: A LITTLE
STANDING UP FROM CHAIR USING ARMS: A LITTLE
DAILY ACTIVITIY SCORE: 22
STANDING UP FROM CHAIR USING ARMS: A LITTLE
TURNING FROM BACK TO SIDE WHILE IN FLAT BAD: A LITTLE
STANDING UP FROM CHAIR USING ARMS: A LITTLE
DRESSING REGULAR LOWER BODY CLOTHING: A LITTLE
WALKING IN HOSPITAL ROOM: A LITTLE
MOBILITY SCORE: 18
TURNING FROM BACK TO SIDE WHILE IN FLAT BAD: A LITTLE
CLIMB 3 TO 5 STEPS WITH RAILING: A LITTLE
CLIMB 3 TO 5 STEPS WITH RAILING: A LITTLE
MOVING TO AND FROM BED TO CHAIR: A LITTLE
WALKING IN HOSPITAL ROOM: A LITTLE
DAILY ACTIVITIY SCORE: 22
MOVING FROM LYING ON BACK TO SITTING ON SIDE OF FLAT BED WITH BEDRAILS: A LITTLE
MOBILITY SCORE: 18
DRESSING REGULAR LOWER BODY CLOTHING: A LITTLE
MOBILITY SCORE: 18
MOVING TO AND FROM BED TO CHAIR: A LITTLE
DAILY ACTIVITIY SCORE: 22
CLIMB 3 TO 5 STEPS WITH RAILING: TOTAL
MOVING TO AND FROM BED TO CHAIR: A LITTLE
MOBILITY SCORE: 16
MOVING FROM LYING ON BACK TO SITTING ON SIDE OF FLAT BED WITH BEDRAILS: A LITTLE

## 2025-09-07 ASSESSMENT — PAIN SCALES - GENERAL
PAINLEVEL_OUTOF10: 0 - NO PAIN
PAINLEVEL_OUTOF10: 5 - MODERATE PAIN

## 2025-09-07 ASSESSMENT — PAIN - FUNCTIONAL ASSESSMENT
PAIN_FUNCTIONAL_ASSESSMENT: 0-10

## 2025-09-15 ENCOUNTER — APPOINTMENT (OUTPATIENT)
Dept: SURGERY | Facility: CLINIC | Age: 80
End: 2025-09-15
Payer: MEDICARE

## 2025-09-19 ENCOUNTER — APPOINTMENT (OUTPATIENT)
Dept: PRIMARY CARE | Facility: CLINIC | Age: 80
End: 2025-09-19
Payer: MEDICARE

## (undated) DEVICE — GUIDEWIRE, COMMAND ST, HI-TORQUE, 0.18 X 300CM

## (undated) DEVICE — CLOSURE SYSTEM, VASCULAR, VASCADE, 5 F

## (undated) DEVICE — GLOVE, SURGICAL, PROTEXIS PI , 6.0, PF, LF

## (undated) DEVICE — MANIFOLD KIT, CUSTOM (SJM)

## (undated) DEVICE — 50ML IODIXANOL INJECTION (NDC 65219-383-05)

## (undated) DEVICE — Device

## (undated) DEVICE — TIP, SUCTION, POOLEHANDPIECE, POOLE SUCTION, W/VENT, 14-28FR

## (undated) DEVICE — INTRODUCER, MICROPUNCTURE, 2.9/4.0 FR, W/ GUIDEWIRE, ECHO

## (undated) DEVICE — SHEATH, 45CM, W/DILATOR, 6 FR DIA, ST CURVE STYLE W/CROSS-CUT VALVE

## (undated) DEVICE — GLOVE, SURGICAL, BIOGEL, 6, PF, LATEX, GREEN

## (undated) DEVICE — APPLICATOR, CHLORAPREP, W/ORANGE TINT, 26ML

## (undated) DEVICE — GUIDE WIRE, 260CM, HI-TORQUE, VERSACORE, MODIFIED J

## (undated) DEVICE — SUTURE, PROLENE, 1, 30 IN, CT-1, BLUE

## (undated) DEVICE — SHEATH, PINNACLE, W/.038 GUIDEWIRE, 10 CM,  6FR INTRODUCER, 6FR DIA, 2.5 CM DIALATOR

## (undated) DEVICE — BANDAGE, ELASTIC, SELF-CLOSE, 4 IN, HONEYCOMB, STERILE

## (undated) DEVICE — LIGASURE IMPACT, 18CM

## (undated) DEVICE — BANDAGE, GAUZE, 6 PLY, KERLIX, 4.5 IN X 4.1 YD, AMD, STERILE

## (undated) DEVICE — SLEEVE, VASO PRESS, CALF GARMENT, MEDIUM, GREEN

## (undated) DEVICE — TORQUE DEVICE, ACCOMODATES WIRES W/DIA .010 TO .038"."

## (undated) DEVICE — CATHETER, SOFT VU, NON-BRAID FLUSH, .035, 5FR, 65CM, 6-SIDEHOLE

## (undated) DEVICE — SHEATH, 45CM, W/DILATOR, 7 FR DIA, ST CURVE STYLE W/CROSS-CUT VALVE

## (undated) DEVICE — SYRINGE, MONOJECT, LUER LOCK, 3 CC, LF

## (undated) DEVICE — IRRIGATION SET, CYSTOSCOPY, REGULATING CLAMP, STRAIGHT, 81 IN

## (undated) DEVICE — EVACUATOR, WOUND, 100CC, SILICONE, HEMOVAC FULL PERF, FLAT, 7MM

## (undated) DEVICE — CATHETER, CXI SUPPORT, .018 X 150CM, ANG TIP

## (undated) DEVICE — GEL, ULTRASOUND, AQUASONIC 100, 20 GM, STERILE

## (undated) DEVICE — SUTURE, PDSII, 1, TP-1, VIL, MONO, 48LP

## (undated) DEVICE — COVER, EQUIPMENT, DOME COVER, SNAP CAP, 30 IN, CLEAR, LF

## (undated) DEVICE — ACCESS KIT, S-MAK MINI, 4FR 10CM 0.018IN 40CM, NT/PT, ECHO ENHANCE NEEDLE

## (undated) DEVICE — SHEATH, PINNACLE, W/.038 GW 10CM, 5FR INTRODUCER, 2.5 CM DIALATOR

## (undated) DEVICE — GUIDEWIRE, STIFF SHAFT, ANGLE TIP, .035 DIA, 180 CM,  3 CM TIP"

## (undated) DEVICE — CLOSURE DEVICE, VASCULAR, MYNX CONTROL, 6FR/7FR

## (undated) DEVICE — CATHETER, ANGIO, INFINITI, MPA2, 5 FR X 100 CM

## (undated) DEVICE — EXTENSION SET, IV, 30 IN, LUER LOCK

## (undated) DEVICE — DRESSING, GAUZE, PETROLATUM, XEROFORM, 5 X 9 IN, STERILE

## (undated) DEVICE — SUTURE, VICRYL 2-0, TAPER POINT, CT-1 UNDYED 27 INCH

## (undated) DEVICE — DRAPE, TIBURON, SPLIT SHEET, REINF ADH STRIP, 77X108

## (undated) DEVICE — DRESSING, GAUZE, SUPER KERLIX, 6X6

## (undated) DEVICE — ACCESS KIT, MINI MAK, 4 FR X 10CM, 0.018 X 40CM, NITINOL/PLATINUM, STD NEEDLE

## (undated) DEVICE — TUBING, PRESSURE MONITOR, 24IN/61CM, FIXED FEMALE LUER

## (undated) DEVICE — VESSEL LOOP, RED MAXI, 2 CARD

## (undated) DEVICE — HEMOSTAT, SURGICEL POWDER 3.0GRAMS

## (undated) DEVICE — SUTURE, MONOCRYL, 3-0, 18 IN, PS2, UNDYED

## (undated) DEVICE — GUIDEWIRE, ANGLE TIP,  .035 DIA, 180 CM, 3 CM TIP"

## (undated) DEVICE — CATHETER, 4 FR. 100CM, ANGLE TAPER A TIP

## (undated) DEVICE — STAPLER, LINEAR, 3.5 60MM, RELOADABLE, BLUE

## (undated) DEVICE — CATHETER, 5 FR. 100CM, ANGLE TAPER AT TIP

## (undated) DEVICE — DRESSING, ADHESIVE, ISLAND, TELFA, 4 X 14 IN

## (undated) DEVICE — SHEATH, GLIDESHEATH, SLENDER, 5FR 10CM

## (undated) DEVICE — DRESSING, GAUZE, SPONGE, 12 PLY, CURITY, 4 X 4 IN, RIGID TRAY, STERILE

## (undated) DEVICE — SUTURE, VICRYL, 3-0, 27 IN, SH

## (undated) DEVICE — DRAPE, FLUID WARMER

## (undated) DEVICE — CATHETER, NAVICROSS, 0.035 X 150CM, STRAIGHT

## (undated) DEVICE — INFLATION DEVICE, BASIXCOMPAX, 30 ATM/BAR, 20ML, MAP152

## (undated) DEVICE — DRESSING, ABDOMINAL, WET PRUF, TENDERSORB, 5 X 9 IN, STERILE

## (undated) DEVICE — BANDAGE, GAUZE, CONFORMING, KERLIX LITE, 4 IN X 4.1 YD, STERILE

## (undated) DEVICE — CATHETER, DIAGNOSTIC, 5FR, IM

## (undated) DEVICE — GLOVE, SURGICAL, PROTEXIS PI BLUE W/NEUTHERA, 6.0, PF, LF

## (undated) DEVICE — SUTURE, SILK, 3-0, 30 IN, SH, CONTROL RELEASE, MULTIPACK, BLACK

## (undated) DEVICE — LOOP, VESSEL, MINI, BLUE STERILE

## (undated) DEVICE — CATHETER, IV, INSYTE, AUTOGUARD, SHIELDED, 18 G X 1.16 IN, VIALON

## (undated) DEVICE — SUTURE, PROLENE, 2-0, 18 IN, FS, BLUE

## (undated) DEVICE — GUIDEWIRE, ADVANTAGE, .035 X 260

## (undated) DEVICE — CATHETER, NAVICROSS, 0.035 X 135CM, ANGLED TIP

## (undated) DEVICE — CUTTER, PROXIMATE LINEAR RELOAD, 75MM, BLUE

## (undated) DEVICE — DEPOT, BAG 1400ML, 48IN LG BORE, AIRLESS MALE, MACRO DRIP

## (undated) DEVICE — CUTTER, PROX LINEAR, 75MM, REG TISSUE, W/ SAFETY LOCK OUT

## (undated) DEVICE — CATHETER, 5 FR. 65CM, ANGLE TAPER AT TIP

## (undated) DEVICE — BANDAGE, ELASTIC, ACE, SELF-CLOSURE, 6 IN X 5 YD, STERILE

## (undated) DEVICE — SWAB, CULTURE, MINI-TIP, W/STUART LIQUID, SOFTWIRE

## (undated) DEVICE — INTRODUCER, CHECKFLO, FLEXOR ANSEL, 6FR, .018/.038 MAX 45CM

## (undated) DEVICE — SUTURE, ETHILON, 3-0, 18 IN, PS2, BLACK

## (undated) DEVICE — DRAPE, SHEET, THREE QUARTER, FAN FOLD, 57 X 77 IN

## (undated) DEVICE — ADHESIVE, SKIN, DERMABOND ADVANCED, 15CM, PEN-STYLE

## (undated) DEVICE — GUIDEWIRE, STIFF SHAFT, ANGLE TIP, .035 DIA, 260 CM,  3 CM TIP"

## (undated) DEVICE — CLOSURE DEVICE, VASCULAR, ANGIO-SEAL, VIP, 6FR, LF

## (undated) DEVICE — PAD, RADIATION, FLIPPER, LEAD FREE, DISP

## (undated) DEVICE — TRAY, SURESTEP, SILICONE DRAINAGE BAG, STATLOCK, 16FR

## (undated) DEVICE — GUIDEWIRE, GOLD TIP, 45 DEG ANGLE, 300 CM, 3CM TIP

## (undated) DEVICE — SOLUTION, IRRIGATION, 0.9% SODIUM CHLORIDE, 1000 ML, HANG BOTTLE

## (undated) DEVICE — TOWEL PACK, STERILE, 4/PACK, BLUE

## (undated) DEVICE — CATHETER, VISIONS PV .018 (IVUS)

## (undated) DEVICE — STOCKINETTE, 16 X 48 X-LARGE

## (undated) DEVICE — DRESSING, NON-ADHERENT, 3 X 3 IN, STERILE

## (undated) DEVICE — BANDAGE, GAUZE, CONFORMING, KERLIX, 6 PLY, 4.5 IN X 4.1 YD

## (undated) DEVICE — CATHETER, TRAILBLAZER, ANGLED SUPPORT, 0.035 X 135CM

## (undated) DEVICE — SOLUTION, IRRIGATION, STERILE WATER, 1000 ML, HANG BOTTLE

## (undated) DEVICE — CATHETER, BALLOON, INPACT AV, DCB 018 6.0 MM X 40 MM X 130 CM

## (undated) DEVICE — BOWL SET, SMALL, DELUXE, STERILE

## (undated) DEVICE — SYRINGE, 20 CC, LUER LOCK